# Patient Record
Sex: FEMALE | Race: WHITE | NOT HISPANIC OR LATINO | Employment: OTHER | URBAN - METROPOLITAN AREA
[De-identification: names, ages, dates, MRNs, and addresses within clinical notes are randomized per-mention and may not be internally consistent; named-entity substitution may affect disease eponyms.]

---

## 2017-01-19 ENCOUNTER — OFFICE VISIT (OUTPATIENT)
Dept: PHARMACY | Facility: PHYSICIAN GROUP | Age: 67
End: 2017-01-19

## 2017-01-19 ENCOUNTER — DOCUMENTATION ONLY (OUTPATIENT)
Dept: PHARMACY | Facility: PHYSICIAN GROUP | Age: 67
End: 2017-01-19

## 2017-01-19 DIAGNOSIS — E11.9 CONTROLLED TYPE 2 DIABETES MELLITUS WITHOUT COMPLICATION, WITH LONG-TERM CURRENT USE OF INSULIN (H): Primary | ICD-10-CM

## 2017-01-19 DIAGNOSIS — Z53.9 ERRONEOUS ENCOUNTER--DISREGARD: Primary | ICD-10-CM

## 2017-01-19 DIAGNOSIS — Z79.4 CONTROLLED TYPE 2 DIABETES MELLITUS WITHOUT COMPLICATION, WITH LONG-TERM CURRENT USE OF INSULIN (H): Primary | ICD-10-CM

## 2017-01-19 LAB — HBA1C MFR BLD: 7 % (ref 4–7)

## 2017-01-19 PROCEDURE — 84443 ASSAY THYROID STIM HORMONE: CPT | Mod: 90 | Performed by: FAMILY MEDICINE

## 2017-01-19 PROCEDURE — 83036 HEMOGLOBIN GLYCOSYLATED A1C: CPT | Performed by: FAMILY MEDICINE

## 2017-01-19 PROCEDURE — 36415 COLL VENOUS BLD VENIPUNCTURE: CPT | Performed by: FAMILY MEDICINE

## 2017-01-19 PROCEDURE — 80048 BASIC METABOLIC PNL TOTAL CA: CPT | Mod: 90 | Performed by: FAMILY MEDICINE

## 2017-01-19 NOTE — Clinical Note
[facility]  [facility] [address]  [place]  [address]    January 23, 2017        Alma Kraft  [address] [facility] [address]              Dear Alma Kraft      LAB RESULTS:     The results of your recent Kidney Tests, Potassium and Thyroid Function were NORMAL.    Fasting blood sugar is 124.  Hemoglobin A1C has increased slightly to 7- and is at the upper limit for diabetis control.      If you have any further questions or problems, please contact our office at [phone].          Susu Brody MD

## 2017-01-19 NOTE — PATIENT INSTRUCTIONS
Arthritis:    1. Try tylenol first; max of 6 tablets of the Extra Strength Tylenol daily   2. Advil   3. Aspercreme.  Apply to hands at night. Avoid touching eyes/mouth after applying  Labs:   1. A1c   2. TSH   3. BMP-kidney function

## 2017-01-19 NOTE — PROGRESS NOTES
"Clinical Consult:                                                    Alma Kraft is a 66 year old female coming in for a clinical pharmacist consult.  She was referred to me from Dr. Brody.     Reason for Consult: diabetes     Discussion: Gets really hungry around 4pm, stress eats often. Family stress- several family members with mental illnesses and she is primarily responsible for everyone. She is not seeing a therapist- on Venlafaxine ER 150mg daily and 10mg amitriptyline. Complains of dry mouth-  (amitriptyline for HA). Bought Biotene but hasn't tried it yet. Gets HA \"once in awhile\" takes 3 Advil and uses heat wrap. Perhaps d/c amitriptyline? Pt would benefit from working on other coping mechanisms instead of stress eating-->behavioral health consult?     Levemir 25 units of insulin qam currently with metofrmin 2000mg daily.    B yesterday, 136,142,112 (fasting)  Not checking her sugars in the afternoon because it is hard to remember.   Labs: a1c, tsh, bmp due.   Vitals: /77 HR 69    She had a question on what to take for arthritis in her hands. Using the Advil currently.       Plan:  1. Labs today  2. Behavioral health phone number- set up therapist.   3. PCP to consider trial off amitriptyline.     Changes ordered per CPA with Dr. Brody.    Sushila Fernandes, Pharm.D, [facility]  Medication Therapy Management Pharmacist  [phone]      "

## 2017-01-20 LAB
BUN SERPL-MCNC: 20 MG/DL (ref 7–25)
BUN/CREATININE RATIO: 43 (CALC) (ref 6–22)
CALCIUM SERPL-MCNC: 9 MG/DL (ref 8.6–10.4)
CHLORIDE SERPLBLD-SCNC: 102 MMOL/L (ref 98–110)
CO2 SERPL-SCNC: 27 MMOL/L (ref 20–31)
CREAT SERPL-MCNC: 0.47 MG/DL (ref 0.5–0.99)
EGFR AFRICAN AMERICAN - QUEST: 118 ML/MIN/1.73M2
GFR SERPL CREATININE-BSD FRML MDRD: 102 ML/MIN/1.73M2
GLUCOSE - QUEST: 124 MG/DL (ref 65–99)
POTASSIUM SERPL-SCNC: 3.8 MMOL/L (ref 3.5–5.3)
SODIUM SERPL-SCNC: 141 MMOL/L (ref 135–146)
TSH SERPL-ACNC: 2.28 MIU/L (ref 0.4–4.5)

## 2017-01-23 ENCOUNTER — CARE COORDINATION (OUTPATIENT)
Dept: CASE MANAGEMENT | Facility: CLINIC | Age: 67
End: 2017-01-23

## 2017-01-25 NOTE — PROGRESS NOTES
Clinic Care Coordination Contact  Outreach    Call placed to patient to follow-up on BHP referral information and identify if patient has any additional need for resources. Pt declined needing assistance at this time. Will mail Care Coordination introduction letter with contact information and remain avialable in clinic.    [facility]  Clinic Care Coordinator  [facility]/ [facility]  [phone]

## 2017-02-15 ENCOUNTER — TRANSFERRED RECORDS (OUTPATIENT)
Dept: FAMILY MEDICINE | Facility: CLINIC | Age: 67
End: 2017-02-15

## 2017-02-15 ENCOUNTER — OFFICE VISIT (OUTPATIENT)
Dept: FAMILY MEDICINE | Facility: CLINIC | Age: 67
End: 2017-02-15

## 2017-02-15 VITALS
OXYGEN SATURATION: 98 % | TEMPERATURE: 98.1 F | HEART RATE: 77 BPM | DIASTOLIC BLOOD PRESSURE: 70 MMHG | BODY MASS INDEX: 48.46 KG/M2 | SYSTOLIC BLOOD PRESSURE: 138 MMHG | WEIGHT: 250.2 LBS

## 2017-02-15 DIAGNOSIS — R51.9 CHRONIC NONINTRACTABLE HEADACHE, UNSPECIFIED HEADACHE TYPE: ICD-10-CM

## 2017-02-15 DIAGNOSIS — F51.04 PSYCHOPHYSIOLOGICAL INSOMNIA: ICD-10-CM

## 2017-02-15 DIAGNOSIS — G89.29 CHRONIC NONINTRACTABLE HEADACHE, UNSPECIFIED HEADACHE TYPE: ICD-10-CM

## 2017-02-15 DIAGNOSIS — F32.5 MAJOR DEPRESSION IN REMISSION (H): ICD-10-CM

## 2017-02-15 PROCEDURE — 99213 OFFICE O/P EST LOW 20 MIN: CPT | Performed by: FAMILY MEDICINE

## 2017-02-15 RX ORDER — LOSARTAN POTASSIUM AND HYDROCHLOROTHIAZIDE 25; 100 MG/1; MG/1
1 TABLET ORAL DAILY
Qty: 90 TABLET | Refills: 1 | Status: CANCELLED | OUTPATIENT
Start: 2017-02-15

## 2017-02-15 RX ORDER — POTASSIUM CHLORIDE 750 MG/1
CAPSULE, EXTENDED RELEASE ORAL
Qty: 360 CAPSULE | Refills: 1 | Status: CANCELLED | OUTPATIENT
Start: 2017-02-15

## 2017-02-15 RX ORDER — AMITRIPTYLINE HYDROCHLORIDE 10 MG/1
10 TABLET ORAL DAILY
Qty: 90 TABLET | Refills: 3 | Status: SHIPPED | OUTPATIENT
Start: 2017-02-15 | End: 2018-02-21

## 2017-02-15 RX ORDER — AMLODIPINE BESYLATE 5 MG/1
5 TABLET ORAL DAILY
Qty: 90 TABLET | Refills: 1 | Status: CANCELLED | OUTPATIENT
Start: 2017-02-15

## 2017-02-15 RX ORDER — VENLAFAXINE HYDROCHLORIDE 150 MG/1
150 CAPSULE, EXTENDED RELEASE ORAL DAILY
Qty: 90 CAPSULE | Refills: 3 | Status: SHIPPED | OUTPATIENT
Start: 2017-02-15 | End: 2018-02-21

## 2017-02-15 RX ORDER — ATORVASTATIN CALCIUM 10 MG/1
TABLET, FILM COATED ORAL
Qty: 90 TABLET | Refills: 3 | Status: CANCELLED | OUTPATIENT
Start: 2017-02-15

## 2017-02-15 NOTE — MR AVS SNAPSHOT
After Visit Summary   2/15/2017    Alma Kraft    MRN: [id number]           Patient Information     Date Of Birth          1950        Visit Information        Provider Department      2/15/2017 9:30 AM Susu Brody MD [facility]        Today's Diagnoses     Psychophysiological insomnia        Major depression in remission (H)        Chronic nonintractable headache, unspecified headache type           Follow-ups after your visit        Follow-up notes from your care team     Return for diabetis recheck with fasting labs.      Who to contact     If you have questions or need follow up information about today's clinic visit or your schedule please contact [facility] directly at [phone].  Normal or non-critical lab and imaging results will be communicated to you by MyChart, letter or phone within 4 business days after the clinic has received the results. If you do not hear from us within 7 days, please contact the clinic through MyChart or phone. If you have a critical or abnormal lab result, we will notify you by phone as soon as possible.  Submit refill requests through [employer] or call your pharmacy and they will forward the refill request to us. Please allow 3 business days for your refill to be completed.          Additional Information About Your Visit        Care EveryWhere ID     This is your Care EveryWhere ID. This could be used by other organizations to access your [facility] medical records  [id number]        Your Vitals Were     Pulse Temperature Last Period Pulse Oximetry BMI (Body Mass Index)       77 98.1  F (36.7  C) (Oral) 05/08/2001 98% 48.46 kg/m2        Blood Pressure from Last 3 Encounters:   02/15/17 138/70   12/14/16 140/74   10/06/16 138/84    Weight from Last 3 Encounters:   02/15/17 113.5 kg (250 lb 3.2 oz)   12/14/16 112.4 kg (247 lb 12.8 oz)   10/06/16 110.2 kg (243 lb)              Today, you had the  following     No orders found for display         Where to get your medicines      These medications were sent to [id number] - [place], [place] - [address]  [address], [facility] [address]     Phone:  [phone]     amitriptyline 10 MG tablet    venlafaxine 150 MG 24 hr capsule          Primary Care Provider Office Phone # Fax #    Susu Brody [phone]       [facility] [address]  [facility] [address]        Thank you!     Thank you for choosing [facility]  for your care. Our goal is always to provide you with excellent care. Hearing back from our patients is one way we can continue to improve our services. Please take a few minutes to complete the written survey that you may receive in the mail after your visit with us. Thank you!             Your Updated Medication List - Protect others around you: Learn how to safely use, store and throw away your medicines at www.disposemymeds.org.          This list is accurate as of: 2/15/17  8:04 PM.  Always use your most recent med list.                   Brand Name Dispense Instructions for use    amitriptyline 10 MG tablet    ELAVIL    90 tablet    Take 1 tablet (10 mg) by mouth daily       amLODIPine 5 MG tablet    NORVASC    90 tablet    Take 1 tablet (5 mg) by mouth daily       aspirin 81 MG tablet     100    1 tab po QD (Once per day)       atorvastatin 10 MG tablet    LIPITOR    90 tablet    TAKE 1 TABLET (10 MG) BY MOUTH DAILY       blood glucose monitoring lancets     1 Box    Use to test blood sugars 2 times daily or as directed.       blood glucose monitoring meter device kit     1 kit    Use to test blood sugars 2 times daily or as directed.       * blood glucose monitoring test strip    ONE TOUCH VERIO IQ    200 each    Use to test blood sugar 2 times daily or as directed.       * blood glucose monitoring test strip    no brand specified    100  each    Use to test blood sugars bid times daily or as directed  One Touch Ultra Test Strips       budesonide 32 MCG/ACT spray    RHINOCORT AQUA    3 Bottle    Spray 2 sprays into both nostrils daily       CENTRUM SILVER per tablet     100 tablet    Take  by mouth. 1 tablet 3 times weekly       insulin detemir 100 UNIT/ML injection    LEVEMIR FLEXPEN/FLEXTOUCH    3 Month    20 units at bedtime       insulin pen needle 32G X 4 MM    BD ROSE U/F    1 each    Use 1 daily as directed.       losartan-hydrochlorothiazide 100-25 MG per tablet    HYZAAR    90 tablet    Take 1 tablet by mouth daily       metFORMIN 500 MG 24 hr tablet    GLUCOPHAGE-XR    360 tablet    Take 4 tablets (2,000 mg) by mouth daily (with dinner)       order for DME     3 Device    Equipment being ordered: Mediven plus compression stockings  84893 20-30 compression       potassium chloride 10 MEQ CR capsule    MICRO-K    360 capsule    TAKE 2 TABLETS BY MOUTH TWICE DAILY       venlafaxine 150 MG 24 hr capsule    EFFEXOR XR    90 capsule    Take 1 capsule (150 mg) by mouth daily       * Notice:  This list has 2 medication(s) that are the same as other medications prescribed for you. Read the directions carefully, and ask your doctor or other care provider to review them with you.

## 2017-02-15 NOTE — PROGRESS NOTES
SUBJECTIVE:                                                    Alma Kraft is a 66 year old female who presents to clinic today for the following health issues:      Depression Followup    Status since last visit: Stable      See PHQ-9 for current symptoms.  Other associated symptoms: overeating- back on track with mindful eating-     Complicating factors: sleep is good/ recheck with sleep physicians/ on CPAP  Significant life event:  No   Current substance abuse:  None  Anxiety or Panic symptoms:  No    PHQ-9  English PHQ-9   Any Language            Amount of exercise or physical activity: sedentary    Problems taking medications regularly: No    Medication side effects: none  Diet: diabetic    PROBLEMS TO ADD ON...  Diabetis: up to date with care  History of headaches-     Has been on amitriptyline since the 1980's- started when her  lost his job-  She had intractable headaches  ?? Stop amitriptyline- advised by MTM  Rare headaches currently- on minimal dose    Venlafaxine: initially prescribed ten plus years-  had mental illness- menopause years  Has been on ever since- symptoms in remission- wishes to continue    Problem list and histories reviewed & adjusted, as indicated.  Additional history: as documented    Patient Active Problem List   Diagnosis     Essential hypertension, benign     Menopausal and postmenopausal disorder     Mixed hyperlipidemia     Obesity, morbid, BMI 40.0-49.9 (H)     Esophageal reflux     Allergic rhinitis     Obstructive sleep apnea     Family history of malignant neoplasm of breast     ACP (advance care planning)     Diabetes type 2, controlled (H)     Adjustment disorder with mixed anxiety and depressed mood     Acne     Health Care Home     Past Surgical History   Procedure Laterality Date     Hc remove tonsils/adenoids,<12 y/o  1953     T & A <11y.o.     C appendectomy  1956     C vaginal hysterectomy  8/01     Hysterectomy, Vaginal & BSO     Hc colonoscopy  w/wo brush/wash  6/03     C eye exam established pt  4/20/11     No retinopathy     Hc knee scope, diagnostic  4/2005     Arthroscopy, Knee Left     C total knee arthroplasty  2/06     Knee Replacement, Total Right     C total knee arthroplasty  3/5/07     Knee Replacement, Total  Left     Test not found  6/27/07     R heel/ inocencio's deformity     ------------other-------------  9/5/2013     L hand, cyst excision       Social History   Substance Use Topics     Smoking status: Never Smoker     Smokeless tobacco: Never Used     Alcohol use 0.0 oz/week     0 drink(s) per week      Comment: very rare     Family History   Problem Relation Age of Onset     Alzheimer Disease No family hx of      CANCER Father      prostate     DIABETES No family hx of      HEART DISEASE Paternal Grandfather      CEREBROVASCULAR DISEASE Maternal Grandfather      HEART DISEASE Maternal Grandmother      GASTROINTESTINAL DISEASE Father      GERD     GASTROINTESTINAL DISEASE Other      Niece with GERD/hiatal hernia     Breast Cancer Sister      51         Current Outpatient Prescriptions   Medication Sig Dispense Refill     amitriptyline (ELAVIL) 10 MG tablet Take 1 tablet (10 mg) by mouth daily 90 tablet 3     venlafaxine (EFFEXOR XR) 150 MG 24 hr capsule Take 1 capsule (150 mg) by mouth daily 90 capsule 3     insulin detemir (LEVEMIR FLEXPEN/FLEXTOUCH) 100 UNIT/ML injection 20 units at bedtime 3 Month 1     metFORMIN (GLUCOPHAGE-XR) 500 MG 24 hr tablet Take 4 tablets (2,000 mg) by mouth daily (with dinner) 360 tablet 1     amLODIPine (NORVASC) 5 MG tablet Take 1 tablet (5 mg) by mouth daily 90 tablet 1     potassium chloride (MICRO-K) 10 MEQ CR capsule TAKE 2 TABLETS BY MOUTH TWICE DAILY 360 capsule 1     losartan-hydrochlorothiazide (HYZAAR) 100-25 MG per tablet Take 1 tablet by mouth daily 90 tablet 1     budesonide (RHINOCORT AQUA) 32 MCG/ACT nasal spray Spray 2 sprays into both nostrils daily 3 Bottle 3     blood glucose monitoring (ONE  TOUCH DELICA) lancets Use to test blood sugars 2 times daily or as directed. 1 Box prn     blood glucose monitoring (NO BRAND SPECIFIED) test strip Use to test blood sugars bid times daily or as directed    One Touch Ultra Test Strips 100 each 3     atorvastatin (LIPITOR) 10 MG tablet TAKE 1 TABLET (10 MG) BY MOUTH DAILY 90 tablet 3     blood glucose monitoring (ONE TOUCH VERIO IQ) test strip Use to test blood sugar 2 times daily or as directed. 200 each 1     insulin pen needle (BD ROSE U/F) 32G X 4 MM Use 1 daily as directed. 1 each PRN     ORDER FOR DME Equipment being ordered: Mediven plus compression stockings    [id number]  20-30 compression 3 Device 0     Blood Glucose Monitoring Suppl (ONE TOUCH ULTRA 2) W/DEVICE KIT Use to test blood sugars 2 times daily or as directed. 1 kit 0     Multiple Vitamins-Minerals (CENTRUM SILVER) per tablet Take  by mouth. 1 tablet 3 times weekly 100 tablet      ASPIRIN 81 MG OR TABS 1 tab po QD (Once per day) 100 3     [DISCONTINUED] amitriptyline (ELAVIL) 10 MG tablet Take 1 tablet (10 mg) by mouth daily 90 tablet 1     [DISCONTINUED] venlafaxine (EFFEXOR XR) 150 MG 24 hr capsule Take 1 capsule (150 mg) by mouth daily 90 capsule 1       ROS:  Constitutional, HEENT, cardiovascular, pulmonary, gi and gu systems are negative, except as otherwise noted.    OBJECTIVE:                                                    /70  Pulse 77  Temp 98.1  F (36.7  C) (Oral)  Wt 113.5 kg (250 lb 3.2 oz)  LMP 05/08/2001  SpO2 98%  BMI 48.46 kg/m2  Body mass index is 48.46 kg/(m^2).  ALert and oriented times 3; Coherent speech, normal rate and volume, able to articulate, logical thoughts, able to abstract reason, no tangential thoughts, no hallucinations or delusions, understands psychiatric illnesses as a problem with normal insight and judgment. Affect is pleasant      Diagnostic Test Results:  none      ASSESSMENT/PLAN:                                                      Problem List Items  "Addressed This Visit     Essential hypertension, benign    Diabetes type 2, controlled (H)    Adjustment disorder with mixed anxiety and depressed mood    Relevant Medications    venlafaxine (EFFEXOR XR) 150 MG 24 hr capsule      Other Visit Diagnoses     Psychophysiological insomnia        Relevant Medications    amitriptyline (ELAVIL) 10 MG tablet    Diabetes type 2, uncontrolled               BMI:   Estimated body mass index is 48.46 kg/(m^2) as calculated from the following:    Height as of 12/14/16: 1.53 m (5' 0.25\").    Weight as of this encounter: 113.5 kg (250 lb 3.2 oz).   Weight management plan: Discussed healthy diet and exercise guidelines and patient will follow up in 3 months in clinic to re-evaluate.      MEDICATIONS:  Continue current medications without change  FUTURE APPOINTMENTS:       - Follow-up visit in April for weight and diabetis check    Susu Brody MD  [facility]  "

## 2017-02-15 NOTE — NURSING NOTE
Alma is here for a fasting medication recheck.     Pre-Visit Screening :  Immunizations : up to date  Colonoscopy : is up to date  Mammogram : is up to date  Asthma Action Test/Plan : na  PHQ9/GAD7 :  Done today..    BP done on the left arm, with a large sized cuff.  Pulse - regular  My Chart - declines    CLASSIFICATION OF OVERWEIGHT AND OBESITY BY BMI                         Obesity Class           BMI(kg/m2)  Underweight                                    < 18.5  Normal                                         18.5-24.9  Overweight                                     25.0-29.9  OBESITY                     I                  30.0-34.9                              II                 35.0-39.9  EXTREME OBESITY             III                >40                             Patient's  BMI Body mass index is 48.46 kg/(m^2).  http://hin.nhlbi.nih.gov/menuplanner/menu.cgi  Questioned patient about current smoking habits.  Pt. has never smoked.    CARLENE Valerio ([facility])

## 2017-02-16 ASSESSMENT — PATIENT HEALTH QUESTIONNAIRE - PHQ9: SUM OF ALL RESPONSES TO PHQ QUESTIONS 1-9: 0

## 2017-02-27 RX ORDER — PEN NEEDLE, DIABETIC 32GX 5/32"
NEEDLE, DISPOSABLE MISCELLANEOUS
Qty: 100 EACH | Status: SHIPPED | OUTPATIENT
Start: 2017-02-27 | End: 2018-04-07

## 2017-02-27 RX ORDER — ATORVASTATIN CALCIUM 10 MG/1
TABLET, FILM COATED ORAL
Qty: 90 TABLET | Refills: 0 | Status: SHIPPED | OUTPATIENT
Start: 2017-02-27 | End: 2017-04-26

## 2017-02-27 NOTE — TELEPHONE ENCOUNTER
Pending Prescriptions:                       Disp   Refills    atorvastatin (LIPITOR) 10 MG tablet [Phar*90 tab*             Sig: TAKE ONE TABLET BY MOUTH EVERY DAY    BD ROSE U/F 32G X 4 MM insulin pen needle*100 ea*PRN          Sig: USE 1 DAILY    BJS please review and fill in QTY, on both requests.    Pt last lipid was 5-  Pt last refill was 12-  Pt last ov was 2-  [place]  [phone] ([place])

## 2017-03-16 DIAGNOSIS — J30.2 SEASONAL ALLERGIC RHINITIS, UNSPECIFIED ALLERGIC RHINITIS TRIGGER: ICD-10-CM

## 2017-03-16 NOTE — TELEPHONE ENCOUNTER
Pt last seen 2/17    Pending Prescriptions:                       Disp   Refills    budesonide (RINOCORT AQUA) 32 MCG/ACT spr*       0            Sig: INSTILL 2 SPRAYS BY NASAL ROUTE DAILY

## 2017-04-10 ENCOUNTER — TRANSFERRED RECORDS (OUTPATIENT)
Dept: FAMILY MEDICINE | Facility: CLINIC | Age: 67
End: 2017-04-10

## 2017-04-15 DIAGNOSIS — I10 ESSENTIAL HYPERTENSION, BENIGN: ICD-10-CM

## 2017-04-17 NOTE — TELEPHONE ENCOUNTER
Pending Prescriptions:                       Disp   Refills    potassium chloride (MICRO-K) 10 MEQ CR ca*360 ca*0            Sig: TAKE TWO CAPSULES BY MOUTH TWICE DAILY     Pt was here for med check on 2-/ blood work done on -1-  Please fax, deny, or send to KENIA Infante  [phone] (home)

## 2017-04-19 RX ORDER — POTASSIUM CHLORIDE 750 MG/1
CAPSULE, EXTENDED RELEASE ORAL
Qty: 360 CAPSULE | Refills: 0 | Status: SHIPPED | OUTPATIENT
Start: 2017-04-19 | End: 2017-07-08

## 2017-04-26 ENCOUNTER — OFFICE VISIT (OUTPATIENT)
Dept: FAMILY MEDICINE | Facility: CLINIC | Age: 67
End: 2017-04-26

## 2017-04-26 VITALS
OXYGEN SATURATION: 98 % | HEIGHT: 60 IN | BODY MASS INDEX: 49.43 KG/M2 | HEART RATE: 75 BPM | DIASTOLIC BLOOD PRESSURE: 80 MMHG | WEIGHT: 251.8 LBS | TEMPERATURE: 98.1 F | SYSTOLIC BLOOD PRESSURE: 136 MMHG

## 2017-04-26 DIAGNOSIS — E11.9 CONTROLLED TYPE 2 DIABETES MELLITUS WITHOUT COMPLICATION, WITH LONG-TERM CURRENT USE OF INSULIN (H): Primary | ICD-10-CM

## 2017-04-26 DIAGNOSIS — E66.01 OBESITY, MORBID, BMI 40.0-49.9 (H): ICD-10-CM

## 2017-04-26 DIAGNOSIS — Z79.4 CONTROLLED TYPE 2 DIABETES MELLITUS WITHOUT COMPLICATION, WITH LONG-TERM CURRENT USE OF INSULIN (H): Primary | ICD-10-CM

## 2017-04-26 DIAGNOSIS — F51.04 PSYCHOPHYSIOLOGICAL INSOMNIA: ICD-10-CM

## 2017-04-26 DIAGNOSIS — I10 ESSENTIAL HYPERTENSION, BENIGN: ICD-10-CM

## 2017-04-26 LAB — HBA1C MFR BLD: 6.7 % (ref 4–7)

## 2017-04-26 PROCEDURE — 80061 LIPID PANEL: CPT | Mod: 90 | Performed by: FAMILY MEDICINE

## 2017-04-26 PROCEDURE — 99214 OFFICE O/P EST MOD 30 MIN: CPT | Performed by: FAMILY MEDICINE

## 2017-04-26 PROCEDURE — 83036 HEMOGLOBIN GLYCOSYLATED A1C: CPT | Performed by: FAMILY MEDICINE

## 2017-04-26 PROCEDURE — 80048 BASIC METABOLIC PNL TOTAL CA: CPT | Mod: 90 | Performed by: FAMILY MEDICINE

## 2017-04-26 PROCEDURE — 36415 COLL VENOUS BLD VENIPUNCTURE: CPT | Performed by: FAMILY MEDICINE

## 2017-04-26 RX ORDER — METFORMIN HCL 500 MG
2000 TABLET, EXTENDED RELEASE 24 HR ORAL
Qty: 360 TABLET | Refills: 1 | Status: SHIPPED | OUTPATIENT
Start: 2017-04-26 | End: 2017-11-09

## 2017-04-26 RX ORDER — ATORVASTATIN CALCIUM 10 MG/1
10 TABLET, FILM COATED ORAL DAILY
Qty: 90 TABLET | Refills: 3 | Status: SHIPPED | OUTPATIENT
Start: 2017-04-26 | End: 2018-02-21

## 2017-04-26 RX ORDER — AMLODIPINE BESYLATE 5 MG/1
5 TABLET ORAL DAILY
Qty: 90 TABLET | Refills: 1 | Status: SHIPPED | OUTPATIENT
Start: 2017-04-26 | End: 2017-11-09

## 2017-04-26 RX ORDER — LOSARTAN POTASSIUM AND HYDROCHLOROTHIAZIDE 25; 100 MG/1; MG/1
1 TABLET ORAL DAILY
Qty: 90 TABLET | Refills: 1 | Status: SHIPPED | OUTPATIENT
Start: 2017-04-26 | End: 2017-11-09

## 2017-04-26 NOTE — MR AVS SNAPSHOT
After Visit Summary   4/26/2017    Alma Kraft    MRN: [id number]           Patient Information     Date Of Birth          1950        Visit Information        Provider Department      4/26/2017 10:45 AM Susu Brody MD [facility]        Today's Diagnoses     Controlled type 2 diabetes mellitus without complication, with long-term current use of insulin (H)    -  1    BENIGN HYPERTENSION        Obesity, morbid, BMI 40.0-49.9 (H)        Psychophysiological insomnia           Follow-ups after your visit        Who to contact     If you have questions or need follow up information about today's clinic visit or your schedule please contact [facility] directly at [phone].  Normal or non-critical lab and imaging results will be communicated to you by MyChart, letter or phone within 4 business days after the clinic has received the results. If you do not hear from us within 7 days, please contact the clinic through MyChart or phone. If you have a critical or abnormal lab result, we will notify you by phone as soon as possible.  Submit refill requests through [employer] or call your pharmacy and they will forward the refill request to us. Please allow 3 business days for your refill to be completed.          Additional Information About Your Visit        Care EveryWhere ID     This is your Care EveryWhere ID. This could be used by other organizations to access your [place] medical records  [id number]        Your Vitals Were     Pulse Temperature Height Last Period Pulse Oximetry BMI (Body Mass Index)    75 98.1  F (36.7  C) (Oral) 1.524 m (5') 05/08/2001 98% 49.18 kg/m2       Blood Pressure from Last 3 Encounters:   04/26/17 136/80   02/15/17 138/70   12/14/16 140/74    Weight from Last 3 Encounters:   04/26/17 114.2 kg (251 lb 12.8 oz)   02/15/17 113.5 kg (250 lb 3.2 oz)   12/14/16 112.4 kg (247 lb 12.8 oz)              We Performed the  Following     BASIC METABOLIC PANEL ([facility])     Hemoglobin A1c ([facility])     Lipid Profile     VENOUS COLLECTION          Today's Medication Changes          These changes are accurate as of: 4/26/17  9:24 PM.  If you have any questions, ask your nurse or doctor.               These medicines have changed or have updated prescriptions.        Dose/Directions    atorvastatin 10 MG tablet   Commonly known as:  LIPITOR   This may have changed:  See the new instructions.   Used for:  Controlled type 2 diabetes mellitus without complication, with long-term current use of insulin (H)   Changed by:  Susu Brody MD        Dose:  10 mg   Take 1 tablet (10 mg) by mouth daily   Quantity:  90 tablet   Refills:  3       insulin detemir 100 UNIT/ML injection   Commonly known as:  LEVEMIR FLEXPEN/FLEXTOUCH   This may have changed:  additional instructions   Used for:  Controlled type 2 diabetes mellitus without complication, with long-term current use of insulin (H)   Changed by:  Susu Brody MD        25 units at bedtime   Quantity:  3 mL   Refills:  2            Where to get your medicines      These medications were sent to [id number] - [place], [place] - [address] [id number]     Phone:  [phone]     amLODIPine 5 MG tablet    atorvastatin 10 MG tablet    blood glucose monitoring lancets    insulin detemir 100 UNIT/ML injection    losartan-hydrochlorothiazide 100-25 MG per tablet    metFORMIN 500 MG 24 hr tablet         Some of these will need a paper prescription and others can be bought over the counter.  Ask your nurse if you have questions.     Bring a paper prescription for each of these medications     blood glucose monitoring test strip                Primary Care Provider Office Phone # Fax #    Susu Brody [phone]       [facility] [address] [address]        Thank you!     Thank  you for choosing [facility]  for your care. Our goal is always to provide you with excellent care. Hearing back from our patients is one way we can continue to improve our services. Please take a few minutes to complete the written survey that you may receive in the mail after your visit with us. Thank you!             Your Updated Medication List - Protect others around you: Learn how to safely use, store and throw away your medicines at www.disposemymeds.org.          This list is accurate as of: 4/26/17  9:24 PM.  Always use your most recent med list.                   Brand Name Dispense Instructions for use    amitriptyline 10 MG tablet    ELAVIL    90 tablet    Take 1 tablet (10 mg) by mouth daily       amLODIPine 5 MG tablet    NORVASC    90 tablet    Take 1 tablet (5 mg) by mouth daily       aspirin 81 MG tablet     100    1 tab po QD (Once per day)       atorvastatin 10 MG tablet    LIPITOR    90 tablet    Take 1 tablet (10 mg) by mouth daily       BD ROSE U/F 32G X 4 MM   Generic drug:  insulin pen needle     100 each    USE 1 DAILY       blood glucose monitoring lancets     1 Box    Use to test blood sugars 2 times daily or as directed.       blood glucose monitoring meter device kit     1 kit    Use to test blood sugars 2 times daily or as directed.       * blood glucose monitoring test strip    ONE TOUCH VERIO IQ    200 each    Use to test blood sugar 2 times daily or as directed.       * blood glucose monitoring test strip    no brand specified    100 each    Use to test blood sugars bid times daily or as directed  One Touch Ultra Test Strips       budesonide 32 MCG/ACT spray    RINOCORT AQUA    1 Bottle    INSTILL 2 SPRAYS BY NASAL ROUTE DAILY       CENTRUM SILVER per tablet     100 tablet    Take  by mouth. 1 tablet 3 times weekly       insulin detemir 100 UNIT/ML injection    LEVEMIR FLEXPEN/FLEXTOUCH    3 mL    25 units at bedtime       losartan-hydrochlorothiazide 100-25 MG  per tablet    HYZAAR    90 tablet    Take 1 tablet by mouth daily       metFORMIN 500 MG 24 hr tablet    GLUCOPHAGE-XR    360 tablet    Take 4 tablets (2,000 mg) by mouth daily (with dinner)       order for DME     3 Device    Equipment being ordered: Mediven plus compression stockings  [id number] 20-30 compression       potassium chloride 10 MEQ CR capsule    MICRO-K    360 capsule    TAKE TWO CAPSULES BY MOUTH TWICE DAILY       venlafaxine 150 MG 24 hr capsule    EFFEXOR XR    90 capsule    Take 1 capsule (150 mg) by mouth daily       * Notice:  This list has 2 medication(s) that are the same as other medications prescribed for you. Read the directions carefully, and ask your doctor or other care provider to review them with you.

## 2017-04-26 NOTE — NURSING NOTE
Jeny is here for a medication recheck.      Pre-Visit Screening :  Immunizations : not up to date - Tdap    Colonoscopy : is up to date  Mammogram : is up to date  Asthma Action Test/Plan : alvina  PHQ9/GAD7 :  Na    Pulse - regular  My Chart - accepts    CLASSIFICATION OF OVERWEIGHT AND OBESITY BY BMI                         Obesity Class           BMI(kg/m2)  Underweight                                    < 18.5  Normal                                         18.5-24.9  Overweight                                     25.0-29.9  OBESITY                     I                  30.0-34.9                              II                 35.0-39.9  EXTREME OBESITY             III                >40                             Patient's  BMI Body mass index is 49.18 kg/(m^2).  http://hin.nhlbi.nih.gov/menuplanner/menu.cgi  Questioned patient about current smoking habits.  Pt. has never smoked.  Namita.CARLENE Dhillon ([facility])

## 2017-04-26 NOTE — PROGRESS NOTES
SUBJECTIVE:                                                    Alma Kraft is a 66 year old female who presents to clinic today for the following health issues:      Diabetes Follow-up    Patient is checking blood sugars: once daily.  Results are as follows:         am - 130's- not consistently checking pm    Diabetic concerns: Worried about her control     Symptoms of hypoglycemia (low blood sugar): none     Paresthesias (numbness or burning in feet) or sores: No   Date of last diabetic eye exam: Left eye, blurred vision- has seen Dr Barbosa - normal exam    Recheck again this week- symptoms continue     Amount of exercise or physical activity:Exercising, but not with husbands recent surgery/    Treadmill- 1.5 miles     Problems taking medications regularly: taking care of  and daughter- less consistent with dosing    Medication side effects: none    Diet: mindful- counting carbs- healthy meals- needs to curb snacking      PROBLEMS TO ADD ON...  Morbid obesity: weight gain of fifteen pounds this year.    Problem list and histories reviewed & adjusted, as indicated.  Additional history: as documented    Patient Active Problem List   Diagnosis     Essential hypertension, benign     Menopausal and postmenopausal disorder     Mixed hyperlipidemia     Obesity, morbid, BMI 40.0-49.9 (H)     Esophageal reflux     Allergic rhinitis     Obstructive sleep apnea     Family history of malignant neoplasm of breast     ACP (advance care planning)     Diabetes type 2, controlled (H)     Adjustment disorder with mixed anxiety and depressed mood     Acne     Health Care Home     Past Surgical History:   Procedure Laterality Date     ------------OTHER-------------  9/5/2013    L hand, cyst excision     C APPENDECTOMY  1956     C EYE EXAM ESTABLISHED PT  4/20/11    No retinopathy     C TOTAL KNEE ARTHROPLASTY  2/06    Knee Replacement, Total Right     C TOTAL KNEE ARTHROPLASTY  3/5/07    Knee Replacement, Total   Left     C VAGINAL HYSTERECTOMY  8/01    Hysterectomy, Vaginal & BSO     HC COLONOSCOPY W/WO BRUSH/WASH  6/03     HC KNEE SCOPE, DIAGNOSTIC  4/2005    Arthroscopy, Knee Left     HC REMOVE TONSILS/ADENOIDS,<10 Y/O  1953    T & A <11y.o.     TEST NOT FOUND  6/27/07    R heel/ inocencio's deformity       Social History   Substance Use Topics     Smoking status: Never Smoker     Smokeless tobacco: Never Used     Alcohol use 0.0 oz/week     0 drink(s) per week      Comment: very rare     Family History   Problem Relation Age of Onset     Alzheimer Disease No family hx of      CANCER Father      prostate     DIABETES No family hx of      HEART DISEASE Paternal Grandfather      CEREBROVASCULAR DISEASE Maternal Grandfather      HEART DISEASE Maternal Grandmother      GASTROINTESTINAL DISEASE Father      GERD     GASTROINTESTINAL DISEASE Other      Niece with GERD/hiatal hernia     Breast Cancer Sister      51         Current Outpatient Prescriptions   Medication Sig Dispense Refill     atorvastatin (LIPITOR) 10 MG tablet Take 1 tablet (10 mg) by mouth daily 90 tablet 3     insulin detemir (LEVEMIR FLEXPEN/FLEXTOUCH) 100 UNIT/ML injection 25 units at bedtime 3 mL 2     amLODIPine (NORVASC) 5 MG tablet Take 1 tablet (5 mg) by mouth daily 90 tablet 1     losartan-hydrochlorothiazide (HYZAAR) 100-25 MG per tablet Take 1 tablet by mouth daily 90 tablet 1     metFORMIN (GLUCOPHAGE-XR) 500 MG 24 hr tablet Take 4 tablets (2,000 mg) by mouth daily (with dinner) 360 tablet 1     blood glucose monitoring (ONE TOUCH DELICA) lancets Use to test blood sugars 2 times daily or as directed. 1 Box prn     blood glucose monitoring (NO BRAND SPECIFIED) test strip Use to test blood sugars bid times daily or as directed    One Touch Ultra Test Strips 100 each 3     potassium chloride (MICRO-K) 10 MEQ CR capsule TAKE TWO CAPSULES BY MOUTH TWICE DAILY  360 capsule 0     budesonide (RINOCORT AQUA) 32 MCG/ACT spray INSTILL 2 SPRAYS BY NASAL ROUTE  DAILY 1 Bottle 11     BD ROSE U/F 32G X 4 MM insulin pen needle USE 1 DAILY 100 each PRN     amitriptyline (ELAVIL) 10 MG tablet Take 1 tablet (10 mg) by mouth daily 90 tablet 3     venlafaxine (EFFEXOR XR) 150 MG 24 hr capsule Take 1 capsule (150 mg) by mouth daily 90 capsule 3     blood glucose monitoring (ONE TOUCH VERIO IQ) test strip Use to test blood sugar 2 times daily or as directed. 200 each 1     ORDER FOR DME Equipment being ordered: Mediven plus compression stockings    03645  20-30 compression 3 Device 0     Blood Glucose Monitoring Suppl (ONE TOUCH ULTRA 2) W/DEVICE KIT Use to test blood sugars 2 times daily or as directed. 1 kit 0     Multiple Vitamins-Minerals (CENTRUM SILVER) per tablet Take  by mouth. 1 tablet 3 times weekly 100 tablet      ASPIRIN 81 MG OR TABS 1 tab po QD (Once per day) 100 3     [DISCONTINUED] atorvastatin (LIPITOR) 10 MG tablet TAKE ONE TABLET BY MOUTH EVERY DAY 90 tablet 0     [DISCONTINUED] insulin detemir (LEVEMIR FLEXPEN/FLEXTOUCH) 100 UNIT/ML injection 20 units at bedtime 3 Month 1     [DISCONTINUED] metFORMIN (GLUCOPHAGE-XR) 500 MG 24 hr tablet Take 4 tablets (2,000 mg) by mouth daily (with dinner) 360 tablet 1     [DISCONTINUED] amLODIPine (NORVASC) 5 MG tablet Take 1 tablet (5 mg) by mouth daily 90 tablet 1     [DISCONTINUED] losartan-hydrochlorothiazide (HYZAAR) 100-25 MG per tablet Take 1 tablet by mouth daily 90 tablet 1       Reviewed and updated as needed this visit by clinical staff       Reviewed and updated as needed this visit by Provider         ROS:    ROS: 7 point ROS neg other than the symptoms noted above in the HPI.  She complains of difficulty falling asleep  She is tired.    OBJECTIVE:                                                    /80 (BP Location: Right arm, Patient Position: Chair, Cuff Size: Adult Large)  Pulse 75  Temp 98.1  F (36.7  C) (Oral)  Ht 1.524 m (5')  Wt 114.2 kg (251 lb 12.8 oz)  LMP 05/08/2001  SpO2 98%  BMI 49.18  kg/m2  Body mass index is 49.18 kg/(m^2).  Regular rate and  rhythm. S1 and S2 normal, no murmurs, clicks, gallops or rubs. No edema or JVD. Chest is clear; no wheezes or rales.      Diagnostic Test Results:  Results for orders placed or performed in visit on 04/26/17 (from the past 24 hour(s))   Hemoglobin A1c (BFP)   Result Value Ref Range    Hemoglobin A1C 6.7 4.0 - 7.0 %        ASSESSMENT/PLAN:                                                      Problem List Items Addressed This Visit     Essential hypertension, benign    Relevant Orders    BASIC METABOLIC PANEL ([facility])    VENOUS COLLECTION (Completed)    Albumin Random Urine Quantitative    Diabetes type 2, controlled (H)    Relevant Orders    VENOUS COLLECTION (Completed)    Albumin Random Urine Quantitative    Obesity, morbid, BMI 40.0-49.9 (H) - Primary    Relevant Orders    Lipid Profile    VENOUS COLLECTION (Completed)    Albumin Random Urine Quantitative      Other Visit Diagnoses     Uncontrolled type 2 diabetes mellitus without complication, with long-term current use of insulin (H)        Relevant Orders    Hemoglobin A1c (BFP)    VENOUS COLLECTION (Completed)    Albumin Random Urine Quantitative    Psychophysiological insomnia        Relevant Orders    VENOUS COLLECTION (Completed)    Albumin Random Urine Quantitative    Chronic nonintractable headache, unspecified headache type        Relevant Orders    VENOUS COLLECTION (Completed)    Albumin Random Urine Quantitative           BMI:   Estimated body mass index is 49.18 kg/(m^2) as calculated from the following:    Height as of this encounter: 1.524 m (5').    Weight as of this encounter: 114.2 kg (251 lb 12.8 oz).   Weight management plan: Discussed healthy diet and exercise guidelines and patient will follow up in 6 months in clinic to re-evaluate.      MEDICATIONS:  Continue current medications without change  FUTURE APPOINTMENTS:       - Follow-up visit in 6 months  Work on weight loss  Regular  exercise  Self care-     Susu Brody MD  [facility]

## 2017-04-26 NOTE — LETTER
[facility]  [facility] [address]  [place]  [address]    April 27, 2017        Alma Kraft  [address] [address]              Dear Alma Kraft      LAB RESULTS:     The results of your recent Kidney Tests, Lipid profile and Potassium were NORMAL.    Blood sugar is 132.       If you have any further questions or problems, please contact our office at [phone].          Susu Brody MD

## 2017-04-27 LAB
BUN SERPL-MCNC: 21 MG/DL (ref 7–25)
BUN/CREATININE RATIO: 47 (CALC) (ref 6–22)
CALCIUM SERPL-MCNC: 9.3 MG/DL (ref 8.6–10.4)
CHLORIDE SERPLBLD-SCNC: 101 MMOL/L (ref 98–110)
CHOLEST SERPL-MCNC: 165 MG/DL (ref 125–200)
CHOLEST/HDLC SERPL: 2.4 (CALC)
CO2 SERPL-SCNC: 29 MMOL/L (ref 20–31)
CREAT SERPL-MCNC: 0.45 MG/DL (ref 0.5–0.99)
EGFR AFRICAN AMERICAN - QUEST: 120 ML/MIN/1.73M2
GFR SERPL CREATININE-BSD FRML MDRD: 104 ML/MIN/1.73M2
GLUCOSE - QUEST: 132 MG/DL (ref 65–99)
HDLC SERPL-MCNC: 69 MG/DL
LDLC SERPL CALC-MCNC: 82 MG/DL (CALC)
NONHDLC SERPL-MCNC: 96 MG/DL (CALC)
POTASSIUM SERPL-SCNC: 3.7 MMOL/L (ref 3.5–5.3)
SODIUM SERPL-SCNC: 140 MMOL/L (ref 135–146)
TRIGL SERPL-MCNC: 68 MG/DL

## 2017-04-29 DIAGNOSIS — E11.9 CONTROLLED TYPE 2 DIABETES MELLITUS WITHOUT COMPLICATION, WITH LONG-TERM CURRENT USE OF INSULIN (H): ICD-10-CM

## 2017-04-29 DIAGNOSIS — Z79.4 CONTROLLED TYPE 2 DIABETES MELLITUS WITHOUT COMPLICATION, WITH LONG-TERM CURRENT USE OF INSULIN (H): ICD-10-CM

## 2017-05-01 DIAGNOSIS — Z79.4 CONTROLLED TYPE 2 DIABETES MELLITUS WITHOUT COMPLICATION, WITH LONG-TERM CURRENT USE OF INSULIN (H): ICD-10-CM

## 2017-05-01 DIAGNOSIS — E11.9 CONTROLLED TYPE 2 DIABETES MELLITUS WITHOUT COMPLICATION, WITH LONG-TERM CURRENT USE OF INSULIN (H): ICD-10-CM

## 2017-05-01 RX ORDER — LANCETS 33 GAUGE
EACH MISCELLANEOUS
Qty: 100 EACH | Refills: 2 | OUTPATIENT
Start: 2017-05-01

## 2017-05-01 NOTE — TELEPHONE ENCOUNTER
Refused Prescriptions:                       Disp   Refills    ONETOUCH DELICA LANCETS 33G MISC [Pharmacy*100 ea*2        Sig: TEST 2 TIMES DAILY  Refused By: JENNIFER REARDON  Reason for Refusal: Request already responded to by other means (phone, fax, etc.)  Reason for Refusal Comment: refilled 4-     Naresh  [phone] (home)

## 2017-05-01 NOTE — TELEPHONE ENCOUNTER
Alma Kraft is requesting a refill of:    Pending Prescriptions:                       Disp   Refills    blood glucose monitoring (NO BRAND SPECIF*100 ea*3            Sig: Use to test blood sugars bid times daily Ruby           strips    Needs the Ruby strips for testing

## 2017-05-02 ENCOUNTER — TELEPHONE (OUTPATIENT)
Dept: FAMILY MEDICINE | Facility: CLINIC | Age: 67
End: 2017-05-02

## 2017-05-02 NOTE — TELEPHONE ENCOUNTER
We have received orders from [facility] for Diabetic testing supplies.     Orders are in your box and ready for you to review and sign.     CARLENE Valerio ([facility])

## 2017-05-03 NOTE — TELEPHONE ENCOUNTER
Orders have been signed and faxed.   Ready for scanning.     Namita.CARLENE Dhillon ([facility])

## 2017-05-12 ENCOUNTER — OFFICE VISIT (OUTPATIENT)
Dept: FAMILY MEDICINE | Facility: CLINIC | Age: 67
End: 2017-05-12

## 2017-05-12 VITALS
HEIGHT: 60 IN | HEART RATE: 75 BPM | TEMPERATURE: 98 F | WEIGHT: 254.2 LBS | OXYGEN SATURATION: 98 % | BODY MASS INDEX: 49.9 KG/M2 | SYSTOLIC BLOOD PRESSURE: 120 MMHG | DIASTOLIC BLOOD PRESSURE: 60 MMHG

## 2017-05-12 DIAGNOSIS — H02.9 DISORDER OF EYELID: ICD-10-CM

## 2017-05-12 DIAGNOSIS — Z01.818 PREOPERATIVE EXAMINATION: Primary | ICD-10-CM

## 2017-05-12 LAB
ERYTHROCYTE [DISTWIDTH] IN BLOOD BY AUTOMATED COUNT: 12.4 %
HCT VFR BLD AUTO: 40.2 % (ref 35–47)
HEMOGLOBIN: 12.9 G/DL (ref 11.7–15.7)
MCH RBC QN AUTO: 29.1 PG (ref 26–33)
MCHC RBC AUTO-ENTMCNC: 32.1 G/DL (ref 31–36)
MCV RBC AUTO: 90.6 FL (ref 78–100)
PLATELET COUNT - QUEST: 295 10^9/L (ref 150–375)
RBC # BLD AUTO: 4.44 10*12/L (ref 3.8–5.2)
WBC # BLD AUTO: 5.9 10*9/L (ref 4–11)

## 2017-05-12 PROCEDURE — 93000 ELECTROCARDIOGRAM COMPLETE: CPT | Performed by: FAMILY MEDICINE

## 2017-05-12 PROCEDURE — 36415 COLL VENOUS BLD VENIPUNCTURE: CPT | Performed by: FAMILY MEDICINE

## 2017-05-12 PROCEDURE — 99214 OFFICE O/P EST MOD 30 MIN: CPT | Performed by: FAMILY MEDICINE

## 2017-05-12 PROCEDURE — 85027 COMPLETE CBC AUTOMATED: CPT | Performed by: FAMILY MEDICINE

## 2017-05-12 NOTE — PATIENT INSTRUCTIONS
Losartan needs to be discontinued 24 hours before surgery    18 units of insulin the morning of surgery

## 2017-05-12 NOTE — PROGRESS NOTES
[facility]  [address]  [address]  [facility] [address]  [phone]  Dept: [phone]    PRE-OP EVALUATION:  Today's date: 2017    Alma Kraft (: 1950) presents for pre-operative evaluation assessment as requested by Dr. Huntley.  She requires evaluation and anesthesia risk assessment prior to undergoing surgery/procedure for treatment of Tighten Lower Bilateral Eye lids .  Proposed procedure: Tighten Lower Bilateral Eye Lids    Date of Surgery/ Procedure: 17  Time of Surgery/ Procedure: 9:50 a.m  Hospital/Surgical Facility: [facility]  Fax number for surgical facility: [phone]  Primary Physician: Susu Brody  Type of Anesthesia Anticipated: to be determined    Patient has a Health Care Directive or Living Will:  NO    1. NO - Do you have a history of heart attack, stroke, stent, bypass or surgery on an artery in the head, neck, heart or legs?  2. NO - Do you ever have any pain or discomfort in your chest?  3. NO - Do you have a history of  Heart Failure?  4. NO - Are you troubled by shortness of breath when: walking on the level, up a slight hill or at night?  5. NO - Do you currently have a cold, bronchitis or other respiratory infection?  6. NO - Do you have a cough, shortness of breath or wheezing?  7. NO - Do you sometimes get pains in the calves of your legs when you walk?  8. YES - Do you or anyone in your family have previous history of blood clots? MOTHER- superficial phlebitis  9. NO - Do you or does anyone in your family have a serious bleeding problem such as prolonged bleeding following surgeries or cuts?  10. NO - Have you ever had problems with anemia or been told to take iron pills?  11. NO - Have you had any abnormal blood loss such as black, tarry or bloody stools, or abnormal vaginal bleeding?  12. NO - Have you ever had a blood transfusion?  13. NO - Have you or any of your relatives ever had problems with  anesthesia?  14. YES - Do you have sleep apnea, excessive snoring or daytime drowsiness? SLEEP APNEA/ CPAP   15. NO - Do you have any prosthetic heart valves?  16. YES - Do you have prosthetic joints? BILATERAL KNEES  17. NO - Is there any chance that you may be pregnant?      HPI:                                                      Brief HPI related to upcoming procedure:  Lower lid surgery      MEDICAL HISTORY:                                                      Patient Active Problem List    Diagnosis Date Noted     Health Care Home 09/25/2012     Priority: Medium     Tier:  1  1/22/2013  [facility]    State Tier Level:  Tier 3  Status:  NA  Care Coordinator:      See Letters for [facility] Care Plan           Acne 11/22/2011     Priority: Medium     Adjustment disorder with mixed anxiety and depressed mood 03/29/2011     Priority: Medium     Diabetes type 2, controlled (H) 10/26/2010     Priority: Medium     Problem list name updated by automated process. Provider to review       ACP (advance care planning) 09/22/2010     Priority: Medium     Advance Care Planning 11/30/2015: ACP Review and Resources Provided:  Reviewed chart for advance care plan.  Alma Kraft has no plan or code status on file. Discussed available resources and provided with information. Confirmed code status reflects current choices pending further ACP discussions.  Confirmed/documented legally designated decision maker(s). Added by Anny Deng                         Family history of malignant neoplasm of breast 12/06/2005     Priority: Medium     Sister         Obstructive sleep apnea 09/30/2002     Priority: Medium     Allergic rhinitis 05/29/2001     Priority: Medium     Problem list name updated by automated process. Provider to review       Esophageal reflux 02/26/2001     Priority: Medium     Mixed hyperlipidemia 09/20/1999     Priority: Medium     Obesity, morbid, BMI 40.0-49.9 (H)      Priority: Medium     Essential hypertension,  benign      Priority: Medium      No past medical history on file.  Past Surgical History:   Procedure Laterality Date     ------------OTHER-------------  9/5/2013    L hand, cyst excision     ------------OTHER------------- Right 03/14/2014    shoulder manipulation     C APPENDECTOMY  1956     C EYE EXAM ESTABLISHED PT  4/20/11    No retinopathy     C TOTAL KNEE ARTHROPLASTY  2/06    Knee Replacement, Total Right     C TOTAL KNEE ARTHROPLASTY  3/5/07    Knee Replacement, Total  Left     C VAGINAL HYSTERECTOMY  8/01    Hysterectomy, Vaginal & BSO     HC COLONOSCOPY W/WO BRUSH/WASH  6/03     HC INCISION TENDON SHEATH FINGER Left 09/05/2013    left thumb trigger finger     HC KNEE SCOPE, DIAGNOSTIC  4/2005    Arthroscopy, Knee Left     HC REMOVE TONSILS/ADENOIDS,<12 Y/O  1953    T & A <11y.o.     TEST NOT FOUND  6/27/07    R heel/ inocencio's deformity     Current Outpatient Prescriptions   Medication Sig Dispense Refill     blood glucose monitoring (NO BRAND SPECIFIED) test strip Use to test blood sugars bid times daily. Pt needs Ruby strips 100 each 3     atorvastatin (LIPITOR) 10 MG tablet Take 1 tablet (10 mg) by mouth daily 90 tablet 3     insulin detemir (LEVEMIR FLEXPEN/FLEXTOUCH) 100 UNIT/ML injection 25 units at bedtime 3 mL 2     amLODIPine (NORVASC) 5 MG tablet Take 1 tablet (5 mg) by mouth daily 90 tablet 1     losartan-hydrochlorothiazide (HYZAAR) 100-25 MG per tablet Take 1 tablet by mouth daily 90 tablet 1     metFORMIN (GLUCOPHAGE-XR) 500 MG 24 hr tablet Take 4 tablets (2,000 mg) by mouth daily (with dinner) 360 tablet 1     blood glucose monitoring (ONE TOUCH DELICA) lancets Use to test blood sugars 2 times daily or as directed. 1 Box prn     potassium chloride (MICRO-K) 10 MEQ CR capsule TAKE TWO CAPSULES BY MOUTH TWICE DAILY  360 capsule 0     budesonide (RINOCORT AQUA) 32 MCG/ACT spray INSTILL 2 SPRAYS BY NASAL ROUTE DAILY 1 Bottle 11     BD ROSE U/F 32G X 4 MM insulin pen needle USE 1 DAILY 100 each  PRN     amitriptyline (ELAVIL) 10 MG tablet Take 1 tablet (10 mg) by mouth daily 90 tablet 3     venlafaxine (EFFEXOR XR) 150 MG 24 hr capsule Take 1 capsule (150 mg) by mouth daily 90 capsule 3     blood glucose monitoring (ONE TOUCH VERIO IQ) test strip Use to test blood sugar 2 times daily or as directed. 200 each 1     ORDER FOR DME Equipment being ordered: Mediven plus compression stockings    [id number]  20-30 compression 3 Device 0     Blood Glucose Monitoring Suppl (ONE TOUCH ULTRA 2) W/DEVICE KIT Use to test blood sugars 2 times daily or as directed. 1 kit 0     Multiple Vitamins-Minerals (CENTRUM SILVER) per tablet Take  by mouth. 1 tablet 3 times weekly 100 tablet      ASPIRIN 81 MG OR TABS 1 tab po QD (Once per day) 100 3     OTC products: no recent use of OTC ASA, NSAIDS or Steroids  Stopped asa two weeks before surgery    Allergies   Allergen Reactions     Demerol Nausea and Vomiting     Erythromycin      GI upset     Peanuts [Nuts] Hives     Shrimp Hives      Latex Allergy: NO    Social History   Substance Use Topics     Smoking status: Never Smoker     Smokeless tobacco: Never Used     Alcohol use 0.0 oz/week     0 drink(s) per week      Comment: very rare     History   Drug Use No       REVIEW OF SYSTEMS:                                                    C: NEGATIVE for fever, chills, change in weight  E/M: NEGATIVE for ear, mouth and throat problems  R: NEGATIVE for significant cough or SOB  CV: NEGATIVE for chest pain, palpitations or peripheral edema    EXAM:                                                    /60 (BP Location: Left arm, Patient Position: Chair, Cuff Size: Adult Large)  Pulse 75  Temp 98  F (36.7  C) (Oral)  Ht 1.524 m (5')  Wt 115.3 kg (254 lb 3.2 oz)  LMP 05/08/2001  SpO2 98%  Breastfeeding? No  BMI 49.65 kg/m2  GENERAL APPEARANCE:   alert and no distress  HENT: ear canals and TM's normal and nose and mouth without ulcers or lesions  RESP: lungs clear to auscultation -  no rales, rhonchi or wheezes  CV: regular rate and rhythm, normal S1 S2, no S3 or S4 and no murmur, click or rub   ABDOMEN: soft, nontender, no HSM or masses and bowel sounds normal  NEURO: Normal strength and tone,  mentation intact and speech normal    DIAGNOSTICS:                                                    EKG: appears normal, NSR    Hemoglobin   Date Value Ref Range Status   05/12/2017 12.9 11.7 - 15.7 g/dL Final   ]  Potassium   Date Value Ref Range Status   04/26/2017 3.7 3.5 - 5.3 mmol/L Final   ]  Lab Results   Component Value Date    CR 0.45 04/26/2017       Recent Labs   Lab Test  04/26/17   1143  04/26/17   1119  01/19/17   1051  01/19/17   1043   08/17/16   1034   03/13/14   1549   10/08/12   1503   HGB   --    --    --    --    --   12.3   --   13.2   < >  13.1   PLT   --    --    --    --    --    --    --   294   --   254   NA  140   --    --   141   < >   --    < >   --    < >   --    POTASSIUM  3.7   --    --   3.8   < >   --    < >   --    < >   --    CR  0.45*   --    --   0.47*   < >   --    < >   --    < >   --    A1C   --   6.7  7.0   --    < >   --    < >   --    < >   --     < > = values in this interval not displayed.        IMPRESSION:                                                    Reason for surgery/procedure: eyelid surgery    The proposed surgical procedure is considered LOW risk.    REVISED CARDIAC RISK INDEX  The patient has the following serious cardiovascular risks for perioperative complications such as (MI, PE, VFib and 3  AV Block):  Diabetes Mellitus (on Insulin)  INTERPRETATION: 1 risks: Class II (low risk - 0.9% complication rate)    The patient has the following additional risks for perioperative complications:  Sleep apnea      ICD-10-CM    1. Preoperative examination Z01.818 HEMOGRAM/PLATELET (BFP)     VENOUS COLLECTION     EKG 12-lead complete w/read - Clinics   2. Disorder of eyelid H02.9        RECOMMENDATIONS:                                                       --Patient is to take all scheduled medications on the day of surgery EXCEPT for modifications listed below.    Diabetes Medication Use    -----Take 80% of long acting insulin (e.g. Lantus, NPH) while NPO (fasting)      ACE Inhibitor or Angiotensin Receptor Blocker (ARB) Use  Ace inhibitor or Angiotensin Receptor Blocker (ARB) and should HOLD this medication for the 24 hours prior to surgery.      APPROVAL GIVEN to proceed with proposed procedure, without further diagnostic evaluation    Bring CPAP machine to hospital       Signed Electronically by: Susu Brody MD    Copy of this evaluation report is provided to requesting physician.    [place] Preop Guidelines

## 2017-05-12 NOTE — MR AVS SNAPSHOT
After Visit Summary   5/12/2017    Alma Kraft    MRN: [id number]           Patient Information     Date Of Birth          1950        Visit Information        Provider Department      5/12/2017 12:00 PM Susu Brody MD [facility]        Today's Diagnoses     Preoperative examination    -  1      Care Instructions    Losartan needs to be discontinued 24 hours before surgery    18 units of insulin the morning of surgery        Follow-ups after your visit        Your next 10 appointments already scheduled     Jul 26, 2017  8:45 AM CDT   Office Visit with Susu Brody MD   [facility] ([facility])    [address]  [address]  [facility] [id number] [phone]              Who to contact     If you have questions or need follow up information about today's clinic visit or your schedule please contact [facility] directly at [phone].  Normal or non-critical lab and imaging results will be communicated to you by MyChart, letter or phone within 4 business days after the clinic has received the results. If you do not hear from us within 7 days, please contact the clinic through i[employer]se Media[employer] or phone. If you have a critical or abnormal lab result, we will notify you by phone as soon as possible.  Submit refill requests through iPinYou or call your pharmacy and they will forward the refill request to us. Please allow 3 business days for your refill to be completed.          Additional Information About Your Visit        Care EveryWhere ID     This is your Care EveryWhere ID. This could be used by other organizations to access your [facility] medical records  [id number]        Your Vitals Were     Pulse Temperature Height Last Period Pulse Oximetry Breastfeeding?    75 98  F (36.7  C) (Oral) 1.524 m (5') 05/08/2001 98% No    BMI (Body Mass Index)                   49.65 kg/m2             Blood Pressure from Last 3 Encounters:   05/12/17 120/60   04/26/17 136/80   02/15/17 138/70    Weight from Last 3 Encounters:   05/12/17 115.3 kg (254 lb 3.2 oz)   04/26/17 114.2 kg (251 lb 12.8 oz)   02/15/17 113.5 kg (250 lb 3.2 oz)              We Performed the Following     EKG 12-lead complete w/read - Clinics     HEMOGRAM/PLATELET (BFP)     VENOUS COLLECTION        Primary Care Provider Office Phone # Fax #    Susu Brody [phone]       [facility] [address]  [facility] [address]        Thank you!     Thank you for choosing [facility]  for your care. Our goal is always to provide you with excellent care. Hearing back from our patients is one way we can continue to improve our services. Please take a few minutes to complete the written survey that you may receive in the mail after your visit with us. Thank you!             Your Updated Medication List - Protect others around you: Learn how to safely use, store and throw away your medicines at www.disposemymeds.org.          This list is accurate as of: 5/12/17  1:08 PM.  Always use your most recent med list.                   Brand Name Dispense Instructions for use    amitriptyline 10 MG tablet    ELAVIL    90 tablet    Take 1 tablet (10 mg) by mouth daily       amLODIPine 5 MG tablet    NORVASC    90 tablet    Take 1 tablet (5 mg) by mouth daily       aspirin 81 MG tablet     100    1 tab po QD (Once per day)       atorvastatin 10 MG tablet    LIPITOR    90 tablet    Take 1 tablet (10 mg) by mouth daily       BD ROSE U/F 32G X 4 MM   Generic drug:  insulin pen needle     100 each    USE 1 DAILY       blood glucose monitoring lancets     1 Box    Use to test blood sugars 2 times daily or as directed.       blood glucose monitoring meter device kit     1 kit    Use to test blood sugars 2 times daily or as directed.       * blood glucose monitoring test strip    ONE TOUCH VERIO IQ     200 each    Use to test blood sugar 2 times daily or as directed.       * blood glucose monitoring test strip    no brand specified    100 each    Use to test blood sugars bid times daily. Pt needs Ruby strips       budesonide 32 MCG/ACT spray    RINOCORT AQUA    1 Bottle    INSTILL 2 SPRAYS BY NASAL ROUTE DAILY       CENTRUM SILVER per tablet     100 tablet    Take  by mouth. 1 tablet 3 times weekly       insulin detemir 100 UNIT/ML injection    LEVEMIR FLEXPEN/FLEXTOUCH    3 mL    25 units at bedtime       losartan-hydrochlorothiazide 100-25 MG per tablet    HYZAAR    90 tablet    Take 1 tablet by mouth daily       metFORMIN 500 MG 24 hr tablet    GLUCOPHAGE-XR    360 tablet    Take 4 tablets (2,000 mg) by mouth daily (with dinner)       order for DME     3 Device    Equipment being ordered: Mediven plus compression stockings  65433 20-30 compression       potassium chloride 10 MEQ CR capsule    MICRO-K    360 capsule    TAKE TWO CAPSULES BY MOUTH TWICE DAILY       venlafaxine 150 MG 24 hr capsule    EFFEXOR XR    90 capsule    Take 1 capsule (150 mg) by mouth daily       * Notice:  This list has 2 medication(s) that are the same as other medications prescribed for you. Read the directions carefully, and ask your doctor or other care provider to review them with you.

## 2017-07-08 DIAGNOSIS — I10 ESSENTIAL HYPERTENSION, BENIGN: ICD-10-CM

## 2017-07-10 NOTE — TELEPHONE ENCOUNTER
Pending Prescriptions:                       Disp   Refills    potassium chloride (MICRO-K) 10 MEQ CR ca*360 ca*0            Sig: TAKE TWO CAPSULES BY MOUTH TWICE DAILY     Per notes on 4- pt was to rtc in six months  You refill on 4- for 3 months.  Do you want to refill for another 3 months>  Please fax, deny or send to KENIA Infante  [phone] (home)

## 2017-07-11 RX ORDER — POTASSIUM CHLORIDE 750 MG/1
CAPSULE, EXTENDED RELEASE ORAL
Qty: 360 CAPSULE | Refills: 1 | Status: SHIPPED | OUTPATIENT
Start: 2017-07-11 | End: 2018-01-06

## 2017-07-18 ENCOUNTER — TRANSFERRED RECORDS (OUTPATIENT)
Dept: FAMILY MEDICINE | Facility: CLINIC | Age: 67
End: 2017-07-18

## 2017-07-26 ENCOUNTER — OFFICE VISIT (OUTPATIENT)
Dept: FAMILY MEDICINE | Facility: CLINIC | Age: 67
End: 2017-07-26

## 2017-07-26 VITALS
HEART RATE: 77 BPM | HEIGHT: 60 IN | WEIGHT: 256.6 LBS | BODY MASS INDEX: 50.38 KG/M2 | SYSTOLIC BLOOD PRESSURE: 118 MMHG | OXYGEN SATURATION: 97 % | TEMPERATURE: 98.1 F | DIASTOLIC BLOOD PRESSURE: 62 MMHG

## 2017-07-26 DIAGNOSIS — Z23 NEED FOR VACCINATION: ICD-10-CM

## 2017-07-26 LAB
ALBUMIN URINE MG/G CR: <30 MG/G CREATININE
ALBUMIN URINE MG/SPEC: 30
CREATININE URINE: 100
GLUCOSE SERPL-MCNC: 179 MG/DL (ref 60–99)
HBA1C MFR BLD: 7.4 % (ref 4–7)

## 2017-07-26 PROCEDURE — 90732 PPSV23 VACC 2 YRS+ SUBQ/IM: CPT | Performed by: FAMILY MEDICINE

## 2017-07-26 PROCEDURE — 82043 UR ALBUMIN QUANTITATIVE: CPT | Performed by: FAMILY MEDICINE

## 2017-07-26 PROCEDURE — 99213 OFFICE O/P EST LOW 20 MIN: CPT | Mod: 25 | Performed by: FAMILY MEDICINE

## 2017-07-26 PROCEDURE — 82947 ASSAY GLUCOSE BLOOD QUANT: CPT | Performed by: FAMILY MEDICINE

## 2017-07-26 PROCEDURE — G0009 ADMIN PNEUMOCOCCAL VACCINE: HCPCS | Performed by: FAMILY MEDICINE

## 2017-07-26 PROCEDURE — 83036 HEMOGLOBIN GLYCOSYLATED A1C: CPT | Performed by: FAMILY MEDICINE

## 2017-07-26 PROCEDURE — 36415 COLL VENOUS BLD VENIPUNCTURE: CPT | Performed by: FAMILY MEDICINE

## 2017-07-26 NOTE — NURSING NOTE
Senait is here for a diabetes recheck.     Pre-Visit Screening :  Immunizations : up to date    Colonoscopy : is up to date  Mammogram : is up to date  Asthma Action Test/Plan : NA  PHQ9/GAD7 : utd    Pulse - regular  My Chart - accepts    CLASSIFICATION OF OVERWEIGHT AND OBESITY BY BMI                         Obesity Class           BMI(kg/m2)  Underweight                                    < 18.5  Normal                                         18.5-24.9  Overweight                                     25.0-29.9  OBESITY                     I                  30.0-34.9                              II                 35.0-39.9  EXTREME OBESITY             III                >40                             Patient's  BMI Body mass index is 50.11 kg/(m^2).  http://hin.nhlbi.nih.gov/menuplanner/menu.cgi  Questioned patient about current smoking habits.  Pt. has never smoked.    Namita.CARLENE Dhillon ([facility])

## 2017-07-26 NOTE — PATIENT INSTRUCTIONS
Schedule visit with Sushila Fernandes- review of diabetic diet    Walk 30 minutes a day, even if three 10 minute walks    Drink more water-    Pay attention to portions    Limit computer and TV time to 2 hours a day    Recheck in three months- fasting diabetis visit

## 2017-07-26 NOTE — PROGRESS NOTES
Recheck of diabetis: worried about her diabetis control- has not been eating well- lots of sweets    Computor is on kitchen table- shops online  Close to food- has regained all of her weight after having some success with weight loss    Still cooks sometimes- less meal planning    Blood Pressure:at goal  /62 (BP Location: Right arm, Patient Position: Chair, Cuff Size: Adult Large)  Pulse 77  Temp 98.1  F (36.7  C) (Oral)  Ht 1.524 m (5')  Wt 116.4 kg (256 lb 9.6 oz)  LMP 05/08/2001  SpO2 97%  BMI 50.11 kg/m2   Regular rate and  rhythm. S1 and S2 normal, no murmurs, clicks, gallops or rubs. No edema or JVD. Chest is clear; no wheezes or rales.    Ace or Arb:yes    Blood sugars: no recorded blood pressures to review  Fasting in mid 100's  No symptoms or documentation  of low blood sugar-     Lipids:   LDL Cholesterol Calculated   Date Value Ref Range Status   04/26/2017 82 <130 mg/dL (calc) Final     Comment:        Desirable range <100 mg/dL for patients with CHD or  diabetes and <70 mg/dL for diabetic patients with  known heart disease.        ]    Statin:yes, atorvastatin    Exercise: not exercising    Weight:regained all of her weight  Dietician consult?:offered visit with Sushila Fernandes for diabetis education- review of diet recommendations    Last eye exam: 7/2017  No diabetic retinopathy    Medications:  Current Outpatient Prescriptions   Medication     potassium chloride (MICRO-K) 10 MEQ CR capsule     blood glucose monitoring (NO BRAND SPECIFIED) test strip     atorvastatin (LIPITOR) 10 MG tablet     insulin detemir (LEVEMIR FLEXPEN/FLEXTOUCH) 100 UNIT/ML injection     amLODIPine (NORVASC) 5 MG tablet     losartan-hydrochlorothiazide (HYZAAR) 100-25 MG per tablet     metFORMIN (GLUCOPHAGE-XR) 500 MG 24 hr tablet     blood glucose monitoring (ONE TOUCH DELICA) lancets     budesonide (RINOCORT AQUA) 32 MCG/ACT spray     BD ROSE U/F 32G X 4 MM insulin pen needle     amitriptyline (ELAVIL) 10 MG  tablet     venlafaxine (EFFEXOR XR) 150 MG 24 hr capsule     blood glucose monitoring (ONE TOUCH VERIO IQ) test strip     ORDER FOR DME     Blood Glucose Monitoring Suppl (ONE TOUCH ULTRA 2) W/DEVICE KIT     Multiple Vitamins-Minerals (CENTRUM SILVER) per tablet     ASPIRIN 81 MG OR TABS     No current facility-administered medications for this visit.          ASA:yes    Foot complaints, paresthesias: no complaints  Normal foot exam today including filament testing  No diabetic ulcers  No excessive callous    YAJAIRA: no symptoms of claudication    Tobacco:no    Hemoglobin A1C 7.4  Microalbumin    Assessment     (E11.65,  Z79.4) Uncontrolled type 2 diabetes mellitus without complication, with long-term current use of insulin (H)  (primary encounter diagnosis)  Comment: MTM consult-  Discussed goals of treatment: increase dose of insulin vs improvement of diet- she wishes to work on diet-  No change in medication  Plan: insulin detemir (LEVEMIR FLEXPEN/FLEXTOUCH) 100        UNIT/ML injection, Hemoglobin A1c, VENOUS         COLLECTION, Glucose Fasting (BFP), Albumin         Random Urine Quantitative            (Z23) Need for vaccination  Comment:   Plan: PNEUMOCOCCAL VACCINE,ADULT,SQ OR IM, VACCINE         ADMINISTRATION, INITIAL

## 2017-07-26 NOTE — MR AVS SNAPSHOT
"              After Visit Summary   7/26/2017    Alma Kraft    MRN: [id number]           Patient Information     Date Of Birth          1950        Visit Information        Provider Department      7/26/2017 8:45 AM Susu Brody MD [facility]        Today's Diagnoses     Controlled type 2 diabetes mellitus without complication, with long-term current use of insulin (H)    -  1    Diabetes type 2, controlled (H)        Need for vaccination          Care Instructions    Schedule visit with Sushila Fernandes- review of diabetic diet    Walk 30 minutes a day, even if three 10 minute walks    Drink more water-    Pay attention to portions    Limit computer and TV time to 2 hours a day    Recheck in three months- fasting diabetis visit          Follow-ups after your visit        Who to contact     If you have questions or need follow up information about today's clinic visit or your schedule please contact [facility] directly at [phone].  Normal or non-critical lab and imaging results will be communicated to you by enEvolvhart, letter or phone within 4 business days after the clinic has received the results. If you do not hear from us within 7 days, please contact the clinic through [employer] or phone. If you have a critical or abnormal lab result, we will notify you by phone as soon as possible.  Submit refill requests through Filtr8 or call your pharmacy and they will forward the refill request to us. Please allow 3 business days for your refill to be completed.          Additional Information About Your Visit        enEvolvhart Information     [email or web address] lets you send messages to your doctor, view your test results, renew your prescriptions, schedule appointments and more. To sign up, go to [email or web address] . Click on \"Log in\" on the left side of the screen, which will take you to the Welcome page. Then click on \"Sign up Now\" on the right side of the " page.     You will be asked to enter the access code listed below, as well as some personal information. Please follow the directions to create your username and password.     Your access code is: [email or web address]  Expires: 10/24/2017  9:32 AM     Your access code will  in 90 days. If you need help or a new code, please call your [place] clinic or [phone].        Care EveryWhere ID     This is your Care EveryWhere ID. This could be used by other organizations to access your [place] medical records  [id number]        Your Vitals Were     Pulse Temperature Height Last Period Pulse Oximetry BMI (Body Mass Index)    77 98.1  F (36.7  C) (Oral) 1.524 m (5') 2001 97% 50.11 kg/m2       Blood Pressure from Last 3 Encounters:   17 118/62   17 120/60   17 136/80    Weight from Last 3 Encounters:   17 116.4 kg (256 lb 9.6 oz)   17 115.3 kg (254 lb 3.2 oz)   17 114.2 kg (251 lb 12.8 oz)              We Performed the Following     Albumin Random Urine Quantitative     Glucose Fasting (BFP)     Hemoglobin A1c     PNEUMOCOCCAL VACCINE,ADULT,SQ OR IM     VACCINE ADMINISTRATION, INITIAL     VENOUS COLLECTION          Where to get your medicines      Some of these will need a paper prescription and others can be bought over the counter.  Ask your nurse if you have questions.     Bring a paper prescription for each of these medications     insulin detemir 100 UNIT/ML injection          Primary Care Provider Office Phone # Fax #    Susu Brody [phone]       [facility] [address]  [facility] [address]        Equal Access to Services     [facility]: Hadhenri Ayon, wanusratda lucharli, qaybta kaalmada rivas, dewayne shoemaker. So [facility] [phone].    ATENCIÓN: Si habla español, tiene a sy disposición servicios gratuitos de asistencia lingüística. Llame al [phone].    We comply with  applicable federal civil rights laws and [place] laws. We do not discriminate on the basis of race, color, national origin, age, disability sex, sexual orientation or gender identity.            Thank you!     Thank you for choosing [facility]  for your care. Our goal is always to provide you with excellent care. Hearing back from our patients is one way we can continue to improve our services. Please take a few minutes to complete the written survey that you may receive in the mail after your visit with us. Thank you!             Your Updated Medication List - Protect others around you: Learn how to safely use, store and throw away your medicines at www.disposemymeds.org.          This list is accurate as of: 7/26/17  9:32 AM.  Always use your most recent med list.                   Brand Name Dispense Instructions for use Diagnosis    amitriptyline 10 MG tablet    ELAVIL    90 tablet    Take 1 tablet (10 mg) by mouth daily    Psychophysiological insomnia, Chronic nonintractable headache, unspecified headache type       amLODIPine 5 MG tablet    NORVASC    90 tablet    Take 1 tablet (5 mg) by mouth daily    Essential hypertension, benign       aspirin 81 MG tablet     100    1 tab po QD (Once per day)    Other specified pre-operative examination       atorvastatin 10 MG tablet    LIPITOR    90 tablet    Take 1 tablet (10 mg) by mouth daily    Controlled type 2 diabetes mellitus without complication, with long-term current use of insulin (H)       BD ROSE U/F 32G X 4 MM   Generic drug:  insulin pen needle     100 each    USE 1 DAILY    Uncontrolled type 2 diabetes mellitus without complication, with long-term current use of insulin (H)       blood glucose monitoring lancets     1 Box    Use to test blood sugars 2 times daily or as directed.    Controlled type 2 diabetes mellitus without complication, with long-term current use of insulin (H)       blood glucose monitoring meter device kit      1 kit    Use to test blood sugars 2 times daily or as directed.    Type II or unspecified type diabetes mellitus without mention of complication, not stated as uncontrolled       * blood glucose monitoring test strip    ONE TOUCH VERIO IQ    200 each    Use to test blood sugar 2 times daily or as directed.    Diabetes type 2, uncontrolled (H)       * blood glucose monitoring test strip    no brand specified    100 each    Use to test blood sugars bid times daily. Pt needs Ruby strips    Controlled type 2 diabetes mellitus without complication, with long-term current use of insulin (H)       budesonide 32 MCG/ACT spray    RINOCORT AQUA    1 Bottle    INSTILL 2 SPRAYS BY NASAL ROUTE DAILY    Seasonal allergic rhinitis, unspecified allergic rhinitis trigger       CENTRUM SILVER per tablet     100 tablet    Take  by mouth. 1 tablet 3 times weekly        insulin detemir 100 UNIT/ML injection    LEVEMIR FLEXPEN/FLEXTOUCH    3 mL    25 units at bedtime    Controlled type 2 diabetes mellitus without complication, with long-term current use of insulin (H)       losartan-hydrochlorothiazide 100-25 MG per tablet    HYZAAR    90 tablet    Take 1 tablet by mouth daily    Essential hypertension, benign, Controlled type 2 diabetes mellitus without complication, with long-term current use of insulin (H)       metFORMIN 500 MG 24 hr tablet    GLUCOPHAGE-XR    360 tablet    Take 4 tablets (2,000 mg) by mouth daily (with dinner)    Controlled type 2 diabetes mellitus without complication, with long-term current use of insulin (H)       order for DME     3 Device    Equipment being ordered: Mediven plus compression stockings  02835 20-30 compression    Unspecified venous (peripheral) insufficiency       potassium chloride 10 MEQ CR capsule    MICRO-K    360 capsule    TAKE TWO CAPSULES BY MOUTH TWICE DAILY    Essential hypertension, benign       venlafaxine 150 MG 24 hr capsule    EFFEXOR XR    90 capsule    Take 1 capsule (150 mg)  by mouth daily    Major depression in remission (H)       * Notice:  This list has 2 medication(s) that are the same as other medications prescribed for you. Read the directions carefully, and ask your doctor or other care provider to review them with you.

## 2017-07-27 ENCOUNTER — DOCUMENTATION ONLY (OUTPATIENT)
Dept: PHARMACY | Facility: PHYSICIAN GROUP | Age: 67
End: 2017-07-27

## 2017-07-27 NOTE — PROGRESS NOTES
"Clinical Consult:                                                    Alma Kraft is a 66 year old female coming in for a clinical pharmacist consult.  She was referred to me from Dr. Brody.     Reason for Consult: Diabetes- patient is currently out of scope for MTM.     Discussion: Here for education on her diet as her a1c has gone up.     Dry mouth drives her to eat \"fresh flavors.\" Eats chocolate, donuts, cookies - especially when she tired. Has started to try and replace some of sweets with fruit, however, she admits to eating more than she should once she starts. Has a very hard time with portion control and will power. States that her family brings in very unhealthy things into the house and then she just can't avoid it.    Typically snacking most between lunch and dinner and after dinner. Stays up late. She is not exercising at this time and does not enjoy being outside at all. Spends a lot of her time in the kitchen, which she realized makes it harder for her to avoid the food temptations.      Plan:  1. Provided resources and teaching on meal preparation and portion control  2. Focus on having a good mix of non-starchy vegetables (1/2 of plate), protein (1/4 of plate), carbohydrates (1/4 of plate)   3. When eating, keep in mind the \"rule of 45\" (45 g of carbohydrates per meal with 1/3 coming from milk, 1/3 from grains, and 1/3 coming from fruit)  4. Encouraged meal prep and pre-portioning snacks to help with portion control.     Pt to follow up with PCP as planned.    Sushila Fernandes, Pharm.D, BCACP  Medication Therapy Management Pharmacist  [phone]          "

## 2017-09-07 ENCOUNTER — OFFICE VISIT (OUTPATIENT)
Dept: FAMILY MEDICINE | Facility: CLINIC | Age: 67
End: 2017-09-07

## 2017-09-07 VITALS
TEMPERATURE: 97.8 F | HEART RATE: 76 BPM | SYSTOLIC BLOOD PRESSURE: 130 MMHG | HEIGHT: 60 IN | BODY MASS INDEX: 50.26 KG/M2 | DIASTOLIC BLOOD PRESSURE: 76 MMHG | WEIGHT: 256 LBS | RESPIRATION RATE: 20 BRPM

## 2017-09-07 DIAGNOSIS — J20.9 ACUTE BRONCHITIS, UNSPECIFIED ORGANISM: Primary | ICD-10-CM

## 2017-09-07 PROCEDURE — 99213 OFFICE O/P EST LOW 20 MIN: CPT | Performed by: PHYSICIAN ASSISTANT

## 2017-09-07 RX ORDER — AZITHROMYCIN 250 MG/1
TABLET, FILM COATED ORAL
Qty: 6 TABLET | Refills: 0 | Status: SHIPPED | OUTPATIENT
Start: 2017-09-07 | End: 2017-11-09

## 2017-09-07 RX ORDER — BENZONATATE 100 MG/1
100 CAPSULE ORAL 3 TIMES DAILY PRN
Qty: 30 CAPSULE | Refills: 0 | Status: SHIPPED | OUTPATIENT
Start: 2017-09-07 | End: 2017-11-09

## 2017-09-07 NOTE — NURSING NOTE
Alma Kraft is here for a cough for over 2 weeks. Has had some sweats with this as well.  Questioned patient about current smoking habits.  Pt. has never smoked.  PULSE regular  My Chart:   CLASSIFICATION OF OVERWEIGHT AND OBESITY BY BMI                        Obesity Class           BMI(kg/m2)  Underweight                                    < 18.5  Normal                                         18.5-24.9  Overweight                                     25.0-29.9  OBESITY                     I                  30.0-34.9                             II                 35.0-39.9  EXTREME OBESITY             III                >40                            Patient's  BMI Body mass index is 50 kg/(m^2).  http://hin.nhlbi.nih.gov/menuplanner/menu.cgi  Pre-visit planning  Immunizations - up to date  Colonoscopy - is up to date  Mammogram - is up to date

## 2017-09-07 NOTE — MR AVS SNAPSHOT
"              After Visit Summary   9/7/2017    Alma Kraft    MRN: [id number]           Patient Information     Date Of Birth          1950        Visit Information        Provider Department      9/7/2017 3:00 PM Kiarra Wyatt PA-C [facility]        Today's Diagnoses     Acute bronchitis, unspecified organism    -  1       Follow-ups after your visit        Follow-up notes from your care team     Return if symptoms worsen or fail to improve.      Your next 10 appointments already scheduled     Nov 09, 2017  9:30 AM CST   Office Visit with Susu Brody MD   [facility] ([facility])    [address]  [address]  [facility] [address] [phone]              Who to contact     If you have questions or need follow up information about today's clinic visit or your schedule please contact [facility] directly at [phone].  Normal or non-critical lab and imaging results will be communicated to you by [employer], letter or phone within 4 business days after the clinic has received the results. If you do not hear from us within 7 days, please contact the clinic through [employer] or phone. If you have a critical or abnormal lab result, we will notify you by phone as soon as possible.  Submit refill requests through [facility] or call your pharmacy and they will forward the refill request to us. Please allow 3 business days for your refill to be completed.          Additional Information About Your Visit        [employer] Information     [facility] lets you send messages to your doctor, view your test results, renew your prescriptions, schedule appointments and more. To sign up, go to [email or web address] . Click on \"Log in\" on the left side of the screen, which will take you to the Welcome page. Then click on \"Sign up Now\" on the right side of the page.     You will be asked to enter the access code " listed below, as well as some personal information. Please follow the directions to create your username and password.     Your access code is: [email or web address]  Expires: 10/24/2017  9:32 AM     Your access code will  in 90 days. If you need help or a new code, please call your [facility] or [phone].        Care EveryWhere ID     This is your Care EveryWhere ID. This could be used by other organizations to access your [place] medical records  [id number]        Your Vitals Were     Pulse Temperature Respirations Height Last Period BMI (Body Mass Index)    76 97.8  F (36.6  C) (Oral) 20 1.524 m (5') 2001 50 kg/m2       Blood Pressure from Last 3 Encounters:   17 130/76   17 118/62   17 120/60    Weight from Last 3 Encounters:   17 116.1 kg (256 lb)   17 116.4 kg (256 lb 9.6 oz)   17 115.3 kg (254 lb 3.2 oz)              Today, you had the following     No orders found for display         Today's Medication Changes          These changes are accurate as of: 17 10:36 PM.  If you have any questions, ask your nurse or doctor.               Start taking these medicines.        Dose/Directions    azithromycin 250 MG tablet   Commonly known as:  ZITHROMAX   Used for:  Acute bronchitis, unspecified organism   Started by:  Kiarra Wyatt PA-C        Two tablets first day, then one tablet daily for four days.   Quantity:  6 tablet   Refills:  0       benzonatate 100 MG capsule   Commonly known as:  TESSALON   Used for:  Acute bronchitis, unspecified organism   Started by:  Kiarra Wyatt PA-C        Dose:  100 mg   Take 1 capsule (100 mg) by mouth 3 times daily as needed   Quantity:  30 capsule   Refills:  0            Where to get your medicines      These medications were sent to [id number] [address] [address]     Phone:  [phone]     azithromycin 250 MG tablet     benzonatate 100 MG capsule                Primary Care Provider Office Phone # Fax #    Susu Brody [phone] [address] [address]        Equal Access to Services     KEVIN AMAYA : Hadii wendy robb marinoo Soomaali, waaxda luqadaha, qaybta kaalmada adeisaacyada, dewayne joejerry elizabethisaac mckinley laGauravcoy sheomaker. So [facility] [phone].    ATENCIÓN: Si habla español, tiene a sy disposición servicios gratuitos de asistencia lingüística. Llame al [phone].    We comply with applicable federal civil rights laws and [place] laws. We do not discriminate on the basis of race, color, national origin, age, disability sex, sexual orientation or gender identity.            Thank you!     Thank you for choosing [facility]  for your care. Our goal is always to provide you with excellent care. Hearing back from our patients is one way we can continue to improve our services. Please take a few minutes to complete the written survey that you may receive in the mail after your visit with us. Thank you!             Your Updated Medication List - Protect others around you: Learn how to safely use, store and throw away your medicines at www.disposemymeds.org.          This list is accurate as of: 9/7/17 10:36 PM.  Always use your most recent med list.                   Brand Name Dispense Instructions for use Diagnosis    amitriptyline 10 MG tablet    ELAVIL    90 tablet    Take 1 tablet (10 mg) by mouth daily    Psychophysiological insomnia, Chronic nonintractable headache, unspecified headache type       amLODIPine 5 MG tablet    NORVASC    90 tablet    Take 1 tablet (5 mg) by mouth daily    Essential hypertension, benign       aspirin 81 MG tablet     100    1 tab po QD (Once per day)    Other specified pre-operative examination       atorvastatin 10 MG tablet    LIPITOR    90 tablet    Take 1 tablet (10 mg) by mouth daily    Controlled type 2 diabetes  mellitus without complication, with long-term current use of insulin (H)       azithromycin 250 MG tablet    ZITHROMAX    6 tablet    Two tablets first day, then one tablet daily for four days.    Acute bronchitis, unspecified organism       BD ROSE U/F 32G X 4 MM   Generic drug:  insulin pen needle     100 each    USE 1 DAILY    Uncontrolled type 2 diabetes mellitus without complication, with long-term current use of insulin (H)       benzonatate 100 MG capsule    TESSALON    30 capsule    Take 1 capsule (100 mg) by mouth 3 times daily as needed    Acute bronchitis, unspecified organism       blood glucose monitoring lancets     1 Box    Use to test blood sugars 2 times daily or as directed.    Controlled type 2 diabetes mellitus without complication, with long-term current use of insulin (H)       blood glucose monitoring meter device kit     1 kit    Use to test blood sugars 2 times daily or as directed.    Type II or unspecified type diabetes mellitus without mention of complication, not stated as uncontrolled       * blood glucose monitoring test strip    ONE TOUCH VERIO IQ    200 each    Use to test blood sugar 2 times daily or as directed.    Diabetes type 2, uncontrolled (H)       * blood glucose monitoring test strip    no brand specified    100 each    Use to test blood sugars bid times daily. Pt needs Ruby strips    Controlled type 2 diabetes mellitus without complication, with long-term current use of insulin (H)       budesonide 32 MCG/ACT spray    RINOCORT AQUA    1 Bottle    INSTILL 2 SPRAYS BY NASAL ROUTE DAILY    Seasonal allergic rhinitis, unspecified allergic rhinitis trigger       CENTRUM SILVER per tablet     100 tablet    Take  by mouth. 1 tablet 3 times weekly        insulin detemir 100 UNIT/ML injection    LEVEMIR FLEXPEN/FLEXTOUCH    3 mL    25 units at bedtime    Uncontrolled type 2 diabetes mellitus without complication, with long-term current use of insulin (H)        losartan-hydrochlorothiazide 100-25 MG per tablet    HYZAAR    90 tablet    Take 1 tablet by mouth daily    Essential hypertension, benign, Controlled type 2 diabetes mellitus without complication, with long-term current use of insulin (H)       metFORMIN 500 MG 24 hr tablet    GLUCOPHAGE-XR    360 tablet    Take 4 tablets (2,000 mg) by mouth daily (with dinner)    Controlled type 2 diabetes mellitus without complication, with long-term current use of insulin (H)       order for DME     3 Device    Equipment being ordered: Mediven plus compression stockings  47503 20-30 compression    Unspecified venous (peripheral) insufficiency       potassium chloride 10 MEQ CR capsule    MICRO-K    360 capsule    TAKE TWO CAPSULES BY MOUTH TWICE DAILY    Essential hypertension, benign       venlafaxine 150 MG 24 hr capsule    EFFEXOR XR    90 capsule    Take 1 capsule (150 mg) by mouth daily    Major depression in remission (H)       * Notice:  This list has 2 medication(s) that are the same as other medications prescribed for you. Read the directions carefully, and ask your doctor or other care provider to review them with you.

## 2017-09-07 NOTE — PROGRESS NOTES
CC: Cough    History:  For the past 3 weeks, Senait has been having a significant cough. Cough causes ear pain as well as urinary leakage. Cough is mostly during the day, and then just a little when first laying down at night. Has been having a sore throat, and has been taking ibuprofen to help. Wakes up in the morning, and has mattering in eyes. Chest and back are sore from cough. No fever, sweats, chills, but does feel fatigued.     Has not tried any cough suppressants or Mucinex as they never work.      PMH, MEDICATIONS, ALLERGIES, SOCIAL AND FAMILY HISTORY in [facility] and reviewed by me personally.      ROS negative other than the symptoms noted above in the HPI.        Examination   /76 (BP Location: Left arm, Patient Position: Chair, Cuff Size: Adult Large)  Pulse 76  Temp 97.8  F (36.6  C) (Oral)  Resp 20  Ht 1.524 m (5')  Wt 116.1 kg (256 lb)  LMP 05/08/2001  BMI 50 kg/m2       Constitutional: Sitting comfortably, in no acute distress. Vital signs noted  Eyes: pupils equal round reactive to light and accomodation, extra ocular movements intact  Ears: external canals and TMs free of abnormalities  Nose: patent, without mucosal abnormalities  Mouth and throat: without erythema or lesions of the mucosa  Neck:  no adenopathy, trachea midline and normal to palpation  Cardiovascular:  regular rate and rhythm, no murmurs, clicks, or gallops  Respiratory:  normal respiratory rate and rhythm, lungs clear to auscultation  Psychiatric: mentation appears normal and affect normal/bright        A/P    ICD-10-CM    1. Acute bronchitis, unspecified organism J20.9 azithromycin (ZITHROMAX) 250 MG tablet     benzonatate (TESSALON) 100 MG capsule       DISCUSSION: For bronchitis of 3 weeks, recommended Jeny complete a z-pack. She should take this with food- warned of side effects. Also will give Tessalon pills as a possible cough relief- warned of drowsiness. She should contact me in 1 week if symptoms have not  significantly improved.     follow up visit: As needed    Kiarra Wyatt PA-C  [facility]

## 2017-11-09 ENCOUNTER — OFFICE VISIT (OUTPATIENT)
Dept: FAMILY MEDICINE | Facility: CLINIC | Age: 67
End: 2017-11-09

## 2017-11-09 VITALS
HEART RATE: 74 BPM | DIASTOLIC BLOOD PRESSURE: 70 MMHG | WEIGHT: 255 LBS | TEMPERATURE: 97.7 F | OXYGEN SATURATION: 96 % | BODY MASS INDEX: 49.8 KG/M2 | SYSTOLIC BLOOD PRESSURE: 126 MMHG

## 2017-11-09 DIAGNOSIS — E66.01 OBESITY, MORBID, BMI 40.0-49.9 (H): ICD-10-CM

## 2017-11-09 DIAGNOSIS — Z23 NEED FOR VACCINATION: ICD-10-CM

## 2017-11-09 DIAGNOSIS — I10 ESSENTIAL HYPERTENSION, BENIGN: ICD-10-CM

## 2017-11-09 LAB — HBA1C MFR BLD: 7.5 % (ref 4–7)

## 2017-11-09 PROCEDURE — 99214 OFFICE O/P EST MOD 30 MIN: CPT | Mod: 25 | Performed by: FAMILY MEDICINE

## 2017-11-09 PROCEDURE — 80048 BASIC METABOLIC PNL TOTAL CA: CPT | Mod: 90 | Performed by: FAMILY MEDICINE

## 2017-11-09 PROCEDURE — G0008 ADMIN INFLUENZA VIRUS VAC: HCPCS | Performed by: FAMILY MEDICINE

## 2017-11-09 PROCEDURE — 83036 HEMOGLOBIN GLYCOSYLATED A1C: CPT | Performed by: FAMILY MEDICINE

## 2017-11-09 PROCEDURE — 90686 IIV4 VACC NO PRSV 0.5 ML IM: CPT | Performed by: FAMILY MEDICINE

## 2017-11-09 PROCEDURE — 36415 COLL VENOUS BLD VENIPUNCTURE: CPT | Performed by: FAMILY MEDICINE

## 2017-11-09 RX ORDER — AMLODIPINE BESYLATE 5 MG/1
5 TABLET ORAL DAILY
Qty: 90 TABLET | Refills: 1 | Status: SHIPPED | OUTPATIENT
Start: 2017-11-09 | End: 2018-05-23

## 2017-11-09 RX ORDER — LOSARTAN POTASSIUM AND HYDROCHLOROTHIAZIDE 25; 100 MG/1; MG/1
1 TABLET ORAL DAILY
Qty: 90 TABLET | Refills: 1 | Status: SHIPPED | OUTPATIENT
Start: 2017-11-09 | End: 2018-05-23

## 2017-11-09 RX ORDER — METFORMIN HCL 500 MG
2000 TABLET, EXTENDED RELEASE 24 HR ORAL
Qty: 360 TABLET | Refills: 1 | Status: ON HOLD | OUTPATIENT
Start: 2017-11-09 | End: 2018-02-11

## 2017-11-09 NOTE — MR AVS SNAPSHOT
"              After Visit Summary   11/9/2017    Alma Kraft    MRN: [id number]           Patient Information     Date Of Birth          1950        Visit Information        Provider Department      11/9/2017 9:30 AM Susu Brody MD [facility]        Today's Diagnoses     BENIGN HYPERTENSION    -  1    Uncontrolled type 2 diabetes mellitus without complication, with long-term current use of insulin (H)        Need for vaccination          Care Instructions    Recheck in three months    Increase insulin to 30 units at bedtime              Follow-ups after your visit        Who to contact     If you have questions or need follow up information about today's clinic visit or your schedule please contact [facility] directly at [phone].  Normal or non-critical lab and imaging results will be communicated to you by [facility], letter or phone within 4 business days after the clinic has received the results. If you do not hear from us within 7 days, please contact the clinic through [facility] or phone. If you have a critical or abnormal lab result, we will notify you by phone as soon as possible.  Submit refill requests through [employer] or call your pharmacy and they will forward the refill request to us. Please allow 3 business days for your refill to be completed.          Additional Information About Your Visit        [employer] Information     [employer] lets you send messages to your doctor, view your test results, renew your prescriptions, schedule appointments and more. To sign up, go to [email or web address] . Click on \"Log in\" on the left side of the screen, which will take you to the Welcome page. Then click on \"Sign up Now\" on the right side of the page.     You will be asked to enter the access code listed below, as well as some personal information. Please follow the directions to create your username and password.     Your access code is: " [email or web address]  Expires: 2018 10:31 AM     Your access code will  in 90 days. If you need help or a new code, please call your [facility] clinic or [phone].        Care EveryWhere ID     This is your Care EveryWhere ID. This could be used by other organizations to access your [facility] medical records  [id number]        Your Vitals Were     Pulse Temperature Last Period Pulse Oximetry BMI (Body Mass Index)       74 97.7  F (36.5  C) (Oral) 2001 96% 49.8 kg/m2        Blood Pressure from Last 3 Encounters:   17 126/70   17 130/76   17 118/62    Weight from Last 3 Encounters:   17 115.7 kg (255 lb)   17 116.1 kg (256 lb)   17 116.4 kg (256 lb 9.6 oz)              We Performed the Following     BASIC METABOLIC PANEL (QUEST)     HC FLU VAC PRESRV FREE QUAD SPLIT VIR 3+YRS IM     Hemoglobin A1c     VACCINE ADMINISTRATION, INITIAL     VENOUS COLLECTION          Today's Medication Changes          These changes are accurate as of: 17 10:31 AM.  If you have any questions, ask your nurse or doctor.               These medicines have changed or have updated prescriptions.        Dose/Directions    insulin detemir 100 UNIT/ML injection   Commonly known as:  LEVEMIR FLEXPEN/FLEXTOUCH   This may have changed:  additional instructions   Used for:  Uncontrolled type 2 diabetes mellitus without complication, with long-term current use of insulin (H)   Changed by:  Susu Brody MD        30 units at bedtime   Quantity:  3 mL   Refills:  1            Where to get your medicines      These medications were sent to [id number] - [place], [place] - [address] [place] [address]     Phone:  [phone]     amLODIPine 5 MG tablet    insulin detemir 100 UNIT/ML injection    losartan-hydrochlorothiazide 100-25 MG per tablet    metFORMIN 500 MG 24 hr tablet                Primary Care Provider Office Phone # Fax #    Susu POTTER  MD Mary Grace [phone]       [address] [facility] [address]  [facility] [address]        Equal Access to Services     KEVIN AMAYA : Hadii aad ku hadgabynura Diankristi, wanusratda laurajose, qahayleyta kaalmada rivas, dewayne ruiin hayaan glennisaac mckinley lamorganjerry navid. So [facility] [phone].    ATENCIÓN: Si habla español, tiene a sy disposición servicios gratuitos de asistencia lingüística. Llame al [phone].    We comply with applicable federal civil rights laws and [place] laws. We do not discriminate on the basis of race, color, national origin, age, disability, sex, sexual orientation, or gender identity.            Thank you!     Thank you for choosing [facility]  for your care. Our goal is always to provide you with excellent care. Hearing back from our patients is one way we can continue to improve our services. Please take a few minutes to complete the written survey that you may receive in the mail after your visit with us. Thank you!             Your Updated Medication List - Protect others around you: Learn how to safely use, store and throw away your medicines at www.disposemymeds.org.          This list is accurate as of: 11/9/17 10:31 AM.  Always use your most recent med list.                   Brand Name Dispense Instructions for use Diagnosis    amitriptyline 10 MG tablet    ELAVIL    90 tablet    Take 1 tablet (10 mg) by mouth daily    Psychophysiological insomnia, Chronic nonintractable headache, unspecified headache type       amLODIPine 5 MG tablet    NORVASC    90 tablet    Take 1 tablet (5 mg) by mouth daily    Essential hypertension, benign       aspirin 81 MG tablet     100    1 tab po QD (Once per day)    Other specified pre-operative examination       atorvastatin 10 MG tablet    LIPITOR    90 tablet    Take 1 tablet (10 mg) by mouth daily    Controlled type 2 diabetes mellitus without complication, with long-term current use of insulin (H)       BD ROES U/F 32G X 4 MM    Generic drug:  insulin pen needle     100 each    USE 1 DAILY    Uncontrolled type 2 diabetes mellitus without complication, with long-term current use of insulin (H)       blood glucose monitoring lancets     1 Box    Use to test blood sugars 2 times daily or as directed.    Controlled type 2 diabetes mellitus without complication, with long-term current use of insulin (H)       blood glucose monitoring meter device kit     1 kit    Use to test blood sugars 2 times daily or as directed.    Type II or unspecified type diabetes mellitus without mention of complication, not stated as uncontrolled       * blood glucose monitoring test strip    ONETOUCH VERIO IQ    200 each    Use to test blood sugar 2 times daily or as directed.    Diabetes type 2, uncontrolled (H)       * blood glucose monitoring test strip    no brand specified    100 each    Use to test blood sugars bid times daily. Pt needs Ruby strips    Controlled type 2 diabetes mellitus without complication, with long-term current use of insulin (H)       budesonide 32 MCG/ACT spray    RINOCORT AQUA    1 Bottle    INSTILL 2 SPRAYS BY NASAL ROUTE DAILY    Seasonal allergic rhinitis, unspecified allergic rhinitis trigger       CENTRUM SILVER per tablet     100 tablet    Take  by mouth. 1 tablet 3 times weekly        insulin detemir 100 UNIT/ML injection    LEVEMIR FLEXPEN/FLEXTOUCH    3 mL    30 units at bedtime    Uncontrolled type 2 diabetes mellitus without complication, with long-term current use of insulin (H)       losartan-hydrochlorothiazide 100-25 MG per tablet    HYZAAR    90 tablet    Take 1 tablet by mouth daily    Essential hypertension, benign, Uncontrolled type 2 diabetes mellitus without complication, with long-term current use of insulin (H)       metFORMIN 500 MG 24 hr tablet    GLUCOPHAGE-XR    360 tablet    Take 4 tablets (2,000 mg) by mouth daily (with dinner)    Uncontrolled type 2 diabetes mellitus without complication, with long-term  current use of insulin (H)       order for DME     3 Device    Equipment being ordered: Mediven plus compression stockings  16106 20-30 compression    Unspecified venous (peripheral) insufficiency       potassium chloride 10 MEQ CR capsule    MICRO-K    360 capsule    TAKE TWO CAPSULES BY MOUTH TWICE DAILY    Essential hypertension, benign       venlafaxine 150 MG 24 hr capsule    EFFEXOR XR    90 capsule    Take 1 capsule (150 mg) by mouth daily    Major depression in remission (H)       * Notice:  This list has 2 medication(s) that are the same as other medications prescribed for you. Read the directions carefully, and ask your doctor or other care provider to review them with you.

## 2017-11-09 NOTE — PROGRESS NOTES
SUBJECTIVE:   Alma Kraft is a 67 year old female who presents to clinic today for the following health issues:    Diabetes Follow-up    Patient is checking blood sugars: once daily.    120-160 Fasting    Diabetic concerns: lack of discipline with her diet     Symptoms of hypoglycemia (low blood sugar): none     Paresthesias (numbness or burning in feet) or sores: No   Date of last diabetic eye exam: July - Dr Robles- July- no diabetic retinoparhy      Amount of exercise or physical activity: Sedentary    Problems taking medications regularly: No    Medication side effects: none    Diet: poor    Ill with viral infection for about a month- drinking more hot chocolate-    PROBLEMS TO ADD ON...  Morbid obesity    Problem list and histories reviewed & adjusted, as indicated.  Additional history: as documented    Patient Active Problem List   Diagnosis     Essential hypertension, benign     Mixed hyperlipidemia     Obesity, morbid, BMI 40.0-49.9 (H)     Esophageal reflux     Allergic rhinitis     Obstructive sleep apnea     Family history of malignant neoplasm of breast     ACP (advance care planning)     Diabetes type 2, controlled (H)     Adjustment disorder with mixed anxiety and depressed mood     Acne     Health Care Home     Past Surgical History:   Procedure Laterality Date     ------------OTHER-------------  9/5/2013    L hand, cyst excision     ------------OTHER------------- Right 03/14/2014    shoulder manipulation     C APPENDECTOMY  1956     C EYE EXAM ESTABLISHED PT  4/20/11    No retinopathy     C TOTAL KNEE ARTHROPLASTY  2/06    Knee Replacement, Total Right     C TOTAL KNEE ARTHROPLASTY  3/5/07    Knee Replacement, Total  Left     C VAGINAL HYSTERECTOMY  8/01    Hysterectomy, Vaginal & BSO     HC COLONOSCOPY W/WO BRUSH/WASH  6/03     HC INCISION TENDON SHEATH FINGER Left 09/05/2013    left thumb trigger finger     HC KNEE SCOPE, DIAGNOSTIC  4/2005    Arthroscopy, Knee Left     HC REMOVE  TONSILS/ADENOIDS,<13 Y/O  1953    T & A <11y.o.     TEST NOT FOUND  6/27/07    R heel/ inocencio's deformity       Social History   Substance Use Topics     Smoking status: Never Smoker     Smokeless tobacco: Never Used     Alcohol use 0.0 oz/week     0 drink(s) per week      Comment: very rare     Family History   Problem Relation Age of Onset     Alzheimer Disease No family hx of      CANCER Father      prostate     DIABETES No family hx of      HEART DISEASE Paternal Grandfather      CEREBROVASCULAR DISEASE Maternal Grandfather      HEART DISEASE Maternal Grandmother      GASTROINTESTINAL DISEASE Father      GERD     GASTROINTESTINAL DISEASE Other      Niece with GERD/hiatal hernia     Breast Cancer Sister      51         Current Outpatient Prescriptions   Medication Sig Dispense Refill     amLODIPine (NORVASC) 5 MG tablet Take 1 tablet (5 mg) by mouth daily 90 tablet 1     losartan-hydrochlorothiazide (HYZAAR) 100-25 MG per tablet Take 1 tablet by mouth daily 90 tablet 1     metFORMIN (GLUCOPHAGE-XR) 500 MG 24 hr tablet Take 4 tablets (2,000 mg) by mouth daily (with dinner) 360 tablet 1     insulin detemir (LEVEMIR FLEXPEN/FLEXTOUCH) 100 UNIT/ML injection 30 units at bedtime 3 mL 1     potassium chloride (MICRO-K) 10 MEQ CR capsule TAKE TWO CAPSULES BY MOUTH TWICE DAILY  360 capsule 1     blood glucose monitoring (NO BRAND SPECIFIED) test strip Use to test blood sugars bid times daily. Pt needs Ruby strips 100 each 3     atorvastatin (LIPITOR) 10 MG tablet Take 1 tablet (10 mg) by mouth daily 90 tablet 3     blood glucose monitoring (ONE TOUCH DELICA) lancets Use to test blood sugars 2 times daily or as directed. 1 Box prn     budesonide (RINOCORT AQUA) 32 MCG/ACT spray INSTILL 2 SPRAYS BY NASAL ROUTE DAILY 1 Bottle 11     BD ROSE U/F 32G X 4 MM insulin pen needle USE 1 DAILY 100 each PRN     amitriptyline (ELAVIL) 10 MG tablet Take 1 tablet (10 mg) by mouth daily 90 tablet 3     venlafaxine (EFFEXOR XR) 150 MG 24  hr capsule Take 1 capsule (150 mg) by mouth daily 90 capsule 3     blood glucose monitoring (ONE TOUCH VERIO IQ) test strip Use to test blood sugar 2 times daily or as directed. 200 each 1     ORDER FOR DME Equipment being ordered: Mediven plus compression stockings    66275  20-30 compression 3 Device 0     Blood Glucose Monitoring Suppl (ONE TOUCH ULTRA 2) W/DEVICE KIT Use to test blood sugars 2 times daily or as directed. 1 kit 0     Multiple Vitamins-Minerals (CENTRUM SILVER) per tablet Take  by mouth. 1 tablet 3 times weekly 100 tablet      ASPIRIN 81 MG OR TABS 1 tab po QD (Once per day) 100 3         Reviewed and updated as needed this visit by clinical staffTobacco  Allergies  Problems       Reviewed and updated as needed this visit by Provider         ROS:  C: NEGATIVE for fever, chills, change in weight  E/M: NEGATIVE for ear, mouth and throat problems  R: NEGATIVE for significant cough or SOB  CV: NEGATIVE for chest pain, palpitations or peripheral edema    OBJECTIVE:     /70 (BP Location: Right arm, Patient Position: Chair, Cuff Size: Adult Large)  Pulse 74  Temp 97.7  F (36.5  C) (Oral)  Wt 115.7 kg (255 lb)  LMP 05/08/2001  SpO2 96%  BMI 49.8 kg/m2  Body mass index is 49.8 kg/(m^2).   Regular rate and  rhythm. S1 and S2 normal, no murmurs, clicks, gallops or rubs. No edema or JVD. Chest is clear; no wheezes or rales.    Diagnostic Test Results:  Results for orders placed or performed in visit on 11/09/17   Hemoglobin A1c   Result Value Ref Range    Hemoglobin A1C 7.5 (A) 4.0 - 7.0 %   BASIC METABOLIC PANEL ([facility])   Result Value Ref Range    Glucose 151 (H) 65 - 99 mg/dL    Urea Nitrogen 19 7 - 25 mg/dL    Creatinine 0.58 0.50 - 0.99 mg/dL    GFR Estimate 95 > OR = 60 mL/min/1.73m2    EGFR African American 110 > OR = 60 mL/min/1.73m2    BUN/Creatinine Ratio NOT APPLICABLE 6 - 22 (calc)    Sodium 139 135 - 146 mmol/L    Potassium 3.6 3.5 - 5.3 mmol/L    Chloride 99 98 - 110 mmol/L     Carbon Dioxide 27 20 - 31 mmol/L    Calcium 8.9 8.6 - 10.4 mg/dL       ASSESSMENT/PLAN:     Problem List Items Addressed This Visit     Essential hypertension, benign - Primary    Relevant Medications    amLODIPine (NORVASC) 5 MG tablet    losartan-hydrochlorothiazide (HYZAAR) 100-25 MG per tablet      Other Visit Diagnoses     Uncontrolled type 2 diabetes mellitus without complication, with long-term current use of insulin (H)        Relevant Medications    losartan-hydrochlorothiazide (HYZAAR) 100-25 MG per tablet    metFORMIN (GLUCOPHAGE-XR) 500 MG 24 hr tablet    insulin detemir (LEVEMIR FLEXPEN/FLEXTOUCH) 100 UNIT/ML injection    Other Relevant Orders    BASIC METABOLIC PANEL ([facility]) (Completed)    Need for vaccination        Relevant Orders    HC FLU VAC PRESRV FREE QUAD SPLIT VIR 3+YRS IM (Completed)    VACCINE ADMINISTRATION, INITIAL (Completed)         Blood pressure controlled  BMI:   Estimated body mass index is 49.8 kg/(m^2) as calculated from the following:    Height as of 9/7/17: 1.524 m (5').    Weight as of this encounter: 115.7 kg (255 lb).   Weight management plan: Discussed healthy diet and exercise guidelines and patient will follow up in 3 months in clinic to re-evaluate.        MEDICATIONS:        - Increase insulin to 30 units hs       - Continue other medications without change  FUTURE APPOINTMENTS:       - Follow-up visit in 3 months  Regular exercise  Focus on improved diet- has demonstrated ability to eat healthy in past- with good diabetis control  e4 visit includes diabetic diet counseling-    Susu Brody MD  [facility]

## 2017-11-09 NOTE — NURSING NOTE
Alma is here for diabetes recheck    Pre-Visit Screening :  Immunizations : up to date-flu shot today  Colonoscopy : is up to date  Mammogram : is up to date  Asthma Action Test/Plan : MELLISSA  PHQ9/GAD7 :  NA  Pulse - regular  My Chart - declines    CLASSIFICATION OF OVERWEIGHT AND OBESITY BY BMI                         Obesity Class           BMI(kg/m2)  Underweight                                    < 18.5  Normal                                         18.5-24.9  Overweight                                     25.0-29.9  OBESITY                     I                  30.0-34.9                              II                 35.0-39.9  EXTREME OBESITY             III                >40                             Patient's  BMI Body mass index is 49.8 kg/(m^2).  http://hin.nhlbi.nih.gov/menuplanner/menu.cgi  Questioned patient about current smoking habits.  Pt. has never smoked.

## 2017-11-10 LAB
BUN SERPL-MCNC: 19 MG/DL (ref 7–25)
BUN/CREATININE RATIO: ABNORMAL (CALC) (ref 6–22)
CALCIUM SERPL-MCNC: 8.9 MG/DL (ref 8.6–10.4)
CHLORIDE SERPLBLD-SCNC: 99 MMOL/L (ref 98–110)
CO2 SERPL-SCNC: 27 MMOL/L (ref 20–31)
CREAT SERPL-MCNC: 0.58 MG/DL (ref 0.5–0.99)
EGFR AFRICAN AMERICAN - QUEST: 110 ML/MIN/1.73M2
GFR SERPL CREATININE-BSD FRML MDRD: 95 ML/MIN/1.73M2
GLUCOSE - QUEST: 151 MG/DL (ref 65–99)
POTASSIUM SERPL-SCNC: 3.6 MMOL/L (ref 3.5–5.3)
SODIUM SERPL-SCNC: 139 MMOL/L (ref 135–146)

## 2017-11-14 ENCOUNTER — TELEPHONE (OUTPATIENT)
Dept: FAMILY MEDICINE | Facility: CLINIC | Age: 67
End: 2017-11-14

## 2017-11-14 NOTE — TELEPHONE ENCOUNTER
We have received forms from t regarding Jeny.     They would like us to review her medications.     Please take a look at these and decide if theses really need to be completed.     Thank you.     The form is in your box.       CARLENE Valerio ([facility])

## 2017-12-07 ENCOUNTER — TELEPHONE (OUTPATIENT)
Dept: FAMILY MEDICINE | Facility: CLINIC | Age: 67
End: 2017-12-07

## 2017-12-07 NOTE — TELEPHONE ENCOUNTER
Patient called and left a msg regarding her Levamir Flex Pen stating that she only got one pen and it was for 23units NOT 30 units.    Not sure what is going on so I call the patient on the number that was given and left a msg for her to please return our call.    [phone]

## 2017-12-07 NOTE — TELEPHONE ENCOUNTER
BJS the wrong amount was ordered - the rx should have been for 9 ml or 3 pens and was only written for 3 ml and taking 30 units a evening that is only good for 10 days.    I asked the pharmacy to dc the rx that was sent please ok the new one that I put in.    Pending Prescriptions:                       Disp   Refills    insulin detemir (LEVEMIR FLEXPEN/FLEXTOUC*9 mL   1            Si units at bedtime    Ready to be faxed when Rx is approved or denied.  Please close encounter when finished. Thanks, Jeny

## 2017-12-27 ENCOUNTER — OFFICE VISIT (OUTPATIENT)
Dept: FAMILY MEDICINE | Facility: CLINIC | Age: 67
End: 2017-12-27

## 2017-12-27 VITALS
SYSTOLIC BLOOD PRESSURE: 152 MMHG | OXYGEN SATURATION: 98 % | TEMPERATURE: 98.3 F | DIASTOLIC BLOOD PRESSURE: 86 MMHG | HEART RATE: 70 BPM

## 2017-12-27 DIAGNOSIS — M62.830 BACK MUSCLE SPASM: Primary | ICD-10-CM

## 2017-12-27 PROCEDURE — 99213 OFFICE O/P EST LOW 20 MIN: CPT | Performed by: PHYSICIAN ASSISTANT

## 2017-12-27 RX ORDER — CYCLOBENZAPRINE HCL 5 MG
5 TABLET ORAL 3 TIMES DAILY PRN
Qty: 42 TABLET | Refills: 0 | Status: SHIPPED | OUTPATIENT
Start: 2017-12-27 | End: 2018-02-21

## 2017-12-27 NOTE — NURSING NOTE
Alma is here for back pain 3.5 weeks ago    Pre-visit Screening:  Immunizations:  up to date  Colonoscopy:  is up to date  Mammogram: is up to date  Asthma Action Test/Plan:  NA  PHQ9:  NA  GAD7:  NA  Questioned patient about current smoking habits Pt. has never smoked.  Ok to leave detailed message on voice mail for today's visit only Yes, phone # [phone]

## 2017-12-27 NOTE — MR AVS SNAPSHOT
"              After Visit Summary   2017    Alma Kraft    MRN: [id number]           Patient Information     Date Of Birth          1950        Visit Information        Provider Department      2017 1:30 PM Kiarra Wyatt PA-C [facility]        Today's Diagnoses     Back muscle spasm    -  1       Follow-ups after your visit        Follow-up notes from your care team     Return if symptoms worsen or fail to improve.      Who to contact     If you have questions or need follow up information about today's clinic visit or your schedule please contact [facility] directly at [phone].  Normal or non-critical lab and imaging results will be communicated to you by MyChart, letter or phone within 4 business days after the clinic has received the results. If you do not hear from us within 7 days, please contact the clinic through MyChart or phone. If you have a critical or abnormal lab result, we will notify you by phone as soon as possible.  Submit refill requests through Lingot[employer] or call your pharmacy and they will forward the refill request to us. Please allow 3 business days for your refill to be completed.          Additional Information About Your Visit        MyChart Information     L[employer]otek lets you send messages to your doctor, view your test results, renew your prescriptions, schedule appointments and more. To sign up, go to [email or web address] . Click on \"Log in\" on the left side of the screen, which will take you to the Welcome page. Then click on \"Sign up Now\" on the right side of the page.     You will be asked to enter the access code listed below, as well as some personal information. Please follow the directions to create your username and password.     Your access code is: [email or web address]  Expires: 2018 10:31 AM     Your access code will  in 90 days. If you need help or a new code, please call your [place] clinic " or [phone].        Care EveryWhere ID     This is your Care EveryWhere ID. This could be used by other organizations to access your [facility] medical records  [id number]        Your Vitals Were     Pulse Temperature Last Period Pulse Oximetry          70 98.3  F (36.8  C) (Oral) 05/08/2001 98%         Blood Pressure from Last 3 Encounters:   12/27/17 152/86   11/09/17 126/70   09/07/17 130/76    Weight from Last 3 Encounters:   11/09/17 115.7 kg (255 lb)   09/07/17 116.1 kg (256 lb)   07/26/17 116.4 kg (256 lb 9.6 oz)              Today, you had the following     No orders found for display         Today's Medication Changes          These changes are accurate as of: 12/27/17 10:25 PM.  If you have any questions, ask your nurse or doctor.               Start taking these medicines.        Dose/Directions    cyclobenzaprine 5 MG tablet   Commonly known as:  FLEXERIL   Used for:  Back muscle spasm   Started by:  Kiarra Wyatt PA-C        Dose:  5 mg   Take 1 tablet (5 mg) by mouth 3 times daily as needed for muscle spasms   Quantity:  42 tablet   Refills:  0            Where to get your medicines      These medications were sent to [facility] [place] [id number]     Phone:  [phone]     cyclobenzaprine 5 MG tablet                Primary Care Provider Office Phone # Fax #    Susu Brody [phone] [address] [address]        Equal Access to Services     [facility]: Hadii wendy robb hadasho Soomaali, waaxda luqadaha, qaybta kaalmada rivas, dewayne shoemaker. So [facility] [phone].    ATENCIÓN: Si habla español, tiene a sy disposición servicios gratuitos de asistencia lingüística. Llame al [phone].    We comply with applicable federal civil rights laws and [place] laws. We do not discriminate on the basis of race, color, national  origin, age, disability, sex, sexual orientation, or gender identity.            Thank you!     Thank you for choosing [facility]  for your care. Our goal is always to provide you with excellent care. Hearing back from our patients is one way we can continue to improve our services. Please take a few minutes to complete the written survey that you may receive in the mail after your visit with us. Thank you!             Your Updated Medication List - Protect others around you: Learn how to safely use, store and throw away your medicines at www.disposemymeds.org.          This list is accurate as of: 12/27/17 10:25 PM.  Always use your most recent med list.                   Brand Name Dispense Instructions for use Diagnosis    amitriptyline 10 MG tablet    ELAVIL    90 tablet    Take 1 tablet (10 mg) by mouth daily    Psychophysiological insomnia, Chronic nonintractable headache, unspecified headache type       amLODIPine 5 MG tablet    NORVASC    90 tablet    Take 1 tablet (5 mg) by mouth daily    Essential hypertension, benign       aspirin 81 MG tablet     100    1 tab po QD (Once per day)    Other specified pre-operative examination       atorvastatin 10 MG tablet    LIPITOR    90 tablet    Take 1 tablet (10 mg) by mouth daily    Controlled type 2 diabetes mellitus without complication, with long-term current use of insulin (H)       BD RSOE U/F 32G X 4 MM   Generic drug:  insulin pen needle     100 each    USE 1 DAILY    Uncontrolled type 2 diabetes mellitus without complication, with long-term current use of insulin (H)       blood glucose monitoring lancets     1 Box    Use to test blood sugars 2 times daily or as directed.    Controlled type 2 diabetes mellitus without complication, with long-term current use of insulin (H)       blood glucose monitoring meter device kit     1 kit    Use to test blood sugars 2 times daily or as directed.    Type II or unspecified type diabetes mellitus  without mention of complication, not stated as uncontrolled       * blood glucose monitoring test strip    ONETOUCH VERIO IQ    200 each    Use to test blood sugar 2 times daily or as directed.    Diabetes type 2, uncontrolled (H)       * blood glucose monitoring test strip    no brand specified    100 each    Use to test blood sugars bid times daily. Pt needs Ruby strips    Controlled type 2 diabetes mellitus without complication, with long-term current use of insulin (H)       budesonide 32 MCG/ACT spray    RINOCORT AQUA    1 Bottle    INSTILL 2 SPRAYS BY NASAL ROUTE DAILY    Seasonal allergic rhinitis, unspecified allergic rhinitis trigger       CENTRUM SILVER per tablet     100 tablet    Take  by mouth. 1 tablet 3 times weekly        cyclobenzaprine 5 MG tablet    FLEXERIL    42 tablet    Take 1 tablet (5 mg) by mouth 3 times daily as needed for muscle spasms    Back muscle spasm       insulin detemir 100 UNIT/ML injection    LEVEMIR FLEXPEN/FLEXTOUCH    9 mL    30 units at bedtime    Uncontrolled type 2 diabetes mellitus without complication, with long-term current use of insulin (H)       losartan-hydrochlorothiazide 100-25 MG per tablet    HYZAAR    90 tablet    Take 1 tablet by mouth daily    Essential hypertension, benign, Uncontrolled type 2 diabetes mellitus without complication, with long-term current use of insulin (H)       metFORMIN 500 MG 24 hr tablet    GLUCOPHAGE-XR    360 tablet    Take 4 tablets (2,000 mg) by mouth daily (with dinner)    Uncontrolled type 2 diabetes mellitus without complication, with long-term current use of insulin (H)       order for DME     3 Device    Equipment being ordered: Mediven plus compression stockings  09392 20-30 compression    Unspecified venous (peripheral) insufficiency       potassium chloride 10 MEQ CR capsule    MICRO-K    360 capsule    TAKE TWO CAPSULES BY MOUTH TWICE DAILY    Essential hypertension, benign       venlafaxine 150 MG 24 hr capsule    EFFEXOR  XR    90 capsule    Take 1 capsule (150 mg) by mouth daily    Major depression in remission (H)       * Notice:  This list has 2 medication(s) that are the same as other medications prescribed for you. Read the directions carefully, and ask your doctor or other care provider to review them with you.

## 2017-12-27 NOTE — PROGRESS NOTES
"CC: Back pain    History:  Jeny is here today as she has been struggling with low back pain for 3 weeks. This was mild and stable, but then as she got more and more busy with the holidays it got worse and worse. Pain is on left lower back. Occasionally travels down into left hip or over to right lower back. Tried to rest, apply heat, and took some ibuprofen. No change in bowel or bladder control. No fevers, sweats, chills. Occasionally feels nausea with most intense pain. Pain gets up to 9/10 pain with 10 being the worst, especially with just moving around in bed. Sitting is the most comfortable. Has used soma in the past for similar symptoms.     Has had chronic back issues for the past 20 years. On and off. In past, has had \"disc protrusion\" requiring physical therapy.     PMH, MEDICATIONS, ALLERGIES, SOCIAL AND FAMILY HISTORY in [facility] and reviewed by me personally.      ROS negative other than the symptoms noted above in the HPI.        Examination   /86 (BP Location: Left arm, Patient Position: Chair, Cuff Size: Adult Large)  Pulse 70  Temp 98.3  F (36.8  C) (Oral)  LMP 05/08/2001  SpO2 98%       Constitutional: Sitting comfortably, in no acute distress. Vital signs noted. BP elevated with pain.  Cardiovascular:  regular rate and rhythm, no murmurs, clicks, or gallops  Respiratory:  normal respiratory rate and rhythm, lungs clear to auscultation  M/S: ROM of back intact. Pain recreated with right lateral lean. Moderately tender to palpation over left lower back at L3-L5 level. No tenderness over spine.  NEURO:  muscle strength normal, reflexes normal and symmetric  SKIN: No jaundice/pallor/rash.   Psychiatric: mentation appears normal and affect normal/bright        A/P    ICD-10-CM    1. Back muscle spasm M62.830 cyclobenzaprine (FLEXERIL) 5 MG tablet    left sided       DISCUSSION: Pain is most consistent with muscle spasm. Recommended:  Low dose cyclobenzaprine up to 3 times per day- careful of " drowsiness. Continue icing/heating, and stretching. Contact me in 1-2 weeks if not significantly better, and may need to consider ortho referral or physical therapy. I will write referral if patient asks.     follow up visit: As needed    [facility]

## 2018-02-11 ENCOUNTER — NURSE TRIAGE (OUTPATIENT)
Dept: NURSING | Facility: CLINIC | Age: 68
End: 2018-02-11

## 2018-02-11 ENCOUNTER — HOSPITAL ENCOUNTER (OUTPATIENT)
Facility: CLINIC | Age: 68
Setting detail: OBSERVATION
Discharge: HOME | End: 2018-02-11
Attending: EMERGENCY MEDICINE | Admitting: INTERNAL MEDICINE
Payer: MEDICARE

## 2018-02-11 ENCOUNTER — APPOINTMENT (OUTPATIENT)
Dept: CT IMAGING | Facility: CLINIC | Age: 68
End: 2018-02-11
Attending: EMERGENCY MEDICINE
Payer: MEDICARE

## 2018-02-11 VITALS
RESPIRATION RATE: 18 BRPM | OXYGEN SATURATION: 97 % | HEIGHT: 62 IN | BODY MASS INDEX: 48.21 KG/M2 | HEART RATE: 81 BPM | WEIGHT: 262 LBS | DIASTOLIC BLOOD PRESSURE: 73 MMHG | TEMPERATURE: 98.3 F | SYSTOLIC BLOOD PRESSURE: 160 MMHG

## 2018-02-11 DIAGNOSIS — R07.89 ATYPICAL CHEST PAIN: ICD-10-CM

## 2018-02-11 DIAGNOSIS — R07.9 CHEST PAIN: ICD-10-CM

## 2018-02-11 DIAGNOSIS — I10 ESSENTIAL HYPERTENSION, BENIGN: Primary | ICD-10-CM

## 2018-02-11 LAB
ANION GAP SERPL CALCULATED.3IONS-SCNC: 5 MMOL/L (ref 3–14)
BASOPHILS # BLD AUTO: 0 10E9/L (ref 0–0.2)
BASOPHILS NFR BLD AUTO: 0.3 %
BUN SERPL-MCNC: 15 MG/DL (ref 7–30)
CALCIUM SERPL-MCNC: 8.6 MG/DL (ref 8.5–10.1)
CHLORIDE SERPL-SCNC: 100 MMOL/L (ref 94–109)
CO2 SERPL-SCNC: 32 MMOL/L (ref 20–32)
CREAT SERPL-MCNC: 0.46 MG/DL (ref 0.52–1.04)
D DIMER PPP FEU-MCNC: 0.8 UG/ML FEU (ref 0–0.5)
DIFFERENTIAL METHOD BLD: NORMAL
EOSINOPHIL # BLD AUTO: 0 10E9/L (ref 0–0.7)
EOSINOPHIL NFR BLD AUTO: 0.1 %
ERYTHROCYTE [DISTWIDTH] IN BLOOD BY AUTOMATED COUNT: 13.3 % (ref 10–15)
GFR SERPL CREATININE-BSD FRML MDRD: >90 ML/MIN/1.7M2
GLUCOSE BLDC GLUCOMTR-MCNC: 133 MG/DL (ref 70–99)
GLUCOSE SERPL-MCNC: 151 MG/DL (ref 70–99)
HBA1C MFR BLD: 7.6 % (ref 4.3–6)
HCT VFR BLD AUTO: 40.4 % (ref 35–47)
HGB BLD-MCNC: 13.3 G/DL (ref 11.7–15.7)
IMM GRANULOCYTES # BLD: 0 10E9/L (ref 0–0.4)
IMM GRANULOCYTES NFR BLD: 0.3 %
INTERPRETATION ECG - MUSE: NORMAL
LYMPHOCYTES # BLD AUTO: 2.6 10E9/L (ref 0.8–5.3)
LYMPHOCYTES NFR BLD AUTO: 34.8 %
MCH RBC QN AUTO: 29.6 PG (ref 26.5–33)
MCHC RBC AUTO-ENTMCNC: 32.9 G/DL (ref 31.5–36.5)
MCV RBC AUTO: 90 FL (ref 78–100)
MONOCYTES # BLD AUTO: 0.8 10E9/L (ref 0–1.3)
MONOCYTES NFR BLD AUTO: 10.3 %
NEUTROPHILS # BLD AUTO: 4 10E9/L (ref 1.6–8.3)
NEUTROPHILS NFR BLD AUTO: 54.2 %
NRBC # BLD AUTO: 0 10*3/UL
NRBC BLD AUTO-RTO: 0 /100
PLATELET # BLD AUTO: 256 10E9/L (ref 150–450)
POTASSIUM SERPL-SCNC: 3.1 MMOL/L (ref 3.4–5.3)
POTASSIUM SERPL-SCNC: 3.6 MMOL/L (ref 3.4–5.3)
RBC # BLD AUTO: 4.49 10E12/L (ref 3.8–5.2)
SODIUM SERPL-SCNC: 137 MMOL/L (ref 133–144)
TROPONIN I SERPL-MCNC: <0.015 UG/L (ref 0–0.04)
WBC # BLD AUTO: 7.4 10E9/L (ref 4–11)

## 2018-02-11 PROCEDURE — 00000146 ZZHCL STATISTIC GLUCOSE BY METER IP

## 2018-02-11 PROCEDURE — 84484 ASSAY OF TROPONIN QUANT: CPT | Performed by: EMERGENCY MEDICINE

## 2018-02-11 PROCEDURE — 83036 HEMOGLOBIN GLYCOSYLATED A1C: CPT | Performed by: INTERNAL MEDICINE

## 2018-02-11 PROCEDURE — 99285 EMERGENCY DEPT VISIT HI MDM: CPT | Mod: 25

## 2018-02-11 PROCEDURE — 93005 ELECTROCARDIOGRAM TRACING: CPT

## 2018-02-11 PROCEDURE — A9270 NON-COVERED ITEM OR SERVICE: HCPCS | Mod: GY | Performed by: INTERNAL MEDICINE

## 2018-02-11 PROCEDURE — G0378 HOSPITAL OBSERVATION PER HR: HCPCS

## 2018-02-11 PROCEDURE — 25000132 ZZH RX MED GY IP 250 OP 250 PS 637: Performed by: EMERGENCY MEDICINE

## 2018-02-11 PROCEDURE — 84132 ASSAY OF SERUM POTASSIUM: CPT | Mod: 91 | Performed by: PHYSICIAN ASSISTANT

## 2018-02-11 PROCEDURE — 84484 ASSAY OF TROPONIN QUANT: CPT | Performed by: INTERNAL MEDICINE

## 2018-02-11 PROCEDURE — 36415 COLL VENOUS BLD VENIPUNCTURE: CPT | Performed by: INTERNAL MEDICINE

## 2018-02-11 PROCEDURE — 25000132 ZZH RX MED GY IP 250 OP 250 PS 637: Mod: GY | Performed by: INTERNAL MEDICINE

## 2018-02-11 PROCEDURE — 85379 FIBRIN DEGRADATION QUANT: CPT | Performed by: EMERGENCY MEDICINE

## 2018-02-11 PROCEDURE — 99207 ZZC CDG-EXAM COMPONENT: MEETS DETAILED - UP CODED: CPT | Performed by: INTERNAL MEDICINE

## 2018-02-11 PROCEDURE — 25000128 H RX IP 250 OP 636: Performed by: EMERGENCY MEDICINE

## 2018-02-11 PROCEDURE — 96374 THER/PROPH/DIAG INJ IV PUSH: CPT | Mod: 59

## 2018-02-11 PROCEDURE — 85025 COMPLETE CBC W/AUTO DIFF WBC: CPT | Performed by: EMERGENCY MEDICINE

## 2018-02-11 PROCEDURE — 80048 BASIC METABOLIC PNL TOTAL CA: CPT | Performed by: EMERGENCY MEDICINE

## 2018-02-11 PROCEDURE — 99218 ZZC INITIAL OBSERVATION CARE,LEVL I: CPT | Performed by: INTERNAL MEDICINE

## 2018-02-11 PROCEDURE — 36415 COLL VENOUS BLD VENIPUNCTURE: CPT | Performed by: PHYSICIAN ASSISTANT

## 2018-02-11 PROCEDURE — 71260 CT THORAX DX C+: CPT

## 2018-02-11 RX ORDER — ASPIRIN 81 MG/1
162 TABLET, CHEWABLE ORAL ONCE
Status: COMPLETED | OUTPATIENT
Start: 2018-02-11 | End: 2018-02-11

## 2018-02-11 RX ORDER — NICOTINE POLACRILEX 4 MG
15-30 LOZENGE BUCCAL
Status: DISCONTINUED | OUTPATIENT
Start: 2018-02-11 | End: 2018-02-11 | Stop reason: HOSPADM

## 2018-02-11 RX ORDER — ASPIRIN 81 MG/1
324 TABLET, CHEWABLE ORAL ONCE
Status: COMPLETED | OUTPATIENT
Start: 2018-02-11 | End: 2018-02-11

## 2018-02-11 RX ORDER — NALOXONE HYDROCHLORIDE 0.4 MG/ML
.1-.4 INJECTION, SOLUTION INTRAMUSCULAR; INTRAVENOUS; SUBCUTANEOUS
Status: DISCONTINUED | OUTPATIENT
Start: 2018-02-11 | End: 2018-02-11 | Stop reason: HOSPADM

## 2018-02-11 RX ORDER — MORPHINE SULFATE 4 MG/ML
4 INJECTION, SOLUTION INTRAMUSCULAR; INTRAVENOUS EVERY 10 MIN PRN
Status: DISCONTINUED | OUTPATIENT
Start: 2018-02-11 | End: 2018-02-11

## 2018-02-11 RX ORDER — ASPIRIN 81 MG/1
81 TABLET ORAL DAILY
Status: DISCONTINUED | OUTPATIENT
Start: 2018-02-12 | End: 2018-02-11 | Stop reason: HOSPADM

## 2018-02-11 RX ORDER — NITROGLYCERIN 0.4 MG/1
0.4 TABLET SUBLINGUAL EVERY 5 MIN PRN
Status: DISCONTINUED | OUTPATIENT
Start: 2018-02-11 | End: 2018-02-11 | Stop reason: HOSPADM

## 2018-02-11 RX ORDER — NITROGLYCERIN 0.4 MG/1
0.4 TABLET SUBLINGUAL EVERY 5 MIN PRN
Status: DISCONTINUED | OUTPATIENT
Start: 2018-02-11 | End: 2018-02-11

## 2018-02-11 RX ORDER — DEXTROSE MONOHYDRATE 25 G/50ML
25-50 INJECTION, SOLUTION INTRAVENOUS
Status: DISCONTINUED | OUTPATIENT
Start: 2018-02-11 | End: 2018-02-11 | Stop reason: HOSPADM

## 2018-02-11 RX ORDER — ACETAMINOPHEN 325 MG/1
650 TABLET ORAL EVERY 4 HOURS PRN
Status: DISCONTINUED | OUTPATIENT
Start: 2018-02-11 | End: 2018-02-11 | Stop reason: HOSPADM

## 2018-02-11 RX ORDER — AMLODIPINE BESYLATE 5 MG/1
5 TABLET ORAL DAILY
Status: DISCONTINUED | OUTPATIENT
Start: 2018-02-11 | End: 2018-02-11 | Stop reason: HOSPADM

## 2018-02-11 RX ORDER — POTASSIUM CHLORIDE 1.5 G/1.58G
20-40 POWDER, FOR SOLUTION ORAL
Status: DISCONTINUED | OUTPATIENT
Start: 2018-02-11 | End: 2018-02-11 | Stop reason: HOSPADM

## 2018-02-11 RX ORDER — IOPAMIDOL 755 MG/ML
500 INJECTION, SOLUTION INTRAVASCULAR ONCE
Status: COMPLETED | OUTPATIENT
Start: 2018-02-11 | End: 2018-02-11

## 2018-02-11 RX ORDER — POTASSIUM CHLORIDE 29.8 MG/ML
20 INJECTION INTRAVENOUS
Status: DISCONTINUED | OUTPATIENT
Start: 2018-02-11 | End: 2018-02-11 | Stop reason: HOSPADM

## 2018-02-11 RX ORDER — ATORVASTATIN CALCIUM 10 MG/1
10 TABLET, FILM COATED ORAL DAILY
Status: DISCONTINUED | OUTPATIENT
Start: 2018-02-11 | End: 2018-02-11 | Stop reason: HOSPADM

## 2018-02-11 RX ORDER — ACETAMINOPHEN 650 MG/1
650 SUPPOSITORY RECTAL EVERY 4 HOURS PRN
Status: DISCONTINUED | OUTPATIENT
Start: 2018-02-11 | End: 2018-02-11 | Stop reason: HOSPADM

## 2018-02-11 RX ORDER — POTASSIUM CHLORIDE 7.45 MG/ML
10 INJECTION INTRAVENOUS
Status: DISCONTINUED | OUTPATIENT
Start: 2018-02-11 | End: 2018-02-11 | Stop reason: HOSPADM

## 2018-02-11 RX ORDER — POTASSIUM CL/LIDO/0.9 % NACL 10MEQ/0.1L
10 INTRAVENOUS SOLUTION, PIGGYBACK (ML) INTRAVENOUS
Status: DISCONTINUED | OUTPATIENT
Start: 2018-02-11 | End: 2018-02-11 | Stop reason: HOSPADM

## 2018-02-11 RX ORDER — LIDOCAINE 40 MG/G
CREAM TOPICAL
Status: DISCONTINUED | OUTPATIENT
Start: 2018-02-11 | End: 2018-02-11 | Stop reason: HOSPADM

## 2018-02-11 RX ORDER — METFORMIN HCL 500 MG
2000 TABLET, EXTENDED RELEASE 24 HR ORAL
Qty: 360 TABLET | Refills: 1
Start: 2018-02-13 | End: 2018-08-22

## 2018-02-11 RX ORDER — POTASSIUM CHLORIDE 1500 MG/1
20-40 TABLET, EXTENDED RELEASE ORAL
Status: DISCONTINUED | OUTPATIENT
Start: 2018-02-11 | End: 2018-02-11 | Stop reason: HOSPADM

## 2018-02-11 RX ADMIN — POTASSIUM CHLORIDE 40 MEQ: 1500 TABLET, EXTENDED RELEASE ORAL at 06:10

## 2018-02-11 RX ADMIN — IOPAMIDOL 80 ML: 755 INJECTION, SOLUTION INTRAVENOUS at 03:58

## 2018-02-11 RX ADMIN — ASPIRIN 81 MG 324 MG: 81 TABLET ORAL at 02:47

## 2018-02-11 RX ADMIN — MORPHINE SULFATE 4 MG: 4 INJECTION INTRAVENOUS at 02:55

## 2018-02-11 RX ADMIN — NITROGLYCERIN 0.4 MG: 0.4 TABLET SUBLINGUAL at 02:47

## 2018-02-11 RX ADMIN — SODIUM CHLORIDE 90 ML: 9 INJECTION, SOLUTION INTRAVENOUS at 03:58

## 2018-02-11 RX ADMIN — POTASSIUM CHLORIDE 20 MEQ: 1500 TABLET, EXTENDED RELEASE ORAL at 08:13

## 2018-02-11 RX ADMIN — ASPIRIN 81 MG 162 MG: 81 TABLET ORAL at 06:11

## 2018-02-11 ASSESSMENT — ENCOUNTER SYMPTOMS
PALPITATIONS: 1
CHILLS: 0
NUMBNESS: 0
COUGH: 0
FEVER: 0

## 2018-02-11 NOTE — H&P
Admitted:     02/11/2018      DATE OF ADMISSION: 02/11/2018      CHIEF COMPLAINT:  Chest fullness.      HISTORY OF PRESENT ILLNESS:  This is a 67-year-old white female with no prior cardiac history; however, has a history of diabetes, obstructive sleep apnea, hypertension, hyperlipidemia, and depression. She was in mass at [facility] at roughly 5 p.m. when she had what she describes as a fullness in her midsternal chest.  She describes it as a bloating or pressure-like sensation, but no pain.  It lasted for an hour and subsequently dissipated.  She had a recurrence of this at 1 a.m. while she was watching TV.  This fullness or bloating sensation does not radiate.  There is no associated shortness of breath, nausea, vomiting, lightheadedness or diaphoresis.  In the ER over here, she was seen by Dr. Gupta whom I discussed care with. Her workup was essentially nondiagnostic other than hypokalemia and an elevated D-dimer. Hence, I am asked to admit her for further evaluation.      PAST MEDICAL HISTORY:  Hypertension, diabetes, hyperlipidemia, obstructive sleep apnea and depression.      PAST SURGICAL HISTORY:  Significant for bilateral total knee arthroplasty, multiple surgeries to her hands.      FAMILY HISTORY:  Significant for stroke in her mother.      ALLERGIES:  SHE IS ALLERGIC TO DEMEROL, ERYTHROMYCIN, PEANUTS, SHRIMP.      HOME MEDICATIONS: Levemir, Norvasc, Hyzaar, metformin, Lipitor, Elavil, Effexor, aspirin.  The rest of her meds are awaiting reconciliation.      REVIEW OF SYSTEMS:  As mentioned in the HPI.  She denies any nausea, vomiting, lightheadedness, dizziness at present.  She has no discomfort in her chest.  All other systems are extensively reviewed and deemed unremarkable and negative.      PHYSICAL EXAMINATION:   VITAL SIGNS:  Temperature is 98.6, pulse is 83.  Blood pressure is 151/86, respiratory rate 12, O2 sat is 97% on room air.   GENERAL:  She is alert, awake, oriented, coherent, nontoxic, in no  acute distress.  She is accompanied by her .   HEENT:  Pupils equal, round, react to light.   LUNGS:  Clear to auscultation.   HEART:  Regular rate, S1, S2 normal.  No murmurs or gallops.   ABDOMEN:  Obese, soft, nontender, with good bowel sounds.   EXTREMITIES:  There is no edema.      LABORATORY:  Labwork obtained here included a basic metabolic panel which is grossly unremarkable other than a potassium of 3.1.  Troponin less than 0.015.  A CBC with diff is grossly unremarkable. D-dimer was 0.8 in the ER.      DIAGNOSTIC DATA:  CT PE protocol showed cirrhotic-appearing liver, no evidence of PE, no evidence of active pulmonary disease.  A few nonspecific borderline and mildly enlarged mediastinal lymph nodes.        An EKG shows normal sinus rhythm at 95 beats per minute with occasional PVCs.  There is no significant ST-T wave changes to indicate ischemia.      ASSESSMENT AND PLAN:   1.  Chest fullness, bloating, pressure in the chest.  Seems atypical for acute coronary syndrome; however, she has multiple risk factors.  Will admit her under observation status.  We will monitor on telemetry.  We will carry out serial troponin on her.  She would benefit from stress testing as an outpatient.   2.  Diabetes.  We will hold metformin.  We will place her on Accu-Cheks along with insulin.   3.  Sleep apnea on CPAP at bedtime.   4.  Hypertension.  Will resume her home meds.      CODE STATUS:  FULL CODE.      DISPOSITION: She will be admitted under observation status.         PEDRO GRECO MD             D: 2018   T: 2018   MT:       Name:     DENNY BENITEZ   MRN:      [id number]        Account:      [id number]   :      1950        Admitted:     2018                   Document: [id number]

## 2018-02-11 NOTE — ED NOTES
Pt to ER with c/o cp and feeling like her heart is beating fast, pressure and fullness, pt originally felt it at [employer] at 1700

## 2018-02-11 NOTE — PLAN OF CARE
Problem: Patient Care Overview  Goal: Discharge Needs Assessment  Outcome: Improving  PRIMARY DIAGNOSIS: CHEST PAIN  OUTPATIENT/OBSERVATION GOALS TO BE MET BEFORE DISCHARGE:  1. Ruled out acute coronary syndrome (negative or stable Troponin):  Yes  2. Pain Status: Pain free.  3. Appropriate provocative testing performed: Yes  - Stress Test Procedure: Follow up outpatient lexiscan  - Interpretation of cardiac rhythm per telemetry tech: SR HR 70s    4. Cleared by Consultants (if applicable):Hospitalist to see patient  5. Return to near baseline physical activity: Yes  Discharge Planner Nurse   Safe discharge environment identified: Yes  Barriers to discharge: Not once medically cleared       Entered by: Elizabet Roy 02/11/2018 8:30am  Please review provider order for any additional goals. Nurse to notify provider when observation goals have been met and patient is ready for discharge.  Alert and oriented x4. SBP elevated between 150s-180s. Patient up SBA. Patient denies CP/SOB. Patient will have outpatient kvein scan- negative troponins x3. Gave 60mEq for K+ of 3.1, will get a recheck after 10am. Will await d/c orders after potassium level is back. Will continue to monitor.

## 2018-02-11 NOTE — TELEPHONE ENCOUNTER
"She was overnight in the observation room at the hospital.  She stated she was \"given KCL tablets at hospital. Told to take regular meds and told to take two tablets today\". Medication record is unclear as to what they intended her to take so I advised to do what they told her to do then contact her primary clinic to discuss any further dosing so that she has adequate coverage for potassium needs. She will contact her clinic to clarify further doses.  Genoveva Putnam RN-[facility]    "

## 2018-02-11 NOTE — PLAN OF CARE
Problem: Patient Care Overview  Goal: Discharge Needs Assessment  Outcome: Adequate for Discharge Date Met: 02/11/18  Patient's After Visit Summary was reviewed with patient and/or significant other.   Patient verbalized understanding of After Visit Summary, recommended follow up and was given an opportunity to ask questions.   Discharge medications sent home with patient/family: No, but a blood pressure cuff rx sent with patient   Discharged with spouse    OBSERVATION patient END time: 12:15pm

## 2018-02-11 NOTE — ED NOTES
[facility] [place]  ED Nurse Handoff Report    Alma Kraft is a 67 year old female   ED Chief complaint: chest pressure  . ED Diagnosis:   Final diagnoses:   None     Allergies:   Allergies   Allergen Reactions     Demerol Nausea and Vomiting     Erythromycin      GI upset     Peanuts [Nuts] Hives     Shrimp Hives       Code Status: Full Code  Activity level - Baseline/Home:  Independent. Activity Level - Current:   Independent. Lift room needed: No. Bariatric: No   Needed: No   Isolation: No. Infection: Not Applicable.     Vital Signs:   Vitals:    02/11/18 0300 02/11/18 0315 02/11/18 0330 02/11/18 0345   BP: 155/81 161/77 169/79 151/86   Pulse:       Resp: 16 12 12 12   Temp:       TempSrc:       SpO2: 96% 96% 96% 97%   Weight:       Height:           Cardiac Rhythm:  ,      Pain level: 0-10 Pain Scale: 1  Patient confused: No. Patient Falls Risk: No.   Elimination Status: Has voided   Patient Report - Initial Complaint: chest pain pressure upper chest into throat . Focused Assessment: alert oriented lungs clear no cough  Pain improved after morphine zofran  Chest ct neg   Tests Performed: cardiac labs and ct  Abnormal Results:   Labs Ordered and Resulted from Time of ED Arrival Up to the Time of Departure from the ED   BASIC METABOLIC PANEL - Abnormal; Notable for the following:        Result Value    Potassium 3.1 (*)     Glucose 151 (*)     Creatinine 0.46 (*)     All other components within normal limits   D DIMER QUANTITATIVE - Abnormal; Notable for the following:     D Dimer 0.8 (*)     All other components within normal limits   CBC WITH PLATELETS DIFFERENTIAL   TROPONIN I   PULSE OXIMETRY NURSING   CARDIAC CONTINUOUS MONITORING   PERIPHERAL IV CATHETER   PATIENT CARE ORDER   .   Treatments provided: ct pain med  Family Comments:  at bedside  OBS brochure/video discussed/provided to patient:  Yes  ED Medications:   Medications   nitroGLYcerin (NITROSTAT) sublingual tablet 0.4  mg (0.4 mg Sublingual Given 2/11/18 0247)   morphine (PF) injection 4 mg (4 mg Intravenous Given 2/11/18 0255)   aspirin chewable tablet 324 mg (324 mg Oral Given 2/11/18 0247)   0.9% sodium chloride BOLUS (0 mLs Intravenous Stopped 2/11/18 0401)   iopamidol (ISOVUE-370) solution 500 mL (80 mLs Intravenous Given 2/11/18 8098)     Drips infusing:  No  For the majority of the shift, the patient's behavior Green. Interventions performed were none   Severe Sepsis OR Septic Shock Diagnosis Present: No      ED Nurse Name/Phone Number: Cassandra Daigle,   4:39 AM    RECEIVING UNIT ED HANDOFF REVIEW    Above ED Nurse Handoff Report was reviewed: Yes  Reviewed by: Nicole Abraham on February 11, 2018 at 5:13 AM

## 2018-02-11 NOTE — DISCHARGE SUMMARY
Discharge Summary  Hospitalist Service    Alma Kraft MRN# [id number]   YOB: 1950 Age: 67 year old     Date of Admission:  2/11/2018  Date of Discharge:  2/11/2018  Admitting Physician: Minerva Crowder MD  Discharge Physician: Ryann Velazquez PA-C  Discharging Service: Hospitalist Service     Primary Provider: Susu Brody  Primary Care Physician Phone Number: [phone]         Discharge Diagnoses/Problem Oriented Hospital Course (Providers):    Alma Kraft was admitted on 2/11/2018 by Minerva Crowder MD and I would refer you to their history and physical. Briefly, patient was admitted with non exertional chest fullness with no other associated cardiac symptoms. Her telemetry was negative as well as her cardiac enzymes x 3. Given obesity, deconditioning and hx of joint pain we did not think she would do well on a treadmill and nuclear stress testing was unavailable. Her symptoms were not concerning enough to keep her an extra night so she was discharged with order for lexiscan stress test. Her blood pressure was elevated her as well which could be contributing to symptoms in addition to increased stress.  The following problems were addressed during her hospitalization:    1. Chest fullness: Non exertional in nature with no other associated symptoms. Tele revealed no arrhythmia. Troponins x 3 negative. Likely to not be cardiac, but stress test reasonable considering risk factors. Patient not likely to do well on treadmill so discharged with outpatient lexiscan order and recommended to return to ED if chest fullness associated with other symptoms. Patient suffering from significant stress at home which could also be contributing.     2. HTN- Blood pressure elevated here which could be related #1. She states her recent pressures at the clinic have been much better so discharged with prescription for blood pressure cuff and recommend monitoring daily with follow up with PCP.           Code Status:      Full Code        Brief Hospital Stay Summary Sent Home With Patient in AVS:        Reason for your hospital stay       You were admitted for concerns of chest fullness. Your initial work up   was negative for heart attack but we were asked to watch you overnight.   Your cardiac enzymes were negative for heart attack. Your chest fullness   is atypical for heart disease and we are unable to perform the type of   stress test you need on the weekend so we were comfortable discharging you   home for an outpatient stress test that we have ordered. You will be   contacted by our cardio pulmonary team to set this up. I am not sure what   is the cause of your chest fullness but should it get worse and you   develop shortness of breath, sweating or jaw/arm pain with it please   return to the ER.    Your blood pressure was high here, but you feel like your blood pressure   has been lower at previous appointment so we have provided you with a   prescription for a blood pressure cuff and would like you to check your   blood pressure every AM and keep track of it to show your primary care   provider.    You had a CT scan with contrast as part of your work up so you can not   take Metformin until 2/13.    You should talk with your primary care provider about your level of stress   and consider seeing a therapist or someone you can talk to.                              Pending Results:        Unresulted Labs Ordered in the Past 30 Days of this Admission     No orders found from 12/13/2017 to 2/12/2018.            Discharge Instructions and Follow-Up:      Follow-up Appointments     Follow-up and recommended labs and tests        Follow up with primary care provider, Susu Brody as scheduled and   you should have a blood pressure diary check, follow up on your stress   test results, and a basic metabolic panel done as your potassium was low.                        Discharge Disposition:       Discharged to home         Discharge Medications:        Current Discharge Medication List      START taking these medications    Details   !! order for DME Equipment being ordered: Other: Automatic blood pressure cuff  Treatment Diagnosis: Hypertension  Qty: 1 Device, Refills: 0    Associated Diagnoses: Essential hypertension, benign       !! - Potential duplicate medications found. Please discuss with provider.      CONTINUE these medications which have CHANGED    Details   metFORMIN (GLUCOPHAGE-XR) 500 MG 24 hr tablet Take 4 tablets (2,000 mg) by mouth daily (with dinner)  Qty: 360 tablet, Refills: 1    Associated Diagnoses: Uncontrolled type 2 diabetes mellitus without complication, with long-term current use of insulin (H)         CONTINUE these medications which have NOT CHANGED    Details   potassium chloride (MICRO-K) 10 MEQ CR capsule TAKE TWO CAPSULES BY MOUTH TWICE DAILY   Qty: 360 capsule, Refills: 3    Comments: Please ensure same potassium supplement as previous. Thanks.  Associated Diagnoses: Essential hypertension, benign      cyclobenzaprine (FLEXERIL) 5 MG tablet Take 1 tablet (5 mg) by mouth 3 times daily as needed for muscle spasms  Qty: 42 tablet, Refills: 0    Associated Diagnoses: Back muscle spasm      insulin detemir (LEVEMIR FLEXPEN/FLEXTOUCH) 100 UNIT/ML injection 30 units at bedtime  Qty: 9 mL, Refills: 1    Comments: Replaces the rx sent in for 3 ml - DOSE CHANGE  Associated Diagnoses: Uncontrolled type 2 diabetes mellitus without complication, with long-term current use of insulin (H)      amLODIPine (NORVASC) 5 MG tablet Take 1 tablet (5 mg) by mouth daily  Qty: 90 tablet, Refills: 1    Associated Diagnoses: Essential hypertension, benign      losartan-hydrochlorothiazide (HYZAAR) 100-25 MG per tablet Take 1 tablet by mouth daily  Qty: 90 tablet, Refills: 1    Associated Diagnoses: Essential hypertension, benign; Uncontrolled type 2 diabetes mellitus without complication, with  long-term current use of insulin (H)      !! blood glucose monitoring (NO BRAND SPECIFIED) test strip Use to test blood sugars bid times daily. Pt needs Ruby strips  Qty: 100 each, Refills: 3    Associated Diagnoses: Controlled type 2 diabetes mellitus without complication, with long-term current use of insulin (H)      atorvastatin (LIPITOR) 10 MG tablet Take 1 tablet (10 mg) by mouth daily  Qty: 90 tablet, Refills: 3    Comments: Patient will call when to fill  Associated Diagnoses: Controlled type 2 diabetes mellitus without complication, with long-term current use of insulin (H)      blood glucose monitoring (ONE TOUCH DELICA) lancets Use to test blood sugars 2 times daily or as directed.  Qty: 1 Box, Refills: prn    Comments: Patient will call when to fill  Associated Diagnoses: Controlled type 2 diabetes mellitus without complication, with long-term current use of insulin (H)      budesonide (RINOCORT AQUA) 32 MCG/ACT spray INSTILL 2 SPRAYS BY NASAL ROUTE DAILY  Qty: 1 Bottle, Refills: 11    Associated Diagnoses: Seasonal allergic rhinitis, unspecified allergic rhinitis trigger      BD ROSE U/F 32G X 4 MM insulin pen needle USE 1 DAILY  Qty: 100 each, Refills: PRN    Associated Diagnoses: Uncontrolled type 2 diabetes mellitus without complication, with long-term current use of insulin (H)      amitriptyline (ELAVIL) 10 MG tablet Take 1 tablet (10 mg) by mouth daily  Qty: 90 tablet, Refills: 3    Associated Diagnoses: Psychophysiological insomnia; Chronic nonintractable headache, unspecified headache type      venlafaxine (EFFEXOR XR) 150 MG 24 hr capsule Take 1 capsule (150 mg) by mouth daily  Qty: 90 capsule, Refills: 3    Associated Diagnoses: Major depression in remission (H)      !! blood glucose monitoring (ONE TOUCH VERIO IQ) test strip Use to test blood sugar 2 times daily or as directed.  Qty: 200 each, Refills: 1    Associated Diagnoses: Diabetes type 2, uncontrolled (H)      !! ORDER FOR DME  "Equipment being ordered: Mediven plus compression stockings    35214  20-30 compression  Qty: 3 Device, Refills: 0    Associated Diagnoses: Unspecified venous (peripheral) insufficiency      Blood Glucose Monitoring Suppl (ONE TOUCH ULTRA 2) W/DEVICE KIT Use to test blood sugars 2 times daily or as directed.  Qty: 1 kit, Refills: 0    Comments: PATIENT WILL CALL WHEN TO FILL/ may substitute meter brand per her insurance  Associated Diagnoses: Type II or unspecified type diabetes mellitus without mention of complication, not stated as uncontrolled      Multiple Vitamins-Minerals (CENTRUM SILVER) per tablet Take  by mouth. 1 tablet 3 times weekly  Qty: 100 tablet      ASPIRIN 81 MG OR TABS 1 tab po QD (Once per day)  Qty: 100, Refills: 3    Associated Diagnoses: Other specified pre-operative examination       !! - Potential duplicate medications found. Please discuss with provider.            Allergies:         Allergies   Allergen Reactions     Demerol Nausea and Vomiting     Erythromycin      GI upset     Peanuts [Nuts] Hives     Shrimp Hives           Consultations This Hospital Stay:      No consultations were requested during this admission         Condition and Physical on Discharge:      Discharge condition: Stable   Vitals: Blood pressure 155/69, pulse 81, temperature 97.8  F (36.6  C), temperature source Oral, resp. rate 18, height 1.575 m (5' 2\"), weight 118.8 kg (262 lb), last menstrual period 05/12/2001, SpO2 96 %, not currently breastfeeding.     Constitutional: Alert and orientated   Lungs: CTAB   Cardiovascular: RRR with no murmur   Abdomen: Bowel sounds are present with no tenderness   Skin: No rash or open sores.    Other:          Discharge Time:      Less than 30 minutes.        Image Results From This Hospital Stay (For Non-EPIC Providers):        Results for orders placed or performed during the hospital encounter of 02/11/18   Chest CT, IV contrast only - PE protocol    Narrative    CT CHEST WITH " CONTRAST   2/11/2018 4:11 AM     HISTORY: Elevated D-dimer.    COMPARISON: None.    TECHNIQUE: Following the uneventful administration of 80 mL Isovue-370  intravenous contrast, helical sections were acquired through the lungs  according to the pulmonary embolism protocol. Coronal reconstructions  were generated. Radiation dose for this scan was reduced using  automated exposure control, adjustment of the mA and/or kV according  to the patient's size, or iterative reconstruction technique.    FINDINGS: No visualized pulmonary embolus. The thoracic aorta is  normal in caliber without dissection. Atherosclerotic calcification in  the thoracic aorta.    The lungs are clear. No pleural or pericardial effusion. A few  borderline and mildly enlarged mediastinal lymph nodes. For example,  there is a 1.7 x 1.4 cm right prevascular lymph node (series 4, image  61).    Scan through the upper abdomen is significant for a nodular outer  contour of the liver that is suggestive of cirrhosis and borderline  splenomegaly. 2 cm cyst in the upper pole of the right kidney. A few  mildly prominent lymph nodes in the gastrohepatic ligament.      Impression    IMPRESSION:   1. No visualized pulmonary embolus.  2. No evidence of active pulmonary disease.  3. A few nonspecific borderline and mildly enlarged mediastinal lymph  nodes.  4. Cirrhotic-appearing liver.    RICHA LINO MD           Most Recent Lab Results In EPIC (For Non-EPIC Providers):    Most Recent 3 CBC's:  Recent Labs   Lab Test  02/11/18   0250  05/12/17   1326  08/17/16   1034  03/13/14   1549   WBC  7.4  5.9   --   4.7   HGB  13.3  12.9  12.3  13.2   MCV  90  90.6   --   90.6   PLT  256  295   --   294      Most Recent 3 BMP's:  Recent Labs   Lab Test  02/11/18   1042  02/11/18   0250  11/09/17   1040  07/26/17   0910  04/26/17   1143   NA   --   137  139   --   140   POTASSIUM  3.6  3.1*  3.6   --   3.7   CHLORIDE   --   100  99   --   101   CO2   --   32  27   --    29   BUN   --   15  19  NOT APPLICABLE   --   21  47*   CR   --   0.46*  0.58   --   0.45*   ANIONGAP   --   5   --    --    --    TONI   --   8.6  8.9   --   9.3   GLC   --   151*  151*  179*  132*     Most Recent 3 Troponin's:No lab results found.  Most Recent 3 INR's:No lab results found.  Most Recent 2 LFT's:  Recent Labs   Lab Test  10/05/13   1023  08/29/13   1349   AST  33  30   ALT  34*  25   ALKPHOS  97  100   BILITOTAL  0.6  0.4     Most Recent Cholesterol Panel:  Recent Labs   Lab Test  04/26/17   1143   CHOL  165   LDL  82   HDL  69   TRIG  68     Most Recent 6 Bacteria Isolates From Any Culture (See EPIC Reports for Culture Details):No lab results found.  Most Recent TSH, T4 and HgbA1c:   Recent Labs   Lab Test  02/11/18   0537   01/19/17   1043   09/23/10   0500   TSH   --    --   2.28   < >  2.11   T4   --    --    --    --   0.9   A1C  7.6*   < >   --    < >   --     < > = values in this interval not displayed.

## 2018-02-11 NOTE — IP AVS SNAPSHOT
MRN:[id number]                      After Visit Summary   2/11/2018    Alma Kraft    MRN: [id number]           Thank you!     Thank you for choosing [facility] for your care. Our goal is always to provide you with excellent care. Hearing back from our patients is one way we can continue to improve our services. Please take a few minutes to complete the written survey that you may receive in the mail after you visit. If you would like to speak to someone directly about your visit please contact Patient Relations at [phone]. Thank you!          Patient Information     Date Of Birth          1950        Designated Caregiver       Most Recent Value    Caregiver    Will someone help with your care after discharge? yes    Name of designated caregiver Blas    Phone number of caregiver [phone]    Caregiver address [place]      About your hospital stay     You were admitted on:  February 11, 2018 You last received care in the:  [facility] Observation Department    You were discharged on:  February 11, 2018        Reason for your hospital stay       You were admitted for concerns of chest fullness. Your initial work up was negative for heart attack but we were asked to watch you overnight. Your cardiac enzymes were negative for heart attack. Your chest fullness is atypical for heart disease and we are unable to perform the type of stress test you need on the weekend so we were comfortable discharging you home for an outpatient stress test that we have ordered. You will be contacted by our cardio pulmonary team to set this up. I am not sure what is the cause of your chest fullness but should it get worse and you develop shortness of breath, sweating or jaw/arm pain with it please return to the ER.    Your blood pressure was high here, but you feel like your blood pressure has been lower at previous appointment so we have provided you with a prescription  for a blood pressure cuff and would like you to check your blood pressure every AM and keep track of it to show your primary care provider.    You had a CT scan with contrast as part of your work up so you can not take Metformin until 2/13.    You should talk with your primary care provider about your level of stress and consider seeing a therapist or someone you can talk to.                  Who to Call     For medical emergencies, please call 911.  For non-urgent questions about your medical care, please call your primary care provider or clinic, [phone]          Attending Provider     Provider Specialty    Makayla Gupta MD Emergency Medicine    Minerva Crowder MD Internal Medicine       Primary Care Provider Office Phone # Fax #    Susu Brody [phone]      After Care Instructions     Activity       Your activity upon discharge: activity as tolerated            Diet       Follow this diet upon discharge: Regular            Monitor and record       blood pressure daily and keep diary of it                  Follow-up Appointments     Follow-up and recommended labs and tests        Follow up with primary care provider, Susu Brody as scheduled and you should have a blood pressure diary check, follow up on your stress test results, and a basic metabolic panel done as your potassium was low.                  Your next 10 appointments already scheduled     Feb 21, 2018  9:30 AM CST   Office Visit with Susu Brody MD   [facility] ([facility])    [address]  [address]  [facility] [id number] [phone]                         Future tests that were ordered for you     NM Lexiscan stress test                 Pending Results     No orders found from 2/9/2018 to 2/12/2018.            Admission Information     Date & Time Provider Department Dept. Phone    2/11/2018 Minerva Crowder MD [facility]  "Observation Department [phone]      Your Vitals Were     Blood Pressure Pulse Temperature Respirations Height Weight    160/73 (BP Location: Right arm) 81 98.3  F (36.8  C) (Oral) 18 1.575 m (5' 2\") 118.8 kg (262 lb)    Last Period Pulse Oximetry BMI (Body Mass Index)             2001 97% 47.92 kg/m2         FRESS Information     FRESS lets you send messages to your doctor, view your test results, renew your prescriptions, schedule appointments and more. To sign up, go to [email or web address] . Click on \"Log in\" on the left side of the screen, which will take you to the Welcome page. Then click on \"Sign up Now\" on the right side of the page.     You will be asked to enter the access code listed below, as well as some personal information. Please follow the directions to create your username and password.     Your access code is: [email or web address]  Expires: 2018 11:56 AM     Your access code will  in 90 days. If you need help or a new code, please call your [facility] clinic or [phone].        Care EveryWhere ID     This is your Care EveryWhere ID. This could be used by other organizations to access your [facility] medical records  [id number]        Equal Access to Services     KEVIN AMAYA AH: Thuy love Sokristi, waaxda luqadaha, qaybta kaalmada aderitesh, dewayne shoemaker. So [facility] [phone].    ATENCIÓN: Si habla español, tiene a sy disposición servicios gratuitos de asistencia lingüística. Llame al [phone].    We comply with applicable federal civil rights laws and [place] laws. We do not discriminate on the basis of race, color, national origin, age, disability, sex, sexual orientation, or gender identity.               Review of your medicines      CONTINUE these medicines which may have CHANGED, or have new prescriptions. If we are uncertain of the size of tablets/capsules you have at home, strength may be listed as something that might have " changed.        Dose / Directions    * order for DME   This may have changed:  Another medication with the same name was added. Make sure you understand how and when to take each.   Used for:  Unspecified venous (peripheral) insufficiency        Equipment being ordered: Mediven plus compression stockings  56059 20-30 compression   Quantity:  3 Device   Refills:  0       * order for DME   This may have changed:  You were already taking a medication with the same name, and this prescription was added. Make sure you understand how and when to take each.   Used for:  Essential hypertension, benign        Equipment being ordered: Other: Automatic blood pressure cuff Treatment Diagnosis: Hypertension   Quantity:  1 Device   Refills:  0       * Notice:  This list has 2 medication(s) that are the same as other medications prescribed for you. Read the directions carefully, and ask your doctor or other care provider to review them with you.      CONTINUE these medicines which have NOT CHANGED        Dose / Directions    amitriptyline 10 MG tablet   Commonly known as:  ELAVIL   Used for:  Psychophysiological insomnia, Chronic nonintractable headache, unspecified headache type        Dose:  10 mg   Take 1 tablet (10 mg) by mouth daily   Quantity:  90 tablet   Refills:  3       amLODIPine 5 MG tablet   Commonly known as:  NORVASC   Used for:  Essential hypertension, benign        Dose:  5 mg   Take 1 tablet (5 mg) by mouth daily   Quantity:  90 tablet   Refills:  1       aspirin 81 MG tablet   Used for:  Other specified pre-operative examination        1 tab po QD (Once per day)   Quantity:  100   Refills:  3       atorvastatin 10 MG tablet   Commonly known as:  LIPITOR   Used for:  Controlled type 2 diabetes mellitus without complication, with long-term current use of insulin (H)        Dose:  10 mg   Take 1 tablet (10 mg) by mouth daily   Quantity:  90 tablet   Refills:  3       BD ROSE U/F 32G X 4 MM   Used for:  Uncontrolled  type 2 diabetes mellitus without complication, with long-term current use of insulin (H)   Generic drug:  insulin pen needle        USE 1 DAILY   Quantity:  100 each   Refills:  PRN       blood glucose monitoring lancets   Used for:  Controlled type 2 diabetes mellitus without complication, with long-term current use of insulin (H)        Use to test blood sugars 2 times daily or as directed.   Quantity:  1 Box   Refills:  prn       blood glucose monitoring meter device kit   Used for:  Type II or unspecified type diabetes mellitus without mention of complication, not stated as uncontrolled        Use to test blood sugars 2 times daily or as directed.   Quantity:  1 kit   Refills:  0       * blood glucose monitoring test strip   Commonly known as:  ONETOUCH VERIO IQ   Used for:  Diabetes type 2, uncontrolled (H)        Use to test blood sugar 2 times daily or as directed.   Quantity:  200 each   Refills:  1       * blood glucose monitoring test strip   Commonly known as:  no brand specified   Used for:  Controlled type 2 diabetes mellitus without complication, with long-term current use of insulin (H)        Use to test blood sugars bid times daily. Pt needs Ruby strips   Quantity:  100 each   Refills:  3       budesonide 32 MCG/ACT spray   Commonly known as:  RINOCORT AQUA   Used for:  Seasonal allergic rhinitis, unspecified allergic rhinitis trigger        INSTILL 2 SPRAYS BY NASAL ROUTE DAILY   Quantity:  1 Bottle   Refills:  11       CENTRUM SILVER per tablet        Take  by mouth. 1 tablet 3 times weekly   Quantity:  100 tablet   Refills:  0       cyclobenzaprine 5 MG tablet   Commonly known as:  FLEXERIL   Used for:  Back muscle spasm        Dose:  5 mg   Take 1 tablet (5 mg) by mouth 3 times daily as needed for muscle spasms   Quantity:  42 tablet   Refills:  0       insulin detemir 100 UNIT/ML injection   Commonly known as:  LEVEMIR FLEXPEN/FLEXTOUCH   Used for:  Uncontrolled type 2 diabetes mellitus without  complication, with long-term current use of insulin (H)        30 units at bedtime   Quantity:  9 mL   Refills:  1       losartan-hydrochlorothiazide 100-25 MG per tablet   Commonly known as:  HYZAAR   Used for:  Essential hypertension, benign, Uncontrolled type 2 diabetes mellitus without complication, with long-term current use of insulin (H)        Dose:  1 tablet   Take 1 tablet by mouth daily   Quantity:  90 tablet   Refills:  1       metFORMIN 500 MG 24 hr tablet   Commonly known as:  GLUCOPHAGE-XR   Used for:  Uncontrolled type 2 diabetes mellitus without complication, with long-term current use of insulin (H)        Dose:  2000 mg   Start taking on:  2/13/2018   Take 4 tablets (2,000 mg) by mouth daily (with dinner)   Quantity:  360 tablet   Refills:  1       potassium chloride 10 MEQ CR capsule   Commonly known as:  MICRO-K   Used for:  Essential hypertension, benign        TAKE TWO CAPSULES BY MOUTH TWICE DAILY   Quantity:  360 capsule   Refills:  3       venlafaxine 150 MG 24 hr capsule   Commonly known as:  EFFEXOR XR   Used for:  Major depression in remission (H)        Dose:  150 mg   Take 1 capsule (150 mg) by mouth daily   Quantity:  90 capsule   Refills:  3       * Notice:  This list has 2 medication(s) that are the same as other medications prescribed for you. Read the directions carefully, and ask your doctor or other care provider to review them with you.         Where to get your medicines      Some of these will need a paper prescription and others can be bought over the counter. Ask your nurse if you have questions.     Bring a paper prescription for each of these medications     order for DME       You don't need a prescription for these medications     metFORMIN 500 MG 24 hr tablet                Protect others around you: Learn how to safely use, store and throw away your medicines at www.disposemymeds.org.             Medication List: This is a list of all your medications and when to take  them. Check marks below indicate your daily home schedule. Keep this list as a reference.      Medications           Morning Afternoon Evening Bedtime As Needed    amitriptyline 10 MG tablet   Commonly known as:  ELAVIL   Take 1 tablet (10 mg) by mouth daily                                amLODIPine 5 MG tablet   Commonly known as:  NORVASC   Take 1 tablet (5 mg) by mouth daily                                aspirin 81 MG tablet   1 tab po QD (Once per day)                                atorvastatin 10 MG tablet   Commonly known as:  LIPITOR   Take 1 tablet (10 mg) by mouth daily                                BD ROSE U/F 32G X 4 MM   USE 1 DAILY   Generic drug:  insulin pen needle                                blood glucose monitoring lancets   Use to test blood sugars 2 times daily or as directed.                                blood glucose monitoring meter device kit   Use to test blood sugars 2 times daily or as directed.                                * blood glucose monitoring test strip   Commonly known as:  ONETOUCH VERIO IQ   Use to test blood sugar 2 times daily or as directed.                                * blood glucose monitoring test strip   Commonly known as:  no brand specified   Use to test blood sugars bid times daily. Pt needs Ruby strips                                budesonide 32 MCG/ACT spray   Commonly known as:  RINOCORT AQUA   INSTILL 2 SPRAYS BY NASAL ROUTE DAILY                                CENTRUM SILVER per tablet   Take  by mouth. 1 tablet 3 times weekly                                cyclobenzaprine 5 MG tablet   Commonly known as:  FLEXERIL   Take 1 tablet (5 mg) by mouth 3 times daily as needed for muscle spasms                                insulin detemir 100 UNIT/ML injection   Commonly known as:  LEVEMIR FLEXPEN/FLEXTOUCH   30 units at bedtime                                losartan-hydrochlorothiazide 100-25 MG per tablet   Commonly known as:  HYZAAR   Take 1 tablet by  mouth daily                                metFORMIN 500 MG 24 hr tablet   Commonly known as:  GLUCOPHAGE-XR   Take 4 tablets (2,000 mg) by mouth daily (with dinner)   Start taking on:  2/13/2018                                * order for DME   Equipment being ordered: Mediven plus compression stockings  47206 20-30 compression                                * order for DME   Equipment being ordered: Other: Automatic blood pressure cuff Treatment Diagnosis: Hypertension                                potassium chloride 10 MEQ CR capsule   Commonly known as:  MICRO-K   TAKE TWO CAPSULES BY MOUTH TWICE DAILY                                venlafaxine 150 MG 24 hr capsule   Commonly known as:  EFFEXOR XR   Take 1 capsule (150 mg) by mouth daily                                * Notice:  This list has 4 medication(s) that are the same as other medications prescribed for you. Read the directions carefully, and ask your doctor or other care provider to review them with you.

## 2018-02-11 NOTE — ED PROVIDER NOTES
History     Chief Complaint:  Chest Pain     HPI   Alma Kraft is a 67 year old female, with a history of hypertension, hyperlipidemia, and type 2 diabetes, who presents with her  to the ED for evaluation of chest pain. The patient reports she had one episode of a chest pain that is a pressure/fullness sensation with palpitations yesterday at 5:00PM; this resolved. The patient notes her chest pain returned today at 2:00AM while she was watching TV. The patient rates the pain as a 4-5/10. The patient reports she took a baby Aspirin. The patient denies any fevers, chills, cough, leg swelling, numbness, or tingling.     CARDIAC RISK FACTORS:  Sex:    Female  Tobacco:   No  Hypertension:   Yes  Hyperlipidemia:  Yes  Diabetes:   Yes  Family History:  No    PE/DVT RISK FACTORS:  Sex:    Female  Hormones:   No  Tobacco:   No  Cancer:   No  Travel:   No  Surgery:   No  Other immobilization: No  Personal history:  No  Family history:  No    Allergies:  Demerol  Erythromycin    Medications:    Potassium chloride  Flexeril  Insulin detemir  Norvasc  Hyzaar  Metformin  Lipitor  Elavil  Effexor  Centrum silver  Aspirin 81mg     Past Medical History:    Adjust disorder   Anxiety  Depression  Type 2 diabetes  MARCOS  Allergic rhinitis  Esophageal reflux   HLD  Obesity  HTN     Past Surgical History:    Left hand cyst excision   Shoulder manipulation  Appendectomy  Bilateral total knee replacement arthroplasty  Hysterectomy & BSO  Left knee arthroscopy   T&A    Family History:    Prostate cancer  GERD  Breast cancer     Social History:  Smoking status: Never smoker  Alcohol use: Very rare  Presents to ED with    Marital Status:   [2]     Review of Systems   Constitutional: Negative for chills and fever.   Respiratory: Negative for cough.    Cardiovascular: Positive for chest pain and palpitations. Negative for leg swelling.   Neurological: Negative for numbness.   All other systems reviewed and are  "negative.    Physical Exam     Patient Vitals for the past 24 hrs:   BP Temp Temp src Pulse Heart Rate Resp SpO2 Height Weight   02/11/18 0345 151/86 - - - 83 12 97 % - -   02/11/18 0330 169/79 - - - 84 12 96 % - -   02/11/18 0315 161/77 - - - 82 12 96 % - -   02/11/18 0300 155/81 - - - 112 16 96 % - -   02/11/18 0257 (!) 174/100 - - - 96 19 98 % - -   02/11/18 0242 (!) 190/120 98.6  F (37  C) Oral 95 - 18 98 % 1.549 m (5' 1\") 117.9 kg (260 lb)     Physical Exam   Constitutional: She appears well-developed and well-nourished.   HENT:   Right Ear: External ear normal.   Left Ear: External ear normal.   Mouth/Throat: Oropharynx is clear and moist. No oropharyngeal exudate.   TM's clear bilaterally   Eyes: Conjunctivae are normal. Pupils are equal, round, and reactive to light. No scleral icterus.   Neck: Normal range of motion. Neck supple.   Cardiovascular: Normal rate, regular rhythm, normal heart sounds and intact distal pulses.  Exam reveals no gallop and no friction rub.    No murmur heard.  Pulmonary/Chest: Effort normal and breath sounds normal. No respiratory distress. She has no wheezes. She has no rales. She exhibits no tenderness.   Abdominal: Soft. Bowel sounds are normal. She exhibits no distension and no mass. There is no tenderness.   Musculoskeletal: Normal range of motion. She exhibits no edema.   Lymphadenopathy:     She has no cervical adenopathy.   Neurological: She is alert.   Skin: Skin is warm and dry. No rash noted.   Psychiatric: She has a normal mood and affect.         Emergency Department Course     ECG (2:41:58):  Rate 95 bpm. IL interval 158. QRS duration 90. QT/QTc 378/475. P-R-T axes 51 20 42. Sinus rhythm with premature atrial complexes with aberrant conduction. Otherwise normal ECG. Interpreted at 0242 by Makayla Gupta MD.    Imaging:  Radiographic findings were communicated with the patient and family who voiced understanding of the findings.    Chest CT, IV Contrast Only-PE " Protocol  IMPRESSION:   1. No visualized pulmonary embolus.  2. No evidence of active pulmonary disease.  3. A few nonspecific borderline and mildly enlarged mediastinal lymph  nodes.  4. Cirrhotic-appearing liver.  As read by Radiology.    Laboratory:   D dimer: 0.8(H)  Troponin I (0250): <0.015  CBC: o/w WNL (WBC 7.4, HGB 13.3, )   BMP: Potassium 3.1(L), Glucose 151(H), Creatinine 0.46(L)    Interventions:  0247: Aspirin 324mg Oral  0247: Nitrostat 0.4mg Sublingual   0255: Morphine 4mg IV  0358: NS 90mLs Bolus IV    Emergency Department Course:  Past medical records, nursing notes, and vitals reviewed.  0239: I performed an exam of the patient and obtained history, as documented above.  IV inserted and blood drawn.    The patient was sent for a chest CT while in the emergency department, findings above.    0426: I rechecked the patient. Explained findings to patient and .    0441: I spoke to Dr. Crowder of the hospitalist service who accepts the patient for admission.     Findings and plan explained to the Patient and spouse who consents to admission. Discussed the patient with Dr. Crowder, who will admit the patient to an obs bed for further monitoring, evaluation, and treatment.     Impression & Plan      Medical Decision Making:  Alma Kraft 67 year old female presents with 2 episodes of chest pressure. On arrival, her symptoms are resolving; although, she was quite hypertensive. So far, her evaluation only showed elevated D dimer. CT chest was negative due to her age as well as chest symptoms, I do think she warrants observation and chest pain evaluation. She does have diabetes, hypertension, as well as high cholesterol. She's comfortable with the plan. Patient's admitted to Dr. Crowder in observation unit.     Diagnosis:    ICD-10-CM   1. Chest pain R07.9     Disposition: Patient admitted to an obs bed by Dr. Lakesha Boswell  2/11/2018   [facility] EMERGENCY  DEPARTMENT    I, Namita Boswell, am serving as a scribe at 2:39 AM on 2/11/2018 to document services personally performed by Makayla Gupta MD based on my observations and the provider's statements to me.        Makayla Gupta MD  02/11/18 0600

## 2018-02-11 NOTE — IP AVS SNAPSHOT
[facility] Observation Department    [address]    [facility] [address]    Phone:  [phone]                                       After Visit Summary   2/11/2018    Alma Kraft    MRN: [id number]           After Visit Summary Signature Page     I have received my discharge instructions, and my questions have been answered. I have discussed any challenges I see with this plan with the nurse or doctor.    ..........................................................................................................................................  Patient/Patient Representative Signature      ..........................................................................................................................................  Patient Representative Print Name and Relationship to Patient    ..................................................               ................................................  Date                                            Time    ..........................................................................................................................................  Reviewed by Signature/Title    ...................................................              ..............................................  Date                                                            Time

## 2018-02-12 ENCOUNTER — TRANSFERRED RECORDS (OUTPATIENT)
Dept: FAMILY MEDICINE | Facility: CLINIC | Age: 68
End: 2018-02-12

## 2018-02-13 ENCOUNTER — CARE COORDINATION (OUTPATIENT)
Dept: FAMILY MEDICINE | Facility: CLINIC | Age: 68
End: 2018-02-13

## 2018-02-13 ENCOUNTER — TELEPHONE (OUTPATIENT)
Dept: FAMILY MEDICINE | Facility: CLINIC | Age: 68
End: 2018-02-13

## 2018-02-13 NOTE — TELEPHONE ENCOUNTER
Alma is having a stress test on Thursday and wants to know if she should take her insulin.  The instructions read to take 1/2 of the insulin if required by PCP so she is calling to see if that is what she should do?  Please advise     Phone #[phone]

## 2018-02-13 NOTE — TELEPHONE ENCOUNTER
No results in epic as of yet    Can she make an appointment to follow up in office to discuss stress test results as well as her diabetis lab results?    Please call

## 2018-02-13 NOTE — PROGRESS NOTES
Care Coordination Assessment    PCP: Susu Brody    Referral Source:  ED/IP    Clinical Data: Patient discharged from [facility] 2/11/2018 with Essential Hypertension, Benign, Chest pain.  Patient discharged home with instructions to follow up with PCP.    Plan: Patient has a appt set-up in clinic with PCP.  No further follow up is necessary.  I will close encounter.

## 2018-02-14 NOTE — TELEPHONE ENCOUNTER
Stress test at 10 am  Plans to eat breakfast three hours before test  Advised to take her usual dose of levimir

## 2018-02-15 ENCOUNTER — HOSPITAL ENCOUNTER (OUTPATIENT)
Dept: NUCLEAR MEDICINE | Facility: CLINIC | Age: 68
Setting detail: NUCLEAR MEDICINE
End: 2018-02-15
Attending: PHYSICIAN ASSISTANT
Payer: MEDICARE

## 2018-02-15 ENCOUNTER — HOSPITAL ENCOUNTER (OUTPATIENT)
Dept: CARDIOLOGY | Facility: CLINIC | Age: 68
Discharge: HOME | End: 2018-02-15
Attending: PHYSICIAN ASSISTANT | Admitting: PHYSICIAN ASSISTANT
Payer: MEDICARE

## 2018-02-15 DIAGNOSIS — R07.89 ATYPICAL CHEST PAIN: ICD-10-CM

## 2018-02-15 PROCEDURE — 78452 HT MUSCLE IMAGE SPECT MULT: CPT | Mod: 26 | Performed by: INTERNAL MEDICINE

## 2018-02-15 PROCEDURE — 25000128 H RX IP 250 OP 636

## 2018-02-15 PROCEDURE — A9502 TC99M TETROFOSMIN: HCPCS | Performed by: PHYSICIAN ASSISTANT

## 2018-02-15 PROCEDURE — 93018 CV STRESS TEST I&R ONLY: CPT | Performed by: INTERNAL MEDICINE

## 2018-02-15 PROCEDURE — 78452 HT MUSCLE IMAGE SPECT MULT: CPT

## 2018-02-15 PROCEDURE — 34300033 ZZH RX 343: Performed by: PHYSICIAN ASSISTANT

## 2018-02-15 PROCEDURE — 93016 CV STRESS TEST SUPVJ ONLY: CPT | Performed by: INTERNAL MEDICINE

## 2018-02-15 PROCEDURE — 93017 CV STRESS TEST TRACING ONLY: CPT

## 2018-02-15 RX ORDER — REGADENOSON 0.08 MG/ML
INJECTION, SOLUTION INTRAVENOUS
Status: COMPLETED
Start: 2018-02-15 | End: 2018-02-15

## 2018-02-15 RX ADMIN — TETROFOSMIN 10.7 MCI.: 1.38 INJECTION, POWDER, LYOPHILIZED, FOR SOLUTION INTRAVENOUS at 09:55

## 2018-02-15 RX ADMIN — REGADENOSON 0.4 MG: 0.08 INJECTION, SOLUTION INTRAVENOUS at 11:54

## 2018-02-15 RX ADMIN — TETROFOSMIN 31.5 MCI.: 1.38 INJECTION, POWDER, LYOPHILIZED, FOR SOLUTION INTRAVENOUS at 11:53

## 2018-02-15 NOTE — PROGRESS NOTES
Pre-procedure:    Initial vital signs: /88, , RR 16  Allergies reviewed: yes   Rhythm: Sinus  Medications taken within 48 hours of procedure: see epic   Last Caffeine: none today  Lung sounds: CTA, no wheezing, crackles or rtx  Health History (COPD, Asthma, etc): none    Procedure: Lexiscan  Reaction/symptoms after receiving Cindy injection: none  Intensity of Pain: none  Rhythm:   1. Vital Signs:/100, , RR 16  2. Vital Signs:/94, , RR 16     Reversal agent: N/A    Post:   Resolution of symptoms?: no symptoms  Vital signs: /94, , RR 15    Walk: YES  Comment: Patient transported back to radiology  Return to Radiology

## 2018-02-21 ENCOUNTER — OFFICE VISIT (OUTPATIENT)
Dept: FAMILY MEDICINE | Facility: CLINIC | Age: 68
End: 2018-02-21

## 2018-02-21 VITALS
HEART RATE: 76 BPM | TEMPERATURE: 98.2 F | WEIGHT: 255.6 LBS | SYSTOLIC BLOOD PRESSURE: 148 MMHG | BODY MASS INDEX: 47.04 KG/M2 | DIASTOLIC BLOOD PRESSURE: 76 MMHG | RESPIRATION RATE: 20 BRPM | HEIGHT: 62 IN

## 2018-02-21 DIAGNOSIS — G89.29 CHRONIC NONINTRACTABLE HEADACHE, UNSPECIFIED HEADACHE TYPE: ICD-10-CM

## 2018-02-21 DIAGNOSIS — R51.9 CHRONIC NONINTRACTABLE HEADACHE, UNSPECIFIED HEADACHE TYPE: ICD-10-CM

## 2018-02-21 DIAGNOSIS — L30.9 DERMATITIS: ICD-10-CM

## 2018-02-21 DIAGNOSIS — F32.5 MAJOR DEPRESSION IN REMISSION (H): ICD-10-CM

## 2018-02-21 DIAGNOSIS — F51.04 PSYCHOPHYSIOLOGICAL INSOMNIA: ICD-10-CM

## 2018-02-21 PROCEDURE — 36415 COLL VENOUS BLD VENIPUNCTURE: CPT | Performed by: FAMILY MEDICINE

## 2018-02-21 PROCEDURE — 86803 HEPATITIS C AB TEST: CPT | Mod: 90 | Performed by: FAMILY MEDICINE

## 2018-02-21 PROCEDURE — 80061 LIPID PANEL: CPT | Mod: 90 | Performed by: FAMILY MEDICINE

## 2018-02-21 PROCEDURE — 99214 OFFICE O/P EST MOD 30 MIN: CPT | Performed by: FAMILY MEDICINE

## 2018-02-21 RX ORDER — AMITRIPTYLINE HYDROCHLORIDE 10 MG/1
10 TABLET ORAL DAILY
Qty: 90 TABLET | Refills: 3 | Status: SHIPPED | OUTPATIENT
Start: 2018-02-21 | End: 2019-04-13

## 2018-02-21 RX ORDER — HYDROCORTISONE VALERATE CREAM 2 MG/G
CREAM TOPICAL
Qty: 45 G | Refills: 0 | Status: SHIPPED | OUTPATIENT
Start: 2018-02-21 | End: 2018-08-22

## 2018-02-21 RX ORDER — ATORVASTATIN CALCIUM 10 MG/1
10 TABLET, FILM COATED ORAL DAILY
Qty: 90 TABLET | Refills: 3 | Status: SHIPPED | OUTPATIENT
Start: 2018-02-21 | End: 2019-02-16

## 2018-02-21 RX ORDER — VENLAFAXINE HYDROCHLORIDE 150 MG/1
150 CAPSULE, EXTENDED RELEASE ORAL DAILY
Qty: 90 CAPSULE | Refills: 3 | Status: SHIPPED | OUTPATIENT
Start: 2018-02-21 | End: 2019-04-13

## 2018-02-21 ASSESSMENT — ANXIETY QUESTIONNAIRES
2. NOT BEING ABLE TO STOP OR CONTROL WORRYING: SEVERAL DAYS
5. BEING SO RESTLESS THAT IT IS HARD TO SIT STILL: NOT AT ALL
IF YOU CHECKED OFF ANY PROBLEMS ON THIS QUESTIONNAIRE, HOW DIFFICULT HAVE THESE PROBLEMS MADE IT FOR YOU TO DO YOUR WORK, TAKE CARE OF THINGS AT HOME, OR GET ALONG WITH OTHER PEOPLE: NOT DIFFICULT AT ALL
GAD7 TOTAL SCORE: 3
7. FEELING AFRAID AS IF SOMETHING AWFUL MIGHT HAPPEN: NOT AT ALL
1. FEELING NERVOUS, ANXIOUS, OR ON EDGE: SEVERAL DAYS
6. BECOMING EASILY ANNOYED OR IRRITABLE: NOT AT ALL
3. WORRYING TOO MUCH ABOUT DIFFERENT THINGS: SEVERAL DAYS

## 2018-02-21 ASSESSMENT — PATIENT HEALTH QUESTIONNAIRE - PHQ9: 5. POOR APPETITE OR OVEREATING: NOT AT ALL

## 2018-02-21 NOTE — NURSING NOTE
Alma Kraft is here for fasting blood work and medication refill.  Questioned patient about current smoking habits.  Pt. has never smoked.  Body mass index is 33.67 kg/(m^2).  PULSE regular  My Chart:     Pre-visit planning  Immunizations - up to date  Colonoscopy - is up to date  Mammogram - is up to date  Asthma -   PHQ9  KATHERINE-7

## 2018-02-21 NOTE — PROGRESS NOTES
SUBJECTIVE:   Alma Kraft is a 67 year old female who presents to clinic today for the following health issues:    Diabetes Follow-up      Patient is checking blood sugars: once daily.     Diabetic concerns: other - She has difficulty eating a healthy diet. She really enjoys eating sweets and carbs. Sometimes when she eats, she isn't hungry. She is cooking meals at home intermittently, but also eats processed foods.      Symptoms of hypoglycemia (low blood sugar): none     Paresthesias (numbness or burning in feet) or sores: No     Date of last diabetic eye exam: Due in April 2018    Hyperlipidemia Follow-Up      Rate your low fat/cholesterol diet?: poor    Taking statin?  Yes, no muscle aches from statin    Other lipid medications/supplements?:  none    Hypertension Follow-up      Outpatient blood pressures are not being checked.    Low Salt Diet: not monitoring salt    BP Readings from Last 2 Encounters:   02/21/18 148/76   02/11/18 160/73     Hemoglobin A1C (%)   Date Value   02/11/2018 7.6 (H)   11/09/2017 7.5 (A)     LDL Cholesterol Calculated (mg/dL (calc))   Date Value   04/26/2017 82   05/16/2016 77       Amount of exercise or physical activity: None    Problems taking medications regularly: No    Medication side effects: none    Diet: regular (no restrictions)    Depression Followup    Status since last visit: Stable    See PHQ-9 for current symptoms.  Other associated symptoms: None    Complicating factors:   Significant life event:  No   Current substance abuse:  None  Anxiety or Panic symptoms:  Yes-  Following episode of chest pain/hospitalization last week.     PHQ-9 12/14/2016 2/15/2017 2/21/2018   Total Score 3 0 3   Q9: Suicide Ideation Not at all Not at all Not at all     PHQ-9  English  PHQ-9   Any Language  Suicide Assessment Five-step Evaluation and Treatment (SAFE-T)    Amount of exercise or physical activity: none    Problems taking medications regularly: No    Medication side effects:  none    Diet: regular (no restrictions)      PROBLEMS TO ADD ON...  Last week, she had an episode of chest heaviness and palpitations. She presented to the ED and was hospitalized for MI rule out. She had a stress test on 2/15/18, which was normal.     Her older sister was recently diagnosed with breast cancer (age 70). Her younger sister has also been diagnosed with breast cancer. She requests a 3D   mammogram.     She's concerned about a lesion in her mouth, located on the right side of her cheek.     Her right thumb is bothering her. She has difficulty grasping objects at times. She would like to return to see Dr[facility] Lua at [facility], who has seen in her in the past.     She feels she's breaking out in a mild rash on the right side of her face/neck. She has not used any new facial creams or washes.     Problem list and histories reviewed & adjusted, as indicated.  Additional history: as documented    Patient Active Problem List   Diagnosis     Essential hypertension, benign     Mixed hyperlipidemia     Obesity, morbid, BMI 40.0-49.9 (H)     Esophageal reflux     Allergic rhinitis     Obstructive sleep apnea     Family history of malignant neoplasm of breast     ACP (advance care planning)     Diabetes type 2, controlled (H)     Adjustment disorder with mixed anxiety and depressed mood     Acne     Health Care Home     Past Surgical History:   Procedure Laterality Date     ------------OTHER-------------  9/5/2013    L hand, cyst excision     ------------OTHER------------- Right 03/14/2014    shoulder manipulation     C APPENDECTOMY  1956     C EYE EXAM ESTABLISHED PT  4/20/11    No retinopathy     C TOTAL KNEE ARTHROPLASTY  2/06    Knee Replacement, Total Right     C TOTAL KNEE ARTHROPLASTY  3/5/07    Knee Replacement, Total  Left     C VAGINAL HYSTERECTOMY  8/01    Hysterectomy, Vaginal & BSO     HC COLONOSCOPY W/WO BRUSH/WASH  6/03     HC INCISION TENDON SHEATH FINGER Left 09/05/2013    left thumb trigger finger      HC KNEE SCOPE, DIAGNOSTIC  4/2005    Arthroscopy, Knee Left     HC REMOVE TONSILS/ADENOIDS,<11 Y/O  1953    T & A <11y.o.     TEST NOT FOUND  6/27/07    R heel/ inocencio's deformity       Social History   Substance Use Topics     Smoking status: Never Smoker     Smokeless tobacco: Never Used     Alcohol use 0.0 oz/week     0 drink(s) per week      Comment: very rare     Family History   Problem Relation Age of Onset     CANCER Father      prostate     GASTROINTESTINAL DISEASE Father      GERD     HEART DISEASE Paternal Grandfather      CEREBROVASCULAR DISEASE Maternal Grandfather      HEART DISEASE Maternal Grandmother      GASTROINTESTINAL DISEASE Other      Niece with GERD/hiatal hernia     Breast Cancer Sister      51     Alzheimer Disease No family hx of      DIABETES No family hx of          Current Outpatient Prescriptions   Medication Sig Dispense Refill     amitriptyline (ELAVIL) 10 MG tablet Take 1 tablet (10 mg) by mouth daily 90 tablet 3     atorvastatin (LIPITOR) 10 MG tablet Take 1 tablet (10 mg) by mouth daily 90 tablet 3     venlafaxine (EFFEXOR XR) 150 MG 24 hr capsule Take 1 capsule (150 mg) by mouth daily 90 capsule 3     LEVEMIR FLEXTOUCH 100 UNIT/ML injection INJECT 30 UNITS AT BEDTIME.  9 mL 3     metFORMIN (GLUCOPHAGE-XR) 500 MG 24 hr tablet Take 4 tablets (2,000 mg) by mouth daily (with dinner) 360 tablet 1     order for DME Equipment being ordered: Other: Automatic blood pressure cuff  Treatment Diagnosis: Hypertension 1 Device 0     potassium chloride (MICRO-K) 10 MEQ CR capsule TAKE TWO CAPSULES BY MOUTH TWICE DAILY  360 capsule 3     amLODIPine (NORVASC) 5 MG tablet Take 1 tablet (5 mg) by mouth daily 90 tablet 1     losartan-hydrochlorothiazide (HYZAAR) 100-25 MG per tablet Take 1 tablet by mouth daily 90 tablet 1     blood glucose monitoring (NO BRAND SPECIFIED) test strip Use to test blood sugars bid times daily. Pt needs Ruby strips 100 each 3     blood glucose monitoring (ONE TOUCH  "DELICA) lancets Use to test blood sugars 2 times daily or as directed. 1 Box prn     budesonide (RINOCORT AQUA) 32 MCG/ACT spray INSTILL 2 SPRAYS BY NASAL ROUTE DAILY 1 Bottle 11     BD ROSE U/F 32G X 4 MM insulin pen needle USE 1 DAILY 100 each PRN     ORDER FOR DME Equipment being ordered: Mediven plus compression stockings    33645  20-30 compression 3 Device 0     Blood Glucose Monitoring Suppl (ONE TOUCH ULTRA 2) W/DEVICE KIT Use to test blood sugars 2 times daily or as directed. 1 kit 0     Multiple Vitamins-Minerals (CENTRUM SILVER) per tablet Take  by mouth. 1 tablet 3 times weekly 100 tablet      ASPIRIN 81 MG OR TABS 1 tab po QD (Once per day) 100 3     [DISCONTINUED] atorvastatin (LIPITOR) 10 MG tablet Take 1 tablet (10 mg) by mouth daily 90 tablet 3     [DISCONTINUED] amitriptyline (ELAVIL) 10 MG tablet Take 1 tablet (10 mg) by mouth daily 90 tablet 3     [DISCONTINUED] venlafaxine (EFFEXOR XR) 150 MG 24 hr capsule Take 1 capsule (150 mg) by mouth daily 90 capsule 3       Reviewed and updated as needed this visit by clinical staff  Tobacco  Allergies  Meds  Problems       Reviewed and updated as needed this visit by Provider         ROS:   ROS: 7 point ROS neg other than the symptoms noted above in the HPI.    OBJECTIVE:     /76 (BP Location: Right arm, Patient Position: Chair, Cuff Size: Adult Large)  Pulse 76  Temp 98.2  F (36.8  C) (Oral)  Resp 20  Ht 1.575 m (5' 2\")  Wt 115.9 kg (255 lb 9.6 oz)  LMP 05/12/2001  Breastfeeding? No  BMI 46.75 kg/m2  Body mass index is 46.75 kg/(m^2).   Constitutional: Alert, oriented, well hydrated. Skin turgor normal.  HEENT: Hemorrhagic tissue, purplish coloration near the molars and on the right cheek (? Traumatic etiology of mouth findings). Neck supple without palpable adenopathy.  Cardiovascular: Regular rate and  rhythm. S1 and S2 normal, no murmurs, clicks, gallops or rubs. No edema or JVD. Pulmonary: Chest is clear; no wheezes or rales.  Skin: " macular papular rash on face    Diabetic foot exam:  Dorsalis pedis pulse R:2  Dorsalis pedis pulse L: 2  Skin temperature R:normal  Skin temperature L:normal  Capillary refill R:normal  Capillary refill L:normal  Hair growth R:normal  Hair growth L:normal  Nail growth R/L:normal  Monofilament R foot:normal monofilament exam, except for findings noted on diabetic foot graphical image.  Monofilament L foot:normal monofilament exam, except for findings noted on diabetic foot graphical image    Diagnostic Test Results:  A1c on 2/11 7.6%    ASSESSMENT/PLAN:     Problem List Items Addressed This Visit     Diabetes type 2, controlled (H)    Relevant Medications    atorvastatin (LIPITOR) 10 MG tablet    Other Relevant Orders    Lipid Profile    Hepatits C antibody (QUEST)    VENOUS COLLECTION (Completed)      Other Visit Diagnoses     Psychophysiological insomnia        Relevant Medications    amitriptyline (ELAVIL) 10 MG tablet    Chronic nonintractable headache, unspecified headache type        Relevant Medications    amitriptyline (ELAVIL) 10 MG tablet    venlafaxine (EFFEXOR XR) 150 MG 24 hr capsule    Major depression in remission (H)        Relevant Medications    amitriptyline (ELAVIL) 10 MG tablet    venlafaxine (EFFEXOR XR) 150 MG 24 hr capsule         (L30.9) Dermatitis  (primary encounter diagnosis)  Comment: Trial of medication. If no improvement, will refer to dermatology.  Plan: hydrocortisone (WESTCORT) 0.2 % cream            (F51.04) Psychophysiological insomnia  Comment: Stable. Refill given.  Plan: amitriptyline (ELAVIL) 10 MG tablet            (R51) Chronic nonintractable headache, unspecified headache type  Comment: Stable. Refill given.  Plan: amitriptyline (ELAVIL) 10 MG tablet            (E11.9,  Z79.4) Controlled type 2 diabetes mellitus without complication, with long-term current use of insulin (H)  Comment: A1c is 7.6% today indicating fair diabetes control. We spent a significant amount of time  discussing the importance of diet and exercise, and how this would improve her diabetes control. Refills given. Follow up in 3 months.   Plan: atorvastatin (LIPITOR) 10 MG tablet, Lipid         Profile, Hepatits C antibody (QUEST), VENOUS         COLLECTION            (F32.5) Major depression in remission (H)  Comment: Stable. Refill given.   Plan: venlafaxine (EFFEXOR XR) 150 MG 24 hr capsule         Regarding the sores in her mouth, she should see her dentist.       MEDICATIONS:  Continue current medications without change  FUTURE APPOINTMENTS:       - Follow-up visit in 3 months  Work on weight loss  Try to make improvements to her diet-     Scribe Statement: I, Fortino Betts, PSS, am scribing for and in the presence of Susu Brody MD. 2/21/2018 7:21 AM    Provider Statement: I personally performed this service and the scribe documentation above accurately reflects this service. Susu Brody MD 2/21/2018 7:21 AM    Susu Brody MD  [facility]

## 2018-02-21 NOTE — MR AVS SNAPSHOT
"              After Visit Summary   2/21/2018    Alma Kraft    MRN: [id number]           Patient Information     Date Of Birth          1950        Visit Information        Provider Department      2/21/2018 9:30 AM Susu Brody MD [facility]        Today's Diagnoses     Dermatitis    -  1    Psychophysiological insomnia        Chronic nonintractable headache, unspecified headache type        Controlled type 2 diabetes mellitus without complication, with long-term current use of insulin (H)        Major depression in remission (H)          Care Instructions    cetaphil cleanser and lotion for skin care          Follow-ups after your visit        Who to contact     If you have questions or need follow up information about today's clinic visit or your schedule please contact [facility] directly at [phone].  Normal or non-critical lab and imaging results will be communicated to you by [email or web address], letter or phone within 4 business days after the clinic has received the results. If you do not hear from us within 7 days, please contact the clinic through [email or web address] or phone. If you have a critical or abnormal lab result, we will notify you by phone as soon as possible.  Submit refill requests through Localist or call your pharmacy and they will forward the refill request to us. Please allow 3 business days for your refill to be completed.          Additional Information About Your Visit        [email or web address] Information     Localist lets you send messages to your doctor, view your test results, renew your prescriptions, schedule appointments and more. To sign up, go to [email or web address] . Click on \"Log in\" on the left side of the screen, which will take you to the Welcome page. Then click on \"Sign up Now\" on the right side of the page.     You will be asked to enter the access code listed below, as well as some personal information. Please follow the " "directions to create your username and password.     Your access code is: [email or web address]  Expires: 2018 11:56 AM     Your access code will  in 90 days. If you need help or a new code, please call your [facility] clinic or [phone].        Care EveryWhere ID     This is your Care EveryWhere ID. This could be used by other organizations to access your [facility] medical records  [id number]        Your Vitals Were     Pulse Temperature Respirations Height Last Period Breastfeeding?    76 98.2  F (36.8  C) (Oral) 20 1.575 m (5' 2\") 2001 No    BMI (Body Mass Index)                   46.75 kg/m2            Blood Pressure from Last 3 Encounters:   18 148/76   18 160/73   17 152/86    Weight from Last 3 Encounters:   18 115.9 kg (255 lb 9.6 oz)   18 118.8 kg (262 lb)   17 115.7 kg (255 lb)              We Performed the Following     Hepatits C antibody (QUEST)     Lipid Profile     VENOUS COLLECTION          Today's Medication Changes          These changes are accurate as of 18 10:34 AM.  If you have any questions, ask your nurse or doctor.               Start taking these medicines.        Dose/Directions    hydrocortisone 0.2 % cream   Commonly known as:  WESTCORT   Used for:  Dermatitis   Started by:  Susu Brody MD        Apply sparingly to affected area two times daily for ten days   Quantity:  45 g   Refills:  0            Where to get your medicines      These medications were sent to [id number] [address] [address]     Phone:  [phone]     amitriptyline 10 MG tablet    atorvastatin 10 MG tablet    hydrocortisone 0.2 % cream    venlafaxine 150 MG 24 hr capsule                Primary Care Provider Office Phone # Fax #    Susu Brody [phone] [address] [address]        Equal Access to Services     KEVIN AMAYA AH: " Hadii wendy pichardoo Sobijanali, waaxda luqadaha, qaybta kaalmada rivas, dewayne godwin heathjerry elizabethisaac mckinley lamorganjerry navid. So [facility] [phone].    ATENCIÓN: Si yeimi lugo, tiene a sy disposición servicios gratuitos de asistencia lingüística. Llame al [phone].    We comply with applicable federal civil rights laws and [place] laws. We do not discriminate on the basis of race, color, national origin, age, disability, sex, sexual orientation, or gender identity.            Thank you!     Thank you for choosing [facility]  for your care. Our goal is always to provide you with excellent care. Hearing back from our patients is one way we can continue to improve our services. Please take a few minutes to complete the written survey that you may receive in the mail after your visit with us. Thank you!             Your Updated Medication List - Protect others around you: Learn how to safely use, store and throw away your medicines at www.disposemymeds.org.          This list is accurate as of 2/21/18 10:34 AM.  Always use your most recent med list.                   Brand Name Dispense Instructions for use Diagnosis    amitriptyline 10 MG tablet    ELAVIL    90 tablet    Take 1 tablet (10 mg) by mouth daily    Psychophysiological insomnia, Chronic nonintractable headache, unspecified headache type       amLODIPine 5 MG tablet    NORVASC    90 tablet    Take 1 tablet (5 mg) by mouth daily    Essential hypertension, benign       aspirin 81 MG tablet     100    1 tab po QD (Once per day)    Other specified pre-operative examination       atorvastatin 10 MG tablet    LIPITOR    90 tablet    Take 1 tablet (10 mg) by mouth daily    Controlled type 2 diabetes mellitus without complication, with long-term current use of insulin (H)       BD ROSE U/F 32G X 4 MM   Generic drug:  insulin pen needle     100 each    USE 1 DAILY    Uncontrolled type 2 diabetes mellitus without complication, with long-term  current use of insulin (H)       blood glucose monitoring lancets     1 Box    Use to test blood sugars 2 times daily or as directed.    Controlled type 2 diabetes mellitus without complication, with long-term current use of insulin (H)       blood glucose monitoring meter device kit     1 kit    Use to test blood sugars 2 times daily or as directed.    Type II or unspecified type diabetes mellitus without mention of complication, not stated as uncontrolled       blood glucose monitoring test strip    no brand specified    100 each    Use to test blood sugars bid times daily. Pt needs Ruby strips    Controlled type 2 diabetes mellitus without complication, with long-term current use of insulin (H)       budesonide 32 MCG/ACT spray    RINOCORT AQUA    1 Bottle    INSTILL 2 SPRAYS BY NASAL ROUTE DAILY    Seasonal allergic rhinitis, unspecified allergic rhinitis trigger       CENTRUM SILVER per tablet     100 tablet    Take  by mouth. 1 tablet 3 times weekly        hydrocortisone 0.2 % cream    WESTCORT    45 g    Apply sparingly to affected area two times daily for ten days    Dermatitis       LEVEMIR FLEXTOUCH 100 UNIT/ML injection   Generic drug:  insulin detemir     9 mL    INJECT 30 UNITS AT BEDTIME.    Uncontrolled type 2 diabetes mellitus without complication, with long-term current use of insulin (H)       losartan-hydrochlorothiazide 100-25 MG per tablet    HYZAAR    90 tablet    Take 1 tablet by mouth daily    Essential hypertension, benign, Uncontrolled type 2 diabetes mellitus without complication, with long-term current use of insulin (H)       metFORMIN 500 MG 24 hr tablet    GLUCOPHAGE-XR    360 tablet    Take 4 tablets (2,000 mg) by mouth daily (with dinner)    Uncontrolled type 2 diabetes mellitus without complication, with long-term current use of insulin (H)       * order for DME     3 Device    Equipment being ordered: Mediven plus compression stockings  75086 20-30 compression    Unspecified venous  (peripheral) insufficiency       * order for DME     1 Device    Equipment being ordered: Other: Automatic blood pressure cuff Treatment Diagnosis: Hypertension    Essential hypertension, benign       potassium chloride 10 MEQ CR capsule    MICRO-K    360 capsule    TAKE TWO CAPSULES BY MOUTH TWICE DAILY    Essential hypertension, benign       venlafaxine 150 MG 24 hr capsule    EFFEXOR XR    90 capsule    Take 1 capsule (150 mg) by mouth daily    Major depression in remission (H)       * Notice:  This list has 2 medication(s) that are the same as other medications prescribed for you. Read the directions carefully, and ask your doctor or other care provider to review them with you.

## 2018-02-22 LAB
CHOLEST SERPL-MCNC: 166 MG/DL
CHOLEST/HDLC SERPL: 2.7 (CALC)
HCV AB - QUEST: NORMAL
HDLC SERPL-MCNC: 61 MG/DL
LDLC SERPL CALC-MCNC: 86 MG/DL (CALC)
NONHDLC SERPL-MCNC: 105 MG/DL (CALC)
SIGNAL TO CUT OFF - QUEST: 0.02
TRIGL SERPL-MCNC: 99 MG/DL

## 2018-02-22 ASSESSMENT — PATIENT HEALTH QUESTIONNAIRE - PHQ9: SUM OF ALL RESPONSES TO PHQ QUESTIONS 1-9: 3

## 2018-02-22 ASSESSMENT — ANXIETY QUESTIONNAIRES: GAD7 TOTAL SCORE: 3

## 2018-03-20 ENCOUNTER — TRANSFERRED RECORDS (OUTPATIENT)
Dept: FAMILY MEDICINE | Facility: CLINIC | Age: 68
End: 2018-03-20

## 2018-03-29 DIAGNOSIS — J30.2 CHRONIC SEASONAL ALLERGIC RHINITIS, UNSPECIFIED TRIGGER: ICD-10-CM

## 2018-03-29 NOTE — TELEPHONE ENCOUNTER
Pending Prescriptions:                       Disp   Refills    budesonide (RINOCORT AQUA) 32 MCG/ACT spr*                    Sig: SPRAY 2 SPRAYS INTO BOTH NOSTRILS ONCE DAILY    Pt last refill was 3-  Was here last for a dieabetes check on 2--rtc in three months  Please change qty fax or send to [facility]  [phone] (home)

## 2018-04-04 ENCOUNTER — TELEPHONE (OUTPATIENT)
Dept: FAMILY MEDICINE | Facility: CLINIC | Age: 68
End: 2018-04-04

## 2018-04-07 RX ORDER — PEN NEEDLE, DIABETIC 32GX 5/32"
NEEDLE, DISPOSABLE MISCELLANEOUS
Qty: 100 EACH | Refills: 3 | Status: SHIPPED | OUTPATIENT
Start: 2018-04-07 | End: 2019-05-16

## 2018-04-07 NOTE — TELEPHONE ENCOUNTER
Pending Prescriptions:                       Disp   Refills    BD ROSE U/F 32G X 4 MM insulin pen needle*100 ea*             Sig: USE 1 NEEDLE DAILY    Last refill 2-27-17  Last diabetes check 2-  Please change qty and fax  [facility]  [phone] (home)

## 2018-04-11 ENCOUNTER — HOSPITAL ENCOUNTER (OUTPATIENT)
Dept: MAMMOGRAPHY | Facility: CLINIC | Age: 68
Discharge: HOME | End: 2018-04-11
Attending: FAMILY MEDICINE | Admitting: FAMILY MEDICINE
Payer: MEDICARE

## 2018-04-11 DIAGNOSIS — Z12.31 VISIT FOR SCREENING MAMMOGRAM: ICD-10-CM

## 2018-04-11 PROCEDURE — 77063 BREAST TOMOSYNTHESIS BI: CPT

## 2018-05-23 ENCOUNTER — OFFICE VISIT (OUTPATIENT)
Dept: FAMILY MEDICINE | Facility: CLINIC | Age: 68
End: 2018-05-23

## 2018-05-23 VITALS
WEIGHT: 258 LBS | BODY MASS INDEX: 47.19 KG/M2 | HEART RATE: 80 BPM | SYSTOLIC BLOOD PRESSURE: 132 MMHG | DIASTOLIC BLOOD PRESSURE: 84 MMHG | TEMPERATURE: 98.4 F | OXYGEN SATURATION: 97 %

## 2018-05-23 DIAGNOSIS — Z23 NEED FOR VACCINATION: ICD-10-CM

## 2018-05-23 DIAGNOSIS — E66.01 OBESITY, MORBID, BMI 40.0-49.9 (H): ICD-10-CM

## 2018-05-23 DIAGNOSIS — I10 ESSENTIAL HYPERTENSION, BENIGN: ICD-10-CM

## 2018-05-23 DIAGNOSIS — E87.6 LOW BLOOD POTASSIUM: ICD-10-CM

## 2018-05-23 LAB — HBA1C MFR BLD: 8.4 % (ref 4–7)

## 2018-05-23 PROCEDURE — 83036 HEMOGLOBIN GLYCOSYLATED A1C: CPT | Performed by: FAMILY MEDICINE

## 2018-05-23 PROCEDURE — 36415 COLL VENOUS BLD VENIPUNCTURE: CPT | Performed by: FAMILY MEDICINE

## 2018-05-23 PROCEDURE — 80048 BASIC METABOLIC PNL TOTAL CA: CPT | Mod: 90 | Performed by: FAMILY MEDICINE

## 2018-05-23 PROCEDURE — 90715 TDAP VACCINE 7 YRS/> IM: CPT | Performed by: FAMILY MEDICINE

## 2018-05-23 PROCEDURE — 99214 OFFICE O/P EST MOD 30 MIN: CPT | Mod: 25 | Performed by: FAMILY MEDICINE

## 2018-05-23 PROCEDURE — 90471 IMMUNIZATION ADMIN: CPT | Performed by: FAMILY MEDICINE

## 2018-05-23 RX ORDER — AMLODIPINE BESYLATE 5 MG/1
5 TABLET ORAL DAILY
Qty: 90 TABLET | Refills: 1 | Status: SHIPPED | OUTPATIENT
Start: 2018-05-23 | End: 2018-11-21

## 2018-05-23 RX ORDER — LOSARTAN POTASSIUM AND HYDROCHLOROTHIAZIDE 25; 100 MG/1; MG/1
1 TABLET ORAL DAILY
Qty: 90 TABLET | Refills: 1 | Status: SHIPPED | OUTPATIENT
Start: 2018-05-23 | End: 2018-11-21

## 2018-05-23 ASSESSMENT — ANXIETY QUESTIONNAIRES
6. BECOMING EASILY ANNOYED OR IRRITABLE: NOT AT ALL
5. BEING SO RESTLESS THAT IT IS HARD TO SIT STILL: NOT AT ALL
3. WORRYING TOO MUCH ABOUT DIFFERENT THINGS: NEARLY EVERY DAY
IF YOU CHECKED OFF ANY PROBLEMS ON THIS QUESTIONNAIRE, HOW DIFFICULT HAVE THESE PROBLEMS MADE IT FOR YOU TO DO YOUR WORK, TAKE CARE OF THINGS AT HOME, OR GET ALONG WITH OTHER PEOPLE: SOMEWHAT DIFFICULT
GAD7 TOTAL SCORE: 9
2. NOT BEING ABLE TO STOP OR CONTROL WORRYING: NEARLY EVERY DAY
7. FEELING AFRAID AS IF SOMETHING AWFUL MIGHT HAPPEN: NOT AT ALL
1. FEELING NERVOUS, ANXIOUS, OR ON EDGE: NEARLY EVERY DAY

## 2018-05-23 ASSESSMENT — PATIENT HEALTH QUESTIONNAIRE - PHQ9: 5. POOR APPETITE OR OVEREATING: NOT AT ALL

## 2018-05-23 NOTE — PROGRESS NOTES
SUBJECTIVE:   Alma Kraft is a 67 year old female who presents to clinic today for the following health issues:    Diabetes Follow-up      Patient is checking blood sugars: not at all    Diabetic concerns: pretty sure that her sugars are running high- not eating well- not exercising- usually remembers to take her insulin daily, but not consistent with times     Symptoms of hypoglycemia (low blood sugar): none     Paresthesias (numbness or burning in feet) or sores: No     Date of last diabetic eye exam: 7/18/2017    BP Readings from Last 2 Encounters:   02/21/18 148/76   02/11/18 160/73     Hemoglobin A1C (%)   Date Value   02/11/2018 7.6 (H)   11/09/2017 7.5 (A)     LDL Cholesterol Calculated (mg/dL (calc))   Date Value   02/21/2018 86   04/26/2017 82       Amount of exercise or physical activity: no walking/ goes up and down stairs at home - helping care for her daughter who recently had hip replacement    Problems taking medications regularly: No    Medication side effects: none    Diet: no meal planning    PROBLEMS TO ADD ON...  Caregiver issues: feels overwhelmed    Low potassium 2/2018- recheck    Brings lifeline screening: high risk - morbid obesity, diabetis  Blood pressure was high at screening    Problem list and histories reviewed & adjusted, as indicated.  Additional history: as documented    Patient Active Problem List   Diagnosis     Essential hypertension, benign     Mixed hyperlipidemia     Obesity, morbid, BMI 40.0-49.9 (H)     Esophageal reflux     Allergic rhinitis     Obstructive sleep apnea     Family history of malignant neoplasm of breast     ACP (advance care planning)     Diabetes type 2, controlled (H)     Adjustment disorder with mixed anxiety and depressed mood     Acne     Health Care Home     Past Surgical History:   Procedure Laterality Date     ------------OTHER-------------  9/5/2013    L hand, cyst excision     ------------OTHER------------- Right 03/14/2014    shoulder  manipulation     C APPENDECTOMY  1956     C EYE EXAM ESTABLISHED PT  4/20/11    No retinopathy     C TOTAL KNEE ARTHROPLASTY  2/06    Knee Replacement, Total Right     C TOTAL KNEE ARTHROPLASTY  3/5/07    Knee Replacement, Total  Left     C VAGINAL HYSTERECTOMY  8/01    Hysterectomy, Vaginal & BSO     HC COLONOSCOPY W/WO BRUSH/WASH  6/03     HC INCISION TENDON SHEATH FINGER Left 09/05/2013    left thumb trigger finger     HC KNEE SCOPE, DIAGNOSTIC  4/2005    Arthroscopy, Knee Left     HC REMOVE TONSILS/ADENOIDS,<11 Y/O  1953    T & A <11y.o.     TEST NOT FOUND  6/27/07    R heel/ inocencio's deformity       Social History   Substance Use Topics     Smoking status: Never Smoker     Smokeless tobacco: Never Used     Alcohol use 0.0 oz/week     0 drink(s) per week      Comment: very rare     Family History   Problem Relation Age of Onset     CANCER Father      prostate     GASTROINTESTINAL DISEASE Father      GERD     HEART DISEASE Paternal Grandfather      CEREBROVASCULAR DISEASE Maternal Grandfather      HEART DISEASE Maternal Grandmother      GASTROINTESTINAL DISEASE Other      Niece with GERD/hiatal hernia     Breast Cancer Sister      51     Alzheimer Disease No family hx of      DIABETES No family hx of          Current Outpatient Prescriptions   Medication Sig Dispense Refill     amitriptyline (ELAVIL) 10 MG tablet Take 1 tablet (10 mg) by mouth daily 90 tablet 3     amLODIPine (NORVASC) 5 MG tablet Take 1 tablet (5 mg) by mouth daily 90 tablet 1     ASPIRIN 81 MG OR TABS 1 tab po QD (Once per day) 100 3     atorvastatin (LIPITOR) 10 MG tablet Take 1 tablet (10 mg) by mouth daily 90 tablet 3     BD ROSE U/F 32G X 4 MM insulin pen needle USE 1 NEEDLE DAILY 100 each 3     blood glucose monitoring (NO BRAND SPECIFIED) test strip Use to test blood sugars bid times daily. Pt needs Ruby strips 100 each 3     blood glucose monitoring (ONE TOUCH DELICA) lancets Use to test blood sugars 2 times daily or as directed. 1 Box  prn     Blood Glucose Monitoring Suppl (ONE TOUCH ULTRA 2) W/DEVICE KIT Use to test blood sugars 2 times daily or as directed. 1 kit 0     budesonide (RINOCORT AQUA) 32 MCG/ACT spray SPRAY 2 SPRAYS INTO BOTH NOSTRILS ONCE DAILY 3 Bottle 3     hydrocortisone (WESTCORT) 0.2 % cream Apply sparingly to affected area two times daily for ten days 45 g 0     insulin detemir (LEVEMIR FLEXTOUCH) 100 UNIT/ML injection INJECT 30 UNITS AT BEDTIME. 9 mL 3     losartan-hydrochlorothiazide (HYZAAR) 100-25 MG per tablet Take 1 tablet by mouth daily 90 tablet 1     metFORMIN (GLUCOPHAGE-XR) 500 MG 24 hr tablet Take 4 tablets (2,000 mg) by mouth daily (with dinner) 360 tablet 1     Multiple Vitamins-Minerals (CENTRUM SILVER) per tablet Take  by mouth. 1 tablet 3 times weekly 100 tablet      order for DME Equipment being ordered: Other: Automatic blood pressure cuff  Treatment Diagnosis: Hypertension 1 Device 0     ORDER FOR DME Equipment being ordered: Mediven plus compression stockings    31970  20-30 compression 3 Device 0     potassium chloride (MICRO-K) 10 MEQ CR capsule TAKE TWO CAPSULES BY MOUTH TWICE DAILY  360 capsule 3     venlafaxine (EFFEXOR XR) 150 MG 24 hr capsule Take 1 capsule (150 mg) by mouth daily 90 capsule 3     [DISCONTINUED] amLODIPine (NORVASC) 5 MG tablet Take 1 tablet (5 mg) by mouth daily 90 tablet 1     [DISCONTINUED] LEVEMIR FLEXTOUCH 100 UNIT/ML injection INJECT 30 UNITS AT BEDTIME.  9 mL 3     [DISCONTINUED] losartan-hydrochlorothiazide (HYZAAR) 100-25 MG per tablet Take 1 tablet by mouth daily 90 tablet 1       Reviewed and updated as needed this visit by clinical staff       Reviewed and updated as needed this visit by Provider         ROS:  CONSTITUTIONAL: NEGATIVE for fever, chills, change in weight  ENT/MOUTH: NEGATIVE for ear, mouth and throat problems  RESP: NEGATIVE for significant cough or SOB  CV: NEGATIVE for chest pain, palpitations or peripheral edema  PSYCHIATRIC: POSITIVE for  "anxiety    OBJECTIVE:     LMP 05/12/2001  Body mass index is 47.19 kg/(m^2).   Regular rate and  rhythm. S1 and S2 normal, no murmurs, clicks, gallops or rubs. No edema or JVD. Chest is clear; no wheezes or rales.        Diabetic foot exam:  Dorsalis pedis pulse R:4  Dorsalis pedis pulse L: 4  Skin temperature R:normal  Skin temperature L:normal  Capillary refill R:normal  Capillary refill L:normal  Hair growth R:normal  Hair growth L:normal  Nail growth R:normal  Monofilament R foot:normal monofilament exam, except for findings noted on diabetic foot graphical image.  Monofilament L foot:normal monofilament exam, except for findings noted on diabetic foot graphical image  Diagnostic Test Results:  Results for orders placed or performed in visit on 05/23/18 (from the past 24 hour(s))   Hemoglobin A1c (BFP)   Result Value Ref Range    Hemoglobin A1C 8.4 (A) 4.0 - 7.0 %       ASSESSMENT/PLAN:     Problem List Items Addressed This Visit     Essential hypertension, benign    Diabetes type 2, controlled (H)      Other Visit Diagnoses     Low blood potassium    -  Primary    Uncontrolled type 2 diabetes mellitus without complication, with long-term current use of insulin (H)               BMI:   Estimated body mass index is 47.19 kg/(m^2) as calculated from the following:    Height as of 2/21/18: 1.575 m (5' 2\").    Weight as of this encounter: 117 kg (258 lb).   Weight management plan: Discussed healthy diet and exercise guidelines and patient will follow up in 6 months in clinic to re-evaluate.      MEDICATIONS:  Continue current medications without change  FUTURE APPOINTMENTS:       - Follow-up visit in 3 months-  she will call me in a month with recorded blood glucose results  SELF MONITORING:       - Please check blood glucose readings daily  Improve diet  Regular exercise  No change in insulin with lack of blood sugar testing-   Her daughter is recovering- hope is that she will be able to refocus on healthy " eating      Call me with blood sugars in 30 days  Overwhelmed with doctor appointments- has a tooth abscess- needs an extraction  Continue current dose of insulin, pending results  Self care encouraged  Susu Brody MD  [facility]

## 2018-05-23 NOTE — MR AVS SNAPSHOT
After Visit Summary   5/23/2018    Alma Kraft    MRN: [id number]           Patient Information     Date Of Birth          1950        Visit Information        Provider Department      5/23/2018 9:30 AM Susu Brody MD [facility]        Today's Diagnoses     Low blood potassium    -  1    Controlled type 2 diabetes mellitus without complication, with long-term current use of insulin (H)        Essential hypertension, benign        BENIGN HYPERTENSION        Uncontrolled type 2 diabetes mellitus without complication, with long-term current use of insulin (H)        Need for vaccination          Care Instructions    Call me in four weeks with your blood sugars          Follow-ups after your visit        Who to contact     If you have questions or need follow up information about today's clinic visit or your schedule please contact [facility] directly at [phone].  Normal or non-critical lab and imaging results will be communicated to you by MyChart, letter or phone within 4 business days after the clinic has received the results. If you do not hear from us within 7 days, please contact the clinic through MyChart or phone. If you have a critical or abnormal lab result, we will notify you by phone as soon as possible.  Submit refill requests through [employer] or call your pharmacy and they will forward the refill request to us. Please allow 3 business days for your refill to be completed.          Additional Information About Your Visit        Care EveryWhere ID     This is your Care EveryWhere ID. This could be used by other organizations to access your [facility] medical records  [id number]        Your Vitals Were     Pulse Temperature Last Period Pulse Oximetry BMI (Body Mass Index)       80 98.4  F (36.9  C) (Oral) 05/12/2001 97% 47.19 kg/m2        Blood Pressure from Last 3 Encounters:   05/23/18 132/84   02/21/18 148/76   02/11/18  160/73    Weight from Last 3 Encounters:   05/23/18 117 kg (258 lb)   02/21/18 115.9 kg (255 lb 9.6 oz)   02/11/18 118.8 kg (262 lb)              We Performed the Following     BASIC METABOLIC PANEL (QUEST)     Hemoglobin A1c (BFP)     TDAP VACCINE (BOOSTRIX)     VACCINE ADMINISTRATION, INITIAL     VENOUS COLLECTION          Today's Medication Changes          These changes are accurate as of 5/23/18 10:29 AM.  If you have any questions, ask your nurse or doctor.               These medicines have changed or have updated prescriptions.        Dose/Directions    insulin detemir 100 UNIT/ML injection   Commonly known as:  LEVEMIR FLEXTOUCH   This may have changed:  See the new instructions.   Used for:  Uncontrolled type 2 diabetes mellitus without complication, with long-term current use of insulin (H)   Changed by:  Susu Brody MD        INJECT 30 UNITS AT BEDTIME.   Quantity:  9 mL   Refills:  3            Where to get your medicines      These medications were sent to [id number] [place] [id number]     Phone:  [phone]     amLODIPine 5 MG tablet    insulin detemir 100 UNIT/ML injection    losartan-hydrochlorothiazide 100-25 MG per tablet                Primary Care Provider Office Phone # Fax #    Susu Brody [phone] [address] [address]        Equal Access to Services     [facility]: Hadii wendy robb hadasho Sobijanali, waaxda luqadaha, qaybta kaalmada adeegyada, dewayne connelly haycoy crawford . So [facility] [phone].    ATENCIÓN: Si habla español, tiene a sy disposición servicios gratuitos de asistencia lingüística. Shirin al [phone].    We comply with applicable federal civil rights laws and [place] laws. We do not discriminate on the basis of race, color, national origin, age, disability, sex, sexual orientation, or gender identity.             Thank you!     Thank you for choosing [facility]  for your care. Our goal is always to provide you with excellent care. Hearing back from our patients is one way we can continue to improve our services. Please take a few minutes to complete the written survey that you may receive in the mail after your visit with us. Thank you!             Your Updated Medication List - Protect others around you: Learn how to safely use, store and throw away your medicines at www.disposemymeds.org.          This list is accurate as of 5/23/18 10:29 AM.  Always use your most recent med list.                   Brand Name Dispense Instructions for use Diagnosis    amitriptyline 10 MG tablet    ELAVIL    90 tablet    Take 1 tablet (10 mg) by mouth daily    Psychophysiological insomnia, Chronic nonintractable headache, unspecified headache type       amLODIPine 5 MG tablet    NORVASC    90 tablet    Take 1 tablet (5 mg) by mouth daily    Essential hypertension, benign       aspirin 81 MG tablet     100    1 tab po QD (Once per day)    Other specified pre-operative examination       atorvastatin 10 MG tablet    LIPITOR    90 tablet    Take 1 tablet (10 mg) by mouth daily    Uncontrolled type 2 diabetes mellitus with stage 2 chronic kidney disease, with long-term current use of insulin (H)       BD ROSE U/F 32G X 4 MM   Generic drug:  insulin pen needle     100 each    USE 1 NEEDLE DAILY    Uncontrolled type 2 diabetes mellitus without complication, with long-term current use of insulin (H)       blood glucose monitoring lancets     1 Box    Use to test blood sugars 2 times daily or as directed.    Controlled type 2 diabetes mellitus without complication, with long-term current use of insulin (H)       blood glucose monitoring meter device kit     1 kit    Use to test blood sugars 2 times daily or as directed.    Type II or unspecified type diabetes mellitus without mention of complication, not stated as  uncontrolled       blood glucose monitoring test strip    no brand specified    100 each    Use to test blood sugars bid times daily. Pt needs Ruby strips    Controlled type 2 diabetes mellitus without complication, with long-term current use of insulin (H)       budesonide 32 MCG/ACT spray    RINOCORT AQUA    3 Bottle    SPRAY 2 SPRAYS INTO BOTH NOSTRILS ONCE DAILY    Chronic seasonal allergic rhinitis, unspecified trigger       CENTRUM SILVER per tablet     100 tablet    Take  by mouth. 1 tablet 3 times weekly        hydrocortisone 0.2 % cream    WESTCORT    45 g    Apply sparingly to affected area two times daily for ten days    Dermatitis       insulin detemir 100 UNIT/ML injection    LEVEMIR FLEXTOUCH    9 mL    INJECT 30 UNITS AT BEDTIME.    Uncontrolled type 2 diabetes mellitus without complication, with long-term current use of insulin (H)       losartan-hydrochlorothiazide 100-25 MG per tablet    HYZAAR    90 tablet    Take 1 tablet by mouth daily    Essential hypertension, benign, Uncontrolled type 2 diabetes mellitus without complication, with long-term current use of insulin (H)       metFORMIN 500 MG 24 hr tablet    GLUCOPHAGE-XR    360 tablet    Take 4 tablets (2,000 mg) by mouth daily (with dinner)    Uncontrolled type 2 diabetes mellitus without complication, with long-term current use of insulin (H)       order for DME     3 Device    Equipment being ordered: Mediven plus compression stockings  36175 20-30 compression    Unspecified venous (peripheral) insufficiency       order for DME     1 Device    Equipment being ordered: Other: Automatic blood pressure cuff Treatment Diagnosis: Hypertension    Essential hypertension, benign       potassium chloride 10 MEQ CR capsule    MICRO-K    360 capsule    TAKE TWO CAPSULES BY MOUTH TWICE DAILY    Essential hypertension, benign       venlafaxine 150 MG 24 hr capsule    EFFEXOR XR    90 capsule    Take 1 capsule (150 mg) by mouth daily    Major depression  in remission (H)          no

## 2018-05-23 NOTE — NURSING NOTE
Jeny is here for fasting medication check today.    Pre-visit Screening:  Immunizations:  not up to date - due for tetanus-patient will have one done today   Colonoscopy:  is up to date  Mammogram: is up to date  Asthma Action Test/Plan:  No concerns  PHQ9:  Will give PHQ-9 today   GAD7:  Will give KATHERINE-7 today   Questioned patient about current smoking habits Pt. has never smoked.  Ok to leave detailed message on voice mail for today's visit only Yes, phone # [phone]

## 2018-05-23 NOTE — LETTER
May 24, 2018      Alma Kraft  [address]  [facility] [address]        Dear ,    We are writing to inform you of your test results.    Blood sugar 201  Hemoglobin A1C 8.4  Both indicate that your diabetis needs better control-   Your insulin dose may need to be increased- call in your blood sugars or drop me off your record for the next four weeks-  I do encourage you to take care of yourself- better sleep- better diet- walking the mall    Normal kidney function tests  Potassium is borderline low at 3.4 (Normal is 3.5 and higher)- include foods high in potassium in your diet (raw bananas, avocado, kassidy and oranges, tomatoes or tomato juice)  Let me know if you have questions.    Resulted Orders   BASIC METABOLIC PANEL (QUEST)   Result Value Ref Range    Glucose 201 (H) 65 - 99 mg/dL      Comment:                    Fasting reference interval     For someone without known diabetes, a glucose  value >125 mg/dL indicates that they may have  diabetes and this should be confirmed with a  follow-up test.         Urea Nitrogen 18 7 - 25 mg/dL    Creatinine 0.49 (L) 0.50 - 0.99 mg/dL      Comment:      For patients >49 years of age, the reference limit  for Creatinine is approximately 13% higher for people  identified as -American.         GFR Estimate 100 > OR = 60 mL/min/1.73m2    EGFR African American 116 > OR = 60 mL/min/1.73m2    BUN/Creatinine Ratio 37 (H) 6 - 22 (calc)    Sodium 137 135 - 146 mmol/L    Potassium 3.4 (L) 3.5 - 5.3 mmol/L    Chloride 99 98 - 110 mmol/L    Carbon Dioxide 27 20 - 31 mmol/L    Calcium 8.7 8.6 - 10.4 mg/dL   Hemoglobin A1c (BFP)   Result Value Ref Range    Hemoglobin A1C 8.4 (A) 4.0 - 7.0 %       If you have any questions or concerns, please call the clinic at the number listed above.       Sincerely,        Susu Brody MD

## 2018-05-24 LAB
BUN SERPL-MCNC: 18 MG/DL (ref 7–25)
BUN/CREATININE RATIO: 37 (CALC) (ref 6–22)
CALCIUM SERPL-MCNC: 8.7 MG/DL (ref 8.6–10.4)
CHLORIDE SERPLBLD-SCNC: 99 MMOL/L (ref 98–110)
CO2 SERPL-SCNC: 27 MMOL/L (ref 20–31)
CREAT SERPL-MCNC: 0.49 MG/DL (ref 0.5–0.99)
EGFR AFRICAN AMERICAN - QUEST: 116 ML/MIN/1.73M2
GFR SERPL CREATININE-BSD FRML MDRD: 100 ML/MIN/1.73M2
GLUCOSE - QUEST: 201 MG/DL (ref 65–99)
POTASSIUM SERPL-SCNC: 3.4 MMOL/L (ref 3.5–5.3)
SODIUM SERPL-SCNC: 137 MMOL/L (ref 135–146)

## 2018-05-24 ASSESSMENT — PATIENT HEALTH QUESTIONNAIRE - PHQ9: SUM OF ALL RESPONSES TO PHQ QUESTIONS 1-9: 3

## 2018-05-24 ASSESSMENT — ANXIETY QUESTIONNAIRES: GAD7 TOTAL SCORE: 9

## 2018-07-09 ENCOUNTER — TRANSFERRED RECORDS (OUTPATIENT)
Dept: FAMILY MEDICINE | Facility: CLINIC | Age: 68
End: 2018-07-09

## 2018-07-30 RX ORDER — METFORMIN HCL 500 MG
TABLET, EXTENDED RELEASE 24 HR ORAL
Qty: 360 TABLET | Refills: 1 | Status: SHIPPED | OUTPATIENT
Start: 2018-07-30 | End: 2018-11-21

## 2018-07-30 NOTE — TELEPHONE ENCOUNTER
Pending Prescriptions:                       Disp   Refills    metFORMIN (GLUCOPHAGE-XR) 500 MG 24 hr ta*360 ta*             Sig: Take 4 tablets (2,000 mg) by mouth daily (with           dinner)    Last refill was 2-2018  Last diabetes check was 5- with blood work  Please change qty fax or randell Infante  [phone] (home)

## 2018-08-22 ENCOUNTER — OFFICE VISIT (OUTPATIENT)
Dept: FAMILY MEDICINE | Facility: CLINIC | Age: 68
End: 2018-08-22

## 2018-08-22 VITALS
RESPIRATION RATE: 20 BRPM | DIASTOLIC BLOOD PRESSURE: 86 MMHG | HEART RATE: 72 BPM | SYSTOLIC BLOOD PRESSURE: 146 MMHG | BODY MASS INDEX: 50.14 KG/M2 | HEIGHT: 60 IN | TEMPERATURE: 98 F | WEIGHT: 255.4 LBS

## 2018-08-22 DIAGNOSIS — Z80.3 FAMILY HISTORY OF MALIGNANT NEOPLASM OF BREAST: ICD-10-CM

## 2018-08-22 DIAGNOSIS — Z00.00 ROUTINE HISTORY AND PHYSICAL EXAMINATION OF ADULT: Primary | ICD-10-CM

## 2018-08-22 DIAGNOSIS — E66.01 OBESITY, MORBID, BMI 40.0-49.9 (H): ICD-10-CM

## 2018-08-22 LAB
ALBUMIN URINE MG/G CR: <30 MG/G CREATININE
ALBUMIN URINE MG/SPEC: 80
CREATININE URINE: 200
ERYTHROCYTE [DISTWIDTH] IN BLOOD BY AUTOMATED COUNT: 12.9 %
GLUCOSE SERPL-MCNC: 176 MG/DL (ref 60–99)
HBA1C MFR BLD: 9.4 % (ref 4–7)
HCT VFR BLD AUTO: 39 % (ref 35–47)
HEMOGLOBIN: 12.6 G/DL (ref 11.7–15.7)
MCH RBC QN AUTO: 29.7 PG (ref 26–33)
MCHC RBC AUTO-ENTMCNC: 32.3 G/DL (ref 31–36)
MCV RBC AUTO: 92 FL (ref 78–100)
PLATELET COUNT - QUEST: 200 10^9/L (ref 150–375)
RBC # BLD AUTO: 4.24 10*12/L (ref 3.8–5.2)
WBC # BLD AUTO: 5.3 10*9/L (ref 4–11)

## 2018-08-22 PROCEDURE — 82947 ASSAY GLUCOSE BLOOD QUANT: CPT | Performed by: FAMILY MEDICINE

## 2018-08-22 PROCEDURE — 36415 COLL VENOUS BLD VENIPUNCTURE: CPT | Performed by: FAMILY MEDICINE

## 2018-08-22 PROCEDURE — 99397 PER PM REEVAL EST PAT 65+ YR: CPT | Performed by: FAMILY MEDICINE

## 2018-08-22 PROCEDURE — 85027 COMPLETE CBC AUTOMATED: CPT | Performed by: FAMILY MEDICINE

## 2018-08-22 PROCEDURE — 82043 UR ALBUMIN QUANTITATIVE: CPT | Performed by: FAMILY MEDICINE

## 2018-08-22 PROCEDURE — 83036 HEMOGLOBIN GLYCOSYLATED A1C: CPT | Performed by: FAMILY MEDICINE

## 2018-08-22 ASSESSMENT — ANXIETY QUESTIONNAIRES
2. NOT BEING ABLE TO STOP OR CONTROL WORRYING: NOT AT ALL
IF YOU CHECKED OFF ANY PROBLEMS ON THIS QUESTIONNAIRE, HOW DIFFICULT HAVE THESE PROBLEMS MADE IT FOR YOU TO DO YOUR WORK, TAKE CARE OF THINGS AT HOME, OR GET ALONG WITH OTHER PEOPLE: NOT DIFFICULT AT ALL
3. WORRYING TOO MUCH ABOUT DIFFERENT THINGS: NOT AT ALL
7. FEELING AFRAID AS IF SOMETHING AWFUL MIGHT HAPPEN: NOT AT ALL
GAD7 TOTAL SCORE: 0
1. FEELING NERVOUS, ANXIOUS, OR ON EDGE: NOT AT ALL
6. BECOMING EASILY ANNOYED OR IRRITABLE: NOT AT ALL
5. BEING SO RESTLESS THAT IT IS HARD TO SIT STILL: NOT AT ALL

## 2018-08-22 ASSESSMENT — PATIENT HEALTH QUESTIONNAIRE - PHQ9: 5. POOR APPETITE OR OVEREATING: NOT AT ALL

## 2018-08-22 NOTE — LETTER
August 26, 2018      Alma Kraft  [address]  [place] [address]        Dear ,    We are writing to inform you of your test results.    Normal blood count including hemoglobin  Both fasting blood sugar and Hemoglobin A1C indicate that your diabetis needs improvement  Microalbumin is present in urine- goal is improved blood sugars.    Resulted Orders   Glucose Fasting (BFP)   Result Value Ref Range    Glucose 176 (A) 60 - 99 mg/dL   HEMOGRAM/PLATELET (BFP)   Result Value Ref Range    WBC 5.3 4.0 - 11 10*9/L    RBC Count 4.24 3.8 - 5.2 10*12/L    Hemoglobin 12.6 11.7 - 15.7 g/dL    Hematocrit 39.0 35.0 - 47.0 %    MCV 92.0 78 - 100 fL    MCH 29.7 26 - 33 pg    MCHC 32.3 31 - 36 g/dL    RDW 12.9 %    Platelet Count 200 150 - 375 10^9/L   Hemoglobin A1c (BFP)   Result Value Ref Range    Hemoglobin A1C 9.4 (A) 4.0 - 7.0 %   Albumin Random Urine Quantitative with Creat Ratio   Result Value Ref Range    Albumin Urine mg/spec 80 (A) <30    Albumin Urine mg/g Cr <30 <30 MG/G Creatinine    Creatinine Urine 200 <300       If you have any questions or concerns, please call the clinic at the number listed above.       Sincerely,        Susu Brody MD

## 2018-08-22 NOTE — PROGRESS NOTES
Chief Complaint: Alma Kraft is an 67 year old woman who presents for preventive health visit.      Besides routine health maintenance,  she would like to discuss : type 2 diabetis uncontrolled.   Testing blood sugars in mornin-160 fasting  No postprandial sugars tested    Right Shoulder injection this past July-has helped  Still has right sided neck pain, radiates to shoulder      Healthy Habits:  Do you get at least three servings of calcium containing foods daily (dairy, green leafy vegetables, etc.)  Skipping lunch-  Snacks on nuts and m/m's  Outside of work or daily activities, how many days per week do you exercise for 30 minutes or longer? None- works around the house  Has a treadmill at home- not used  Have you had an eye exam in the past two years? Eye surgery of May of Last year/ scheduled September  Do you see a dentist twice per year? Yes- tooth extraction- consult for implant this fall      PHQ-2  Over the last two weeks- Have you been bothered by little interest or pleasure in doing things?  No  Over the last two weeks- Have you been feeling down, depressed, or hopeless?  No     PHQ9: scores 6  Isolated from family/ siblings- upcoming trip planned     Advanced directive: encouraged    COGNITIVE SCREEN  1) Repeat 3 items (Leader, Season, Table)    2) Clock draw: NORMAL  3) 3 item recall: Recalls 3 objects  Results: 3 items recalled: COGNITIVE IMPAIRMENT LESS LIKELY    Mini-CogTM Copyright S Daniel. Licensed by the author for use in [facility]; reprinted with permission (maria@.[email or web address]). All rights reserved.          Social History   Substance Use Topics     Smoking status: Never Smoker     Smokeless tobacco: Never Used     Alcohol use 0.0 oz/week     0 drink(s) per week      Comment: very rare     The patient does not drink >3 drinks per day nor >7 drinks per week.    Reviewed orders with patient.  Reviewed health maintenance and updated orders accordingly - Yes      History of  "abnormal Pap smear: NO - age 64 - see link Cervical Cytology Screening Guidelines  All Histories reviewed and updated in [facility].      ROS:  CONSTITUTIONAL: NEGATIVE for fever, chills, change in weight  INTEGUMENTARY/SKIN: NEGATIVE for worrisome rashes, moles or lesions  EYES: NEGATIVE for vision changes or irritation  ENT: NEGATIVE for ear, mouth and throat problems  RESP: NEGATIVE for significant cough or SOB  BREAST: NEGATIVE for masses, tenderness or discharge  CV: NEGATIVE for chest pain, palpitations or peripheral edema  GI: NEGATIVE for nausea, abdominal pain, heartburn, or change in bowel habits  : NEGATIVE for vaginal bleeding  Stress incontinence- wears pads.  MUSCULOSKELETAL:Arthritic hands- pain and stiffness  NEURO: NEGATIVE for weakness, dizziness or paresthesias  PSYCHIATRIC:History of depression-in partial remission  PHQ9 scores 6   Abnormal sleep pattern- stays up reading until 1 or 2 am, then sleeps late into the day  Has difficulty falling asleep  Endocrine: not willing to make lifestyle changes- has the information on healthy diabetic diet-     Health Care Maintenance   Scheduled for colonoscopy in September  Mammogram is up to date        OBJECTIVE:  /86 (BP Location: Left arm, Patient Position: Chair, Cuff Size: Adult Large)  Pulse 72  Temp 98  F (36.7  C) (Oral)  Resp 20  Ht 1.53 m (5' 0.25\")  Wt 115.8 kg (255 lb 6.4 oz)  LMP 05/12/2001  BMI 49.47 kg/m2  General appearance: Healthy    Skin: Normal. No atypical appearing moles on inspection of trunk and extremities. Hair piece on- scalp not inspected    External ears  and canals clear bilaterally. TM's normal bilaterally. Nose normal without lesions or discharge. Oropharynx normal. Neck supple without palpable adenopathy.    Breasts are symmetric.  No dominant, discrete, fixed  or suspicious masses are noted.  No skin or nipple changes or axillary nodes.     Regular rate and  rhythm. S1 and S2 normal, no murmurs, clicks, gallops or " rubs. No edema or JVD. Chest is clear; no wheezes or rales.    The abdomen is soft without tenderness, guarding, mass or organomegaly. Bowel sounds are normal. No CVA tenderness or inguinal adenopathy noted.    Pelvic:  deferred    Rectal exam: deferred    Extremities:   Diabetic foot exam:  Dorsalis pedis pulse R:3  Dorsalis pedis pulse L: 3  Skin temperature R:normal  Skin temperature L:normal  Capillary refill R:normal  Capillary refill L:normal  Hair growth R:normal  Hair growth L:normal  Nail growth R/L:normal  Monofilament R foot:normal monofilament exam,   Monofilament L foot:normal monofilament exam,       COUNSELING:  Reviewed preventive health counseling, as reflected in patient instructions       Regular exercise       Healthy diet/nutrition       Aspirin Prophylaxsis       Osteoporosis Prevention/Bone Health       Advance Care Planning    ATP III Guidelines  ICSI Preventive Guidelines    ASSESSMENT/PLAN:  (Z00.00) Routine history and physical examination of adult  (primary encounter diagnosis)  Comment:   Plan:     (E11.65,  Z79.4) Uncontrolled type 2 diabetes mellitus without complication, with long-term current use of insulin (H)  Comment: MTM referral- Hemoglobin A1C not at goal-? Benefit from Viktosa- or similar- ?  Plan: Glucose Fasting (BFP), HEMOGRAM/PLATELET (BFP),        VENOUS COLLECTION, Hemoglobin A1c (BFP),         Albumin Random Urine Quantitative with Creat         Ratio, blood glucose monitoring (ONE TOUCH         DELICA) lancets, blood glucose monitoring (NO         BRAND SPECIFIED) test strip            (Z80.3) Family history of malignant neoplasm of breast  Comment:    Plan: annual mammogram encouraged    (E66.01) Obesity, morbid, BMI 40.0-49.9 (H)  Comment:   Plan: DC amitriptyline  Counseling  Bariatric surgery  referral?

## 2018-08-22 NOTE — MR AVS SNAPSHOT
"              After Visit Summary   8/22/2018    Alma Kraft    MRN: [id number]           Patient Information     Date Of Birth          1950        Visit Information        Provider Department      8/22/2018 9:00 AM Susu Brody MD [facility]        Today's Diagnoses     Routine history and physical examination of adult    -  1    Uncontrolled type 2 diabetes mellitus without complication, with long-term current use of insulin (H)        Family history of malignant neoplasm of breast        Obesity, morbid, BMI 40.0-49.9 (H)          Care Instructions    New Recombinant shingles vaccine (Shingrix): call your insurance for information on cost  Get at Pharmacy    Schedule MTM visit- with Sushila Fernandes          Follow-ups after your visit        Who to contact     If you have questions or need follow up information about today's clinic visit or your schedule please contact [facility] directly at [phone].  Normal or non-critical lab and imaging results will be communicated to you by MyChart, letter or phone within 4 business days after the clinic has received the results. If you do not hear from us within 7 days, please contact the clinic through MyChart or phone. If you have a critical or abnormal lab result, we will notify you by phone as soon as possible.  Submit refill requests through [employer] or call your pharmacy and they will forward the refill request to us. Please allow 3 business days for your refill to be completed.          Additional Information About Your Visit        Care EveryWhere ID     This is your Care EveryWhere ID. This could be used by other organizations to access your [facility] medical records  [id number]        Your Vitals Were     Pulse Temperature Respirations Height Last Period BMI (Body Mass Index)    72 98  F (36.7  C) (Oral) 20 1.53 m (5' 0.25\") 05/12/2001 49.47 kg/m2       Blood Pressure from Last 3 Encounters: "   08/22/18 146/86   05/23/18 132/84   02/21/18 148/76    Weight from Last 3 Encounters:   08/22/18 115.8 kg (255 lb 6.4 oz)   05/23/18 117 kg (258 lb)   02/21/18 115.9 kg (255 lb 9.6 oz)              We Performed the Following     Albumin Random Urine Quantitative with Creat Ratio     Glucose Fasting (BFP)     Hemoglobin A1c (BFP)     HEMOGRAM/PLATELET (BFP)     VENOUS COLLECTION          Today's Medication Changes          These changes are accurate as of 8/22/18 11:59 PM.  If you have any questions, ask your nurse or doctor.               These medicines have changed or have updated prescriptions.        Dose/Directions    blood glucose monitoring lancets   This may have changed:  additional instructions   Used for:  Uncontrolled type 2 diabetes mellitus without complication, with long-term current use of insulin (H)   Changed by:  Susu Brody MD        Use to test blood sugars 1 times daily or as directed.   Quantity:  100 each   Refills:  3       blood glucose monitoring test strip   Commonly known as:  no brand specified   This may have changed:  additional instructions   Used for:  Uncontrolled type 2 diabetes mellitus without complication, with long-term current use of insulin (H)   Changed by:  Susu Brody MD        Use to test blood sugars one times daily.   Quantity:  100 each   Refills:  3            Where to get your medicines      These medications were sent to [id number] [address] [address]     Phone:  [phone]     blood glucose monitoring lancets    blood glucose monitoring test strip                Primary Care Provider Office Phone # Fax #    Susu Brody [phone] [address] [address]        Equal Access to Services     [facility] JOSE LUIS AH: Thuy Ayon, edwin sheehan, dewayne tristan  lamariia shoemaker. So [facility] [phone].    ATENCIÓN: Si habla parish, tiene a sy disposición servicios gratuitos de asistencia lingüística. Shirin melton [phone].    We comply with applicable federal civil rights laws and [place] laws. We do not discriminate on the basis of race, color, national origin, age, disability, sex, sexual orientation, or gender identity.            Thank you!     Thank you for choosing [facility]  for your care. Our goal is always to provide you with excellent care. Hearing back from our patients is one way we can continue to improve our services. Please take a few minutes to complete the written survey that you may receive in the mail after your visit with us. Thank you!             Your Updated Medication List - Protect others around you: Learn how to safely use, store and throw away your medicines at www.disposemymeds.org.          This list is accurate as of 8/22/18 11:59 PM.  Always use your most recent med list.                   Brand Name Dispense Instructions for use Diagnosis    amitriptyline 10 MG tablet    ELAVIL    90 tablet    Take 1 tablet (10 mg) by mouth daily    Psychophysiological insomnia, Chronic nonintractable headache, unspecified headache type       amLODIPine 5 MG tablet    NORVASC    90 tablet    Take 1 tablet (5 mg) by mouth daily    Essential hypertension, benign       aspirin 81 MG tablet     100    1 tab po QD (Once per day)    Other specified pre-operative examination       atorvastatin 10 MG tablet    LIPITOR    90 tablet    Take 1 tablet (10 mg) by mouth daily    Uncontrolled type 2 diabetes mellitus with stage 2 chronic kidney disease, with long-term current use of insulin (H)       BD ROSE U/F 32G X 4 MM   Generic drug:  insulin pen needle     100 each    USE 1 NEEDLE DAILY    Uncontrolled type 2 diabetes mellitus without complication, with long-term current use of insulin (H)       blood glucose monitoring lancets     100 each    Use  to test blood sugars 1 times daily or as directed.    Uncontrolled type 2 diabetes mellitus without complication, with long-term current use of insulin (H)       blood glucose monitoring meter device kit     1 kit    Use to test blood sugars 2 times daily or as directed.    Type II or unspecified type diabetes mellitus without mention of complication, not stated as uncontrolled       blood glucose monitoring test strip    no brand specified    100 each    Use to test blood sugars one times daily.    Uncontrolled type 2 diabetes mellitus without complication, with long-term current use of insulin (H)       budesonide 32 MCG/ACT spray    RINOCORT AQUA    3 Bottle    SPRAY 2 SPRAYS INTO BOTH NOSTRILS ONCE DAILY    Chronic seasonal allergic rhinitis, unspecified trigger       CENTRUM SILVER per tablet     100 tablet    Take  by mouth. 1 tablet 3 times weekly        insulin detemir 100 UNIT/ML injection    LEVEMIR FLEXTOUCH    9 mL    INJECT 30 UNITS AT BEDTIME.    Uncontrolled type 2 diabetes mellitus without complication, with long-term current use of insulin (H)       losartan-hydrochlorothiazide 100-25 MG per tablet    HYZAAR    90 tablet    Take 1 tablet by mouth daily    Essential hypertension, benign, Uncontrolled type 2 diabetes mellitus without complication, with long-term current use of insulin (H)       metFORMIN 500 MG 24 hr tablet    GLUCOPHAGE-XR    360 tablet    Take 4 tablets (2,000 mg) by mouth daily (with dinner)    Uncontrolled type 2 diabetes mellitus without complication, with long-term current use of insulin (H)       order for DME     3 Device    Equipment being ordered: Mediven plus compression stockings  25565 20-30 compression    Unspecified venous (peripheral) insufficiency       potassium chloride 10 MEQ CR capsule    MICRO-K    360 capsule    TAKE TWO CAPSULES BY MOUTH TWICE DAILY    Essential hypertension, benign       venlafaxine 150 MG 24 hr capsule    EFFEXOR XR    90 capsule    Take 1  capsule (150 mg) by mouth daily    Major depression in remission (H)

## 2018-08-22 NOTE — PATIENT INSTRUCTIONS
New Recombinant shingles vaccine (Shingrix): call your insurance for information on cost  Get at Pharmacy    Schedule MTM visit- with Sushila Fernandes

## 2018-08-22 NOTE — NURSING NOTE
Alma Kraft is here for a diabetic check and medication refill.    Questioned patient about current smoking habits.  Pt. has never smoked.  Body mass index is 49.47 kg/(m^2).  PULSE regular  My Chart: accepts  CLASSIFICATION OF OVERWEIGHT AND OBESITY BY BMI                        Obesity Class           BMI(kg/m2)  Underweight                                    < 18.5  Normal                                         18.5-24.9  Overweight                                     25.0-29.9  OBESITY                     I                  30.0-34.9                             II                 35.0-39.9  EXTREME OBESITY             III                >40                            Patient's  BMI Body mass index is 49.47 kg/(m^2).  http://hin.nhlbi.nih.gov/menuplanner/menu.cgi    Pre-visit planning  Immunizations - up to date  Colonoscopy - Pt has appointment  Mammogram - is up to date  Asthma -   PHQ9 -    KATHERINE-7 -

## 2018-08-23 ENCOUNTER — OFFICE VISIT (OUTPATIENT)
Dept: PHARMACY | Facility: PHYSICIAN GROUP | Age: 68
End: 2018-08-23
Payer: COMMERCIAL

## 2018-08-23 DIAGNOSIS — Z79.4 TYPE 2 DIABETES MELLITUS WITHOUT COMPLICATION, WITH LONG-TERM CURRENT USE OF INSULIN (H): Primary | ICD-10-CM

## 2018-08-23 DIAGNOSIS — E11.9 TYPE 2 DIABETES MELLITUS WITHOUT COMPLICATION, WITH LONG-TERM CURRENT USE OF INSULIN (H): Primary | ICD-10-CM

## 2018-08-23 DIAGNOSIS — E78.2 MIXED HYPERLIPIDEMIA: ICD-10-CM

## 2018-08-23 DIAGNOSIS — F43.23 ADJUSTMENT DISORDER WITH MIXED ANXIETY AND DEPRESSED MOOD: ICD-10-CM

## 2018-08-23 DIAGNOSIS — E66.01 OBESITY, MORBID, BMI 40.0-49.9 (H): ICD-10-CM

## 2018-08-23 DIAGNOSIS — R51.9 GENERALIZED HEADACHE: ICD-10-CM

## 2018-08-23 DIAGNOSIS — I10 ESSENTIAL HYPERTENSION, BENIGN: ICD-10-CM

## 2018-08-23 PROCEDURE — 99605 MTMS BY PHARM NP 15 MIN: CPT | Performed by: PHARMACIST

## 2018-08-23 PROCEDURE — 99607 MTMS BY PHARM ADDL 15 MIN: CPT | Performed by: PHARMACIST

## 2018-08-23 NOTE — PROGRESS NOTES
"SUBJECTIVE/OBJECTIVE:                           Alma Kraft is a 67 year old female coming in for an initial visit for Medication Therapy Management.  She was referred to me from Dr. Brody.    Chief Complaint: DM uncontrolled- needs weight loss too.    Allergies/ADRs: Reviewed in Epic  Tobacco: No tobacco use  Alcohol: 1-3 beverages / week  Caffeine: no caffeine  Activity: not exercising  PMH: Reviewed in Epic    Medication Adherence/Access:  no issues reported    Diabetes/Obesity:  Pt currently taking metformin 2000mg daily, Levemir 30 units daily. Pt is not experiencing side effects.  SMBG: one time daily.  Ranges (patient reported):   Fastin-190s, did NOT bring in meter today.  Cortisone injection in July, shot up to 390 after this.  Breakfast 9:30/10:30am, 1-2pm lunch, light snack, 6-8pm for dinner  Patient is not experiencing hypoglycemia  Recent symptoms of high blood sugar? none  ACEi/ARB: Yes: losartan.   Urine Albumin:   Lab Results   Component Value Date    UMALCR <30 2018      Aspirin: Taking 81mg daily and denies side effects  Diet/Exercise: has a really hard time with weight and eating, she eats when she is stressed, makes poor choices around food and has a hard time when there are temptations in her home. Lives with her  and grown children.  Lab Results   Component Value Date    A1C 9.4 2018    A1C 8.4 2018    A1C 7.6 2018    A1C 7.5 2017    A1C 7.4 2017        Hypertension: Current medications include losartan/hctz 100/25mg 1 daily and amlodipine 5mg daily. Also on potassium daily due to 3.4 level. Patient does not self-monitor BP.  Patient reports no current medication side effects.States her BP is up today due to upcoming trip that is stressing her out because she is the \"outsider\" of her family and it makes her nervous for weeks around seeing them.  BP Readings from Last 3 Encounters:   18 146/86   18 132/84   18 148/76 "     Depression/Headaches:  Current medications include: Venlafaxine 150mg once daily and Amitriptyline 10mg once daily. Family stress is a major issue for her - eating is a coping mechanism. Has not worked with a therapist before. Has issues with staying up late and then sleeping in. Not active. She is using the amitriptyline for a headache condition and has been able to get down to maintain on 10mg daily, had been up to 50mg at one point years ago. Does have significant dry mouth.   PHQ-9 SCORE 2/21/2018 5/23/2018 8/22/2018   Total Score - - -   Total Score 3 3 6     Hyperlipidemia: Current therapy includes atorvastatin 10mg once daily.  Pt reports no significant myalgias or other side effects.    Today's Vitals: LMP 05/12/2001      ASSESSMENT:                             Current medications were reviewed today.     Medication Adherence: good, no issues identified    Diabetes/Obesity: Needs Improvement. Patient is not meeting A1c goal of < 7%. Self monitoring of blood glucose is not at goal of fasting  mg/dL. Pt would benefit from GLP-1 agonist (Trulicity) :  Start at dose : 0.75mg weekly. Reviewed appropriate use, benefits, risks and monitoring at length. Needs to modify diet and increase exercise- working with a therapist on eating behaviors is recommended.     Hypertension: Needs Improvement. Patient is not meeting BP goal of < 140/90mmHg. Patient needs diet modification and increased exercise, discussed in detail today.     Depression/Headaches: Needs Improvement. Patient would benefit from seeing a therapist- discussed using [facility] to find one. Did discuss risks associated with amitriptyline and increasing age - she is not willing to make any changes to this medication.    Hyperlipidemia: Stable.     PLAN:                            1. Start Trulicity 0.75mg once weekly.     2. Could try some meditation relaxation strategies    3. Continue with other medications at same doses.     4. Check with [facility] to find  a therapist - [phone]      I spent 45 minutes with this patient today. All changes were made via collaborative practice agreement with Dr. Brody. A copy of the visit note was provided to the patient's primary care provider.    Will follow up in 1 month.    The patient was given a summary of these recommendations as an after visit summary.     Sushila Fernandes, Pharm.D, [facility]  Medication Therapy Management Pharmacist  [phone]

## 2018-08-23 NOTE — PATIENT INSTRUCTIONS
Recommendations from today's MTM visit:                                                    MTM (medication therapy management) is a service provided by a clinical pharmacist designed to help you get the most of out of your medicines.   Today we reviewed what your medicines are for, how to know if they are working, that your medicines are safe and how to make your medicine regimen as easy as possible.     1. Start Trulicity 0.75mg once weekly.     2. Could try some meditation relaxation strategies    3. Continue with other medications at same doses.     4. Check with [facility] to find a therapist - [phone]    Next MTM visit: 1 month    To schedule another MTM appointment, please call the clinic directly or you may call the MTM scheduling line at [phone] or toll-free at [phone].     My Clinical Pharmacist's contact information:                                                      It was a pleasure seeing you today!  Please feel free to contact me with any questions or concerns you have.      Sushila Fernandes, Pharm.D, [facility]  Medication Therapy Management Pharmacist  [phone]    You may receive a survey about the MTM services you received.  I would appreciate your feedback to help me serve you better in the future. Please fill it out and return it when you can. Your comments will be anonymous.      My healthcare goals:                                                      Diabetes Goals:    Home Monitoring of Blood Sugars:Fasting  mg/dL and 2 hours after a meal less than 150 mg/dL.    Hemoglobin A1C: Less than 7%. Yours is   Lab Results   Component Value Date    A1C 9.4 08/22/2018   .    Blood Pressure: Less than 140/90mmHg.     Cholesterol: You are taking atorvastatin to help decrease the risk of heart disease.    Things you can do to help lower your blood sugars:    Diet: 3 meals and 1-2 snacks per day with 45-60 grams of carbohydrates per meal and 15-30 grams of carbohydrates per snack. Try to  fill your plate at least half-full with vegetables, fill one-quarter full with lean meats or protein, and also make sure you get at least some carbohydrate with every meal.    Exercise: 30 minutes per day of anything that will increase your heart rate and make you break a sweat! Gardening, walking, cleaning the house, changing the oil in your car, etc. If you feel like 30 minutes per day is too much, start small. Even lifting canned foods or working your arms with a resistance band in front of the TV can help.

## 2018-08-23 NOTE — MR AVS SNAPSHOT
After Visit Summary   8/23/2018    Alma Kraft    MRN: [id number]           Patient Information     Date Of Birth          1950        Visit Information        Provider Department      8/23/2018 3:00 PM Sushila Fernandes, [facility] MTM        Today's Diagnoses     Controlled type 2 diabetes mellitus without complication, with long-term current use of insulin (H)    -  1      Care Instructions    Recommendations from today's MTM visit:                                                    MTM (medication therapy management) is a service provided by a clinical pharmacist designed to help you get the most of out of your medicines.   Today we reviewed what your medicines are for, how to know if they are working, that your medicines are safe and how to make your medicine regimen as easy as possible.     1. Start Trulicity 0.75mg once weekly.     2. Could try some meditation relaxation strategies    3. Continue with other medications at same doses.     4. Check with [facility] to find a therapist -     Next MTM visit: 1 month    To schedule another MTM appointment, please call the clinic directly or you may call the MTM scheduling line at [phone] or toll-free at [phone].     My Clinical Pharmacist's contact information:                                                      It was a pleasure seeing you today!  Please feel free to contact me with any questions or concerns you have.      Sushila Fernandes, Pharm.D, [facility]  Medication Therapy Management Pharmacist  [phone]    You may receive a survey about the MTM services you received.  I would appreciate your feedback to help me serve you better in the future. Please fill it out and return it when you can. Your comments will be anonymous.      My healthcare goals:                                                      Diabetes Goals:    Home Monitoring of Blood Sugars:Fasting  mg/dL and 2 hours after a meal less than  150 mg/dL.    Hemoglobin A1C: Less than 7%. Yours is   Lab Results   Component Value Date    A1C 9.4 08/22/2018   .    Blood Pressure: Less than 140/90mmHg.     Cholesterol: You are taking atorvastatin to help decrease the risk of heart disease.    Things you can do to help lower your blood sugars:    Diet: 3 meals and 1-2 snacks per day with 45-60 grams of carbohydrates per meal and 15-30 grams of carbohydrates per snack. Try to fill your plate at least half-full with vegetables, fill one-quarter full with lean meats or protein, and also make sure you get at least some carbohydrate with every meal.    Exercise: 30 minutes per day of anything that will increase your heart rate and make you break a sweat! Gardening, walking, cleaning the house, changing the oil in your car, etc. If you feel like 30 minutes per day is too much, start small. Even lifting canned foods or working your arms with a resistance band in front of the TV can help.                  Follow-ups after your visit        Who to contact     If you have questions or need follow up information about today's clinic visit or your schedule please contact [facility] directly at [phone].  Normal or non-critical lab and imaging results will be communicated to you by MyChart, letter or phone within 4 business days after the clinic has received the results. If you do not hear from us within 7 days, please contact the clinic through MyChart or phone. If you have a critical or abnormal lab result, we will notify you by phone as soon as possible.  Submit refill requests through [employer] or call your pharmacy and they will forward the refill request to us. Please allow 3 business days for your refill to be completed.          Additional Information About Your Visit        Care EveryWhere ID     This is your Care EveryWhere ID. This could be used by other organizations to access your [place] medical records  [id number]        Your Vitals  Were     Last Period                   05/12/2001            Blood Pressure from Last 3 Encounters:   08/22/18 146/86   05/23/18 132/84   02/21/18 148/76    Weight from Last 3 Encounters:   08/22/18 255 lb 6.4 oz (115.8 kg)   05/23/18 258 lb (117 kg)   02/21/18 255 lb 9.6 oz (115.9 kg)              Today, you had the following     No orders found for display         Today's Medication Changes          These changes are accurate as of 8/23/18  3:46 PM.  If you have any questions, ask your nurse or doctor.               Start taking these medicines.        Dose/Directions    dulaglutide 0.75 MG/0.5ML pen   Commonly known as:  TRULICITY   Used for:  Controlled type 2 diabetes mellitus without complication, with long-term current use of insulin (H)        Dose:  0.75 mg   Inject 0.75 mg Subcutaneous every 7 days   Quantity:  2 mL   Refills:  0            Where to get your medicines      These medications were sent to [id number] [address] [address]     Phone:  [phone]     dulaglutide 0.75 MG/0.5ML pen                Primary Care Provider Office Phone # Fax #    Susu Brody [phone] [address] [address]        Equal Access to Services     KEVIN AMAYA : Hadii wendy robb hadgabyo Sokristi, waaxda luqadaha, qaybta kaalmada aderitesh, dewayne shoemaker. So [facility] [phone].    ATENCIÓN: Si habla español, tiene a sy disposición servicios gratuitos de asistencia lingüística. Shirin al [phone].    We comply with applicable federal civil rights laws and [place] laws. We do not discriminate on the basis of race, color, national origin, age, disability, sex, sexual orientation, or gender identity.            Thank you!     Thank you for choosing [facility]  for your care. Our goal is always to provide you with excellent care. Hearing back  from our patients is one way we can continue to improve our services. Please take a few minutes to complete the written survey that you may receive in the mail after your visit with us. Thank you!             Your Updated Medication List - Protect others around you: Learn how to safely use, store and throw away your medicines at www.disposemymeds.org.          This list is accurate as of 8/23/18  3:46 PM.  Always use your most recent med list.                   Brand Name Dispense Instructions for use Diagnosis    amitriptyline 10 MG tablet    ELAVIL    90 tablet    Take 1 tablet (10 mg) by mouth daily    Psychophysiological insomnia, Chronic nonintractable headache, unspecified headache type       amLODIPine 5 MG tablet    NORVASC    90 tablet    Take 1 tablet (5 mg) by mouth daily    Essential hypertension, benign       aspirin 81 MG tablet     100    1 tab po QD (Once per day)    Other specified pre-operative examination       atorvastatin 10 MG tablet    LIPITOR    90 tablet    Take 1 tablet (10 mg) by mouth daily    Uncontrolled type 2 diabetes mellitus with stage 2 chronic kidney disease, with long-term current use of insulin (H)       BD ROSE U/F 32G X 4 MM   Generic drug:  insulin pen needle     100 each    USE 1 NEEDLE DAILY    Uncontrolled type 2 diabetes mellitus without complication, with long-term current use of insulin (H)       blood glucose monitoring lancets     1 Box    Use to test blood sugars 2 times daily or as directed.    Controlled type 2 diabetes mellitus without complication, with long-term current use of insulin (H)       blood glucose monitoring meter device kit     1 kit    Use to test blood sugars 2 times daily or as directed.    Type II or unspecified type diabetes mellitus without mention of complication, not stated as uncontrolled       blood glucose monitoring test strip    no brand specified    100 each    Use to test blood sugars bid times daily. Pt needs Ruby strips    Controlled  type 2 diabetes mellitus without complication, with long-term current use of insulin (H)       budesonide 32 MCG/ACT spray    RINOCORT AQUA    3 Bottle    SPRAY 2 SPRAYS INTO BOTH NOSTRILS ONCE DAILY    Chronic seasonal allergic rhinitis, unspecified trigger       CENTRUM SILVER per tablet     100 tablet    Take  by mouth. 1 tablet 3 times weekly        dulaglutide 0.75 MG/0.5ML pen    TRULICITY    2 mL    Inject 0.75 mg Subcutaneous every 7 days    Controlled type 2 diabetes mellitus without complication, with long-term current use of insulin (H)       insulin detemir 100 UNIT/ML injection    LEVEMIR FLEXTOUCH    9 mL    INJECT 30 UNITS AT BEDTIME.    Uncontrolled type 2 diabetes mellitus without complication, with long-term current use of insulin (H)       losartan-hydrochlorothiazide 100-25 MG per tablet    HYZAAR    90 tablet    Take 1 tablet by mouth daily    Essential hypertension, benign, Uncontrolled type 2 diabetes mellitus without complication, with long-term current use of insulin (H)       metFORMIN 500 MG 24 hr tablet    GLUCOPHAGE-XR    360 tablet    Take 4 tablets (2,000 mg) by mouth daily (with dinner)    Uncontrolled type 2 diabetes mellitus without complication, with long-term current use of insulin (H)       order for DME     3 Device    Equipment being ordered: Mediven plus compression stockings  [id number] 20-30 compression    Unspecified venous (peripheral) insufficiency       potassium chloride 10 MEQ CR capsule    MICRO-K    360 capsule    TAKE TWO CAPSULES BY MOUTH TWICE DAILY    Essential hypertension, benign       venlafaxine 150 MG 24 hr capsule    EFFEXOR XR    90 capsule    Take 1 capsule (150 mg) by mouth daily    Major depression in remission (H)

## 2018-08-24 ASSESSMENT — PATIENT HEALTH QUESTIONNAIRE - PHQ9: SUM OF ALL RESPONSES TO PHQ QUESTIONS 1-9: 6

## 2018-08-24 ASSESSMENT — ANXIETY QUESTIONNAIRES: GAD7 TOTAL SCORE: 0

## 2018-09-04 DIAGNOSIS — E11.9 TYPE 2 DIABETES MELLITUS WITHOUT COMPLICATION, WITH LONG-TERM CURRENT USE OF INSULIN (H): Primary | ICD-10-CM

## 2018-09-04 DIAGNOSIS — Z79.4 TYPE 2 DIABETES MELLITUS WITHOUT COMPLICATION, WITH LONG-TERM CURRENT USE OF INSULIN (H): Primary | ICD-10-CM

## 2018-09-04 NOTE — TELEPHONE ENCOUNTER
pt called the clinical support line with the following;    Pt pharmacy called     Puja from [facility]    States that pt is on Medicare B and needs specific name on tests strips to be covered.  We sent in a rx on 9-1-2018 but was not covered  One Touch Viro test strips  Please check that I put in the right brand and fax   Naresh  [phone] (home)

## 2018-09-24 ENCOUNTER — TELEPHONE (OUTPATIENT)
Dept: PHARMACY | Facility: PHYSICIAN GROUP | Age: 68
End: 2018-09-24

## 2018-09-24 NOTE — TELEPHONE ENCOUNTER
Patient called to speak with MTM as she has completed 3 shots of Trulicity and usually gets pretty nauseaus on Thursdays after taking the shot on Monday. She has not taken the shot today yet. She is stating it is tolerable, but wanted to see if this is expected. Also curious if this could be impacting her sleep as she has not been sleeping as well lately. She is not reporting any heartburn symptoms impacting her sleep. Lastly, her concern is the cost of the medication and the coverage gap. States her one month cost $200+ and not sure she can afford to pay this.     She is scheduled to see MTM on Friday to discuss these issues. She will bring [facility] pharmacy spend and last year tax information to see if patient assistance would be an option if she is able to tolerate the medication moving forward.     Sushila Fernandes, Pharm.D, [facility]  Medication Therapy Management Pharmacist  [phone]

## 2018-09-28 ENCOUNTER — ALLIED HEALTH/NURSE VISIT (OUTPATIENT)
Dept: FAMILY MEDICINE | Facility: CLINIC | Age: 68
End: 2018-09-28

## 2018-09-28 ENCOUNTER — OFFICE VISIT (OUTPATIENT)
Dept: PHARMACY | Facility: PHYSICIAN GROUP | Age: 68
End: 2018-09-28
Payer: COMMERCIAL

## 2018-09-28 DIAGNOSIS — E11.9 TYPE 2 DIABETES MELLITUS WITHOUT COMPLICATION, WITH LONG-TERM CURRENT USE OF INSULIN (H): Primary | ICD-10-CM

## 2018-09-28 DIAGNOSIS — Z79.4 TYPE 2 DIABETES MELLITUS WITHOUT COMPLICATION, WITH LONG-TERM CURRENT USE OF INSULIN (H): Primary | ICD-10-CM

## 2018-09-28 DIAGNOSIS — Z23 NEED FOR VACCINATION: Primary | ICD-10-CM

## 2018-09-28 PROCEDURE — G0008 ADMIN INFLUENZA VIRUS VAC: HCPCS | Performed by: FAMILY MEDICINE

## 2018-09-28 PROCEDURE — 99607 MTMS BY PHARM ADDL 15 MIN: CPT | Performed by: PHARMACIST

## 2018-09-28 PROCEDURE — 90686 IIV4 VACC NO PRSV 0.5 ML IM: CPT | Performed by: FAMILY MEDICINE

## 2018-09-28 PROCEDURE — 99606 MTMS BY PHARM EST 15 MIN: CPT | Performed by: PHARMACIST

## 2018-09-28 NOTE — MR AVS SNAPSHOT
After Visit Summary   9/28/2018    Alma Kraft    MRN: [id number]           Patient Information     Date Of Birth          1950        Visit Information        Provider Department      9/28/2018 1:30 PM Susu Brody MD [facility]        Today's Diagnoses     Need for vaccination    -  1       Follow-ups after your visit        Your next 10 appointments already scheduled     Nov 21, 2018  9:30 AM CST   Office Visit with Susu Brody MD   [facility] ([facility])    [address]  [address]  [facility] [id number] [phone]              Who to contact     If you have questions or need follow up information about today's clinic visit or your schedule please contact [facility] directly at [phone].  Normal or non-critical lab and imaging results will be communicated to you by MyChart, letter or phone within 4 business days after the clinic has received the results. If you do not hear from us within 7 days, please contact the clinic through MyChart or phone. If you have a critical or abnormal lab result, we will notify you by phone as soon as possible.  Submit refill requests through [employer] or call your pharmacy and they will forward the refill request to us. Please allow 3 business days for your refill to be completed.          Additional Information About Your Visit        Care EveryWhere ID     This is your Care EveryWhere ID. This could be used by other organizations to access your [place] medical records  [id number]        Your Vitals Were     Last Period                   05/12/2001            Blood Pressure from Last 3 Encounters:   08/22/18 146/86   05/23/18 132/84   02/21/18 148/76    Weight from Last 3 Encounters:   08/22/18 115.8 kg (255 lb 6.4 oz)   05/23/18 117 kg (258 lb)   02/21/18 115.9 kg (255 lb 9.6 oz)              We Performed the Following     HC FLU  VAC PRESRV FREE QUAD SPLIT VIR 3+YRS IM     VACCINE ADMINISTRATION, INITIAL        Primary Care Provider Office Phone # Fax #    Susu Brody [phone] [address] [address]        Equal Access to Services     KEVIN AMAYA : Hadii aad ku hadasho Soomaali, waaxda luqadaha, qaybta kaalmada adeegyada, waxay ruiin hayysabeln dorie trentafrica crawford . So [facility] [phone].    ATENCIÓN: Si habla español, tiene a sy disposición servicios gratuitos de asistencia lingüística. Llame al [phone].    We comply with applicable federal civil rights laws and [place] laws. We do not discriminate on the basis of race, color, national origin, age, disability, sex, sexual orientation, or gender identity.            Thank you!     Thank you for choosing [facility]  for your care. Our goal is always to provide you with excellent care. Hearing back from our patients is one way we can continue to improve our services. Please take a few minutes to complete the written survey that you may receive in the mail after your visit with us. Thank you!             Your Updated Medication List - Protect others around you: Learn how to safely use, store and throw away your medicines at www.disposemymeds.org.          This list is accurate as of 9/28/18  3:19 PM.  Always use your most recent med list.                   Brand Name Dispense Instructions for use Diagnosis    amitriptyline 10 MG tablet    ELAVIL    90 tablet    Take 1 tablet (10 mg) by mouth daily    Psychophysiological insomnia, Chronic nonintractable headache, unspecified headache type       amLODIPine 5 MG tablet    NORVASC    90 tablet    Take 1 tablet (5 mg) by mouth daily    Essential hypertension, benign       aspirin 81 MG tablet     100    1 tab po QD (Once per day)    Other specified pre-operative examination       atorvastatin 10 MG tablet    LIPITOR    90 tablet    Take 1 tablet (10 mg) by mouth  daily    Uncontrolled type 2 diabetes mellitus with stage 2 chronic kidney disease, with long-term current use of insulin (H)       BD ROSE U/F 32G X 4 MM   Generic drug:  insulin pen needle     100 each    USE 1 NEEDLE DAILY    Uncontrolled type 2 diabetes mellitus without complication, with long-term current use of insulin (H)       blood glucose monitoring lancets     100 each    Use to test blood sugars 1 times daily or as directed.    Uncontrolled type 2 diabetes mellitus without complication, with long-term current use of insulin (H)       blood glucose monitoring meter device kit     1 kit    Use to test blood sugars 2 times daily or as directed.    Type II or unspecified type diabetes mellitus without mention of complication, not stated as uncontrolled       * blood glucose monitoring test strip    no brand specified    100 each    Use to test blood sugars one times daily.    Uncontrolled type 2 diabetes mellitus without complication, with long-term current use of insulin (H)       * blood glucose monitoring test strip    no brand specified    100 strip    Use to test blood sugar one times daily or as directed. To accompany: {Blood Glucose [employer] One Touch Viro test strips    Type 2 diabetes mellitus without complication, with long-term current use of insulin (H)       budesonide 32 MCG/ACT spray    RINOCORT AQUA    3 Bottle    SPRAY 2 SPRAYS INTO BOTH NOSTRILS ONCE DAILY    Chronic seasonal allergic rhinitis, unspecified trigger       CENTRUM SILVER per tablet     100 tablet    Take  by mouth. 1 tablet 3 times weekly        dulaglutide 0.75 MG/0.5ML pen    TRULICITY    2 mL    Inject 0.75 mg Subcutaneous every 7 days        insulin detemir 100 UNIT/ML injection    LEVEMIR FLEXTOUCH    9 mL    INJECT 30 UNITS AT BEDTIME.    Uncontrolled type 2 diabetes mellitus without complication, with long-term current use of insulin (H)       losartan-hydrochlorothiazide 100-25 MG per tablet    HYZAAR    90  tablet    Take 1 tablet by mouth daily    Essential hypertension, benign, Uncontrolled type 2 diabetes mellitus without complication, with long-term current use of insulin (H)       metFORMIN 500 MG 24 hr tablet    GLUCOPHAGE-XR    360 tablet    Take 4 tablets (2,000 mg) by mouth daily (with dinner)    Uncontrolled type 2 diabetes mellitus without complication, with long-term current use of insulin (H)       order for DME     3 Device    Equipment being ordered: Mediven plus compression stockings  [id number] 20-30 compression    Unspecified venous (peripheral) insufficiency       potassium chloride 10 MEQ CR capsule    MICRO-K    360 capsule    TAKE TWO CAPSULES BY MOUTH TWICE DAILY    Essential hypertension, benign       venlafaxine 150 MG 24 hr capsule    EFFEXOR XR    90 capsule    Take 1 capsule (150 mg) by mouth daily    Major depression in remission (H)       * Notice:  This list has 2 medication(s) that are the same as other medications prescribed for you. Read the directions carefully, and ask your doctor or other care provider to review them with you.

## 2018-09-28 NOTE — PROGRESS NOTES
SUBJECTIVE/OBJECTIVE:                Alma Kraft is a 68 year old female coming in for a follow-up visit for Medication Therapy Management.  She was referred to me from Dr. Brody.     Chief Complaint: Follow up from our visit on .  Medication costs and side effects- trulicity    Tobacco: No tobacco use  Alcohol: not currently using    Medication Adherence/Access:  no issues reported    Diabetes/Obesity:  Pt currently taking metformin 2000mg daily, Levemir 30 units daily and Trulicity 0.75mg week (4 weeks so far). Having a hard time with nausea since starting Trulicity- this is also very expensive and would like to stop the medication.   SMBG: one time daily.  Ranges (patient reported):   Fastinmg/dl  140mg/dl  121mg/dl  120mg/dl  Self reported- no meter here today.   Breakfast 9:30/10:30am, 1-2pm lunch, light snack, 6-8pm for dinner  Patient is not experiencing hypoglycemia  Recent symptoms of high blood sugar? none  ACEi/ARB: Yes: losartan.   Urine Albumin:   Lab Results   Component Value Date    UMALCR <30 2018      Aspirin: Taking 81mg daily and denies side effects  Diet/Exercise: has a really hard time with weight and eating, she eats when she is stressed, makes poor choices around food and has a hard time when there are temptations in her home. Lives with her  and grown children.  Lab Results   Component Value Date    A1C 9.4 2018    A1C 8.4 2018    A1C 7.6 2018    A1C 7.5 2017    A1C 7.4 2017        Today's Vitals: LMP 2001      ASSESSMENT:              Current medications were reviewed today as discussed above.      Medication Adherence: good, no issues identified    Diabetes/Obesity:  Needs improvement, given financial need- filled out patient assistance applications for [employer] (Januvia) and [employer](Basagar). Using DPP4 inhibitor in place of GLP1 given her side effects. Likely need to consider additional insulin as well.       PLAN:                   1. Continue Basaglar 30units daily- application filled out to be signed and faxed to [employer].     2. Start Januvia 100mg daily  In place of Trulicity due to side effects- application for [facility] patient assistance filled out today to be mailed.     I spent 30 minutes with this patient today. All changes were made via collaborative practice agreement with Dr. Brody. A copy of the visit note was provided to the patient's primary care provider.     Will follow up in 1 month.    The patient was given a summary of these recommendations as an after visit summary.    Sushila Fernandes, Pharm.D, [facility]  Medication Therapy Management Pharmacist  [phone]

## 2018-10-09 ENCOUNTER — TELEPHONE (OUTPATIENT)
Dept: FAMILY MEDICINE | Facility: CLINIC | Age: 68
End: 2018-10-09

## 2018-10-09 NOTE — TELEPHONE ENCOUNTER
Pt informed that her medication is here and ready to be picked up. It is in the injection fridge in clinic support.

## 2018-11-05 ENCOUNTER — TELEPHONE (OUTPATIENT)
Dept: FAMILY MEDICINE | Facility: CLINIC | Age: 68
End: 2018-11-05

## 2018-11-05 NOTE — TELEPHONE ENCOUNTER
Please call-  She should not take her metformin tonight- and do not restart until she restarts eating her normal foods    She should hold her blood pressure pills in the morning

## 2018-11-05 NOTE — TELEPHONE ENCOUNTER
Alma Kraft called the clinic support line with the following:    States that she has a colonoscopy tomorrow and is wondering if she should take her metformin tonight since she wont be eating, also should she take her blood pressure medication in the morning.    Please advise - [phone] (home)

## 2018-11-09 ENCOUNTER — TRANSFERRED RECORDS (OUTPATIENT)
Dept: FAMILY MEDICINE | Facility: CLINIC | Age: 68
End: 2018-11-09

## 2018-11-17 DIAGNOSIS — I10 ESSENTIAL HYPERTENSION, BENIGN: ICD-10-CM

## 2018-11-19 RX ORDER — AMLODIPINE BESYLATE 5 MG/1
TABLET ORAL
Qty: 90 TABLET | Refills: 0 | OUTPATIENT
Start: 2018-11-19

## 2018-11-19 RX ORDER — LOSARTAN POTASSIUM AND HYDROCHLOROTHIAZIDE 25; 100 MG/1; MG/1
TABLET ORAL
Qty: 90 TABLET | Refills: 0 | OUTPATIENT
Start: 2018-11-19

## 2018-11-21 ENCOUNTER — OFFICE VISIT (OUTPATIENT)
Dept: FAMILY MEDICINE | Facility: CLINIC | Age: 68
End: 2018-11-21

## 2018-11-21 VITALS
OXYGEN SATURATION: 98 % | WEIGHT: 255 LBS | TEMPERATURE: 98.4 F | HEART RATE: 69 BPM | BODY MASS INDEX: 49.39 KG/M2 | SYSTOLIC BLOOD PRESSURE: 136 MMHG | DIASTOLIC BLOOD PRESSURE: 86 MMHG

## 2018-11-21 DIAGNOSIS — I10 ESSENTIAL HYPERTENSION, BENIGN: ICD-10-CM

## 2018-11-21 DIAGNOSIS — E66.01 OBESITY, MORBID, BMI 40.0-49.9 (H): ICD-10-CM

## 2018-11-21 LAB — HBA1C MFR BLD: 8.2 % (ref 4–7)

## 2018-11-21 PROCEDURE — 99214 OFFICE O/P EST MOD 30 MIN: CPT | Performed by: FAMILY MEDICINE

## 2018-11-21 PROCEDURE — 80048 BASIC METABOLIC PNL TOTAL CA: CPT | Mod: 90 | Performed by: FAMILY MEDICINE

## 2018-11-21 PROCEDURE — 83036 HEMOGLOBIN GLYCOSYLATED A1C: CPT | Performed by: FAMILY MEDICINE

## 2018-11-21 PROCEDURE — 36415 COLL VENOUS BLD VENIPUNCTURE: CPT | Performed by: FAMILY MEDICINE

## 2018-11-21 RX ORDER — INSULIN GLARGINE 100 [IU]/ML
30 INJECTION, SOLUTION SUBCUTANEOUS DAILY
COMMUNITY
End: 2019-01-28

## 2018-11-21 RX ORDER — LOSARTAN POTASSIUM AND HYDROCHLOROTHIAZIDE 25; 100 MG/1; MG/1
1 TABLET ORAL DAILY
Qty: 90 TABLET | Refills: 1 | Status: SHIPPED | OUTPATIENT
Start: 2018-11-21 | End: 2019-05-18

## 2018-11-21 RX ORDER — METFORMIN HCL 500 MG
TABLET, EXTENDED RELEASE 24 HR ORAL
Qty: 360 TABLET | Refills: 1 | Status: SHIPPED | OUTPATIENT
Start: 2018-11-21 | End: 2019-07-13

## 2018-11-21 RX ORDER — AMLODIPINE BESYLATE 5 MG/1
5 TABLET ORAL DAILY
Qty: 90 TABLET | Refills: 1 | Status: SHIPPED | OUTPATIENT
Start: 2018-11-21 | End: 2019-05-25

## 2018-11-21 NOTE — NURSING NOTE
Patient is here for fasting diabetic check today.    Pre-visit Screening:  Immunizations:  up to date  Colonoscopy:  is up to date  Mammogram: is up to date  Asthma Action Test/Plan:  MELLISSA  PHQ9:  NA  GAD7:  NA  Questioned patient about current smoking habits Pt. has never smoked.  Ok to leave detailed message on voice mail for today's visit only Yes, phone # [phone]

## 2018-11-21 NOTE — PROGRESS NOTES
SUBJECTIVE:   Alma Kraft is a 68 year old female who presents to clinic today for the following health issues:    Diabetes Follow-up    Patient is checking blood sugars: once daily.  Results are as follows:    Two days ago 123- othertimes 140    Diabetic concerns: other - variable blood sugars- little understanding on why some days control is better than others     Symptoms of hypoglycemia (low blood sugar): none     Paresthesias (numbness or burning in feet) or sores: No     Date of last diabetic eye exam:  Patient reports she is up to date with eye exams    BP Readings from Last 2 Encounters:   08/22/18 146/86   05/23/18 132/84     Hemoglobin A1C (%)   Date Value   08/22/2018 9.4 (A)   05/23/2018 8.4 (A)     LDL Cholesterol Calculated (mg/dL (calc))   Date Value   02/21/2018 86   04/26/2017 82       Diabetes Management Resources    Amount of exercise or physical activity: no exercise - minimal movement- sitting much of the day    Does not walk    Problems taking medications regularly: No    Started januvia, one week ago-just came in mail order pharmacy    Medication side effects: none    Diet: eats a lot of sweets- candy    Restarted drinking pop (not diet) - because she feels tired  One half tsp cinnamon with yogurt- started on Friday   Up to date with sleep apnea follow up    PROBLEMS TO ADD ON...  Morbid obesity: weight unchanged    Problem list and histories reviewed & adjusted, as indicated.  Additional history: as documented    Patient Active Problem List   Diagnosis     Essential hypertension, benign     Mixed hyperlipidemia     Obesity, morbid, BMI 40.0-49.9 (H)     Esophageal reflux     Allergic rhinitis     Obstructive sleep apnea     Family history of malignant neoplasm of breast     ACP (advance care planning)     Type 2 diabetes mellitus without complication, with long-term current use of insulin (H)     Adjustment disorder with mixed anxiety and depressed mood     Acne     Health Care Home      Past Surgical History:   Procedure Laterality Date     ------------OTHER-------------  9/5/2013    L hand, cyst excision     ------------OTHER------------- Right 03/14/2014    shoulder manipulation     C APPENDECTOMY  1956     C EYE EXAM ESTABLISHED PT  4/20/11    No retinopathy     C TOTAL KNEE ARTHROPLASTY  2/06    Knee Replacement, Total Right     C TOTAL KNEE ARTHROPLASTY  3/5/07    Knee Replacement, Total  Left     C VAGINAL HYSTERECTOMY  8/01    Hysterectomy, Vaginal & BSO     HC COLONOSCOPY W/WO BRUSH/WASH  6/03     HC INCISION TENDON SHEATH FINGER Left 09/05/2013    left thumb trigger finger     HC KNEE SCOPE, DIAGNOSTIC  4/2005    Arthroscopy, Knee Left     HC REMOVE TONSILS/ADENOIDS,<13 Y/O  1953    T & A <11y.o.     TEST NOT FOUND  6/27/07    R heel/ incoencio's deformity       Social History   Substance Use Topics     Smoking status: Never Smoker     Smokeless tobacco: Never Used     Alcohol use 0.0 oz/week     0 drink(s) per week      Comment: very rare     Family History   Problem Relation Age of Onset     Cancer Father      prostate     GASTROINTESTINAL DISEASE Father      GERD     HEART DISEASE Paternal Grandfather      Cerebrovascular Disease Maternal Grandfather      HEART DISEASE Maternal Grandmother      GASTROINTESTINAL DISEASE Other      Niece with GERD/hiatal hernia     Breast Cancer Sister      51     Alzheimer Disease No family hx of      Diabetes No family hx of          Current Outpatient Prescriptions   Medication Sig Dispense Refill     amitriptyline (ELAVIL) 10 MG tablet Take 1 tablet (10 mg) by mouth daily 90 tablet 3     amLODIPine (NORVASC) 5 MG tablet Take 1 tablet (5 mg) by mouth daily 90 tablet 1     ASPIRIN 81 MG OR TABS 1 tab po QD (Once per day) 100 3     atorvastatin (LIPITOR) 10 MG tablet Take 1 tablet (10 mg) by mouth daily 90 tablet 3     BD ROSE U/F 32G X 4 MM insulin pen needle USE 1 NEEDLE DAILY 100 each 3     blood glucose monitoring (NO BRAND SPECIFIED) test strip  Use to test blood sugar one times daily or as directed. To accompany: {Blood Glucose Monitor Brands One Touch Viro test strips 100 strip 3     blood glucose monitoring (ONE TOUCH DELICA) lancets Use to test blood sugars 1 times daily or as directed. 100 each 3     Blood Glucose Monitoring Suppl (ONE TOUCH ULTRA 2) W/DEVICE KIT Use to test blood sugars 2 times daily or as directed. 1 kit 0     budesonide (RINOCORT AQUA) 32 MCG/ACT spray SPRAY 2 SPRAYS INTO BOTH NOSTRILS ONCE DAILY 3 Bottle 3     insulin detemir (LEVEMIR FLEXTOUCH) 100 UNIT/ML injection INJECT 30 UNITS AT BEDTIME. 9 mL 3     insulin glargine (BASAGLAR KWIKPEN) 100 UNIT/ML pen Inject 30 Units Subcutaneous daily       losartan-hydrochlorothiazide (HYZAAR) 100-25 MG per tablet Take 1 tablet by mouth daily 90 tablet 1     metFORMIN (GLUCOPHAGE-XR) 500 MG 24 hr tablet Take 4 tablets (2,000 mg) by mouth daily (with dinner) 360 tablet 1     Multiple Vitamins-Minerals (CENTRUM SILVER) per tablet Take  by mouth. 1 tablet 3 times weekly 100 tablet      ORDER FOR DME Equipment being ordered: [employer]el [employer] plus compression stockings    37929  20-30 compression 3 Device 0     potassium chloride (MICRO-K) 10 MEQ CR capsule TAKE TWO CAPSULES BY MOUTH TWICE DAILY  360 capsule 3     sitagliptin (JANUVIA) 100 MG tablet Take 1 tablet (100 mg) by mouth daily 90 tablet 1     venlafaxine (EFFEXOR XR) 150 MG 24 hr capsule Take 1 capsule (150 mg) by mouth daily 90 capsule 3       Reviewed and updated as needed this visit by clinical staff       Reviewed and updated as needed this visit by Provider         ROS:  CONSTITUTIONAL: NEGATIVE for fever, chills, change in weight  ENT/MOUTH: NEGATIVE for ear, mouth and throat problems  RESP: NEGATIVE for significant cough or SOB  CV: NEGATIVE for chest pain, palpitations or peripheral edema  GI: NEGATIVE for nausea, abdominal pain, heartburn, or change in bowel habits. Recent colonoscopy  ENDOCRINE: NEGATIVE for temperature intolerance,  skin/hair changes- See diabetis history above    OBJECTIVE:     /86 (BP Location: Left arm, Patient Position: Sitting, Cuff Size: Adult Large)  Pulse 69  Temp 98.4  F (36.9  C) (Oral)  Wt 115.7 kg (255 lb)  LMP 05/12/2001  SpO2 98%  BMI 49.39 kg/m2  Body mass index is 49.39 kg/(m^2).   GENERAL: healthy, alert and no distress  NECK: no adenopathy, no asymmetry, masses, or scars and thyroid normal to palpation  RESP: lungs clear to auscultation - no rales, rhonchi or wheezes  CV: regular rate and rhythm, normal S1 S2, no S3 or S4, no murmur, click or rub, no peripheral edema and peripheral pulses strong  MS: no gross musculoskeletal defects noted, no edema    Diabetic foot exam:  Dorsalis pedis pulse R:3  Dorsalis pedis pulse L: 3  Skin temperature R:normal  Skin temperature L:normal  Capillary refill R:normal  Capillary refill L:normal  Hair growth R:normal  Hair growth L:normal     Monofilament R foot:normal monofilament exam   Monofilament L foot:normal monofilament exam     Diagnostic Test Results:  Results for orders placed or performed in visit on 11/21/18   Hemoglobin A1c (BFP)   Result Value Ref Range    Hemoglobin A1C 8.2 (A) 4.0 - 7.0 %   BASIC METABOLIC PANEL (QUEST)   Result Value Ref Range    Glucose 170 (H) 65 - 99 mg/dL    Urea Nitrogen 18 7 - 25 mg/dL    Creatinine 0.50 0.50 - 0.99 mg/dL    GFR Estimate 99 > OR = 60 mL/min/1.73m2    EGFR African American 114 > OR = 60 mL/min/1.73m2    BUN/Creatinine Ratio NOT APPLICABLE 6 - 22 (calc)    Sodium 138 135 - 146 mmol/L    Potassium 3.7 3.5 - 5.3 mmol/L    Chloride 103 98 - 110 mmol/L    Carbon Dioxide 26 20 - 32 mmol/L    Calcium 8.9 8.6 - 10.4 mg/dL       ASSESSMENT/PLAN:     (E11.65,  Z79.4) Uncontrolled type 2 diabetes mellitus without complication, with long-term current use of insulin (H)  (primary encounter diagnosis)  Comment: diabetis control not at goal  She has been taking januvia for less than a week  Reevaluate diabetis control again  "in three months- meanwhile continue current medications  Stop pop - candy- discussed alternatives for snacking  Move more  Plan: Hemoglobin A1c (BFP), VENOUS COLLECTION, BASIC         METABOLIC PANEL (QUEST), insulin detemir         (LEVEMIR FLEXTOUCH) 100 UNIT/ML injection,         losartan-hydrochlorothiazide (HYZAAR) 100-25 MG        per tablet, metFORMIN (GLUCOPHAGE-XR) 500 MG 24        hr tablet, insulin glargine (BASAGLAR KWIKPEN)         100 UNIT/ML pen, sitagliptin (JANUVIA) 100 MG         tablet             (I10) BENIGN HYPERTENSION  Comment: blood pressure at goal  Plan: amLODIPine (NORVASC) 5 MG tablet,         losartan-hydrochlorothiazide (HYZAAR) 100-25 MG        per tablet              BMI:   Estimated body mass index is 49.39 kg/(m^2) as calculated from the following:    Height as of 8/22/18: 1.53 m (5' 0.25\").    Weight as of this encounter: 115.7 kg (255 lb).   Weight management plan: Discussed healthy diet and exercise guidelines and patient will follow up in 3 months in clinic to re-evaluate.      MEDICATIONS:  Continue current medications without change  Recheck in three months  Susu Brody MD  [facility]           "

## 2018-11-21 NOTE — MR AVS SNAPSHOT
"              After Visit Summary   2018    Alma Kraft    MRN: [id number]           Patient Information     Date Of Birth          1950        Visit Information        Provider Department      2018 9:30 AM Susu Brody MD [facility]        Today's Diagnoses     Uncontrolled type 2 diabetes mellitus without complication, with long-term current use of insulin (H)    -  1    BENIGN HYPERTENSION        Obesity, morbid, BMI 40.0-49.9 (H)           Follow-ups after your visit        Who to contact     If you have questions or need follow up information about today's clinic visit or your schedule please contact [facility] directly at [phone].  Normal or non-critical lab and imaging results will be communicated to you by Fantomhart, letter or phone within 4 business days after the clinic has received the results. If you do not hear from us within 7 days, please contact the clinic through Fantomhart or phone. If you have a critical or abnormal lab result, we will notify you by phone as soon as possible.  Submit refill requests through cooala - your brands or call your pharmacy and they will forward the refill request to us. Please allow 3 business days for your refill to be completed.          Additional Information About Your Visit        Fantomhart Information     cooala - your brands lets you send messages to your doctor, view your test results, renew your prescriptions, schedule appointments and more. To sign up, go to [email or web address] - your brands . Click on \"Log in\" on the left side of the screen, which will take you to the Welcome page. Then click on \"Sign up Now\" on the right side of the page.     You will be asked to enter the access code listed below, as well as some personal information. Please follow the directions to create your username and password.     Your access code is: [email or web address]  Expires: 2019  3:56 PM     Your access code will  in 90 days. If you " need help or a new code, please call your [place] clinic or [phone].        Care EveryWhere ID     This is your Care EveryWhere ID. This could be used by other organizations to access your [place] medical records  [id number]        Your Vitals Were     Pulse Temperature Last Period Pulse Oximetry BMI (Body Mass Index)       69 98.4  F (36.9  C) (Oral) 05/12/2001 98% 49.39 kg/m2        Blood Pressure from Last 3 Encounters:   11/21/18 136/86   08/22/18 146/86   05/23/18 132/84    Weight from Last 3 Encounters:   11/21/18 115.7 kg (255 lb)   08/22/18 115.8 kg (255 lb 6.4 oz)   05/23/18 117 kg (258 lb)              We Performed the Following     BASIC METABOLIC PANEL (QUEST)     Hemoglobin A1c (BFP)     VENOUS COLLECTION          Where to get your medicines      These medications were sent to [facility] [address]  [address] [facility] [address], [facility] [address]     Phone:  [phone]     insulin detemir 100 UNIT/ML pen    sitagliptin 100 MG tablet         These medications were sent to [id number] [address] [address]     Phone:  [phone]     amLODIPine 5 MG tablet    losartan-hydrochlorothiazide 100-25 MG per tablet    metFORMIN 500 MG 24 hr tablet          Primary Care Provider Office Phone # Fax #    Susu Brody [phone] [address] [address]        Equal Access to Services     [facility]: Hadii wendy robb hadasho Soomaali, waaxda luqadaha, qaybta kaalmada dewayne hathaway . So [facility] [place] [phone].    ATENCIÓN: Si habla español, tiene a sy disposición servicios gratuitos de asistencia lingüística. Llame al [phone].    We comply with applicable federal civil rights laws and [place] laws. We do not discriminate on the basis of race, color, national origin, age, disability, sex, sexual  orientation, or gender identity.            Thank you!     Thank you for choosing [facility]  for your care. Our goal is always to provide you with excellent care. Hearing back from our patients is one way we can continue to improve our services. Please take a few minutes to complete the written survey that you may receive in the mail after your visit with us. Thank you!             Your Updated Medication List - Protect others around you: Learn how to safely use, store and throw away your medicines at www.disposemymeds.org.          This list is accurate as of 11/21/18 11:59 PM.  Always use your most recent med list.                   Brand Name Dispense Instructions for use Diagnosis    amitriptyline 10 MG tablet    ELAVIL    90 tablet    Take 1 tablet (10 mg) by mouth daily    Psychophysiological insomnia, Chronic nonintractable headache, unspecified headache type       amLODIPine 5 MG tablet    NORVASC    90 tablet    Take 1 tablet (5 mg) by mouth daily    Essential hypertension, benign       aspirin 81 MG tablet    ASA    100    1 tab po QD (Once per day)    Other specified pre-operative examination       atorvastatin 10 MG tablet    LIPITOR    90 tablet    Take 1 tablet (10 mg) by mouth daily    Uncontrolled type 2 diabetes mellitus with stage 2 chronic kidney disease, with long-term current use of insulin (H)       BD ROSE U/F 32G X 4 MM miscellaneous   Generic drug:  insulin pen needle     100 each    USE 1 NEEDLE DAILY    Uncontrolled type 2 diabetes mellitus without complication, with long-term current use of insulin (H)       blood glucose monitoring lancets     100 each    Use to test blood sugars 1 times daily or as directed.    Uncontrolled type 2 diabetes mellitus without complication, with long-term current use of insulin (H)       blood glucose monitoring meter device kit     1 kit    Use to test blood sugars 2 times daily or as directed.    Type II or unspecified type  diabetes mellitus without mention of complication, not stated as uncontrolled       blood glucose monitoring test strip    no brand specified    100 strip    Use to test blood sugar one times daily or as directed. To accompany: {Blood Glucose Monitor Brands One Touch Viro test strips    Type 2 diabetes mellitus without complication, with long-term current use of insulin (H)       budesonide 32 MCG/ACT nasal spray    RINOCORT AQUA    3 Bottle    SPRAY 2 SPRAYS INTO BOTH NOSTRILS ONCE DAILY    Chronic seasonal allergic rhinitis, unspecified trigger       CENTRUM SILVER tablet     100 tablet    Take  by mouth. 1 tablet 3 times weekly        insulin detemir 100 UNIT/ML pen    LEVEMIR FLEXTOUCH    9 mL    INJECT 30 UNITS AT BEDTIME.    Uncontrolled type 2 diabetes mellitus without complication, with long-term current use of insulin (H)       insulin glargine 100 UNIT/ML pen      Inject 30 Units Subcutaneous daily    Uncontrolled type 2 diabetes mellitus without complication, with long-term current use of insulin (H)       losartan-hydrochlorothiazide 100-25 MG per tablet    HYZAAR    90 tablet    Take 1 tablet by mouth daily    Essential hypertension, benign, Uncontrolled type 2 diabetes mellitus without complication, with long-term current use of insulin (H)       metFORMIN 500 MG 24 hr tablet    GLUCOPHAGE-XR    360 tablet    Take 4 tablets (2,000 mg) by mouth daily (with dinner)    Uncontrolled type 2 diabetes mellitus without complication, with long-term current use of insulin (H)       order for DME     3 Device    Equipment being ordered: Med[employer] plus compression stockings  23868 20-30 compression    Unspecified venous (peripheral) insufficiency       potassium chloride 10 MEQ CR capsule    MICRO-K    360 capsule    TAKE TWO CAPSULES BY MOUTH TWICE DAILY    Essential hypertension, benign       sitagliptin 100 MG tablet    JANUVIA    90 tablet    Take 1 tablet (100 mg) by mouth daily    Uncontrolled type 2 diabetes  mellitus without complication, with long-term current use of insulin (H)       venlafaxine 150 MG 24 hr capsule    EFFEXOR XR    90 capsule    Take 1 capsule (150 mg) by mouth daily    Major depression in remission (H)

## 2018-11-21 NOTE — LETTER
November 24, 2018      Alma Shahelio  [address]  [facility] [address]        Dear ,    We are writing to inform you of your test results.    Kidney function tests and potassium are Normal  Blood sugar and Hemoglobin A1C indicate that your diabetis control needs improvement.  Choose healthier snacks and beverages  Continue your current medications.  Recheck in three months    Resulted Orders   Hemoglobin A1c (BFP)   Result Value Ref Range    Hemoglobin A1C 8.2 (A) 4.0 - 7.0 %   BASIC METABOLIC PANEL (QUEST)   Result Value Ref Range    Glucose 170 (H) 65 - 99 mg/dL      Comment:                    Fasting reference interval     For someone without known diabetes, a glucose  value >125 mg/dL indicates that they may have  diabetes and this should be confirmed with a  follow-up test.         Urea Nitrogen 18 7 - 25 mg/dL    Creatinine 0.50 0.50 - 0.99 mg/dL      Comment:      For patients >49 years of age, the reference limit  for Creatinine is approximately 13% higher for people  identified as -American.         GFR Estimate 99 > OR = 60 mL/min/1.73m2    EGFR African American 114 > OR = 60 mL/min/1.73m2    BUN/Creatinine Ratio NOT APPLICABLE 6 - 22 (calc)    Sodium 138 135 - 146 mmol/L    Potassium 3.7 3.5 - 5.3 mmol/L    Chloride 103 98 - 110 mmol/L    Carbon Dioxide 26 20 - 32 mmol/L    Calcium 8.9 8.6 - 10.4 mg/dL       If you have any questions or concerns, please call the clinic at the number listed above.       Sincerely,        Susu Brody MD

## 2018-11-22 LAB
BUN SERPL-MCNC: 18 MG/DL (ref 7–25)
BUN/CREATININE RATIO: ABNORMAL (CALC) (ref 6–22)
CALCIUM SERPL-MCNC: 8.9 MG/DL (ref 8.6–10.4)
CHLORIDE SERPLBLD-SCNC: 103 MMOL/L (ref 98–110)
CO2 SERPL-SCNC: 26 MMOL/L (ref 20–32)
CREAT SERPL-MCNC: 0.5 MG/DL (ref 0.5–0.99)
EGFR AFRICAN AMERICAN - QUEST: 114 ML/MIN/1.73M2
GFR SERPL CREATININE-BSD FRML MDRD: 99 ML/MIN/1.73M2
GLUCOSE - QUEST: 170 MG/DL (ref 65–99)
POTASSIUM SERPL-SCNC: 3.7 MMOL/L (ref 3.5–5.3)
SODIUM SERPL-SCNC: 138 MMOL/L (ref 135–146)

## 2018-12-03 ENCOUNTER — OFFICE VISIT (OUTPATIENT)
Dept: FAMILY MEDICINE | Facility: CLINIC | Age: 68
End: 2018-12-03

## 2018-12-03 VITALS
SYSTOLIC BLOOD PRESSURE: 138 MMHG | WEIGHT: 255 LBS | HEART RATE: 82 BPM | OXYGEN SATURATION: 98 % | BODY MASS INDEX: 49.39 KG/M2 | TEMPERATURE: 98.8 F | DIASTOLIC BLOOD PRESSURE: 78 MMHG

## 2018-12-03 DIAGNOSIS — N63.0 LUMP OR MASS IN BREAST: ICD-10-CM

## 2018-12-03 DIAGNOSIS — Z80.42 FAMILY HISTORY OF PROSTATE CANCER: ICD-10-CM

## 2018-12-03 DIAGNOSIS — Z80.3 FAMILY HISTORY OF MALIGNANT NEOPLASM OF BREAST: ICD-10-CM

## 2018-12-03 PROCEDURE — 99213 OFFICE O/P EST LOW 20 MIN: CPT | Performed by: FAMILY MEDICINE

## 2018-12-03 NOTE — NURSING NOTE
Patient is here due to having a lump under her left arm area but it is now going away. She just wants to make sure it is ok because she had two sisters that had breast cancer.    Pre-visit Screening:  Immunizations:  up to date  Colonoscopy:  is up to date  Mammogram: is up to date  Asthma Action Test/Plan:  No concerns  PHQ9:  NA  GAD7:  NA  Questioned patient about current smoking habits Pt. has never smoked.  Ok to leave detailed message on voice mail for today's visit only Yes, phone # [phone]

## 2018-12-03 NOTE — MR AVS SNAPSHOT
After Visit Summary   12/3/2018    Alma Kraft    MRN: [id number]           Patient Information     Date Of Birth          1950        Visit Information        Provider Department      12/3/2018 3:15 PM Susu Brody MD [facility]        Today's Diagnoses     Lump or mass in breast    -  1    Family history of malignant neoplasm of breast        Family history of prostate cancer           Follow-ups after your visit        Additional Services     CANCER RISK MGMT/CANCER GENETIC COUNSELING REFERRAL       Genetic counseling:  Ava Russell  [facility]  [address] [place] [address]  [phone]   [email or web address]  Reason for Referral: family history of breast and prostate cancer    We have a sent a notice to a staff member of the Cancer Risk Management Program to give you a call to assist with scheduling your appointment.  You may also call  [phone] ([phone]) to initiate scheduling.    Please be aware that coverage of these services is subject to the terms and limitations of your health insurance plan.  Call member services at your health plan with any benefit or coverage questions.      Please bring the completed family history sheet to your appointment in addition to any available outside medical records documenting your cancer diagnosis.                  Who to contact     If you have questions or need follow up information about today's clinic visit or your schedule please contact [facility] directly at [phone].  Normal or non-critical lab and imaging results will be communicated to you by MyChart, letter or phone within 4 business days after the clinic has received the results. If you do not hear from us within 7 days, please contact the clinic through MyChart or phone. If you have a critical or abnormal lab result, we will notify you by phone as soon as possible.  Submit  "refill requests through Fastnote or call your pharmacy and they will forward the refill request to us. Please allow 3 business days for your refill to be completed.          Additional Information About Your Visit        [employer] Information     Fastnote lets you send messages to your doctor, view your test results, renew your prescriptions, schedule appointments and more. To sign up, go to [email or web address] . Click on \"Log in\" on the left side of the screen, which will take you to the Welcome page. Then click on \"Sign up Now\" on the right side of the page.     You will be asked to enter the access code listed below, as well as some personal information. Please follow the directions to create your username and password.     Your access code is: [email or web address]  Expires: 2019  3:56 PM     Your access code will  in 90 days. If you need help or a new code, please call your [facility] clinic or [phone].        Care EveryWhere ID     This is your Care EveryWhere ID. This could be used by other organizations to access your [facility] medical records  [id number]        Your Vitals Were     Pulse Temperature Last Period Pulse Oximetry BMI (Body Mass Index)       82 98.8  F (37.1  C) (Oral) 2001 98% 49.39 kg/m2        Blood Pressure from Last 3 Encounters:   18 138/78   18 136/86   18 146/86    Weight from Last 3 Encounters:   18 115.7 kg (255 lb)   18 115.7 kg (255 lb)   18 115.8 kg (255 lb 6.4 oz)              We Performed the Following     CANCER RISK MGMT/CANCER GENETIC COUNSELING REFERRAL        Primary Care Provider Office Phone # Fax #    Susu Brody [phone] [address] [address]        Equal Access to Services     ANAI AMAYA : Thuy Ayon, wadanelle kapadiaqjose, qablade turneraldewayne gaspar. [facility] [phone].    ATENCIÓN: Si yeimi lugo, " tiene a sy disposición servicios gratuitos de asistencia lingüística. Shirin melton [phone].    We comply with applicable federal civil rights laws and [place] laws. We do not discriminate on the basis of race, color, national origin, age, disability, sex, sexual orientation, or gender identity.            Thank you!     Thank you for choosing [facility]  for your care. Our goal is always to provide you with excellent care. Hearing back from our patients is one way we can continue to improve our services. Please take a few minutes to complete the written survey that you may receive in the mail after your visit with us. Thank you!             Your Updated Medication List - Protect others around you: Learn how to safely use, store and throw away your medicines at www.disposemymeds.org.          This list is accurate as of 12/3/18  3:44 PM.  Always use your most recent med list.                   Brand Name Dispense Instructions for use Diagnosis    amitriptyline 10 MG tablet    ELAVIL    90 tablet    Take 1 tablet (10 mg) by mouth daily    Psychophysiological insomnia, Chronic nonintractable headache, unspecified headache type       amLODIPine 5 MG tablet    NORVASC    90 tablet    Take 1 tablet (5 mg) by mouth daily    Essential hypertension, benign       aspirin 81 MG tablet    ASA    100    1 tab po QD (Once per day)    Other specified pre-operative examination       atorvastatin 10 MG tablet    LIPITOR    90 tablet    Take 1 tablet (10 mg) by mouth daily    Uncontrolled type 2 diabetes mellitus with stage 2 chronic kidney disease, with long-term current use of insulin (H)       BD ROSE U/F 32G X 4 MM miscellaneous   Generic drug:  insulin pen needle     100 each    USE 1 NEEDLE DAILY    Uncontrolled type 2 diabetes mellitus without complication, with long-term current use of insulin (H)       blood glucose monitoring lancets     100 each    Use to test blood sugars 1 times daily or as  directed.    Uncontrolled type 2 diabetes mellitus without complication, with long-term current use of insulin (H)       blood glucose monitoring meter device kit     1 kit    Use to test blood sugars 2 times daily or as directed.    Type II or unspecified type diabetes mellitus without mention of complication, not stated as uncontrolled       blood glucose monitoring test strip    NO BRAND SPECIFIED    100 strip    Use to test blood sugar one times daily or as directed. To accompany: {Blood Glucose Monitor Brands One Touch Viro test strips    Type 2 diabetes mellitus without complication, with long-term current use of insulin (H)       budesonide 32 MCG/ACT nasal spray    RINOCORT AQUA    3 Bottle    SPRAY 2 SPRAYS INTO BOTH NOSTRILS ONCE DAILY    Chronic seasonal allergic rhinitis, unspecified trigger       insulin detemir 100 UNIT/ML pen    LEVEMIR FLEXTOUCH    9 mL    INJECT 30 UNITS AT BEDTIME.    Uncontrolled type 2 diabetes mellitus without complication, with long-term current use of insulin (H)       insulin glargine 100 UNIT/ML pen      Inject 30 Units Subcutaneous daily    Uncontrolled type 2 diabetes mellitus without complication, with long-term current use of insulin (H)       losartan-hydrochlorothiazide 100-25 MG tablet    HYZAAR    90 tablet    Take 1 tablet by mouth daily    Essential hypertension, benign, Uncontrolled type 2 diabetes mellitus without complication, with long-term current use of insulin (H)       metFORMIN 500 MG 24 hr tablet    GLUCOPHAGE-XR    360 tablet    Take 4 tablets (2,000 mg) by mouth daily (with dinner)    Uncontrolled type 2 diabetes mellitus without complication, with long-term current use of insulin (H)       multivitamin tablet     100 tablet    Take  by mouth. 1 tablet 3 times weekly        order for DME     3 Device    Equipment being ordered: Mediven plus compression stockings  15216 20-30 compression    Unspecified venous (peripheral) insufficiency       potassium  chloride ER 10 MEQ CR capsule    MICRO-K    360 capsule    TAKE TWO CAPSULES BY MOUTH TWICE DAILY    Essential hypertension, benign       sitagliptin 100 MG tablet    JANUVIA    90 tablet    Take 1 tablet (100 mg) by mouth daily    Uncontrolled type 2 diabetes mellitus without complication, with long-term current use of insulin (H)       venlafaxine 150 MG 24 hr capsule    EFFEXOR XR    90 capsule    Take 1 capsule (150 mg) by mouth daily    Major depression in remission (H)

## 2018-12-03 NOTE — PROGRESS NOTES
SUBJECTIVE:  68 year old female presents with the following concern:    One week ago, she felt a  tender lump right axilla  After 24 hours, the lump enlarged- and was more tender  Two days later, she decided to call for an appointment, but then it started to get better.  Now, symptoms have pretty much resolved     Symptoms came on shortly after shaving and plucking axillary hair with a tweezers     Family history of breat cancer- one sister age 71, one sister 52- neither had genetic testing  Father had prostate cancer    OBJECTIVE:  /78 (BP Location: Left arm, Patient Position: Sitting, Cuff Size: Adult Large)  Pulse 82  Temp 98.8  F (37.1  C) (Oral)  Wt 115.7 kg (255 lb)  LMP 05/12/2001  SpO2 98%  BMI 49.39 kg/m2   Normal bilateral axillary and breast exam  No axillary tenderness, or palpable lump  No breast masses- breast are large and symmetrical  No nipple changes    Assessment    Resolution of tender axillary lump      PLAN:  She is interested in genetic testing for breast cancer  Referral for genetic counseling

## 2019-01-07 DIAGNOSIS — I10 ESSENTIAL HYPERTENSION, BENIGN: ICD-10-CM

## 2019-01-07 RX ORDER — POTASSIUM CHLORIDE 750 MG/1
CAPSULE, EXTENDED RELEASE ORAL
Qty: 360 CAPSULE | Refills: 3 | Status: SHIPPED | OUTPATIENT
Start: 2019-01-07 | End: 2019-11-14

## 2019-01-07 NOTE — TELEPHONE ENCOUNTER
Pt was last seen 08/22/2018.    Alma Kraft is requesting a refill of:    Pending Prescriptions:                       Disp   Refills    potassium chloride ER (MICRO-K) 10 MEQ CR*360 ca*3

## 2019-01-11 ENCOUNTER — OFFICE VISIT (OUTPATIENT)
Dept: PHARMACY | Facility: PHYSICIAN GROUP | Age: 69
End: 2019-01-11
Payer: COMMERCIAL

## 2019-01-11 VITALS — DIASTOLIC BLOOD PRESSURE: 76 MMHG | SYSTOLIC BLOOD PRESSURE: 124 MMHG | HEART RATE: 80 BPM

## 2019-01-11 DIAGNOSIS — I10 ESSENTIAL HYPERTENSION, BENIGN: ICD-10-CM

## 2019-01-11 DIAGNOSIS — E78.2 MIXED HYPERLIPIDEMIA: ICD-10-CM

## 2019-01-11 DIAGNOSIS — F43.23 ADJUSTMENT DISORDER WITH MIXED ANXIETY AND DEPRESSED MOOD: ICD-10-CM

## 2019-01-11 DIAGNOSIS — Z79.4 TYPE 2 DIABETES MELLITUS WITHOUT COMPLICATION, WITH LONG-TERM CURRENT USE OF INSULIN (H): Primary | ICD-10-CM

## 2019-01-11 DIAGNOSIS — E11.9 TYPE 2 DIABETES MELLITUS WITHOUT COMPLICATION, WITH LONG-TERM CURRENT USE OF INSULIN (H): Primary | ICD-10-CM

## 2019-01-11 DIAGNOSIS — E66.01 OBESITY, MORBID, BMI 40.0-49.9 (H): ICD-10-CM

## 2019-01-11 DIAGNOSIS — K08.9 TOOTH DISORDER: ICD-10-CM

## 2019-01-11 DIAGNOSIS — R51.9 GENERALIZED HEADACHE: ICD-10-CM

## 2019-01-11 PROCEDURE — 99607 MTMS BY PHARM ADDL 15 MIN: CPT | Performed by: PHARMACIST

## 2019-01-11 PROCEDURE — 99605 MTMS BY PHARM NP 15 MIN: CPT | Performed by: PHARMACIST

## 2019-01-11 NOTE — PROGRESS NOTES
"SUBJECTIVE/OBJECTIVE:                Alma Kraft is a 68 year old female coming in for a follow-up visit for Medication Therapy Management.  She was referred to me from Dr. Brody.     Chief Complaint: Follow up from our visit on 9/28.  Needs to update [employer] patient assistance forms today. Won't be eligible for insulin programs until later in the year. First visit of this year.    Tobacco: No tobacco use  Alcohol: not currently using    Medication Adherence/Access:  no issues reported      Diabetes/Obesity:  Pt currently taking metformin 2000mg daily, Levemir 30 units daily and Januvia 100mg daily (started end of Sept). Pt is not experiencing side effects.  SMBG: one time daily. Ranges (patient reported):     Just started checking again, stopped before Thanksgiving because she knew she wasn't eating right.   Had restarted drinking coke at that time and eating a lot due to emotions.   Ideal plan would be Breakfast 9:30/10:30am, 1-2pm lunch, light snack, 6-8pm for dinner, but currently waking up around noon and eating dinner, then staying up late. Working on getting back to a normal routine.   Patient is not experiencing hypoglycemia  Recent symptoms of high blood sugar? none  ACEi/ARB: Yes: losartan.   Urine Albumin:   Lab Results   Component Value Date    UMALCR <30 08/22/2018      Aspirin: Taking 81mg daily and denies side effects    Lab Results   Component Value Date    A1C 8.2 11/21/2018    A1C 9.4 08/22/2018    A1C 8.4 05/23/2018    A1C 7.6 02/11/2018    A1C 7.5 11/09/2017        Hypertension: Current medications include losartan/hctz 100/25mg 1 daily and amlodipine 5mg daily. Also on potassium daily due to low level, last checked 11/21/18 = 3.7mg/dl. Patient does not self-monitor BP.  Patient reports no current medication side effects.States her BP is up today due to upcoming trip that is stressing her out because she is the \"outsider\" of her family and it makes her nervous for weeks around seeing " them.  BP Readings from Last 3 Encounters:   12/03/18 138/78   11/21/18 136/86   08/22/18 146/86     Depression/Headaches:  Current medications include: Venlafaxine 150mg once daily and Amitriptyline 10mg once daily. Family stress is a major issue for her - eating is a coping mechanism. Recommended BHP previously she is not interested and does not this this is necessary at this time. Has not worked with a therapist before. Has issues with staying up late and then sleeping in. Not active. She is using the amitriptyline for a headache condition and has been able to get down to maintain on 10mg daily, had been up to 50mg at one point years ago. Does have significant dry mouth. We have discussed the safety of the medication, has not been willing to change this. Lives with 3 grown children and her , but one of her son's is moving out this month which has stirred some additional stress for her.      Hyperlipidemia: Current therapy includes atorvastatin 10mg once daily.  Pt reports no significant myalgias or other side effects.    Allergic rhinitis: Current medications include Rhniocort daily.  Pt feels that current therapy is effective.   No issues at this time    Tooth implant: has been needing multiple rounds of amoxicillin for this, now waiting 4 months to have this done. Had to have sinuses lifted. 4-500mg tabs prior to dental work also.    Today's Vitals: LMP 05/12/2001       ASSESSMENT:              Current medications were reviewed today as discussed above.      Medication Adherence: good, filled out [employer] assistance paperwork today.    Diabetes/Obesity:  Needs Improvement. Patient is not meeting A1c goal of < 7%. Self monitoring of blood glucose is not at goal of fasting  mg/dL. Pt would benefit from basaglar increase to 33 units daily and checking sugars every day. Discussed diet modification again as well. Januvia paperwork filled out. She will not qualify for assistance with insulin until she has  met >$1000 out of pocket for the year.     Hypertension: Stable. Patient is meeting BP goal of < 140/90mmHg.     Depression/Headaches: Needs improvement, I feel a therapist would be helpful as she continues to use eating as a coping mechanism for stress, however she is not willing at this time.    Hyperlipidemia: Stable.     Allergic rhinitis: Stable.    Tooth implant: Stable.    PLAN:                  1. Increase Basaglar to 33 units daily    2. Filled out Januvia application     3. Start checking sugars every day.     I spent 30 minutes with this patient today. All changes were made via collaborative practice agreement with . A copy of the visit note was provided to the patient's primary care provider.     Will follow up in 1 month.    The patient was given a summary of these recommendations as an after visit summary.    Sushila Fernandes, Pharm.D, [facility]  Medication Therapy Management Pharmacist  [phone]

## 2019-01-11 NOTE — PATIENT INSTRUCTIONS
Recommendations from today's MTM visit:                                                      1. Increase Basaglar to 33 units daily    2. Filled out Januvia application     3. Start checking sugars every day.     Next MTM visit: 1 month - Feb 8th at 11:30am    To schedule another MTM appointment, please call the clinic directly or you may call the MTM scheduling line at [phone] or toll-free at [phone].     My Clinical Pharmacist's contact information:                                                      It was a pleasure talking with you today!  Please feel free to contact me with any questions or concerns you have.      Sushlia Fernandes, Pharm.D, [facility]  Medication Therapy Management Pharmacist  [phone]    You may receive a survey about the MTM services you received.  I would appreciate your feedback to help me serve you better in the future. Please fill it out and return it when you can. Your comments will be anonymous.

## 2019-01-28 RX ORDER — INSULIN GLARGINE 100 [IU]/ML
33 INJECTION, SOLUTION SUBCUTANEOUS DAILY
Qty: 30 ML | Refills: 0 | Status: SHIPPED | OUTPATIENT
Start: 2019-01-28 | End: 2019-04-28

## 2019-02-13 ENCOUNTER — TELEPHONE (OUTPATIENT)
Dept: PHARMACY | Facility: PHYSICIAN GROUP | Age: 69
End: 2019-02-13

## 2019-02-13 NOTE — TELEPHONE ENCOUNTER
Patient is still waiting on approval from [employer] for Rushuvia- attestation has been mailed back so waiting on approval. Sent 30 day supply to local pharmacy to hold her over until she is approved through the .     Sushila Fernandes, Pharm.D, [facility]  Medication Therapy Management Pharmacist  [phone]

## 2019-02-18 RX ORDER — ATORVASTATIN CALCIUM 10 MG/1
TABLET, FILM COATED ORAL
Qty: 30 TABLET | Refills: 0 | Status: SHIPPED | OUTPATIENT
Start: 2019-02-18 | End: 2019-02-23

## 2019-02-18 NOTE — TELEPHONE ENCOUNTER
Pending Prescriptions:                       Disp   Refills    atorvastatin (LIPITOR) 10 MG tablet [Phar*30 tab*0            Sig: TAKE ONE TABLET BY MOUTH DAILY.     Please review for BJS     Fax 30 and send to [facility] for a fasting ov  Louise  [phone] (home)

## 2019-02-19 ENCOUNTER — HOSPITAL PATHOLOGY (OUTPATIENT)
Dept: OTHER | Facility: CLINIC | Age: 69
End: 2019-02-19

## 2019-02-21 ENCOUNTER — OFFICE VISIT (OUTPATIENT)
Dept: FAMILY MEDICINE | Facility: CLINIC | Age: 69
End: 2019-02-21

## 2019-02-21 ENCOUNTER — OFFICE VISIT (OUTPATIENT)
Dept: PHARMACY | Facility: PHYSICIAN GROUP | Age: 69
End: 2019-02-21
Payer: COMMERCIAL

## 2019-02-21 ENCOUNTER — TELEPHONE (OUTPATIENT)
Dept: FAMILY MEDICINE | Facility: CLINIC | Age: 69
End: 2019-02-21

## 2019-02-21 VITALS
DIASTOLIC BLOOD PRESSURE: 88 MMHG | BODY MASS INDEX: 50.36 KG/M2 | HEART RATE: 80 BPM | OXYGEN SATURATION: 95 % | SYSTOLIC BLOOD PRESSURE: 138 MMHG | WEIGHT: 260 LBS | TEMPERATURE: 98.4 F

## 2019-02-21 DIAGNOSIS — I10 ESSENTIAL HYPERTENSION, BENIGN: ICD-10-CM

## 2019-02-21 DIAGNOSIS — R51.9 GENERALIZED HEADACHE: ICD-10-CM

## 2019-02-21 DIAGNOSIS — Z79.4 TYPE 2 DIABETES MELLITUS WITHOUT COMPLICATION, WITH LONG-TERM CURRENT USE OF INSULIN (H): Primary | ICD-10-CM

## 2019-02-21 DIAGNOSIS — E11.9 TYPE 2 DIABETES MELLITUS WITHOUT COMPLICATION, WITH LONG-TERM CURRENT USE OF INSULIN (H): Primary | ICD-10-CM

## 2019-02-21 DIAGNOSIS — F43.23 ADJUSTMENT DISORDER WITH MIXED ANXIETY AND DEPRESSED MOOD: ICD-10-CM

## 2019-02-21 DIAGNOSIS — E66.01 OBESITY, MORBID, BMI 40.0-49.9 (H): ICD-10-CM

## 2019-02-21 LAB
COPATH REPORT: NORMAL
GLUCOSE SERPL-MCNC: 193 MG/DL (ref 60–99)
HBA1C MFR BLD: 8.8 % (ref 4–7)

## 2019-02-21 PROCEDURE — 99606 MTMS BY PHARM EST 15 MIN: CPT | Performed by: PHARMACIST

## 2019-02-21 PROCEDURE — 99214 OFFICE O/P EST MOD 30 MIN: CPT | Performed by: FAMILY MEDICINE

## 2019-02-21 PROCEDURE — 83036 HEMOGLOBIN GLYCOSYLATED A1C: CPT | Performed by: FAMILY MEDICINE

## 2019-02-21 PROCEDURE — 82947 ASSAY GLUCOSE BLOOD QUANT: CPT | Performed by: FAMILY MEDICINE

## 2019-02-21 PROCEDURE — 36415 COLL VENOUS BLD VENIPUNCTURE: CPT | Performed by: FAMILY MEDICINE

## 2019-02-21 PROCEDURE — 99607 MTMS BY PHARM ADDL 15 MIN: CPT | Performed by: PHARMACIST

## 2019-02-21 RX ORDER — GLIPIZIDE 5 MG/1
5 TABLET, FILM COATED, EXTENDED RELEASE ORAL DAILY
Qty: 90 TABLET | Refills: 3 | Status: SHIPPED | OUTPATIENT
Start: 2019-02-21 | End: 2020-02-12

## 2019-02-21 NOTE — PROGRESS NOTES
SUBJECTIVE/OBJECTIVE:                Alma Kraft is a 68 year old female coming in for a follow-up visit for Medication Therapy Management.  She was referred to me from Dr. Brody.     Chief Complaint: Follow up from our visit on 1/11/19.      Tobacco: No tobacco use  Alcohol: not currently using    Medication Adherence/Access:  no issues reported    Diabetes/Obesity:  Pt currently taking metformin 2000mg daily, Basaglar 33 units daily and Januvia 100mg daily - applied for patient assistance with [employer], but waiting to get the product shipped to her. Has 1 month supply from the pharmacy at this time (Januvia). Pt is not experiencing side effects.  SMBG: one time daily. Ranges (patient reported): didn't bring the meter, recalled few numbers   189, 200, 185, 176   Her goal plan would be Breakfast 9:30/10:30am, 1-2pm lunch, light snack, 6-8pm for dinner, but currently waking up around noon and eating dinner, then staying up late. Working on getting back to a normal routine. Feels she is under too much stress and can't seem to take care of herself. See below.  Patient is not experiencing hypoglycemia.   Recent symptoms of high blood sugar? none  ACEi/ARB: Yes: losartan.   Urine Albumin:   Lab Results   Component Value Date    UMALCR <30 08/22/2018   Aspirin: Taking 81mg daily and denies side effects    Lab Results   Component Value Date    A1C 8.8 02/21/2019    A1C 8.2 11/21/2018    A1C 9.4 08/22/2018    A1C 8.4 05/23/2018    A1C 7.6 02/11/2018       Hypertension: Current medications include losartan/hctz 100/25mg 1 daily and amlodipine 5mg daily. Also on potassium daily due to low level, will check potassium level today. Patient does not self-monitor BP. Patient reports no current medication side effects.  BP Readings from Last 3 Encounters:   02/21/19 138/88   01/11/19 124/76   12/03/18 138/78     Depression/Headaches:  Current medications include: Venlafaxine 150mg once daily and Amitriptyline 10mg once daily.  "Family stress is a major issue for her - eating is a coping mechanism. Recommended BHP previously - discussed with PCP again today. Has not worked with a therapist before. Has issues with staying up late and then sleeping in. Not active.  She would like to stop taking amitriptyline now and would like to know how to do this.   Lives with 3 grown children and her , but one of her son's is moving out this month which is one of her biggest stressor. Multiple family members gone through surgery in the past half of year, son is having surgery this summer.       BP Readings from Last 1 Encounters:   02/21/19 138/88     Pulse Readings from Last 1 Encounters:   02/21/19 80     Wt Readings from Last 1 Encounters:   02/21/19 260 lb (117.9 kg)     Ht Readings from Last 1 Encounters:   08/22/18 5' 0.25\" (1.53 m)     Estimated body mass index is 50.36 kg/m  as calculated from the following:    Height as of 8/22/18: 5' 0.25\" (1.53 m).    Weight as of an earlier encounter on 2/21/19: 260 lb (117.9 kg).    Temp Readings from Last 1 Encounters:   02/21/19 98.4  F (36.9  C) (Oral)       ASSESSMENT:              Current medications were reviewed today as discussed above.      Medication Adherence: good, no issues identified. Filled out [employer] assistance paperwork already, waiting for the following steps.     Diabetes: Needs Improvement. Patient is not meeting A1c goal of < 7%. Self monitoring of blood glucose is not at goal of fasting  mg/dL. Pt would benefit from starting glipizide XL 5mg daily and working with therapist to help with coping mechanism.     Hypertension: Stable. BP is at goal <140/90mmHg.     Depression/Headaches:   Needs improvement, patient has a strong desire of stopping take amitriptyline, tapering the dose over 2 weeks would be beneficial. Advise patient to take 5mg for 2 weeks, then stop. Also recommend setting up therapy visit as this is her biggest contributor to her poor health at this time.  "     PLAN:                  1. Start glipizide XL 5mg daily     2. Cut amitriptyline in half and take 1/2 tablet by mouth for 2 weeks if having any issues at lower dose okay to go back to whole pill at bedtime. If doing well at 2 weeks then okay to stop completely. - okay per auth from Dr. Brody.    3. Digna from [facility] will be helping to get therapy information to patient.     I spent 30 minutes with this patient today. All changes were made via collaborative practice agreement with PCP. A copy of the visit note was provided to the patient's primary care provider.     Will follow up in 1 month.    The patient was given a summary of these recommendations as an after visit summary.    Phil Harrison, Pharmacy Student    Sushila Fernandes, Pharm.D, Psychiatric  Medication Therapy Management Pharmacist  [phone]

## 2019-02-21 NOTE — TELEPHONE ENCOUNTER
I talked with patient RE referral for therapist. I gave her     [facility]  [address]  [address]  [facility] [id number] [phone] - appt line  [phone] - fax    [facility]  [address] [facility] [id number] [phone] -- appt line  [phone] -- fax    [facility]  [address] [place] [address] [address]  [address]  [facility] [id number] [phone] -- appt line    Patient will call to make her appt. Will call me back with her appt info.

## 2019-02-21 NOTE — NURSING NOTE
Patient is here for diabetic check today.    Pre-visit Screening:  Immunizations:  up to date  Colonoscopy:  is up to date  Mammogram: is up to date  Asthma Action Test/Plan:  MELLISSA  PHQ9:  NA  GAD7:  NA  Questioned patient about current smoking habits Pt. has never smoked.  Ok to leave detailed message on voice mail for today's visit only Yes, phone # [phone]

## 2019-02-21 NOTE — LETTER
February 23, 2019      Alma Kraft  [address]  [place] [address]        Dear ,    We are writing to inform you of your test results.    Normal thyroid function  LDL is at goal on your current dose of atorvastatin- refilled for one year.  Normal liver function  Normal potassium  Fasting blood sugar and Hemoglobin A1C needs improvement- recheck in three months-   Let me know if you need my help scheduling with a therapist.    Resulted Orders   TSH with free T4 reflex ([facility])   Result Value Ref Range    TSH 2.09 0.40 - 4.50 mIU/L   Lipid Profile ([facility])   Result Value Ref Range    Cholesterol 170 <200 mg/dL    HDL Cholesterol 66 >50 mg/dL    Triglycerides 77 <150 mg/dL    LDL Cholesterol Calculated 87 mg/dL (calc)      Comment:      Reference range: <100     Desirable range <100 mg/dL for primary prevention;    <70 mg/dL for patients with CHD or diabetic patients   with > or = 2 CHD risk factors.     LDL-C is now calculated using the Stevie-Ford   calculation, which is a validated novel method providing   better accuracy than the Friedewald equation in the   estimation of LDL-C.   Stevie SS et al. KATERYNA. 2013;310(19): 6476-6096   (http://education.Lango.DelaGet/faq/UFF098)      Cholesterol/HDL Ratio 2.6 <5.0 (calc)    Non HDL Cholesterol 104 <130 mg/dL (calc)      Comment:      For patients with diabetes plus 1 major ASCVD risk   factor, treating to a non-HDL-C goal of <100 mg/dL   (LDL-C of <70 mg/dL) is considered a therapeutic   option.     Hemoglobin A1c ([facility])   Result Value Ref Range    Hemoglobin A1C 8.8 (A) 4.0 - 7.0 %   Glucose Fasting ([facility])   Result Value Ref Range    Glucose 193 (A) 60 - 99 mg/dL   ALT ([facility])   Result Value Ref Range    ALT 32 (H) 6 - 29 U/L   POTASSIUM ([facility])   Result Value Ref Range    Potassium 3.9 3.5 - 5.3 mmol/L       If you have any questions or concerns, please call the clinic at the number listed above.       Sincerely,        Susu Brody,  MD

## 2019-02-21 NOTE — PROGRESS NOTES
SUBJECTIVE:   Alma Kraft is a 68 year old female who presents to clinic today for the following health issues:      Diabetes Follow-up      Patient is checking blood sugars: No recorded blood sugars to review- She stopped checking blood sugars because she has not been eating well and she knows her blood sugars will be high    Diabetic concerns: high blood sugars     Symptoms of hypoglycemia (low blood sugar): none     Paresthesias (numbness or burning in feet) or sores: No     Date of last diabetic eye exam: she reports that her eye exams are up to date    BP Readings from Last 2 Encounters:   01/11/19 124/76   12/03/18 138/78     Hemoglobin A1C (%)   Date Value   11/21/2018 8.2 (A)   08/22/2018 9.4 (A)     LDL Cholesterol Calculated (mg/dL (calc))   Date Value   02/21/2018 86   04/26/2017 82       Diabetes Management Resources    Amount of exercise or physical activity: None    Problems taking medications regularly: No    Medication side effects: none    Diet: reviewed her diet-    Cooks once or twice a week- no longer drinking pop    Eats take out, sugared cereal, fruit (little vegetables)      PROBLEMS TO ADD ON...  Codependency issues with family (cannot cook healthy meals because her children (in their forties)- does not like to eat it-  Not motivated to change  History of depression       Problem list and histories reviewed & adjusted, as indicated.  Additional history: as documented    Patient Active Problem List   Diagnosis     Essential hypertension, benign     Mixed hyperlipidemia     Obesity, morbid, BMI 40.0-49.9 (H)     Esophageal reflux     Allergic rhinitis     Obstructive sleep apnea     Family history of malignant neoplasm of breast     ACP (advance care planning)     Type 2 diabetes mellitus without complication, with long-term current use of insulin (H)     Adjustment disorder with mixed anxiety and depressed mood     Acne     Health Care Home     Past Surgical History:   Procedure  Laterality Date     ------------OTHER-------------  9/5/2013    L hand, cyst excision     ------------OTHER------------- Right 03/14/2014    shoulder manipulation     C APPENDECTOMY  1956     C EYE EXAM ESTABLISHED PT  4/20/11    No retinopathy     C TOTAL KNEE ARTHROPLASTY  2/06    Knee Replacement, Total Right     C TOTAL KNEE ARTHROPLASTY  3/5/07    Knee Replacement, Total  Left     C VAGINAL HYSTERECTOMY  8/01    Hysterectomy, Vaginal & BSO     HC COLONOSCOPY W/WO BRUSH/WASH  6/03     HC INCISION TENDON SHEATH FINGER Left 09/05/2013    left thumb trigger finger     HC KNEE SCOPE, DIAGNOSTIC  4/2005    Arthroscopy, Knee Left     HC REMOVE TONSILS/ADENOIDS,<13 Y/O  1953    T & A <11y.o.     TEST NOT FOUND  6/27/07    R heel/ inocencio's deformity       Social History     Tobacco Use     Smoking status: Never Smoker     Smokeless tobacco: Never Used   Substance Use Topics     Alcohol use: Yes     Alcohol/week: 0.0 oz     Comment: very rare     Family History   Problem Relation Age of Onset     Cancer Father         prostate     Gastrointestinal Disease Father         GERD     Heart Disease Paternal Grandfather      Cerebrovascular Disease Maternal Grandfather      Heart Disease Maternal Grandmother      Gastrointestinal Disease Other         Niece with GERD/hiatal hernia     Breast Cancer Sister         51     Alzheimer Disease No family hx of      Diabetes No family hx of          Current Outpatient Medications   Medication Sig Dispense Refill     amitriptyline (ELAVIL) 10 MG tablet Take 1 tablet (10 mg) by mouth daily 90 tablet 3     amLODIPine (NORVASC) 5 MG tablet Take 1 tablet (5 mg) by mouth daily 90 tablet 1     ASPIRIN 81 MG OR TABS 1 tab po QD (Once per day) 100 3     atorvastatin (LIPITOR) 10 MG tablet TAKE ONE TABLET BY MOUTH DAILY.  30 tablet 0     BD ROSE U/F 32G X 4 MM insulin pen needle USE 1 NEEDLE DAILY 100 each 3     blood glucose monitoring (NO BRAND SPECIFIED) test strip Use to test blood sugar  one times daily or as directed. To accompany: {Blood Glucose Monitor Brands One Touch Viro test strips 100 strip 3     blood glucose monitoring (ONE TOUCH DELICA) lancets Use to test blood sugars 1 times daily or as directed. 100 each 3     Blood Glucose Monitoring Suppl (ONE TOUCH ULTRA 2) W/DEVICE KIT Use to test blood sugars 2 times daily or as directed. 1 kit 0     budesonide (RINOCORT AQUA) 32 MCG/ACT spray SPRAY 2 SPRAYS INTO BOTH NOSTRILS ONCE DAILY 3 Bottle 3     insulin glargine (BASAGLAR KWIKPEN) 100 UNIT/ML pen Inject 33 Units Subcutaneous daily 30 mL 0     losartan-hydrochlorothiazide (HYZAAR) 100-25 MG per tablet Take 1 tablet by mouth daily 90 tablet 1     metFORMIN (GLUCOPHAGE-XR) 500 MG 24 hr tablet Take 4 tablets (2,000 mg) by mouth daily (with dinner) 360 tablet 1     Multiple Vitamins-Minerals (CENTRUM SILVER) per tablet 1 tablet 4 times weekly 100 tablet      ORDER FOR DME Equipment being ordered: Stretch plus compression stockings    64882  20-30 compression 3 Device 0     potassium chloride ER (MICRO-K) 10 MEQ CR capsule TAKE TWO CAPSULES BY MOUTH TWICE DAILY 360 capsule 3     sitagliptin (JANUVIA) 100 MG tablet Take 1 tablet (100 mg) by mouth daily 30 tablet 0     venlafaxine (EFFEXOR XR) 150 MG 24 hr capsule Take 1 capsule (150 mg) by mouth daily 90 capsule 3     glipiZIDE (GLUCOTROL XL) 5 MG 24 hr tablet Take 1 tablet (5 mg) by mouth daily 90 tablet 3       Reviewed and updated as needed this visit by clinical staff       Reviewed and updated as needed this visit by Provider         ROS:  INTEGUMENTARY/SKIN: NEGATIVE for worrisome rashes, moles or lesions  ENT/MOUTH: NEGATIVE for ear, mouth and throat problems. Positive for tooth problems- extractions and tooth implants  RESP:NEGATIVE for significant cough or SOB  CV: NEGATIVE for chest pain, palpitations or peripheral edema  GI: NEGATIVE for nausea, abdominal pain, heartburn, or change in bowel habits  : Incontinent of urine while  sleeping  ENDOCRINE: NEGATIVE for temperature intolerance, skin/hair changes  Positive for weight gain- morbid obesity  PSYCHIATRIC: POSITIVE for depressed mood and feelings of worthlessness/guilt  MS: right groin pain- intermittent- history of femoral hernia    OBJECTIVE:     /88 (BP Location: Left arm, Patient Position: Sitting, Cuff Size: Adult Large)   Pulse 80   Temp 98.4  F (36.9  C) (Oral)   Wt 117.9 kg (260 lb)   LMP 05/12/2001   SpO2 95%   BMI 50.36 kg/m    Body mass index is 50.36 kg/m .   GENERAL: healthy, alert and no distress  NECK: no adenopathy, no asymmetry, masses, or scars and thyroid normal to palpation  RESP: lungs clear to auscultation - no rales, rhonchi or wheezes  CV: regular rate and rhythm, normal S1 S2, no S3 or S4, no murmur, click or rub, no peripheral edema and peripheral pulses strong  MS: no gross musculoskeletal defects noted, no edema  PSYCH:Normal affect    Diabetic foot exam:  Dorsalis pedis pulse R:4  Dorsalis pedis pulse L: 4  Skin temperature R:normal  Skin temperature L:normal  Capillary refill R:normal  Capillary refill L:normal  Hair growth R:normal  Hair growth L:normal  Nail growth R/L:normal  Monofilament R foot:normal monofilament exam   Monofilament L foot:normal monofilament exam     Diagnostic Test Results:  Results for orders placed or performed in visit on 02/21/19 (from the past 24 hour(s))   Glucose Fasting (BFP)   Result Value Ref Range    Glucose 193 (A) 60 - 99 mg/dL   Hemoglobin A1c (BFP)   Result Value Ref Range    Hemoglobin A1C 8.8 (A) 4.0 - 7.0 %       ASSESSMENT/PLAN:     Problem List Items Addressed This Visit     Type 2 diabetes mellitus without complication, with long-term current use of insulin (H) - Primary    Relevant Orders    TSH with free T4 reflex ([facility])    Lipid Profile ([facility])    Hemoglobin A1c (BFP) (Completed)    VENOUS COLLECTION (Completed)    Glucose Fasting (BFP) (Completed)    ALT ([facility])    POTASSIUM ([facility])           BMI:  "  Estimated body mass index is 50.36 kg/m  as calculated from the following:    Height as of 8/22/18: 1.53 m (5' 0.25\").    Weight as of this encounter: 117.9 kg (260 lb).   Weight management plan: Discussed healthy diet and exercise guidelines   Discussed need for meal planning  Hand out for healthy snacks given      MEDICATIONS:   MTM  Consult  Glimepiride added to her current medications  She does not eat regular meals- discussed the importance of doing this  CONSULTATION/REFERRAL to  Mental health provider-  Needs to understand family dynamics, self esteem-   FUTURE APPOINTMENTS:       - Follow-up visit in 3 months    More than 50% of visit spent in counseling. 25 minutes    Susu Brody MD  [facility]    "

## 2019-02-21 NOTE — PATIENT INSTRUCTIONS
Recommendations from today's MTM visit:                                                        1. Start glipizide XL 5mg daily     2. Cut amitriptyline in half and take 1/2 tablet by mouth for 2 weeks if having any issues at lower dose okay to go back to whole pill at bedtime. If doing well at 2 weeks then okay to stop completely.     3. Set up visit with therapist    Next MTM visit: 1 month with Sushila    To schedule another MTM appointment, please call the clinic directly or you may call the MTM scheduling line at [phone] or toll-free at [phone].     My Clinical Pharmacist's contact information:                                                      It was a pleasure talking with you today!  Please feel free to contact me with any questions or concerns you have.      Sushila Fernandes, Pharm.D, [facility]  Medication Therapy Management Pharmacist  [phone]    You may receive a survey about the MTM services you received.  I would appreciate your feedback to help me serve you better in the future. Please fill it out and return it when you can. Your comments will be anonymous.

## 2019-02-22 LAB
ALT SERPL-CCNC: 32 U/L (ref 6–29)
CHOLEST SERPL-MCNC: 170 MG/DL
CHOLEST/HDLC SERPL: 2.6 (CALC)
HDLC SERPL-MCNC: 66 MG/DL
LDLC SERPL CALC-MCNC: 87 MG/DL (CALC)
NONHDLC SERPL-MCNC: 104 MG/DL (CALC)
POTASSIUM SERPL-SCNC: 3.9 MMOL/L (ref 3.5–5.3)
TRIGL SERPL-MCNC: 77 MG/DL
TSH SERPL-ACNC: 2.09 MIU/L (ref 0.4–4.5)

## 2019-02-23 ENCOUNTER — TRANSFERRED RECORDS (OUTPATIENT)
Dept: FAMILY MEDICINE | Facility: CLINIC | Age: 69
End: 2019-02-23

## 2019-02-23 RX ORDER — ATORVASTATIN CALCIUM 10 MG/1
10 TABLET, FILM COATED ORAL DAILY
Qty: 90 TABLET | Refills: 3 | Status: SHIPPED | OUTPATIENT
Start: 2019-02-23 | End: 2019-12-11

## 2019-03-21 ENCOUNTER — OFFICE VISIT (OUTPATIENT)
Dept: PHARMACY | Facility: PHYSICIAN GROUP | Age: 69
End: 2019-03-21
Payer: COMMERCIAL

## 2019-03-21 VITALS — SYSTOLIC BLOOD PRESSURE: 139 MMHG | DIASTOLIC BLOOD PRESSURE: 78 MMHG | HEART RATE: 72 BPM

## 2019-03-21 DIAGNOSIS — F43.23 ADJUSTMENT DISORDER WITH MIXED ANXIETY AND DEPRESSED MOOD: ICD-10-CM

## 2019-03-21 DIAGNOSIS — Z79.4 TYPE 2 DIABETES MELLITUS WITHOUT COMPLICATION, WITH LONG-TERM CURRENT USE OF INSULIN (H): Primary | ICD-10-CM

## 2019-03-21 DIAGNOSIS — E11.9 TYPE 2 DIABETES MELLITUS WITHOUT COMPLICATION, WITH LONG-TERM CURRENT USE OF INSULIN (H): Primary | ICD-10-CM

## 2019-03-21 DIAGNOSIS — I10 ESSENTIAL HYPERTENSION, BENIGN: ICD-10-CM

## 2019-03-21 DIAGNOSIS — R51.9 GENERALIZED HEADACHE: ICD-10-CM

## 2019-03-21 PROCEDURE — 99607 MTMS BY PHARM ADDL 15 MIN: CPT | Performed by: PHARMACIST

## 2019-03-21 PROCEDURE — 99606 MTMS BY PHARM EST 15 MIN: CPT | Performed by: PHARMACIST

## 2019-03-21 NOTE — PATIENT INSTRUCTIONS
Recommendations from today's MTM visit:                                                      1. Continue current doses    2. Start checking blood sugars 2 hours after a meal or at bedtime.     Next MTM visit: May 16th at 9am, then Dr. Brody at 9:30am    To schedule another MTM appointment, please call the clinic directly or you may call the MTM scheduling line at [phone] or toll-free at [phone].     My Clinical Pharmacist's contact information:                                                      It was a pleasure talking with you today!  Please feel free to contact me with any questions or concerns you have.      Sushila Fernandes, Pharm.D, [facility]  Medication Therapy Management Pharmacist  [phone]    You may receive a survey about the MTM services you received by email and/or US Mail.  I would appreciate your feedback to help me serve you better in the future. Your comments will be anonymous.
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
## 2019-04-08 NOTE — TELEPHONE ENCOUNTER
Dr. Brody    Patient has an appt 4/18/2019 @ 12:00pm with     Namita Ferrari ( Toni ), PhD, , [place]  [facility]  [address] [place] [id number]  [phone] -- appt line    Patient also wanted to let you know that [facility] has a 9 month waiting list for Medicare patients.     FYI

## 2019-04-09 DIAGNOSIS — J30.2 CHRONIC SEASONAL ALLERGIC RHINITIS: ICD-10-CM

## 2019-04-09 NOTE — TELEPHONE ENCOUNTER
Received incoming refill request for  Pending Prescriptions:                       Disp   Refills    budesonide (RINOCORT AQUA) 32 MCG/ACT chuy*       2            Sig: SPRAY 2 SPRAYS INTO BOTH NOSTRILS ONCE DAILY    Patient was last given refills of this medication on 3/29/18 and she was last seen in clinic on 2/21/19 for diabetic visit. Routing to Dr. Brody for approval or denial of request.

## 2019-04-13 DIAGNOSIS — F32.5 MAJOR DEPRESSION IN REMISSION (H): ICD-10-CM

## 2019-04-13 DIAGNOSIS — R51.9 CHRONIC NONINTRACTABLE HEADACHE, UNSPECIFIED HEADACHE TYPE: ICD-10-CM

## 2019-04-13 DIAGNOSIS — F51.04 PSYCHOPHYSIOLOGICAL INSOMNIA: ICD-10-CM

## 2019-04-13 DIAGNOSIS — G89.29 CHRONIC NONINTRACTABLE HEADACHE, UNSPECIFIED HEADACHE TYPE: ICD-10-CM

## 2019-04-15 ENCOUNTER — HOSPITAL ENCOUNTER (OUTPATIENT)
Dept: MAMMOGRAPHY | Facility: CLINIC | Age: 69
Discharge: HOME | End: 2019-04-15
Attending: FAMILY MEDICINE | Admitting: FAMILY MEDICINE
Payer: MEDICARE

## 2019-04-15 DIAGNOSIS — Z12.31 VISIT FOR SCREENING MAMMOGRAM: ICD-10-CM

## 2019-04-15 PROCEDURE — 77063 BREAST TOMOSYNTHESIS BI: CPT

## 2019-04-15 RX ORDER — AMITRIPTYLINE HYDROCHLORIDE 10 MG/1
TABLET ORAL
Qty: 90 TABLET | Refills: 1 | Status: SHIPPED | OUTPATIENT
Start: 2019-04-15 | End: 2019-10-14

## 2019-04-15 RX ORDER — VENLAFAXINE HYDROCHLORIDE 150 MG/1
CAPSULE, EXTENDED RELEASE ORAL
Qty: 90 CAPSULE | Refills: 1 | Status: SHIPPED | OUTPATIENT
Start: 2019-04-15 | End: 2019-08-14

## 2019-04-15 NOTE — TELEPHONE ENCOUNTER
Alma Kraft is requesting a refill of:    Pending Prescriptions:                       Disp   Refills    venlafaxine (EFFEXOR-XR) 150 MG 24 hr cap*90 cap*0            Sig: TAKE ONE CAPSULE BY MOUTH DAILY.     amitriptyline (ELAVIL) 10 MG tablet [Phar*90 tab*0            Sig: TAKE ONE TABLET BY MOUTH DAILY.     Pt has OV with you on 5/16/19    PHQ-9 SCORE 2/21/2018 5/23/2018 8/22/2018   PHQ-9 Total Score - - -   PHQ-9 Total Score 3 3 6

## 2019-04-29 RX ORDER — INSULIN GLARGINE 100 [IU]/ML
33 INJECTION, SOLUTION SUBCUTANEOUS DAILY
Qty: 30 ML | Refills: 0 | Status: SHIPPED | OUTPATIENT
Start: 2019-04-29 | End: 2019-08-14

## 2019-04-29 NOTE — TELEPHONE ENCOUNTER
Received incoming refill request for   Pending Prescriptions:                       Disp   Refills    insulin glargine (BASAGLAR KWIKPEN) 100 U*30 mL  0            Sig: Inject 33 Units Subcutaneous daily    Patient was last given refills of this medication on 1/28/19 for a 3 month supply. Patient was seen in clinic on 2/21/19 for diabetic check. Routing to Dr. Brody for review, is patient able to get 3 month supply of this medication or just one month?

## 2019-04-29 NOTE — TELEPHONE ENCOUNTER
Spoke to patient and informed her that prescription was refilled. She expressed understanding to this and had no further questions or concerns at this point.

## 2019-05-09 ENCOUNTER — TELEPHONE (OUTPATIENT)
Dept: FAMILY MEDICINE | Facility: CLINIC | Age: 69
End: 2019-05-09

## 2019-05-09 NOTE — TELEPHONE ENCOUNTER
Medical records request from Viri Ferrari PHD, LP . Records sent 5/10/19    
no abdominal pain, no bloating, no constipation, no diarrhea, no nausea and no vomiting.

## 2019-05-16 ENCOUNTER — OFFICE VISIT (OUTPATIENT)
Dept: FAMILY MEDICINE | Facility: CLINIC | Age: 69
End: 2019-05-16

## 2019-05-16 ENCOUNTER — OFFICE VISIT (OUTPATIENT)
Dept: PHARMACY | Facility: PHYSICIAN GROUP | Age: 69
End: 2019-05-16
Payer: COMMERCIAL

## 2019-05-16 VITALS
DIASTOLIC BLOOD PRESSURE: 82 MMHG | RESPIRATION RATE: 20 BRPM | HEART RATE: 74 BPM | WEIGHT: 260 LBS | OXYGEN SATURATION: 97 % | BODY MASS INDEX: 50.36 KG/M2 | SYSTOLIC BLOOD PRESSURE: 130 MMHG

## 2019-05-16 VITALS — WEIGHT: 260.8 LBS | BODY MASS INDEX: 50.51 KG/M2

## 2019-05-16 DIAGNOSIS — Z79.4 TYPE 2 DIABETES MELLITUS WITHOUT COMPLICATION, WITH LONG-TERM CURRENT USE OF INSULIN (H): Primary | ICD-10-CM

## 2019-05-16 DIAGNOSIS — F43.23 ADJUSTMENT DISORDER WITH MIXED ANXIETY AND DEPRESSED MOOD: ICD-10-CM

## 2019-05-16 DIAGNOSIS — I10 ESSENTIAL HYPERTENSION, BENIGN: Primary | ICD-10-CM

## 2019-05-16 DIAGNOSIS — R60.9 EDEMA, UNSPECIFIED TYPE: ICD-10-CM

## 2019-05-16 DIAGNOSIS — E11.9 TYPE 2 DIABETES MELLITUS WITHOUT COMPLICATION, WITH LONG-TERM CURRENT USE OF INSULIN (H): Primary | ICD-10-CM

## 2019-05-16 DIAGNOSIS — G47.8 POOR SLEEP PATTERN: ICD-10-CM

## 2019-05-16 DIAGNOSIS — I10 ESSENTIAL HYPERTENSION, BENIGN: ICD-10-CM

## 2019-05-16 DIAGNOSIS — R51.9 GENERALIZED HEADACHE: ICD-10-CM

## 2019-05-16 DIAGNOSIS — L82.0 INFLAMED SEBORRHEIC KERATOSIS: ICD-10-CM

## 2019-05-16 DIAGNOSIS — E11.9 TYPE 2 DIABETES MELLITUS WITHOUT COMPLICATION, WITH LONG-TERM CURRENT USE OF INSULIN (H): ICD-10-CM

## 2019-05-16 DIAGNOSIS — Z79.4 TYPE 2 DIABETES MELLITUS WITHOUT COMPLICATION, WITH LONG-TERM CURRENT USE OF INSULIN (H): ICD-10-CM

## 2019-05-16 LAB
BUN SERPL-MCNC: 18 MG/DL (ref 7–25)
CALCIUM SERPL-MCNC: 9.3 MG/DL (ref 8.6–10.3)
CHLORIDE SERPLBLD-SCNC: 102.3 MMOL/L (ref 98–110)
CO2 SERPL-SCNC: 28.1 MMOL/L (ref 20–32)
CREAT SERPL-MCNC: 0.6 MG/DL (ref 0.7–1.18)
GLUCOSE SERPL-MCNC: 148 MG/DL (ref 60–99)
GLUCOSE SERPL-MCNC: 149 MG/DL (ref 60–99)
HBA1C MFR BLD: 7.4 % (ref 4–7)
POTASSIUM SERPL-SCNC: 3.76 MMOL/L (ref 3.5–5.3)
SODIUM SERPL-SCNC: 140.5 MMOL/L (ref 135–146)

## 2019-05-16 PROCEDURE — 99214 OFFICE O/P EST MOD 30 MIN: CPT | Performed by: FAMILY MEDICINE

## 2019-05-16 PROCEDURE — 36415 COLL VENOUS BLD VENIPUNCTURE: CPT | Performed by: FAMILY MEDICINE

## 2019-05-16 PROCEDURE — 80048 BASIC METABOLIC PNL TOTAL CA: CPT | Performed by: FAMILY MEDICINE

## 2019-05-16 PROCEDURE — 83036 HEMOGLOBIN GLYCOSYLATED A1C: CPT | Performed by: FAMILY MEDICINE

## 2019-05-16 PROCEDURE — 99607 MTMS BY PHARM ADDL 15 MIN: CPT | Performed by: PHARMACIST

## 2019-05-16 PROCEDURE — 99606 MTMS BY PHARM EST 15 MIN: CPT | Performed by: PHARMACIST

## 2019-05-16 ASSESSMENT — ANXIETY QUESTIONNAIRES
7. FEELING AFRAID AS IF SOMETHING AWFUL MIGHT HAPPEN: NOT AT ALL
GAD7 TOTAL SCORE: 0
1. FEELING NERVOUS, ANXIOUS, OR ON EDGE: NOT AT ALL
IF YOU CHECKED OFF ANY PROBLEMS ON THIS QUESTIONNAIRE, HOW DIFFICULT HAVE THESE PROBLEMS MADE IT FOR YOU TO DO YOUR WORK, TAKE CARE OF THINGS AT HOME, OR GET ALONG WITH OTHER PEOPLE: NOT DIFFICULT AT ALL
5. BEING SO RESTLESS THAT IT IS HARD TO SIT STILL: NOT AT ALL
6. BECOMING EASILY ANNOYED OR IRRITABLE: NOT AT ALL
3. WORRYING TOO MUCH ABOUT DIFFERENT THINGS: NOT AT ALL
2. NOT BEING ABLE TO STOP OR CONTROL WORRYING: NOT AT ALL

## 2019-05-16 ASSESSMENT — PATIENT HEALTH QUESTIONNAIRE - PHQ9
5. POOR APPETITE OR OVEREATING: NOT AT ALL
SUM OF ALL RESPONSES TO PHQ QUESTIONS 1-9: 6

## 2019-05-16 NOTE — PROGRESS NOTES
SUBJECTIVE/OBJECTIVE:                Alma Kraft is a 68 year old female coming in for a follow-up visit for Medication Therapy Management.  She was referred to me from Dr. Brody.     Chief Complaint: Follow up from our visit on 3/21.  Seeing PCP today also.     Tobacco: No tobacco use  Alcohol: not currently using    Medication Adherence/Access:  no issues reported  Januvia through [employer] Patient assistance.     Diabetes:  Pt currently taking metformin 2000mg daily, Basaglar 33units daily, Januvia 100mg daily and glipizide XL 5mg daily (started on 2/21/19).  Pt does not report problems taking medications regularly.  Waking up late, eating around 4pm  SMBG: one time daily.   Ranges:   159 higher end,  Lower end seeing 113   not checking often.   Symptoms of low blood sugar? none. Frequency of hypoglycemia? no.  Recent symptoms of high blood sugar? None.  ACEi/ARB: yes - losartan   Eye exam: due  Foot exam: up to date  Aspirin: yes - 81mg and denies side effects   No low episodes.   Diet/Exercise: eating better, patient controls the carb's in each meal within 45-60 grams (3-4 carb servings)- mostly at 60g per meal, however her pitfall is her snacking and sweets. She continues to eat pies, candies etc in the evening because her  buys them and she doesn't want him to feel bad. She eats at night as a coping mechanism for stress/emotions. Working on eating three meals a day, currently eats protein bars mid-day as lunch several days per week.      Lab Results   Component Value Date    A1C 8.8 02/21/2019    A1C 8.2 11/21/2018    A1C 9.4 08/22/2018    A1C 8.4 05/23/2018    A1C 7.6 02/11/2018       Hypertension: Current HTN meds include losartan/hctz 100/25mg daily and amlodipine 5mg daily, potassium 20mEq tablets BID. Last potassium level was 3.9 on 2/21/19. Pt doesn't self-monitor BP. Pt reports taking medications as instructed, no current medication side effects.   BP Readings from Last 3 Encounters:  "  05/16/19 130/82   03/21/19 139/78   02/21/19 138/88       Depression/Headaches/sleep patterns: Current medications include: venlafaxine 150mg daily and amitriptyline 10mg daily. In the past month, patient tried tapering off amitriptyline by taking 5mg daily for 2 weeks then stopped. However her headache came back which were unbearable with the lower dose and discontinuation. Patient went back to 10mg daily 2 days after stopped the amitriptyline. Patient also mentioned that the highest amitriptyline dose she had was 50mg daily.  She was interested in discussing the rationale for discontinuation of the amitriptyline again today. She is not willing to change today but wanted to discuss risk/benefits.   Pt does not report problems taking medications regularly. Pt reports that symptoms are not changed. Patient reports the following stressors: family. Coping mechanism is eating. Started seeing a therapist in [facility], has had 2 sessions so far, every other week set up for now. Feels this is a good fit with this therapist.     States that her sleeping routine is likely impacting her energy, motivation, eating and ability to get thing done. She is going to bed very late midnight-2am and then not waking up until 10-11am and sitting around most of the day because she wakes up late.      Today's Vitals:   BP Readings from Last 1 Encounters:   05/16/19 130/82     Pulse Readings from Last 1 Encounters:   05/16/19 74     Wt Readings from Last 1 Encounters:   05/16/19 260 lb (117.9 kg)     Ht Readings from Last 1 Encounters:   08/22/18 5' 0.25\" (1.53 m)     Estimated body mass index is 50.51 kg/m  as calculated from the following:    Height as of 8/22/18: 5' 0.25\" (1.53 m).    Weight as of this encounter: 260 lb 12.8 oz (118.3 kg).    Temp Readings from Last 1 Encounters:   02/21/19 98.4  F (36.9  C) (Oral)       ASSESSMENT:              Current medications were reviewed today as discussed above.      Medication " Adherence: good, no issues identified    Diabetes: Needs Improvement. Patient is not meeting A1c goal of < 7%. Self monitoring of blood glucose is not at goal of fasting  mg/dL and post prandial < 150 mg/dL. Pt would benefit from shifting her sleep pattern to allow for more exercise and movement during the day.    Hypertension: Stable. Patient is meeting BP goal of < 140/90mmHg, due for BMP to ensure safety of SCr and potassium.     Depression/Headaches/sleep patterns: Stable. Recommend trial of melatonin for shifting pattern of sleep and increasing exercise. May additionally try reducing amitriptyline to 5mg in the future if she is willing. Ongoing work with therapist needed.       PLAN:                  1. A1c, BMP (watch K),     2. Needs refill of test strips and needles.      3. Melatonin 1-3mg at 9-10pm and then take night meds at 11pm, with goal of getting to bed by 11pm. AM walking encouraged to help with energy as well.       I spent 30 minutes with this patient today. All changes were made via collaborative practice agreement with . A copy of the visit note was provided to the patient's primary care provider.     Will follow up in 1 month.    The patient was given a summary of these recommendations as an after visit summary.    Sushila Fernandes, Pharm.D, [facility]  Medication Therapy Management Pharmacist  [phone]

## 2019-05-16 NOTE — PATIENT INSTRUCTIONS
Recommendations from today's MTM visit:                                                      1. Could try taking melatonin 1-3mg around 9-10pm then take night medications at 11pm and try to be in bed by this time.     2. Try to increase day time exercise to get going for the day. Work up on the time spent with walking.     Next MTM visit: 1 month    To schedule another MTM appointment, please call the clinic directly or you may call the MTM scheduling line at [phone] or toll-free at [phone].     My Clinical Pharmacist's contact information:                                                      It was a pleasure talking with you today!  Please feel free to contact me with any questions or concerns you have.      Sushila Fernandes, Pharm.D, [facility]  Medication Therapy Management Pharmacist  [phone]    You may receive a survey about the MTM services you received by email and/or US Mail.  I would appreciate your feedback to help me serve you better in the future. Your comments will be anonymous.

## 2019-05-16 NOTE — LETTER
May 19, 2019      Alma CANADA Abena  [address]  [facility] [address]        Dear ,    We are writing to inform you of your test results.    Improved Hemoglobin A1C (goal is less than 7)  Glucose 148  Normal kidney function   Normal potassium    Resulted Orders   Hemoglobin A1c (BFP)   Result Value Ref Range    Hemoglobin A1C 7.4 (A) 4.0 - 7.0 %   Glucose Fasting (BFP)   Result Value Ref Range    Glucose 148 (A) 60 - 99 mg/dL   Basic Metabolic Panel (BFP)   Result Value Ref Range    Carbon Dioxide 28.1 20 - 32 mmol/L    Creatinine 0.60 (A) 0.70 - 1.18 mg/dL    Glucose 149 (A) 60 - 99 mg/dL    Sodium 140.5 135 - 146 mmol/L    Potassium 3.76 3.5 - 5.3 mmol/L    Chloride 102.3 98 - 110 mmol/L    Urea Nitrogen 18 7 - 25 mg/dL    Calcium 9.3 8.6 - 10.3 mg/dL       If you have any questions or concerns, please call the clinic at the number listed above.       Sincerely,        Susu Brody MD

## 2019-05-16 NOTE — PROGRESS NOTES
SUBJECTIVE:   Alma Kraft is a 68 year old female who presents to clinic today for the following   health issues:     Recheck diabetis- also seen by MTM  Abnormal sleep pattern  Stays up until 2 am- sleeps until 10- or 11  With much of the day already gone when she wakes up, she doesn't feel she has time to get things done including exercise    MTM: counseled on sleep - trial of  Melatonin  She is still taking amitriptyline 10 mg at hs     Other concerns:  Multiple keratosis, one on the superior edge of her right breast is bothersome  Liquid nitrogen treatment- sprayed    Cancelled appointment for genetic testing- may not get tested - 2 sister's have breast cancer, neither had genetic testing, one diagnosed  in her fifties, another in her 70's  She will be 69 this year- less likely to have inherited cause of breast cancer  paternal Family history of colon cancer- she is up to date with her colonoscopy screening    Diabetes Follow-up  MTM consult 3/21/2019: Pt would benefit from continuing the current regimen and checking blood glucose 2 hours after dinner or at bedtime. Encourage exercise and diet control    She did not check postprandial blood sugars  Weight is stable- no change  She has difficulty being active      Depression symptoms; to contact [facility]-   She has had a couple of sessions- finding therapy helpful    Headaches- taking amitriptyline- restarted 10 mg  Dose  She denies current problems with headaches      Patient is checking blood sugars: fasting- not postprandial 113 today - as high as 159 after high carb day including pop    Diabetic concerns: None     Symptoms of hypoglycemia (low blood sugar): none     Paresthesias (numbness or burning in feet) or sores: no complaints today     Date of last diabetic eye exam:  October 2019    BP Readings from Last 2 Encounters:   03/21/19 139/78   02/21/19 138/88     Hemoglobin A1C (%)   Date Value   02/21/2019 8.8 (A)   11/21/2018 8.2 (A)      LDL Cholesterol Calculated (mg/dL (calc))   Date Value   02/21/2019 87   02/21/2018 86       Diabetes Management Resources    Amount of exercise or physical activity:none    Problems taking medications regularly: No    Medication side effects: none    Diet: meal planning encouraged    Additional history: as documented    Reviewed  and updated as needed this visit by clinical staff         Reviewed and updated as needed this visit by Provider         Patient Active Problem List   Diagnosis     Essential hypertension, benign     Mixed hyperlipidemia     Obesity, morbid, BMI 40.0-49.9 (H)     Esophageal reflux     Allergic rhinitis     Obstructive sleep apnea     Family history of malignant neoplasm of breast     ACP (advance care planning)     Type 2 diabetes mellitus without complication, with long-term current use of insulin (H)     Adjustment disorder with mixed anxiety and depressed mood     Acne     Health Care Home     Past Surgical History:   Procedure Laterality Date     ------------OTHER-------------  9/5/2013    L hand, cyst excision     ------------OTHER------------- Right 03/14/2014    shoulder manipulation     C APPENDECTOMY  1956     C EYE EXAM ESTABLISHED PT  4/20/11    No retinopathy     C TOTAL KNEE ARTHROPLASTY  2/06    Knee Replacement, Total Right     C TOTAL KNEE ARTHROPLASTY  3/5/07    Knee Replacement, Total  Left     C VAGINAL HYSTERECTOMY  8/01    Hysterectomy, Vaginal & BSO     HC COLONOSCOPY W/WO BRUSH/WASH  6/03     HC INCISION TENDON SHEATH FINGER Left 09/05/2013    left thumb trigger finger     HC KNEE SCOPE, DIAGNOSTIC  4/2005    Arthroscopy, Knee Left     HC REMOVE TONSILS/ADENOIDS,<11 Y/O  1953    T & A <11y.o.     TEST NOT FOUND  6/27/07    R heel/ inocencio's deformity       Social History     Tobacco Use     Smoking status: Never Smoker     Smokeless tobacco: Never Used   Substance Use Topics     Alcohol use: Yes     Alcohol/week: 0.0 oz     Comment: very rare     Family History    Problem Relation Age of Onset     Cancer Father         prostate     Gastrointestinal Disease Father         GERD     Heart Disease Paternal Grandfather      Cerebrovascular Disease Maternal Grandfather      Heart Disease Maternal Grandmother      Gastrointestinal Disease Other         Niece with GERD/hiatal hernia     Breast Cancer Sister         51     Alzheimer Disease No family hx of      Diabetes No family hx of          Current Outpatient Medications   Medication Sig Dispense Refill     amitriptyline (ELAVIL) 10 MG tablet TAKE ONE TABLET BY MOUTH DAILY.  90 tablet 1     amLODIPine (NORVASC) 5 MG tablet Take 1 tablet (5 mg) by mouth daily 90 tablet 1     ASPIRIN 81 MG OR TABS 1 tab po QD (Once per day) 100 3     atorvastatin (LIPITOR) 10 MG tablet Take 1 tablet (10 mg) by mouth daily 90 tablet 3     blood glucose (NO BRAND SPECIFIED) test strip Use to test blood sugar one times daily or as directed. To accompany: {Blood Glucose Monitor Brands One Touch Viro test strips 100 strip 3     blood glucose monitoring (ONE TOUCH DELICA) lancets Use to test blood sugars 1 times daily or as directed. 100 each 3     Blood Glucose Monitoring Suppl (ONE TOUCH ULTRA 2) W/DEVICE KIT Use to test blood sugars 2 times daily or as directed. 1 kit 0     budesonide (RINOCORT AQUA) 32 MCG/ACT nasal spray SPRAY 2 SPRAYS INTO BOTH NOSTRILS ONCE DAILY 3 Bottle 3     glipiZIDE (GLUCOTROL XL) 5 MG 24 hr tablet Take 1 tablet (5 mg) by mouth daily 90 tablet 3     insulin glargine (BASAGLAR KWIKPEN) 100 UNIT/ML pen Inject 33 Units Subcutaneous daily 30 mL 0     insulin pen needle (BD ROSE U/F) 32G X 4 MM miscellaneous Use 1 pen needles daily or as directed. 100 each 3     losartan-hydrochlorothiazide (HYZAAR) 100-25 MG per tablet Take 1 tablet by mouth daily 90 tablet 1     metFORMIN (GLUCOPHAGE-XR) 500 MG 24 hr tablet Take 4 tablets (2,000 mg) by mouth daily (with dinner) 360 tablet 1     Multiple Vitamins-Minerals (CENTRUM SILVER) per  tablet 1 tablet 4 times weekly 100 tablet      ORDER FOR DME Equipment being ordered: Mediven plus compression stockings    34009  20-30 compression 3 Device 0     potassium chloride ER (MICRO-K) 10 MEQ CR capsule TAKE TWO CAPSULES BY MOUTH TWICE DAILY 360 capsule 3     sitagliptin (JANUVIA) 100 MG tablet Take 1 tablet (100 mg) by mouth daily 30 tablet 0     venlafaxine (EFFEXOR-XR) 150 MG 24 hr capsule TAKE ONE CAPSULE BY MOUTH DAILY.  90 capsule 1       ROS:  Constitutional, HEENT, cardiovascular, pulmonary, GI, , musculoskeletal, neuro, skin, endocrine and psych systems are negative, except as otherwise noted.    Wishes compression stockings  Keratosis- right breast    OBJECTIVE:     /82 (BP Location: Right arm, Patient Position: Sitting, Cuff Size: Adult Large)   Pulse 74   Resp 20   Wt 117.9 kg (260 lb)   LMP 05/12/2001   SpO2 97%   BMI 50.36 kg/m    Body mass index is 50.36 kg/m .   GENERAL: healthy, alert and no distress  NECK: no adenopathy, no asymmetry, masses, or scars and thyroid normal to palpation  RESP: lungs clear to auscultation - no rales, rhonchi or wheezes  CV: regular rate and rhythm, , no murmur, click or rub, she has mild peripheral edema  She is requesting an RX for support hose- knee high     Diagnostic Test Results:  Results for orders placed or performed in visit on 05/16/19 (from the past 24 hour(s))   Basic Metabolic Panel (BFP)   Result Value Ref Range    Carbon Dioxide 28.1 20 - 32 mmol/L    Creatinine 0.60 (A) 0.70 - 1.18 mg/dL    Glucose 149 (A) 60 - 99 mg/dL    Sodium 140.5 135 - 146 mmol/L    Potassium 3.76 3.5 - 5.3 mmol/L    Chloride 102.3 98 - 110 mmol/L    Urea Nitrogen 18 7 - 25 mg/dL    Calcium 9.3 8.6 - 10.3 mg/dL   Hemoglobin A1c (BFP)   Result Value Ref Range    Hemoglobin A1C 7.4 (A) 4.0 - 7.0 %   Glucose Fasting (BFP)   Result Value Ref Range    Glucose 148 (A) 60 - 99 mg/dL       ASSESSMENT/PLAN:     (E11.9,  Z79.4) Type 2 diabetes mellitus without  complication, with long-term current use of insulin (H)  (primary encounter diagnosis)  Comment: borderline control- no change in medication  Encouraged increasing her activity  Plan: Hemoglobin A1c (BFP), VENOUS COLLECTION,         Glucose Fasting (BFP), Basic Metabolic Panel         (BFP), CANCELED: BASIC METABOLIC PANEL ([facility])       Hemoglobin A1C has improved to 7.4 (down from 8.8)  Medication refills are up to date  No change    (I10) Essential hypertension, benign  Comment:  No change in medications  Plan: Basic Metabolic Panel (BFP), CANCELED: BASIC         METABOLIC PANEL ([facility])           (R60.9) Edema, unspecified type  Comment:    Plan: order for DME- compression stockings        (L82.0) Inflamed seborrheic keratosis  Comment: treated with liquid nitrogen  Plan:             FUTURE APPOINTMENTS:       - Follow-up visit in 3 months with me  One month with MTM    Susu Brody MD  [facility]

## 2019-05-16 NOTE — NURSING NOTE
Chief Complaint   Patient presents with     Diabetes     3 month diabetic check     Pre-visit Screening:  Immunizations:  up to date  Colonoscopy:  is up to date  Mammogram: is up to date  Asthma Action Test/Plan:  NA  PHQ9:  Given today   GAD7:  Given today   Questioned patient about current smoking habits Pt. has never smoked.  Ok to leave detailed message on voice mail for today's visit only Yes, phone # [phone]

## 2019-05-17 ASSESSMENT — ANXIETY QUESTIONNAIRES: GAD7 TOTAL SCORE: 0

## 2019-05-18 DIAGNOSIS — I10 ESSENTIAL HYPERTENSION, BENIGN: ICD-10-CM

## 2019-05-20 RX ORDER — LOSARTAN POTASSIUM AND HYDROCHLOROTHIAZIDE 25; 100 MG/1; MG/1
1 TABLET ORAL DAILY
Qty: 90 TABLET | Refills: 1 | Status: SHIPPED | OUTPATIENT
Start: 2019-05-20 | End: 2019-11-14

## 2019-05-20 NOTE — TELEPHONE ENCOUNTER
Pending Prescriptions:                       Disp   Refills    losartan-hydrochlorothiazide (HYZAAR) 100*90 tab*             Sig: Take 1 tablet by mouth daily.     Last refill was   Pt here on 5-2019  Change qty fax and close encounter when finished  Louise  [phone] (home)

## 2019-05-25 DIAGNOSIS — I10 ESSENTIAL HYPERTENSION, BENIGN: ICD-10-CM

## 2019-05-28 RX ORDER — AMLODIPINE BESYLATE 5 MG/1
TABLET ORAL
Qty: 90 TABLET | Refills: 1 | Status: SHIPPED | OUTPATIENT
Start: 2019-05-28 | End: 2019-11-14

## 2019-05-28 NOTE — TELEPHONE ENCOUNTER
Pending Prescriptions:                       Disp   Refills    amLODIPine (NORVASC) 5 MG tablet [Pharmac*90 tab*             Sig: take one tablet by mouth daily.     Last refill was   Last ov and BMP 5-2019  Change qty fax and close encounter  Louise  [phone] (home)

## 2019-06-20 ENCOUNTER — OFFICE VISIT (OUTPATIENT)
Dept: PHARMACY | Facility: PHYSICIAN GROUP | Age: 69
End: 2019-06-20
Payer: COMMERCIAL

## 2019-06-20 VITALS — SYSTOLIC BLOOD PRESSURE: 144 MMHG | DIASTOLIC BLOOD PRESSURE: 78 MMHG

## 2019-06-20 DIAGNOSIS — Z79.4 TYPE 2 DIABETES MELLITUS WITHOUT COMPLICATION, WITH LONG-TERM CURRENT USE OF INSULIN (H): ICD-10-CM

## 2019-06-20 DIAGNOSIS — G47.8 POOR SLEEP PATTERN: ICD-10-CM

## 2019-06-20 DIAGNOSIS — E66.01 OBESITY, MORBID, BMI 40.0-49.9 (H): Primary | ICD-10-CM

## 2019-06-20 DIAGNOSIS — I10 ESSENTIAL HYPERTENSION, BENIGN: ICD-10-CM

## 2019-06-20 DIAGNOSIS — E11.9 TYPE 2 DIABETES MELLITUS WITHOUT COMPLICATION, WITH LONG-TERM CURRENT USE OF INSULIN (H): ICD-10-CM

## 2019-06-20 DIAGNOSIS — F43.23 ADJUSTMENT DISORDER WITH MIXED ANXIETY AND DEPRESSED MOOD: ICD-10-CM

## 2019-06-20 DIAGNOSIS — R51.9 GENERALIZED HEADACHE: ICD-10-CM

## 2019-06-20 PROCEDURE — 99607 MTMS BY PHARM ADDL 15 MIN: CPT | Performed by: PHARMACIST

## 2019-06-20 PROCEDURE — 99606 MTMS BY PHARM EST 15 MIN: CPT | Performed by: PHARMACIST

## 2019-06-20 NOTE — PROGRESS NOTES
SUBJECTIVE/OBJECTIVE:                Alma Kraft is a 68 year old female coming in for a follow-up visit for Medication Therapy Management.  She was referred to me from Dr. Brody.     Chief Complaint: Follow up from our visit on 5/16.      Tobacco: No tobacco use  Alcohol: rarely-1 wine sometimes    Medication Adherence/Access:  Sometimes will miss the metformin at dinner if out occasionally.     Diabetes:  Pt currently taking metformin ER 2000mg daily, Basaglar 33units daily, Januvia 100mg daily and glipizide XL 5mg daily (started on 2/21/19).  Pt does not report problems taking medications regularly.  Waking up late, eating around 4pm  SMBG: one time daily. Ranges:   193, 157, 137   Not checking too often.   Symptoms of low blood sugar? none. Frequency of hypoglycemia? no.  Recent symptoms of high blood sugar? None.  ACEi/ARB: yes - losartan   Eye exam: due  Foot exam: up to date  Aspirin: yes - 81mg and denies side effects   No low episodes.   Diet/Exercise: She continues to eat pies, candies etc in the evening because her  buys them and she doesn't want him to feel bad. She eats at night as a coping mechanism for stress/emotions.   Has been drinking more regular soda (coke) lately.     Lab Results   Component Value Date    A1C 7.4 05/16/2019    A1C 8.8 02/21/2019    A1C 8.2 11/21/2018    A1C 9.4 08/22/2018    A1C 8.4 05/23/2018   Estimated Creatinine Clearance: 104.5 mL/min (A) (based on SCr of 0.6 mg/dL (A)).      Hypertension: Current HTN meds include losartan/hctz 100/25mg daily and amlodipine 5mg daily, potassium 20mEq tablets BID. Last potassium level was 3.76 on 5/16/19. Pt doesn't self-monitor BP. Pt reports taking medications as instructed, no current medication side effects.   BP Readings from Last 3 Encounters:   05/16/19 130/82   03/21/19 139/78   02/21/19 138/88       Depression/Headaches/sleep patterns:   Current medications include: venlafaxine 150mg daily in the AM and amitriptyline  10mg daily. In the past month, patient tried tapering off amitriptyline by taking 5mg daily for 2 weeks then stopped. However her headache came back which were unbearable with the lower dose and discontinuation. Patient went back to 10mg daily 2 days after stopped the amitriptyline. Patient also mentioned that the highest amitriptyline dose she had was 50mg daily.      July 3rd surgery for her mouth and July 18th- son's surgery, so after that is willing to try lowering the amitriptyline again.     Pt does not report problems taking medications regularly. Pt reports that symptoms are not changed. Patient reports the following stressors: family. Coping mechanism is eating. Started seeing a therapist in [facility],  every other week set up for now. Feels this is a good fit with this therapist.     States that her sleeping routine is likely impacting her energy, motivation, eating and ability to get thing done. She is going to bed very late midnight-2am and then not waking up until 10-11am and sitting around most of the day because she wakes up late.  Has not tried the melatonin yet because she is worried about it making her too drowsy in the morning, but looking to start this next week to try.     Today's Vitals: /78   LMP 05/12/2001      BP Readings from Last 3 Encounters:   06/20/19 142/82   05/16/19 130/82   03/21/19 139/78       ASSESSMENT:              Current medications were reviewed today as discussed above.      Medication Adherence: fair, discussed metformin rationale for taking with food.     Diabetes: Needs Improvement. Patient is not meeting A1c goal of < 7%. Self monitoring of blood glucose is not at goal of fasting  mg/dL. Pt would benefit from diet modification and weight loss- long discussion around her diet patterns and poor eating choices - she has done well with more formal structure on diet adjustments, recommend she look into the  weight management program for healthy living.  Discussed metformin okay to take after a meal to not forget it, but watch for nausea.     Hypertension: Needs Improvement. Patient is not meeting BP goal of < 140/90mmHg.  Pt would benefit from the following changes - home monitoring. Diet changes and weight loss encouraged as well.     Depression/Headaches/sleep patterns:  Will try lowering amitriptyline again after her son's surgery- decrease to 5mg daily to see if she is able to tolerate less.      PLAN:                    1. Lifestyle program referral sent for weight management    2. Okay to take metformin later in the day after food if you are out, watch for nausea.     3. Try lowering amitriptyline to 5mg daily after son's surgery in July- okay per pcp     4. Start checking BP at home 2 x per week, call if getting readings over 140mmHg.    I spent 30 minutes with this patient today. All changes were made via collaborative practice agreement with Dr. Brody. A copy of the visit note was provided to the patient's primary care provider.     Will follow up in 2 months with pcp.    The patient was given a summary of these recommendations as an after visit summary.    Sushila Fernandes, Pharm.D, [facility]  Medication Therapy Management Pharmacist  [phone]

## 2019-06-20 NOTE — PATIENT INSTRUCTIONS
Recommendations from today's MTM visit:                                                      1. Lifestyle program referral sent for weight management    2. Okay to take metformin later in the day after food if you are out, watch for nausea.     3. Try lowering amitriptyline to 5mg daily after son's surgery in July.     4. Start checking BP at home 2 x per week, call if getting readings over 140mmHg.    Next MTM visit: See Dr. Brody in Aug, [facility] after    To schedule another MTM appointment, please call the clinic directly or you may call the MTM scheduling line at [phone] or toll-free at [phone].     My Clinical Pharmacist's contact information:                                                      It was a pleasure talking with you today!  Please feel free to contact me with any questions or concerns you have.      Sushila Fernandes, Pharm.D, [facility]  Medication Therapy Management Pharmacist  [phone]    You may receive a survey about the MTM services you received by email and/or US Mail.  I would appreciate your feedback to help me serve you better in the future. Your comments will be anonymous.

## 2019-06-28 ENCOUNTER — OFFICE VISIT (OUTPATIENT)
Dept: FAMILY MEDICINE | Facility: CLINIC | Age: 69
End: 2019-06-28

## 2019-06-28 VITALS
DIASTOLIC BLOOD PRESSURE: 70 MMHG | OXYGEN SATURATION: 97 % | HEART RATE: 75 BPM | SYSTOLIC BLOOD PRESSURE: 146 MMHG | TEMPERATURE: 97.6 F

## 2019-06-28 DIAGNOSIS — S39.012A STRAIN OF LUMBAR REGION, INITIAL ENCOUNTER: Primary | ICD-10-CM

## 2019-06-28 DIAGNOSIS — E66.01 OBESITY, MORBID, BMI 40.0-49.9 (H): ICD-10-CM

## 2019-06-28 PROCEDURE — 99214 OFFICE O/P EST MOD 30 MIN: CPT | Performed by: FAMILY MEDICINE

## 2019-06-28 RX ORDER — CYCLOBENZAPRINE HCL 5 MG
5 TABLET ORAL 3 TIMES DAILY PRN
Qty: 30 TABLET | Refills: 0 | Status: SHIPPED | OUTPATIENT
Start: 2019-06-28

## 2019-06-28 NOTE — PROGRESS NOTES
SUBJECTIVE:  68 morbidly obese female who has a history of chronic low back pain, presents with the following concern:    Exacerbation of back pain four days ago, worse the last 48 hours  Location: a couple of inches above and below waistline, bilaterally  ? Radicular symptoms: has a little pain down her right leg to her knee  She is able to sit comfortably in a chair  Not able to stand for very long- pain exacerbated  Able to sleep, laying on her side    Treatment thus far:  Heat  Aleve/ advil not helpful  Historically symptoms relieved with a muscle relaxant     Under the care of ortho:  Seeing Dr Chiu, [facility]- for shoulder njections    Patient Active Problem List   Diagnosis     Essential hypertension, benign     Mixed hyperlipidemia     Obesity, morbid, BMI 40.0-49.9 (H)     Esophageal reflux     Allergic rhinitis     Obstructive sleep apnea     Family history of malignant neoplasm of breast     ACP (advance care planning)     Type 2 diabetes mellitus without complication, with long-term current use of insulin (H)     Adjustment disorder with mixed anxiety and depressed mood     Acne     Health Care Home     Past Surgical History:   Procedure Laterality Date     ------------OTHER-------------  9/5/2013    L hand, cyst excision     ------------OTHER------------- Right 03/14/2014    shoulder manipulation     C APPENDECTOMY  1956     C EYE EXAM ESTABLISHED PT  4/20/11    No retinopathy     C TOTAL KNEE ARTHROPLASTY  2/06    Knee Replacement, Total Right     C TOTAL KNEE ARTHROPLASTY  3/5/07    Knee Replacement, Total  Left     C VAGINAL HYSTERECTOMY  8/01    Hysterectomy, Vaginal & BSO     HC COLONOSCOPY W/WO BRUSH/WASH  6/03     HC INCISION TENDON SHEATH FINGER Left 09/05/2013    left thumb trigger finger     HC KNEE SCOPE, DIAGNOSTIC  4/2005    Arthroscopy, Knee Left     HC REMOVE TONSILS/ADENOIDS,<11 Y/O  1953    T & A <11y.o.     TEST NOT FOUND  6/27/07    R heel/ inocencio's deformity     Current Outpatient  Medications   Medication     amitriptyline (ELAVIL) 10 MG tablet     amLODIPine (NORVASC) 5 MG tablet     ASPIRIN 81 MG OR TABS     atorvastatin (LIPITOR) 10 MG tablet     blood glucose (NO BRAND SPECIFIED) test strip     blood glucose monitoring (ONE TOUCH DELICA) lancets     Blood Glucose Monitoring Suppl (ONE TOUCH ULTRA 2) W/DEVICE KIT     budesonide (RINOCORT AQUA) 32 MCG/ACT nasal spray     cyclobenzaprine (FLEXERIL) 5 MG tablet     glipiZIDE (GLUCOTROL XL) 5 MG 24 hr tablet     insulin glargine (BASAGLAR KWIKPEN) 100 UNIT/ML pen     insulin pen needle (BD ROSE U/F) 32G X 4 MM miscellaneous     losartan-hydrochlorothiazide (HYZAAR) 100-25 MG tablet     metFORMIN (GLUCOPHAGE-XR) 500 MG 24 hr tablet     Multiple Vitamins-Minerals (CENTRUM SILVER) per tablet     order for DME     ORDER FOR DME     potassium chloride ER (MICRO-K) 10 MEQ CR capsule     sitagliptin (JANUVIA) 100 MG tablet     venlafaxine (EFFEXOR-XR) 150 MG 24 hr capsule     No current facility-administered medications for this visit.       ROS: 10 point ROS neg other than the symptoms noted above in the HPI.    OBJECTIVE:  /70 (BP Location: Left arm, Patient Position: Sitting, Cuff Size: Adult Large)   Pulse 75   Temp 97.6  F (36.4  C) (Oral)   LMP 05/12/2001   SpO2 97%   Sits comfortably in the exam room chair  Normal straight leg raising.  No weakness of the lower extremities; reflexes decreased but symmetrical patella and achilles reflex  Gait is normal.  No palpable muscle spasm or point tenderness of the vertebrae.     Assessment    (S39.012A) Strain of lumbar region, initial encounter  (primary encounter diagnosis)  Comment:   Plan: cyclobenzaprine (FLEXERIL) 5 MG tablet,         PHYSICAL THERAPY REFERRAL

## 2019-06-28 NOTE — NURSING NOTE
Jeny is here for back pain    Pre-visit Screening:  Immunizations:  up to date  Colonoscopy:  is up to date  Mammogram: is up to date  Asthma Action Test/Plan:  NA  PHQ9:  None  GAD7:  None  Questioned patient about current smoking habits Pt. has never smoked.  Ok to leave detailed message on voice mail for today's visit only Yes, phone # [phone]

## 2019-07-01 ENCOUNTER — TRANSFERRED RECORDS (OUTPATIENT)
Dept: FAMILY MEDICINE | Facility: CLINIC | Age: 69
End: 2019-07-01

## 2019-07-15 RX ORDER — METFORMIN HCL 500 MG
TABLET, EXTENDED RELEASE 24 HR ORAL
Qty: 120 TABLET | Refills: 0 | Status: SHIPPED | OUTPATIENT
Start: 2019-07-15 | End: 2019-08-14

## 2019-08-14 ENCOUNTER — OFFICE VISIT (OUTPATIENT)
Dept: FAMILY MEDICINE | Facility: CLINIC | Age: 69
End: 2019-08-14

## 2019-08-14 VITALS
OXYGEN SATURATION: 98 % | BODY MASS INDEX: 51.25 KG/M2 | WEIGHT: 264.6 LBS | DIASTOLIC BLOOD PRESSURE: 82 MMHG | HEART RATE: 78 BPM | SYSTOLIC BLOOD PRESSURE: 140 MMHG

## 2019-08-14 DIAGNOSIS — F32.5 MAJOR DEPRESSION IN REMISSION (H): ICD-10-CM

## 2019-08-14 LAB — HBA1C MFR BLD: 8.6 % (ref 4–7)

## 2019-08-14 PROCEDURE — 99214 OFFICE O/P EST MOD 30 MIN: CPT | Performed by: FAMILY MEDICINE

## 2019-08-14 PROCEDURE — 83036 HEMOGLOBIN GLYCOSYLATED A1C: CPT | Performed by: FAMILY MEDICINE

## 2019-08-14 PROCEDURE — 36415 COLL VENOUS BLD VENIPUNCTURE: CPT | Performed by: FAMILY MEDICINE

## 2019-08-14 RX ORDER — METFORMIN HCL 500 MG
TABLET, EXTENDED RELEASE 24 HR ORAL
Qty: 360 TABLET | Refills: 3 | Status: SHIPPED | OUTPATIENT
Start: 2019-08-14 | End: 2019-08-30

## 2019-08-14 RX ORDER — INSULIN GLARGINE 100 [IU]/ML
35 INJECTION, SOLUTION SUBCUTANEOUS DAILY
Qty: 30 ML | Refills: 0 | Status: SHIPPED | OUTPATIENT
Start: 2019-08-14 | End: 2019-10-14

## 2019-08-14 RX ORDER — VENLAFAXINE HYDROCHLORIDE 150 MG/1
150 CAPSULE, EXTENDED RELEASE ORAL DAILY
Qty: 90 CAPSULE | Refills: 3 | Status: SHIPPED | OUTPATIENT
Start: 2019-08-14 | End: 2020-09-21

## 2019-08-14 NOTE — LETTER
[facility]                 [facility]  [address]  [place]  [address]        For Emergencies:  Call 911      For Clinic Appointments:   [phone]                     Check your blood daily to help you with staying on task     Increase insulin to 35 units

## 2019-08-14 NOTE — NURSING NOTE
Chief Complaint   Patient presents with     Diabetes     recheck and fasting A1c     Pre-visit Screening:  Immunizations:  up to date  Colonoscopy:  is up to date  Mammogram: is up to date  Asthma Action Test/Plan:  NA  PHQ9:  NA  GAD7:  NA  Questioned patient about current smoking habits Pt. has never smoked.  Ok to leave detailed message on voice mail for today's visit only yes, phone # [phone]

## 2019-08-14 NOTE — PROGRESS NOTES
"Subjective     Alma Kraft is a 68 year old female who presents to clinic today for the following health issues:    HPI   Diabetes Follow-up  See MTM note:  Pt currently taking metformin ER 2000mg daily, Basaglar 33units daily, Januvia 100mg daily and glipizide XL 5mg daily (started on 2/21/19).      How often are you checking your blood sugar? Only checking this past week-    194/174/200/159 all am sugars     What time of day are you checking your blood sugars (select all that apply)?  Before meals    Have you had any blood sugars above 200?  No    Have you had any blood sugars below 70?  No    What symptoms do you notice when your blood sugar is low?  None    What concerns do you have today about your diabetes?  High fasting blood sugars     Do you have any of these symptoms? (Select all that apply)  Weight gain and No numbness or tingling in feet.  No redness, sores or blisters on feet.  No complaints of excessive thirst.  No reports of blurry vision.  No significant changes to weight.     Have you had a diabetic eye exam in the last 12 months? Yes- Date of last eye exam: 10/17/2018     Eats deserts  Trying to meal plan-   Weight up four pounds    BP Readings from Last 2 Encounters:   06/28/19 146/70   06/20/19 144/78     Hemoglobin A1C (%)   Date Value   05/16/2019 7.4 (A)   02/21/2019 8.8 (A)     LDL Cholesterol Calculated (mg/dL (calc))   Date Value   02/21/2019 87   02/21/2018 86       Diabetes Management Resources     Diet is poor- impulsively eats baked goods bought by other family members- she went grocery shopping this week- bought \"healthy foods\"    Goal is to prepare healthy meals for herself ( usually cooks his own meals- adult daughter is a \"picky eater\"    How many days per week do you miss taking your medication? 0        PROBLEMS TO ADD ON...  Depression: seeing a therapist. Problems with relationship with her     Low back pain: in therapy: has exercises she needs to " do  Interested in starting to use her treadmill again  Currently sedentary    Patient Active Problem List   Diagnosis     Essential hypertension, benign     Mixed hyperlipidemia     Obesity, morbid, BMI 40.0-49.9 (H)     Esophageal reflux     Allergic rhinitis     Obstructive sleep apnea     Family history of malignant neoplasm of breast     ACP (advance care planning)     Type 2 diabetes mellitus without complication, with long-term current use of insulin (H)     Adjustment disorder with mixed anxiety and depressed mood     Acne     Health Care Home     Past Surgical History:   Procedure Laterality Date     ------------OTHER-------------  9/5/2013    L hand, cyst excision     ------------OTHER------------- Right 03/14/2014    shoulder manipulation     C APPENDECTOMY  1956     C EYE EXAM ESTABLISHED PT  4/20/11    No retinopathy     C TOTAL KNEE ARTHROPLASTY  2/06    Knee Replacement, Total Right     C TOTAL KNEE ARTHROPLASTY  3/5/07    Knee Replacement, Total  Left     C VAGINAL HYSTERECTOMY  8/01    Hysterectomy, Vaginal & BSO     HC COLONOSCOPY W/WO BRUSH/WASH  6/03     HC INCISION TENDON SHEATH FINGER Left 09/05/2013    left thumb trigger finger     HC KNEE SCOPE, DIAGNOSTIC  4/2005    Arthroscopy, Knee Left     HC REMOVE TONSILS/ADENOIDS,<11 Y/O  1953    T & A <11y.o.     TEST NOT FOUND  6/27/07    R heel/ inocencio's deformity       Social History     Tobacco Use     Smoking status: Never Smoker     Smokeless tobacco: Never Used   Substance Use Topics     Alcohol use: Yes     Alcohol/week: 0.0 oz     Comment: very rare     Family History   Problem Relation Age of Onset     Cancer Father         prostate     Gastrointestinal Disease Father         GERD     Heart Disease Paternal Grandfather      Cerebrovascular Disease Maternal Grandfather      Heart Disease Maternal Grandmother      Gastrointestinal Disease Other         Niece with GERD/hiatal hernia     Breast Cancer Sister         51     Alzheimer Disease No  family hx of      Diabetes No family hx of             Reviewed and updated as needed this visit by Provider         Review of Systems   ROS COMP: Constitutional, HEENT, cardiovascular, pulmonary, GI, , musculoskeletal, neuro, skin, endocrine and psych systems are negative, except as otherwise noted.           Objective    BP (!) 140/82 (BP Location: Right arm, Patient Position: Sitting, Cuff Size: Adult Large)   Pulse 78   Wt 120 kg (264 lb 9.6 oz)   LMP 05/12/2001   SpO2 98%   BMI 51.25 kg/m    There is no height or weight on file to calculate BMI.  Physical Exam   GENERAL: healthy, alert and no distress  NECK: no adenopathy, no asymmetry, masses, or scars and thyroid normal to palpation  RESP: lungs clear to auscultation - no rales, rhonchi or wheezes  CV: regular rate and rhythm,  no murmur, click or rub,   MS: no gross musculoskeletal defects noted,mild edema-she has her support hose on    Diagnostic Test Results:  Labs reviewed in Epic  Results for orders placed or performed in visit on 08/14/19 (from the past 24 hour(s))   Hemoglobin A1c (BFP)   Result Value Ref Range    Hemoglobin A1C 8.6 (A) 4.0 - 7.0 %           Assessment & Plan   (E11.65,  Z79.4) Uncontrolled type 2 diabetes mellitus without complication, with long-term current use of insulin (H)  (primary encounter diagnosis)  Comment: increase insulin to 35 units (? [facility] RX to [facility] or mail in- check with MTM for guidance  Plan: insulin glargine (BASAGLAR KWIKPEN) 100 UNIT/ML        pen, sitagliptin (JANUVIA) 100 MG tablet,         metFORMIN (GLUCOPHAGE-XR) 500 MG 24 hr tablet           BLOOD PRESSURE NOT AT GOAL  WEIGHT INCREASED  DIABETIS NOT CONTROLLED- needs to check blood sugars- continue therapy- IMPROVE DIET- move more    (F32.5) Major depression in remission (H)  Comment:situational triggers- relationships  Encouraged her to continue seeing her therapist- motivated to make changes in her life  Plan: venlafaxine (EFFEXOR-XR) 150 MG 24 hr  capsule        Continue her current dose of effexor             FUTURE APPOINTMENTS:       - Follow-up visit in 3 months  No follow-ups on file.    Susu Brody MD  [facility]

## 2019-08-15 ENCOUNTER — TRANSFERRED RECORDS (OUTPATIENT)
Dept: FAMILY MEDICINE | Facility: CLINIC | Age: 69
End: 2019-08-15

## 2019-08-19 ENCOUNTER — TELEPHONE (OUTPATIENT)
Dept: PHARMACY | Facility: PHYSICIAN GROUP | Age: 69
End: 2019-08-19

## 2019-08-19 NOTE — TELEPHONE ENCOUNTER
Spoke with  to clarify the medication is coming from the different places     [facility] (not mail order pharmacy through insurance- this is why they said it would be $300 copay).     [employer]- will be applying for Tresiba to replace basaglar- they are getting the pharmacy spend report, 1040 and signature page to drop off at the office, will combine this with the written rx and provider section of the application to fax in.     NEW rx will be for Tresiba 100u/ml 35units once daily  To replace basaglar.     Will also include rx for pen needles 1 daily.     Sushila Fernandes, Pharm.D, [facility]  Medication Therapy Management Pharmacist  [phone]

## 2019-08-30 RX ORDER — METFORMIN HCL 500 MG
TABLET, EXTENDED RELEASE 24 HR ORAL
Qty: 360 TABLET | Refills: 3 | Status: SHIPPED | OUTPATIENT
Start: 2019-08-30 | End: 2020-08-21

## 2019-08-30 NOTE — TELEPHONE ENCOUNTER
Patient called in and stated that she needs her Metformin sent to the [facility] pharmacy in [place] instead of the mail order. She informed mail order to cancel that one and is hoping to have this resent to the [facility] pharmacy today. Routing to Dr. Brody to send in prescription, [facility] pharmacy is chosen.

## 2019-09-03 RX ORDER — PEN NEEDLE, DIABETIC 32GX 5/32"
NEEDLE, DISPOSABLE MISCELLANEOUS
Qty: 100 EACH | Refills: 2 | COMMUNITY
Start: 2019-09-03

## 2019-09-03 NOTE — TELEPHONE ENCOUNTER
Alma Kraft is requesting a refill of:    Refused Prescriptions:                       Disp   Refills    ULTICARE MICRO 32G X 4 MM insulin pen need*100 ea*2        Sig: USE 1 NEEDLE DAILY  Refused By: RAVEN JENKINS  Reason for Refusal: Refill not appropriate  Reason for Refusal Comment: Refilled 100 with 3 refills on 05/16/19

## 2019-09-05 ENCOUNTER — TELEPHONE (OUTPATIENT)
Dept: FAMILY MEDICINE | Facility: CLINIC | Age: 69
End: 2019-09-05

## 2019-09-05 DIAGNOSIS — F32.5 MAJOR DEPRESSION IN REMISSION (H): ICD-10-CM

## 2019-09-05 NOTE — TELEPHONE ENCOUNTER
Alma Kraft is requesting a refill of:    Pending Prescriptions:                       Disp   Refills    venlafaxine (EFFEXOR-XR) 150 MG 24 hr cap*90 cap*3            Sig: Take 1 capsule (150 mg) by mouth daily    Needs to go to her local pharmacy. Called into [facility]

## 2019-09-06 ENCOUNTER — TELEPHONE (OUTPATIENT)
Dept: FAMILY MEDICINE | Facility: CLINIC | Age: 69
End: 2019-09-06

## 2019-09-06 NOTE — TELEPHONE ENCOUNTER
Sanchez, patients spouse called stating that they received a letter from [employer] that patient was denied for the Assistance Program.    I asked Sanchez if he could fax, mail or bring in a copy of the letter from [employer] so we can review and see what we can do. Patient said that he will stop in today to drop off a copy of the letter.     I will the toll free # that Sanchez states is on the letter to call RE the denial when we get the copy.     Sanchez ( spouse ) gave his cell # [phone] with any questions.     LISSETTE

## 2019-09-06 NOTE — TELEPHONE ENCOUNTER
Tresiba is not going to be covered with patient assistance program. Is there another medication that she can be on?    Please advise    Thanks,[place]

## 2019-09-09 NOTE — TELEPHONE ENCOUNTER
Pt  is to come in and discuss with BJS. The application is online and Pt does not want to go online for this.

## 2019-09-10 NOTE — TELEPHONE ENCOUNTER
I talked with Sanchez, patients spouse yesterday RE the denial they received from [employer] for the Tresiba.     I did review the letter from [employer]. The letter did advise that patient can apply for extra help or low Income Subsidy. They can call Social Security Administration ( [employer] ) @ 967.172.9137 or go to www.socialsecurity.gov/prescriptionhelp/.     If they are denied assistance from the Federal Government Medicare Prescription Drug Plan or Medicaid, you may be eligible for the [employer] Patient Assistance Program. You will need to send the denial of notification.     After talking with the patient RE this, they do not want to do the on line application. They are asking if patient can go back on Basaglar.  I let Sanchez know that I will talk with BJS RE this and call him back.     Dr. Brody advised to have patient come in for an office visit to discuss the next step.     I left a message for Sanchez ( [phone] ) to call me back, will advise him that an appt is recommended.

## 2019-09-10 NOTE — TELEPHONE ENCOUNTER
I talked with Sanchez, patients spouse yesterday RE the denial they received from [employer] for the Tresiba.     I did review the letter from [employer]. The letter did advise that patient can apply for extra help or low Income Subsidy. They can call Social Security Administration ( [employer] ) @ [phone] or go to www.socialsecurity.gov/prescriptionhelp/.     If they are denied assistance from the Federal Government Medicare Prescription Drug Plan or Medicaid, you may be eligible for the [employer] Patient Assistance Program. You will need to send the denial of notification.     After talking with the patient RE this, they do not want to do the on line application. They are asking if patient can go back on Basaglar.  I let Sanchez know that I will talk with BJS RE this and call him back.     Dr. Brody advised to have patient come in for an office visit to discuss the next step.     I left a message for Sanchez ( [phone] ) to call me back, will advise him that an appt is recommended.

## 2019-09-11 ENCOUNTER — TELEPHONE (OUTPATIENT)
Dept: FAMILY MEDICINE | Facility: CLINIC | Age: 69
End: 2019-09-11

## 2019-09-11 NOTE — TELEPHONE ENCOUNTER
We received a fax from [facility] for PT evaluation and it  needs your signature. I will place the form in your in basket in your office.     Fax: [phone]

## 2019-09-11 NOTE — TELEPHONE ENCOUNTER
I talked with Sanchez, patients spouse RE note below. Patient will call back to schedule an appt with [facility]. -- Patient is scheduled for 11/14/19. At this time we will keep this appt. Will see what [facility] advises after sooner appt.

## 2019-10-12 DIAGNOSIS — R51.9 CHRONIC NONINTRACTABLE HEADACHE, UNSPECIFIED HEADACHE TYPE: ICD-10-CM

## 2019-10-12 DIAGNOSIS — G89.29 CHRONIC NONINTRACTABLE HEADACHE, UNSPECIFIED HEADACHE TYPE: ICD-10-CM

## 2019-10-12 DIAGNOSIS — F51.04 PSYCHOPHYSIOLOGICAL INSOMNIA: ICD-10-CM

## 2019-10-14 ENCOUNTER — OFFICE VISIT (OUTPATIENT)
Dept: FAMILY MEDICINE | Facility: CLINIC | Age: 69
End: 2019-10-14

## 2019-10-14 VITALS
OXYGEN SATURATION: 98 % | TEMPERATURE: 98.1 F | HEART RATE: 82 BPM | DIASTOLIC BLOOD PRESSURE: 86 MMHG | SYSTOLIC BLOOD PRESSURE: 148 MMHG

## 2019-10-14 DIAGNOSIS — G44.049 CHRONIC PAROXYSMAL HEMICRANIA, NOT INTRACTABLE: ICD-10-CM

## 2019-10-14 LAB
ALBUMIN URINE MG/G CR: ABNORMAL MG/G CREATININE
ALBUMIN URINE MG/SPEC: 80
CREATININE URINE: 200

## 2019-10-14 PROCEDURE — G0008 ADMIN INFLUENZA VIRUS VAC: HCPCS | Performed by: FAMILY MEDICINE

## 2019-10-14 PROCEDURE — 99213 OFFICE O/P EST LOW 20 MIN: CPT | Mod: 25 | Performed by: FAMILY MEDICINE

## 2019-10-14 PROCEDURE — 90686 IIV4 VACC NO PRSV 0.5 ML IM: CPT | Performed by: FAMILY MEDICINE

## 2019-10-14 PROCEDURE — 82043 UR ALBUMIN QUANTITATIVE: CPT | Performed by: FAMILY MEDICINE

## 2019-10-14 RX ORDER — AMITRIPTYLINE HYDROCHLORIDE 10 MG/1
TABLET ORAL
Qty: 30 TABLET | Refills: 0 | COMMUNITY
Start: 2019-10-14

## 2019-10-14 RX ORDER — NORTRIPTYLINE HCL 10 MG
10 CAPSULE ORAL AT BEDTIME
Qty: 90 CAPSULE | Refills: 1 | Status: SHIPPED | OUTPATIENT
Start: 2019-10-14 | End: 2020-02-13

## 2019-10-14 RX ORDER — INSULIN GLARGINE 100 [IU]/ML
35 INJECTION, SOLUTION SUBCUTANEOUS DAILY
Qty: 30 ML | Refills: 0 | Status: SHIPPED | OUTPATIENT
Start: 2019-10-14 | End: 2019-11-14

## 2019-10-14 NOTE — LETTER
October 15, 2019      Alma CANADA Abena  [address]  [place] [address]        Dear ,    We are writing to inform you of your test results.    Microalbumin is present in your urine- this is an abnormal result.  Treatment is improved diabetis control  Your Hemoglobin A1C is due in mid November.    Resulted Orders   Albumin Random Urine Quantitative with Creat Ratio   Result Value Ref Range    Albumin Urine mg/spec 80 (A) <30    Albumin Urine mg/g Cr  (A) <30 MG/G Creatinine    Creatinine Urine 200 <300       If you have any questions or concerns, please call the clinic at the number listed above.       Sincerely,        Susu Brody MD

## 2019-10-14 NOTE — PROGRESS NOTES
"SUBJECTIVE:  69 year old female presents with the following concerns:   She needs refill of amitriptyline  Contraindicated over 65  She has been prescribed this for years, initially by Dr Reeves, [facility] for cranial muscle spasms  She takes low dose- 10 mg at hs  Previous attempts to wean off have been unsuccessful.    Type 2 Diabetis:  Under more stress (daughter is moving out) house is discombobulated- not focussed on her health  Not checking blood sugars  Diet: poor- has been eating donuts, unhealthy snacks (\"my family keeps buying\")  inactive  No due for Hemoglobin A1C for another month        Patient Active Problem List   Diagnosis     Essential hypertension, benign     Mixed hyperlipidemia     Obesity, morbid, BMI 40.0-49.9 (H)     Esophageal reflux     Allergic rhinitis     Obstructive sleep apnea     Family history of malignant neoplasm of breast     ACP (advance care planning)     Type 2 diabetes mellitus without complication, with long-term current use of insulin (H)     Adjustment disorder with mixed anxiety and depressed mood     Acne     Health Care Home     Past Surgical History:   Procedure Laterality Date     ------------OTHER-------------  9/5/2013    L hand, cyst excision     ------------OTHER------------- Right 03/14/2014    shoulder manipulation     C APPENDECTOMY  1956     C EYE EXAM ESTABLISHED PT  4/20/11    No retinopathy     C TOTAL KNEE ARTHROPLASTY  2/06    Knee Replacement, Total Right     C TOTAL KNEE ARTHROPLASTY  3/5/07    Knee Replacement, Total  Left     C VAGINAL HYSTERECTOMY  8/01    Hysterectomy, Vaginal & BSO     HC COLONOSCOPY W/WO BRUSH/WASH  6/03     HC INCISION TENDON SHEATH FINGER Left 09/05/2013    left thumb trigger finger     HC KNEE SCOPE, DIAGNOSTIC  4/2005    Arthroscopy, Knee Left     HC REMOVE TONSILS/ADENOIDS,<13 Y/O  1953    T & A <11y.o.     TEST NOT FOUND  6/27/07    R heel/ inocencio's deformity     Current Outpatient Medications   Medication     " amLODIPine (NORVASC) 5 MG tablet     ASPIRIN 81 MG OR TABS     atorvastatin (LIPITOR) 10 MG tablet     budesonide (RINOCORT AQUA) 32 MCG/ACT nasal spray     cyclobenzaprine (FLEXERIL) 5 MG tablet     glipiZIDE (GLUCOTROL XL) 5 MG 24 hr tablet     insulin glargine (BASAGLAR KWIKPEN) 100 UNIT/ML pen     insulin pen needle (BD ROSE U/F) 32G X 4 MM miscellaneous     losartan-hydrochlorothiazide (HYZAAR) 100-25 MG tablet     metFORMIN (GLUCOPHAGE-XR) 500 MG 24 hr tablet     Multiple Vitamins-Minerals (CENTRUM SILVER) per tablet     nortriptyline (PAMELOR) 10 MG capsule     potassium chloride ER (MICRO-K) 10 MEQ CR capsule     sitagliptin (JANUVIA) 100 MG tablet     venlafaxine (EFFEXOR-XR) 150 MG 24 hr capsule     blood glucose (NO BRAND SPECIFIED) test strip     blood glucose monitoring (ONE TOUCH DELICA) lancets     Blood Glucose Monitoring Suppl (ONE TOUCH ULTRA 2) W/DEVICE KIT     order for DME     ORDER FOR DME     No current facility-administered medications for this visit.      OBJECTIVE:  BP (!) 148/86 (BP Location: Right arm, Patient Position: Sitting, Cuff Size: Adult Large)   Pulse 82   Temp 98.1  F (36.7  C) (Oral)   LMP 05/12/2001   SpO2 98%   No weight today    Labs: pos Micro albumin    Assessment   (G44.049) Chronic paroxysmal hemicrania, not intractable  (primary encounter diagnosis)  Comment:  Trial of nortriptyline rather than amitriptyline  Plan: nortriptyline (PAMELOR) 10 MG capsule             (E11.65,  Z79.4) Uncontrolled type 2 diabetes mellitus without complication, with long-term current use of insulin (H)  Comment:    Plan: Albumin Random Urine Quantitative with Creat         Ratio, insulin glargine (BASAGLAR KWIKPEN) 100         UNIT/ML pen    Due for Hemoglobin A1C in one month             More than 50% of visit spent in counseling.  15 mn visit

## 2019-10-14 NOTE — TELEPHONE ENCOUNTER
Alma Kraft is requesting a refill of:    Refused Prescriptions:                       Disp   Refills    amitriptyline (ELAVIL) 10 MG tablet [Pharm*30 tab*0        Sig: TAKE ONE TABLET BY MOUTH DAILY.  Refused By: RAVEN JENKINS  Reason for Refusal: Patient needs appointment  Reason for Refusal Comment: Pt has appt scheduled for today

## 2019-10-14 NOTE — NURSING NOTE
Chief Complaint   Patient presents with     Recheck Medication     consult on her meds     Pre-visit Screening:  Immunizations:  up to date  Colonoscopy:  is up to date  Mammogram: is up to date  Asthma Action Test/Plan:  MELLISSA  PHQ9:  NA  GAD7:  NA  Questioned patient about current smoking habits Pt. has never smoked.  Ok to leave detailed message on voice mail for today's visit only yes, phone # [phone]

## 2019-10-21 ENCOUNTER — TRANSFERRED RECORDS (OUTPATIENT)
Dept: FAMILY MEDICINE | Facility: CLINIC | Age: 69
End: 2019-10-21

## 2019-11-09 DIAGNOSIS — I10 ESSENTIAL HYPERTENSION, BENIGN: ICD-10-CM

## 2019-11-11 RX ORDER — LOSARTAN POTASSIUM AND HYDROCHLOROTHIAZIDE 25; 100 MG/1; MG/1
1 TABLET ORAL DAILY
Qty: 90 TABLET | Refills: 0 | OUTPATIENT
Start: 2019-11-11

## 2019-11-11 RX ORDER — AMLODIPINE BESYLATE 5 MG/1
TABLET ORAL
Qty: 90 TABLET | Refills: 0 | OUTPATIENT
Start: 2019-11-11

## 2019-11-11 NOTE — TELEPHONE ENCOUNTER
Refused Prescriptions:                       Disp   Refills    amLODIPine (NORVASC) 5 MG tablet [Pharmacy*90 tab*0        Sig: Take one tablet by mouth daily.  Refused By: LOUISE REARDON  Reason for Refusal: Appt required, please call patient    losartan-hydrochlorothiazide (HYZAAR) 100-*90 tab*0        Sig: Take 1 tablet by mouth daily.  Refused By: LOUISE REARDON  Reason for Refusal: Appt required, please call patient    Pt has an appt on 11-  Louise  [phone] (home)

## 2019-11-14 ENCOUNTER — OFFICE VISIT (OUTPATIENT)
Dept: FAMILY MEDICINE | Facility: CLINIC | Age: 69
End: 2019-11-14

## 2019-11-14 VITALS
TEMPERATURE: 98.1 F | HEART RATE: 82 BPM | OXYGEN SATURATION: 97 % | WEIGHT: 261.2 LBS | DIASTOLIC BLOOD PRESSURE: 88 MMHG | BODY MASS INDEX: 50.59 KG/M2 | SYSTOLIC BLOOD PRESSURE: 152 MMHG

## 2019-11-14 DIAGNOSIS — I10 ESSENTIAL HYPERTENSION, BENIGN: ICD-10-CM

## 2019-11-14 DIAGNOSIS — E66.01 OBESITY, MORBID, BMI 40.0-49.9 (H): ICD-10-CM

## 2019-11-14 LAB
BUN SERPL-MCNC: 17 MG/DL (ref 7–25)
BUN/CREATININE RATIO: 27.4 (ref 6–22)
CALCIUM SERPL-MCNC: 9.6 MG/DL (ref 8.6–10.3)
CHLORIDE SERPLBLD-SCNC: 100.4 MMOL/L (ref 98–110)
CO2 SERPL-SCNC: 30.4 MMOL/L (ref 20–32)
CREAT SERPL-MCNC: 0.62 MG/DL (ref 0.7–1.18)
GLUCOSE SERPL-MCNC: 170 MG/DL (ref 60–99)
HBA1C MFR BLD: 8.5 % (ref 4–7)
POTASSIUM SERPL-SCNC: 3.38 MMOL/L (ref 3.5–5.3)
SODIUM SERPL-SCNC: 138.7 MMOL/L (ref 135–146)

## 2019-11-14 PROCEDURE — 36415 COLL VENOUS BLD VENIPUNCTURE: CPT | Performed by: FAMILY MEDICINE

## 2019-11-14 PROCEDURE — 80048 BASIC METABOLIC PNL TOTAL CA: CPT | Performed by: FAMILY MEDICINE

## 2019-11-14 PROCEDURE — 83036 HEMOGLOBIN GLYCOSYLATED A1C: CPT | Performed by: FAMILY MEDICINE

## 2019-11-14 PROCEDURE — 99214 OFFICE O/P EST MOD 30 MIN: CPT | Performed by: FAMILY MEDICINE

## 2019-11-14 RX ORDER — INSULIN GLARGINE 100 [IU]/ML
35 INJECTION, SOLUTION SUBCUTANEOUS DAILY
Qty: 30 ML | Refills: 0 | Status: SHIPPED | OUTPATIENT
Start: 2019-11-14 | End: 2020-02-12

## 2019-11-14 RX ORDER — AMLODIPINE BESYLATE 5 MG/1
5 TABLET ORAL DAILY
Qty: 90 TABLET | Refills: 1 | Status: CANCELLED | OUTPATIENT
Start: 2019-11-14

## 2019-11-14 RX ORDER — AMLODIPINE BESYLATE 2.5 MG/1
7.5 TABLET ORAL DAILY
Qty: 270 TABLET | Refills: 1 | Status: SHIPPED | OUTPATIENT
Start: 2019-11-14 | End: 2019-12-11 | Stop reason: DRUGHIGH

## 2019-11-14 RX ORDER — POTASSIUM CHLORIDE 750 MG/1
CAPSULE, EXTENDED RELEASE ORAL
Qty: 360 CAPSULE | Refills: 3 | Status: SHIPPED | OUTPATIENT
Start: 2019-11-14 | End: 2019-12-11

## 2019-11-14 RX ORDER — LOSARTAN POTASSIUM AND HYDROCHLOROTHIAZIDE 25; 100 MG/1; MG/1
1 TABLET ORAL DAILY
Qty: 90 TABLET | Refills: 1 | Status: SHIPPED | OUTPATIENT
Start: 2019-11-14 | End: 2019-11-14

## 2019-11-14 RX ORDER — LOSARTAN POTASSIUM AND HYDROCHLOROTHIAZIDE 12.5; 1 MG/1; MG/1
1 TABLET ORAL DAILY
Qty: 90 TABLET | Refills: 1 | Status: SHIPPED | OUTPATIENT
Start: 2019-11-14 | End: 2020-02-13

## 2019-11-14 ASSESSMENT — ANXIETY QUESTIONNAIRES
1. FEELING NERVOUS, ANXIOUS, OR ON EDGE: SEVERAL DAYS
GAD7 TOTAL SCORE: 2
7. FEELING AFRAID AS IF SOMETHING AWFUL MIGHT HAPPEN: NOT AT ALL
3. WORRYING TOO MUCH ABOUT DIFFERENT THINGS: SEVERAL DAYS
6. BECOMING EASILY ANNOYED OR IRRITABLE: NOT AT ALL
IF YOU CHECKED OFF ANY PROBLEMS ON THIS QUESTIONNAIRE, HOW DIFFICULT HAVE THESE PROBLEMS MADE IT FOR YOU TO DO YOUR WORK, TAKE CARE OF THINGS AT HOME, OR GET ALONG WITH OTHER PEOPLE: NOT DIFFICULT AT ALL
5. BEING SO RESTLESS THAT IT IS HARD TO SIT STILL: NOT AT ALL
2. NOT BEING ABLE TO STOP OR CONTROL WORRYING: NOT AT ALL

## 2019-11-14 ASSESSMENT — PATIENT HEALTH QUESTIONNAIRE - PHQ9
5. POOR APPETITE OR OVEREATING: NOT AT ALL
SUM OF ALL RESPONSES TO PHQ QUESTIONS 1-9: 7

## 2019-11-14 NOTE — PROGRESS NOTES
Subjective     Alma Kraft is a 69 year old female who presents to clinic today for the following health issues:    HPI   Diabetes Follow-up      How often are you checking your blood sugar? She just started checking her blood sugar in the last week or two    What concerns do you have today about your diabetes? None     Do you have any of these symptoms? (Select all that apply)  No numbness or tingling in feet.  No redness, sores or blisters on feet.  No complaints of excessive thirst.  No reports of blurry vision.  No significant changes to weight.     Have you had a diabetic eye exam in the last 12 months? Yes- Date of last eye exam: 10/21/2019     Started cooking again this week- cooking vegetables, meat- noticeable improvement in fasting blood sugars-114 fasting today-  175 fasting a week ago   trying to eat better- sucking on hard candy- three jaw breakers= for 15 gm carbs    Sugars coming down 175/166/141/138/120/114  Seeing therapist every other week which she finds helpful    BP Readings from Last 2 Encounters:   10/14/19 (!) 148/86   08/14/19 (!) 140/82     Hemoglobin A1C (%)   Date Value   08/14/2019 8.6 (A)   05/16/2019 7.4 (A)     LDL Cholesterol Calculated (mg/dL (calc))   Date Value   02/21/2019 87   02/21/2018 86       Diabetes Management Resources    Hypertension Follow-up      Do you check your blood pressure regularly outside of the clinic? No     Are you following a low salt diet? No- often eats out-     Are your blood pressures ever more than 140 on the top number (systolic) OR more   than 90 on the bottom number (diastolic), for example 140/90?          How many servings of fruits and vegetables do you eat daily?  Needs improvement    On average, how many sweetened beverages do you drink each day (soda, juice, sweet tea, etc)?   Drinks diet pop-     How many days per week do you miss taking your medication? 0    Chronic headaches: trial of nortriptyline rather than amitriptyline  Getting  some headaches- readdress again in a month      Patient Active Problem List   Diagnosis     Essential hypertension, benign     Mixed hyperlipidemia     Obesity, morbid, BMI 40.0-49.9 (H)     Esophageal reflux     Allergic rhinitis     Obstructive sleep apnea     Family history of malignant neoplasm of breast     ACP (advance care planning)     Type 2 diabetes mellitus without complication, with long-term current use of insulin (H)     Adjustment disorder with mixed anxiety and depressed mood     Acne     Health Care Home     Past Surgical History:   Procedure Laterality Date     ------------OTHER-------------  9/5/2013    L hand, cyst excision     ------------OTHER------------- Right 03/14/2014    shoulder manipulation     C APPENDECTOMY  1956     C EYE EXAM ESTABLISHED PT  4/20/11    No retinopathy     C TOTAL KNEE ARTHROPLASTY  2/06    Knee Replacement, Total Right     C TOTAL KNEE ARTHROPLASTY  3/5/07    Knee Replacement, Total  Left     C VAGINAL HYSTERECTOMY  8/01    Hysterectomy, Vaginal & BSO     HC COLONOSCOPY W/WO BRUSH/WASH  6/03     HC INCISION TENDON SHEATH FINGER Left 09/05/2013    left thumb trigger finger     HC KNEE SCOPE, DIAGNOSTIC  4/2005    Arthroscopy, Knee Left     HC REMOVE TONSILS/ADENOIDS,<13 Y/O  1953    T & A <11y.o.     TEST NOT FOUND  6/27/07    R heel/ inocencio's deformity       Social History     Tobacco Use     Smoking status: Never Smoker     Smokeless tobacco: Never Used   Substance Use Topics     Alcohol use: Yes     Alcohol/week: 0.0 standard drinks     Comment: very rare     Family History   Problem Relation Age of Onset     Cancer Father         prostate     Gastrointestinal Disease Father         GERD     Heart Disease Paternal Grandfather      Cerebrovascular Disease Maternal Grandfather      Heart Disease Maternal Grandmother      Gastrointestinal Disease Other         Niece with GERD/hiatal hernia     Breast Cancer Sister         51     Alzheimer Disease No family hx of       Diabetes No family hx of          Current Outpatient Medications   Medication Sig Dispense Refill     amLODIPine (NORVASC) 2.5 MG tablet Take 3 tablets (7.5 mg) by mouth daily 270 tablet 1     ASPIRIN 81 MG OR TABS 1 tab po QD (Once per day) 100 3     atorvastatin (LIPITOR) 10 MG tablet Take 1 tablet (10 mg) by mouth daily 90 tablet 3     blood glucose monitoring (ONE TOUCH DELICA) lancets Use to test blood sugars 1 times daily or as directed. 100 each 3     budesonide (RINOCORT AQUA) 32 MCG/ACT nasal spray SPRAY 2 SPRAYS INTO BOTH NOSTRILS ONCE DAILY 3 Bottle 3     cyclobenzaprine (FLEXERIL) 5 MG tablet Take 1 tablet (5 mg) by mouth 3 times daily as needed for muscle spasms 30 tablet 0     glipiZIDE (GLUCOTROL XL) 5 MG 24 hr tablet Take 1 tablet (5 mg) by mouth daily 90 tablet 3     insulin glargine (BASAGLAR KWIKPEN) 100 UNIT/ML pen Inject 35 Units Subcutaneous daily 30 mL 0     insulin pen needle (BD ROSE U/F) 32G X 4 MM miscellaneous Use 1 pen needles daily or as directed. 100 each 3     losartan-hydrochlorothiazide (HYZAAR) 100-12.5 MG tablet Take 1 tablet by mouth daily 90 tablet 1     metFORMIN (GLUCOPHAGE-XR) 500 MG 24 hr tablet Take 4 tablets by mouth daily with dinner. 360 tablet 3     Multiple Vitamins-Minerals (CENTRUM SILVER) per tablet 1 tablet 4 times weekly 100 tablet      nortriptyline (PAMELOR) 10 MG capsule Take 1 capsule (10 mg) by mouth At Bedtime 90 capsule 1     potassium chloride ER (MICRO-K) 10 MEQ CR capsule TAKE TWO CAPSULES BY MOUTH TWICE DAILY 360 capsule 3     sitagliptin (JANUVIA) 100 MG tablet Take 1 tablet (100 mg) by mouth daily 90 tablet 3     venlafaxine (EFFEXOR-XR) 150 MG 24 hr capsule Take 1 capsule (150 mg) by mouth daily 90 capsule 3     blood glucose (NO BRAND SPECIFIED) test strip Use to test blood sugar one times daily or as directed. To accompany: {Blood Glucose Monitor Brands One Touch Viro test strips 100 strip 3     Blood Glucose Monitoring Suppl (ONE TOUCH ULTRA 2)  W/DEVICE KIT Use to test blood sugars 2 times daily or as directed. 1 kit 0     order for DME Equipment being ordered: compression socks, below knee- light compression    Dx: edema 2 Device 1     ORDER FOR DME Equipment being ordered: Mediven plus compression stockings    29656  20-30 compression 3 Device 0       Taking three advil before bed- joint injections recommended as a better alternative    PROBLEMS TO ADD ON...  Morbid obesity: weight unchanged  Depression- seeing therapist, and mental health provider  Headaches- having a few more since change from amitriptyline to nortriptyline  Shoulder pain- cortisone injection in the last month- contemplating shoulder surgery  Reviewed and updated as needed this visit by Provider         Review of Systems   ROS COMP: Constitutional, HEENT, cardiovascular, pulmonary, GI, , musculoskeletal, neuro, skin, endocrine and psych systems are negative, except as otherwise noted.      Objective    BP (!) 152/88 (BP Location: Right arm, Patient Position: Sitting, Cuff Size: Adult Large)   Pulse 82   Temp 98.1  F (36.7  C) (Oral)   Wt 118.5 kg (261 lb 3.2 oz)   LMP 05/12/2001   SpO2 97%   BMI 50.59 kg/m    There is no height or weight on file to calculate BMI.     144/84 on recheck  Physical Exam   GENERAL: healthy, alert and no distress  NECK: no adenopathy, no asymmetry, masses, or scars and thyroid normal to palpation  RESP: lungs clear to auscultation - no rales, rhonchi or wheezes  CV: regular rate and rhythm, no murmur, click or rub,      Diagnostic Test Results:  Labs reviewed in Epic  Results for orders placed or performed in visit on 11/14/19 (from the past 24 hour(s))   Hemoglobin A1c (BFP)   Result Value Ref Range    Hemoglobin A1C 8.5 (A) 4.0 - 7.0 %   Basic Metabolic Panel (BFP)   Result Value Ref Range    Carbon Dioxide 30.4 20 - 32 mmol/L    Creatinine 0.62 (A) 0.70 - 1.18 mg/dL    Glucose 170 (A) 60 - 99 mg/dL    Sodium 138.7 135 - 146 mmol/L    Potassium  3.38 (A) 3.5 - 5.3 mmol/L    Chloride 100.4 98 - 110 mmol/L    Urea Nitrogen 17 7 - 25 mg/dL    Calcium 9.6 8.6 - 10.3 mg/dL    BUN/Creatinine Ratio 27.4 (A) 6 - 22           Assessment & Plan   Problem List Items Addressed This Visit     Essential hypertension, benign    Relevant Medications    potassium chloride ER (MICRO-K) 10 MEQ CR capsule    amLODIPine (NORVASC) 2.5 MG tablet    losartan-hydrochlorothiazide (HYZAAR) 100-12.5 MG tablet    Other Relevant Orders    VENOUS COLLECTION (Completed)    Basic Metabolic Panel (BFP) (Completed)      Other Visit Diagnoses     Uncontrolled type 2 diabetes mellitus without complication, with long-term current use of insulin (H)        Relevant Medications    insulin glargine (BASAGLAR KWIKPEN) 100 UNIT/ML pen    blood glucose monitoring (ONE TOUCH DELICA) lancets    Other Relevant Orders    Hemoglobin A1c (BFP) (Completed)    VENOUS COLLECTION (Completed)               FUTURE APPOINTMENTS:       - Follow-up visit in on [facility]    Recheck blood pressure in one month- non fasting  Increase amlodipine from 5 mg to 7.5 mg  Despite taking 40 meq of potassium supplement daily, her serum potassium remains low  Losartan 100/25 was changed to losartan 100/12.5    MTM today - for help with  assistance program for januvia RX

## 2019-11-14 NOTE — PATIENT INSTRUCTIONS
It was a pleasure meeting you today Jeny.  I look forward to being a part of your care team.    PLAN DETAILS:  1) Call [employer] at the phone number that you use to refill your Januvia and ask about the renewal process and paperwork to continue being on the assistance program.  Call me and let me know what they say.  2) Check your calendar and your 's calendar and call the clinic to schedule an appointment with me that works for you.  If no Thursdays seem to be working before the end of the year, call me and we will find a different time to meet.        If you have any questions or concerns, please feel free to contact me.  If I do not answer my phone, leave a message and I will return your call as soon as I can.      Best Regards,    Doe Packer, PharmD  Medication Therapy Management Pharmacist  [facility]  General [facility] Phone: [phone]  Direct Office Phone: [phone]

## 2019-11-14 NOTE — LETTER
November 14, 2019      Alma Kraft  [address]  [place] [facility] [place] [address]        Dear ,    We are writing to inform you of your test results.    Normal kidney function - nevertheless avoid taking ibuprofen-  Potassium remains below normal range- continue your current dose, but hydrochlorothiazide was lowered in your losartan RX from 25 t 12.5  Increase amlodipine to 7.5 mg daily  RECHECK YOUR BLOOD PRESSURE IN ONE MONTH    Continue to check your blood sugars  Home cooking is best for managing your diabetis.  Bring your recorded blood sugars to your next office visit    Resulted Orders   Hemoglobin A1c (BFP)   Result Value Ref Range    Hemoglobin A1C 8.5 (A) 4.0 - 7.0 %   Basic Metabolic Panel (BFP)   Result Value Ref Range    Carbon Dioxide 30.4 20 - 32 mmol/L    Creatinine 0.62 (A) 0.70 - 1.18 mg/dL    Glucose 170 (A) 60 - 99 mg/dL    Sodium 138.7 135 - 146 mmol/L    Potassium 3.38 (A) 3.5 - 5.3 mmol/L    Chloride 100.4 98 - 110 mmol/L    Urea Nitrogen 17 7 - 25 mg/dL    Calcium 9.6 8.6 - 10.3 mg/dL    BUN/Creatinine Ratio 27.4 (A) 6 - 22       If you have any questions or concerns, please call the clinic at the number listed above.       Sincerely,        Susu Brody MD

## 2019-11-14 NOTE — PATIENT INSTRUCTIONS
Schedule shoulder injection with ortho    Follow up in one month- try to stay focussed on your diet and daily blood sugar testing  Bring results to clinic  Count carbs

## 2019-11-14 NOTE — PROGRESS NOTES
SUBJECTIVE/OBJECTIVE:    I met Jeny today in the clinic.  She was referred to me by Dr. Brody to assist with medication mgmt, specifically for cost concerns with diabetes.  Pt had been working with Dr. Fernandes, PharmD in the past and earlier this year, patient enrolled in the patient assistance program for Januvia through [employer].  Pt also mentioned that she was receiving some assistance for her insulin.  I was unable to find any documentation that an assistance program is in place for Basaglar for Jeny.    We talked about how her  is usually involved in helping with the financial aspect of her medications, however, he was not here today with her.    We also spent a little time talking about how she thinks about food and diabetes and stress and lifestyle.  Jeny reports that the stress in her life is the main trigger for her to turn to food.  She has 3 children in their 40's who all at home currently, although one is moving out.    ASSESSMENT/PLAN:    1) Jeny will the phone number at [employer] where she refills her medication and ask them what she needs to do to renew her enrollment in the patient assistance program.  It is likely they can mail her the paperwork and I can assist with filling it out and sending it back.  Jeny will let me know what she learns.  2) Jeny will schedule an appointment to meet with me in the future at the clinic when her  can join her.  At that visit, we plan to discuss Social Security Extra Help and see if they qualify for extra help with prescription drugs.  We will also follow-up on reviewing all of her medications and specifically focusing on her diabetes.    Doe Packer, PharmD  Medication Therapy Management Pharmacist  [facility]  General Clinic Phone: [phone]  Direct Office Phone: [phone]

## 2019-11-14 NOTE — NURSING NOTE
Chief Complaint   Patient presents with     Recheck Medication     fasting     Pre-visit Screening:  Immunizations:  up to date  Colonoscopy:  is up to date  Mammogram: is up to date  Asthma Action Test/Plan:  NA  PHQ9:  given  GAD7:  given  Questioned patient about current smoking habits Pt. has never smoked.  Ok to leave detailed message on voice mail for today's visit only yes, phone # [phone]

## 2019-11-15 ASSESSMENT — ANXIETY QUESTIONNAIRES: GAD7 TOTAL SCORE: 2

## 2019-11-16 DIAGNOSIS — I10 ESSENTIAL HYPERTENSION, BENIGN: ICD-10-CM

## 2019-11-16 RX ORDER — AMLODIPINE BESYLATE 5 MG/1
TABLET ORAL
Qty: 90 TABLET | Refills: 0 | OUTPATIENT
Start: 2019-11-16

## 2019-11-16 NOTE — TELEPHONE ENCOUNTER
Refused Prescriptions:                       Disp   Refills    amLODIPine (NORVASC) 5 MG tablet [Pharmacy*90 tab*0        Sig: Take one tablet by mouth daily.  Refused By: LOUISE REARDON  Reason for Refusal: Adjustment in Therapy  Reason for Refusal Comment: new Rx on 11-    Last med check 11- f/u in one month  Louise  [phone] (home)

## 2019-12-05 ENCOUNTER — OFFICE VISIT (OUTPATIENT)
Dept: FAMILY MEDICINE | Facility: CLINIC | Age: 69
End: 2019-12-05

## 2019-12-05 NOTE — Clinical Note
Dr. Brody,I left some forms for you to sign and then mail to the [facility] Patient Assistance Program.  I already completed the forms, you just need to sign and mail.  I will probably have more for you to sign for Lantus, but I need to look into it a bit more.Let me know if you have any questions/concerns.Thanks,Doe

## 2019-12-05 NOTE — PROGRESS NOTES
"SUBJECTIVE / OBJECTIVE:                                                Alma Kraft is a 69 year old female coming in for an initial visit for Medication Therapy Management.  She was referred to me by Dr. Brody.     REASON FOR MTM REFERRAL: Concerns about the cost of her medications.     PATIENT CHIEF COMPLAINT/CONCERN: She needs to renew her application for Januvia patient assistance program through [employer].  Also would like to address cost concerns for Basaglar.    PAST MEDICAL HISTORY: Reviewed in Epic    MEDICAL CONDITIONS REVIEWED:    DIABETES:      - Current Diabetes Medications:  Basaglar 35 units once daily  Metformin ER 500mg taking 4 tablets daily  Januvia 100mg once daily  Glipizide ER 5mg once daily  - Patient does not report problems taking medications regularly.  - Patient Questions/Concerns about medications:   Cost  - Discontinued Diabetes Medication History (Drug/Dose/Reason for Discontinuation):   Not discussed in detail today  - A1c Values:   Lab Results   Component Value Date    A1C 8.5 11/14/2019    A1C 8.6 08/14/2019    A1C 7.4 05/16/2019    A1C 8.8 02/21/2019    A1C 8.2 11/21/2018       - A1c Goal = < 7.0%  - Self-Monitoring of Blood Glucose (SMBG): Occasionally, however, patient did not have meter with her today.  She acknowledges her readings have been high lately and she has not checked since before Thanksgiving.  - Blood sugar ranges: \"high\"  - Additional Information:   Today's visit was focused on filling out the renewal forms for the [employer] Patient Assistance Program and also looking into the Patient Assistance program for Lantus.    Current Outpatient Medications   Medication Sig Dispense Refill     amLODIPine (NORVASC) 2.5 MG tablet Take 3 tablets (7.5 mg) by mouth daily 270 tablet 1     ASPIRIN 81 MG OR TABS 1 tab po QD (Once per day) 100 3     atorvastatin (LIPITOR) 10 MG tablet Take 1 tablet (10 mg) by mouth daily 90 tablet 3     blood glucose (NO BRAND SPECIFIED) test strip Use " to test blood sugar one times daily or as directed. To accompany: {Blood Glucose Monitor Brands One Touch Viro test strips 100 strip 3     blood glucose monitoring (ONE TOUCH DELICA) lancets Use to test blood sugars 1 times daily or as directed. 100 each 3     Blood Glucose Monitoring Suppl (ONE TOUCH ULTRA 2) W/DEVICE KIT Use to test blood sugars 2 times daily or as directed. 1 kit 0     budesonide (RINOCORT AQUA) 32 MCG/ACT nasal spray SPRAY 2 SPRAYS INTO BOTH NOSTRILS ONCE DAILY 3 Bottle 3     cyclobenzaprine (FLEXERIL) 5 MG tablet Take 1 tablet (5 mg) by mouth 3 times daily as needed for muscle spasms 30 tablet 0     glipiZIDE (GLUCOTROL XL) 5 MG 24 hr tablet Take 1 tablet (5 mg) by mouth daily 90 tablet 3     insulin glargine (BASAGLAR KWIKPEN) 100 UNIT/ML pen Inject 35 Units Subcutaneous daily 30 mL 0     insulin pen needle (BD ROSE U/F) 32G X 4 MM miscellaneous Use 1 pen needles daily or as directed. 100 each 3     losartan-hydrochlorothiazide (HYZAAR) 100-12.5 MG tablet Take 1 tablet by mouth daily 90 tablet 1     metFORMIN (GLUCOPHAGE-XR) 500 MG 24 hr tablet Take 4 tablets by mouth daily with dinner. 360 tablet 3     Multiple Vitamins-Minerals (CENTRUM SILVER) per tablet 1 tablet 4 times weekly 100 tablet      nortriptyline (PAMELOR) 10 MG capsule Take 1 capsule (10 mg) by mouth At Bedtime 90 capsule 1     order for DME Equipment being ordered: compression socks, below knee- light compression    Dx: edema 2 Device 1     ORDER FOR DME Equipment being ordered: Mediven plus compression stockings    [id number]  20-30 compression 3 Device 0     potassium chloride ER (MICRO-K) 10 MEQ CR capsule TAKE TWO CAPSULES BY MOUTH TWICE DAILY 360 capsule 3     sitagliptin (JANUVIA) 100 MG tablet Take 1 tablet (100 mg) by mouth daily 90 tablet 3     venlafaxine (EFFEXOR-XR) 150 MG 24 hr capsule Take 1 capsule (150 mg) by mouth daily 90 capsule 3       Current labs include:  BP Readings from Last 3 Encounters:   11/14/19 (!)  152/88   10/14/19 (!) 148/86   08/14/19 (!) 140/82       LMP 05/12/2001     Most Recent Immunizations   Administered Date(s) Administered     Influenza (IIV3) PF 09/25/2012     Influenza Vaccine IM > 6 months Valent IIV4 10/14/2019     Pneumo Conj 13-V (2010&after) 04/29/2015     Pneumococcal 23 valent 07/26/2017     TD (ADULT, 7+) 11/09/1999     TDAP Vaccine (Adacel) 03/18/2008     TDAP Vaccine (Boostrix) 05/23/2018     Zoster vaccine recombinant adjuvanted (SHINGRIX) 03/12/2019     Zoster vaccine, live 09/24/2014       ASSESSMENT / PLAN:                                                       DIABETES:  Assessment: Not at A1c goal.  We completed the paperwork the [employer] Patient Assistance program today for Januvia.  I left the forms for Dr. Brody to sign and then mail to [employer].  Drug Therapy Problems:  1) Adherence - Cost  Plan:  1) Completed application to [employer] Patient Assistance Program for Januvia.  Once Dr. Brody signs the forms, they will be mailed to [employer] Patient Assistance  2) I will look into the application for Lantus through [facility] Patient Connection/Patient Assistance Program.  3) I will call patient in a couple of weeks to follow-up on what I find out about the Lantus PA program and also discuss more about patients diabetes control at that time.    I spent 45 minutes with this patient today.  All changes were made via collaborative practice agreement with Susu Brody. A copy of the visit note was provided to the patient's primary care provider.    The patient was provided with a summary of these recommendations in an after visit summary.       Doe Packer, PharmD  Medication Therapy Management Pharmacist  [facility]  Office Phone: [phone]

## 2019-12-06 ENCOUNTER — TELEPHONE (OUTPATIENT)
Dept: FAMILY MEDICINE | Facility: CLINIC | Age: 69
End: 2019-12-06

## 2019-12-06 NOTE — TELEPHONE ENCOUNTER
Received a form from Doe, patient saw him yesterday and Doe started a form for [employer] Patient Assistance Program. Please review and sign.     Doe stated:   -Needs to be completed by 12/31/19  -Doe would like a copy for himself on his desk after Dr. Brody Signs

## 2019-12-11 ENCOUNTER — OFFICE VISIT (OUTPATIENT)
Dept: FAMILY MEDICINE | Facility: CLINIC | Age: 69
End: 2019-12-11

## 2019-12-11 VITALS
DIASTOLIC BLOOD PRESSURE: 80 MMHG | HEART RATE: 80 BPM | TEMPERATURE: 98.1 F | SYSTOLIC BLOOD PRESSURE: 162 MMHG | OXYGEN SATURATION: 96 %

## 2019-12-11 DIAGNOSIS — Z86.39 HISTORY OF LOW POTASSIUM: Primary | ICD-10-CM

## 2019-12-11 DIAGNOSIS — I10 ESSENTIAL HYPERTENSION, BENIGN: ICD-10-CM

## 2019-12-11 LAB — POTASSIUM SERPL-SCNC: 3.8 MMOL/L (ref 3.5–5.3)

## 2019-12-11 PROCEDURE — 36415 COLL VENOUS BLD VENIPUNCTURE: CPT | Performed by: FAMILY MEDICINE

## 2019-12-11 PROCEDURE — 99213 OFFICE O/P EST LOW 20 MIN: CPT | Performed by: FAMILY MEDICINE

## 2019-12-11 PROCEDURE — 84132 ASSAY OF SERUM POTASSIUM: CPT | Performed by: FAMILY MEDICINE

## 2019-12-11 RX ORDER — ATORVASTATIN CALCIUM 10 MG/1
10 TABLET, FILM COATED ORAL DAILY
Qty: 90 TABLET | Refills: 0 | Status: SHIPPED | OUTPATIENT
Start: 2019-12-11 | End: 2020-02-13

## 2019-12-11 RX ORDER — AMLODIPINE BESYLATE 10 MG/1
10 TABLET ORAL DAILY
COMMUNITY
Start: 2019-12-11 | End: 2020-05-14

## 2019-12-11 RX ORDER — POTASSIUM CHLORIDE 750 MG/1
CAPSULE, EXTENDED RELEASE ORAL
Qty: 360 CAPSULE | Refills: 3 | Status: CANCELLED | OUTPATIENT
Start: 2019-12-11

## 2019-12-11 RX ORDER — POTASSIUM CHLORIDE 750 MG/1
CAPSULE, EXTENDED RELEASE ORAL
Qty: 270 CAPSULE | Refills: 3 | Status: SHIPPED | OUTPATIENT
Start: 2019-12-11 | End: 2021-01-08

## 2019-12-11 NOTE — PROGRESS NOTES
SUBJECTIVE:  69 year old female presents for recheck of blood pressure  and potassium after a change in medications  Losartan 100/12.5 ( hydrochlorothiazide reduced)  Amlodipine increased to 7.5 mg    Brother in law recently - traveled out of town   She complains of increased stress-  Poor diet-     BP (!) 162/80 (BP Location: Left arm, Patient Position: Sitting, Cuff Size: Adult Large)   Pulse 80   Temp 98.1  F (36.7  C) (Oral)   LMP 2001   SpO2 96%      158/80 on recheck   Cor: RRR- no significant murmur    PLAN:  Patient requests refill of potassium  Recheck today with lowering of dose- typically takes 40 mg-  With low potassiumresults     Potassium is now in the normal range  Refill of potassium chloride - 30 mg daily (rather than 40 mg)  Increase amlodipine to 10 mg daily  Continue other medications unchanged  Recheck blood pressure in four weeks-     one touch lancets refilled  Encouraged monitoring of her blood sugars    Recheck blood pressure in one month

## 2019-12-11 NOTE — NURSING NOTE
Chief Complaint   Patient presents with     Consult     bp check and refills     Pre-visit Screening:  Immunizations:  up to date  Colonoscopy:  is up to date  Mammogram: NA  Asthma Action Test/Plan:  NA  PHQ9:  NA  GAD7:  NA  Questioned patient about current smoking habits Pt. has never smoked.  Ok to leave detailed message on voice mail for today's visit only yes, phone # [phone]

## 2019-12-11 NOTE — LETTER
December 11, 2019      Alma CANA[place] Abena  [address]  [place] [place] [address]        Dear ,    We are writing to inform you of your test results.    Normal potassium result.  Reduce your dose to three 10 meq tablets daily, split in two doses with food.    Resulted Orders   Potassium (BFP)   Result Value Ref Range    Potassium 3.80 3.5 - 5.3 mmol/L       If you have any questions or concerns, please call the clinic at the number listed above.       Sincerely,        Susu Brody MD

## 2019-12-11 NOTE — PATIENT INSTRUCTIONS
Try to increase amlodipine to 4 tablets a day (can be taken at the same time)  Watch for worsening swelling of your legs- go back to three a day if this is problems      Recheck blood pressure again in one month

## 2019-12-12 ENCOUNTER — OFFICE VISIT (OUTPATIENT)
Dept: FAMILY MEDICINE | Facility: CLINIC | Age: 69
End: 2019-12-12

## 2019-12-12 ENCOUNTER — TELEPHONE (OUTPATIENT)
Dept: FAMILY MEDICINE | Facility: CLINIC | Age: 69
End: 2019-12-12

## 2019-12-12 VITALS
DIASTOLIC BLOOD PRESSURE: 78 MMHG | TEMPERATURE: 97.7 F | HEART RATE: 82 BPM | OXYGEN SATURATION: 97 % | SYSTOLIC BLOOD PRESSURE: 158 MMHG

## 2019-12-12 DIAGNOSIS — M54.12 CERVICAL RADICULOPATHY: Primary | ICD-10-CM

## 2019-12-12 PROCEDURE — 99213 OFFICE O/P EST LOW 20 MIN: CPT | Performed by: FAMILY MEDICINE

## 2019-12-12 RX ORDER — MELOXICAM 15 MG/1
15 TABLET ORAL DAILY
Qty: 7 TABLET | Refills: 0 | Status: SHIPPED | OUTPATIENT
Start: 2019-12-12 | End: 2019-12-19

## 2019-12-12 RX ORDER — GABAPENTIN 300 MG/1
300 CAPSULE ORAL 3 TIMES DAILY
Qty: 30 CAPSULE | Refills: 1 | Status: SHIPPED | OUTPATIENT
Start: 2019-12-12 | End: 2020-08-27

## 2019-12-12 RX ORDER — VALACYCLOVIR HYDROCHLORIDE 1 G/1
1000 TABLET, FILM COATED ORAL 3 TIMES DAILY
Qty: 21 TABLET | Refills: 0 | Status: SHIPPED | OUTPATIENT
Start: 2019-12-12 | End: 2019-12-19

## 2019-12-12 NOTE — NURSING NOTE
Chief Complaint   Patient presents with     Consult     sore neck and soreness down into her left arm/shoulder     Pre-visit Screening:  Immunizations:  up to date  Colonoscopy:  is up to date  Mammogram: is up to date  Asthma Action Test/Plan:  NA  PHQ9:  NA  GAD7:  NA  Questioned patient about current smoking habits Pt. has never smoked.  Ok to leave detailed message on voice mail for today's visit only yes, phone # [phone]

## 2019-12-12 NOTE — TELEPHONE ENCOUNTER
Due to Part B the pharmacy called and said she needs her Lancets to say brand specifically:  blood glucose monitoring (One Touch Delica) lancets    Patient has them in her medication list but a further action is needed when picked. Send to her [facility].

## 2019-12-12 NOTE — TELEPHONE ENCOUNTER
Got it cleared up with the pharmacy, they will fill the RX that was sent on 11/14 of 100 lancets with 3 refills so she should be good for 1 year.

## 2019-12-12 NOTE — PROGRESS NOTES
SUBJECTIVE:  69 year old female presents with the following concern:    Worsening neck pain over the past 24 hours  Located left side, posterior neck- radiates toward her shoulder  No radicular symptoms in arm    Had similar symptoms seven years ago- right sided neck pain.    She is unaware of a  trigger  Felt fine until yesterday  She has been helping her daughter move, but no repetitive lifting of boxes, reaching over head    She denies weakness of her UE  Associated symptoms: headache    Patient Active Problem List   Diagnosis     Essential hypertension, benign     Mixed hyperlipidemia     Obesity, morbid, BMI 40.0-49.9 (H)     Esophageal reflux     Allergic rhinitis     Obstructive sleep apnea     Family history of malignant neoplasm of breast     ACP (advance care planning)     Type 2 diabetes mellitus without complication, with long-term current use of insulin (H)     Adjustment disorder with mixed anxiety and depressed mood     Acne     Health Care Home     Past Surgical History:   Procedure Laterality Date     ------------OTHER-------------  9/5/2013    L hand, cyst excision     ------------OTHER------------- Right 03/14/2014    shoulder manipulation     C APPENDECTOMY  1956     C EYE EXAM ESTABLISHED PT  4/20/11    No retinopathy     C TOTAL KNEE ARTHROPLASTY  2/06    Knee Replacement, Total Right     C TOTAL KNEE ARTHROPLASTY  3/5/07    Knee Replacement, Total  Left     C VAGINAL HYSTERECTOMY  8/01    Hysterectomy, Vaginal & BSO     HC COLONOSCOPY W/WO BRUSH/WASH  6/03     HC INCISION TENDON SHEATH FINGER Left 09/05/2013    left thumb trigger finger     HC KNEE SCOPE, DIAGNOSTIC  4/2005    Arthroscopy, Knee Left     HC REMOVE TONSILS/ADENOIDS,<11 Y/O  1953    T & A <11y.o.     TEST NOT FOUND  6/27/07    R heel/ inocencio's deformity     Current Outpatient Medications   Medication     amLODIPine (NORVASC) 10 MG tablet     ASPIRIN 81 MG OR TABS     atorvastatin (LIPITOR) 10 MG tablet     budesonide (RINOCORT  AQUA) 32 MCG/ACT nasal spray     cyclobenzaprine (FLEXERIL) 5 MG tablet     gabapentin (NEURONTIN) 300 MG capsule     glipiZIDE (GLUCOTROL XL) 5 MG 24 hr tablet     insulin glargine (BASAGLAR KWIKPEN) 100 UNIT/ML pen     losartan-hydrochlorothiazide (HYZAAR) 100-12.5 MG tablet     meloxicam (MOBIC) 15 MG tablet     metFORMIN (GLUCOPHAGE-XR) 500 MG 24 hr tablet     Multiple Vitamins-Minerals (CENTRUM SILVER) per tablet     nortriptyline (PAMELOR) 10 MG capsule     sitagliptin (JANUVIA) 100 MG tablet     valACYclovir (VALTREX) 1000 mg tablet     venlafaxine (EFFEXOR-XR) 150 MG 24 hr capsule     blood glucose (NO BRAND SPECIFIED) test strip     blood glucose monitoring (ONE TOUCH DELICA) lancets     Blood Glucose Monitoring Suppl (ONE TOUCH ULTRA 2) W/DEVICE KIT     insulin pen needle (BD ROSE U/F) 32G X 4 MM miscellaneous     order for DME     ORDER FOR DME     potassium chloride ER (MICRO-K) 10 MEQ CR capsule     No current facility-administered medications for this visit.       ROS: 7 point ROS neg other than the symptoms noted above in the HPI.    OBJECTIVE:  BP (!) 158/78 (BP Location: Right arm, Patient Position: Sitting, Cuff Size: Adult Large)   Pulse 82   Temp 97.7  F (36.5  C) (Oral)   LMP 05/12/2001   SpO2 97%    No acute distress  Limited ROM with rotation  No exacerbation of symptoms with extension (normal flexion and extension)  Normal motor exam proximal and distal muscles  She has a ? Pimple on her left posterior neck- no vesicles- herpes zoster not likely but prescribed valtrex with zoster in differential  No palpable lymphadenopathy-    Assessment   (M54.12) Cervical radiculopathy  (primary encounter diagnosis)  Comment:    Plan: meloxicam (MOBIC) 15 MG tablet, gabapentin         (NEURONTIN) 300 MG capsule, valACYclovir         (VALTREX) 1000 mg tablet    Consider cervical pillow  Call or return to clinic prn if these symtoms worsen, fail to improve as anticipated, or if new symptoms  develop.  Patient is in agreement with this plan

## 2019-12-19 ENCOUNTER — OFFICE VISIT (OUTPATIENT)
Dept: FAMILY MEDICINE | Facility: CLINIC | Age: 69
End: 2019-12-19

## 2019-12-19 VITALS
WEIGHT: 257.4 LBS | TEMPERATURE: 98.4 F | BODY MASS INDEX: 49.85 KG/M2 | HEART RATE: 82 BPM | OXYGEN SATURATION: 96 % | DIASTOLIC BLOOD PRESSURE: 78 MMHG | SYSTOLIC BLOOD PRESSURE: 148 MMHG

## 2019-12-19 DIAGNOSIS — M54.2 CERVICALGIA: Primary | ICD-10-CM

## 2019-12-19 DIAGNOSIS — I10 ESSENTIAL HYPERTENSION, BENIGN: ICD-10-CM

## 2019-12-19 PROCEDURE — 99213 OFFICE O/P EST LOW 20 MIN: CPT | Performed by: FAMILY MEDICINE

## 2019-12-19 NOTE — PROGRESS NOTES
SUBJECTIVE:  69 year old female presents for follow up o shelia left sided neck pain.  She Completed her 7 days of meloxicam-   Neck pain has improved, but not resolved.  Symptoms are  limited to her left posterior neck (describe as a pulling) with restricted ROM , turning her neck to the left.  She is sleeping better - taking gabapentin at night.    Other concerns:  Blood pressure not at goal  Increased amlodipine to 7.5 mg daily- wears support hose because of leg swelling- has not noticed a change with increasing the dose of amlodipine.    Patient Active Problem List   Diagnosis     Essential hypertension, benign     Mixed hyperlipidemia     Obesity, morbid, BMI 40.0-49.9 (H)     Esophageal reflux     Allergic rhinitis     Obstructive sleep apnea     Family history of malignant neoplasm of breast     ACP (advance care planning)     Type 2 diabetes mellitus without complication, with long-term current use of insulin (H)     Adjustment disorder with mixed anxiety and depressed mood     Acne     Health Care Home     Past Surgical History:   Procedure Laterality Date     ------------OTHER-------------  9/5/2013    L hand, cyst excision     ------------OTHER------------- Right 03/14/2014    shoulder manipulation     C APPENDECTOMY  1956     C EYE EXAM ESTABLISHED PT  4/20/11    No retinopathy     C TOTAL KNEE ARTHROPLASTY  2/06    Knee Replacement, Total Right     C TOTAL KNEE ARTHROPLASTY  3/5/07    Knee Replacement, Total  Left     C VAGINAL HYSTERECTOMY  8/01    Hysterectomy, Vaginal & BSO     HC COLONOSCOPY W/WO BRUSH/WASH  6/03     HC INCISION TENDON SHEATH FINGER Left 09/05/2013    left thumb trigger finger     HC KNEE SCOPE, DIAGNOSTIC  4/2005    Arthroscopy, Knee Left     HC REMOVE TONSILS/ADENOIDS,<12 Y/O  1953    T & A <11y.o.     TEST NOT FOUND  6/27/07    R heel/ inocencio's deformity     Current Outpatient Medications   Medication     amLODIPine (NORVASC) 10 MG tablet     amLODIPine (NORVASC) 10 MG tablet      ASPIRIN 81 MG OR TABS     atorvastatin (LIPITOR) 10 MG tablet     budesonide (RINOCORT AQUA) 32 MCG/ACT nasal spray     cyclobenzaprine (FLEXERIL) 5 MG tablet     gabapentin (NEURONTIN) 300 MG capsule     glipiZIDE (GLUCOTROL XL) 5 MG 24 hr tablet     insulin glargine (BASAGLAR KWIKPEN) 100 UNIT/ML pen     losartan-hydrochlorothiazide (HYZAAR) 100-12.5 MG tablet     metFORMIN (GLUCOPHAGE-XR) 500 MG 24 hr tablet     Multiple Vitamins-Minerals (CENTRUM SILVER) per tablet     nortriptyline (PAMELOR) 10 MG capsule     order for DME     ORDER FOR DME     potassium chloride ER (MICRO-K) 10 MEQ CR capsule     sitagliptin (JANUVIA) 100 MG tablet     venlafaxine (EFFEXOR-XR) 150 MG 24 hr capsule     blood glucose (NO BRAND SPECIFIED) test strip     blood glucose monitoring (ONE TOUCH DELICA) lancets     Blood Glucose Monitoring Suppl (ONE TOUCH ULTRA 2) W/DEVICE KIT     insulin pen needle (BD ROSE U/F) 32G X 4 MM miscellaneous     No current facility-administered medications for this visit.       ROS: 7 point ROS neg other than the symptoms noted above in the HPI.    OBJECTIVE:  BP (!) 148/78 (BP Location: Right arm, Patient Position: Sitting, Cuff Size: Adult Large)   Pulse 82   Temp 98.4  F (36.9  C) (Oral)   Wt 116.8 kg (257 lb 6.4 oz)   LMP 05/12/2001   SpO2 96%   BMI 49.85 kg/m    BP remains above goal  Cervical ROM remains restricted with rotation to the left/ normal flexion and extension  No weakness of upper extremities  Legs: support hose on- no worrisome edema    Assessment   (M54.2) Cervicalgia  (primary encounter diagnosis)  Comment:    Plan: PHYSICAL THERAPY REFERRAL             (I10) Essential hypertension, benign  Comment:amlodipine dose increased to 10 mg  Plan: amLODIPine (NORVASC) 10 MG tablet           Recheck in February- 6-8 weeks for recheck of blood pressure and diabetis

## 2019-12-19 NOTE — LETTER
[facility]                 [facility]  [address]  [place]  [address]        For Emergencies:  Call 911      For Clinic Appointments:   [phone]                     Discontinue meloxicam and valcyclovir    Continue amlodipine 10 mg (one 10 mg tablet instead of four 2.5 mg)    Still take gabapentin : one 300 mg at bedtime:

## 2019-12-19 NOTE — PATIENT INSTRUCTIONS
Discontinue meloxicam and valcyclovir    Continue amlodipine 10 mg (one 10 mg tablet instead of four 2.5 mg)    Still take gabapentin : one 300 mg at bedtime:

## 2019-12-19 NOTE — NURSING NOTE
Chief Complaint   Patient presents with     Consult     neck pain and bp check     Pre-visit Screening:  Immunizations:  up to date  Colonoscopy:  is up to date  Mammogram: is up to date  Asthma Action Test/Plan:  NA  PHQ9:  NA  GAD7:  NA  Questioned patient about current smoking habits Pt. has never smoked.  Ok to leave detailed message on voice mail for today's visit only yes, phone # [phone]

## 2019-12-19 NOTE — NURSING NOTE
SUBJECTIVE:  69 year old female presents for recheck of her neck pain    Recheck of blood pressure after increase in amlodipine to 10 mg    Patient Active Problem List   Diagnosis     Essential hypertension, benign     Mixed hyperlipidemia     Obesity, morbid, BMI 40.0-49.9 (H)     Esophageal reflux     Allergic rhinitis     Obstructive sleep apnea     Family history of malignant neoplasm of breast     ACP (advance care planning)     Type 2 diabetes mellitus without complication, with long-term current use of insulin (H)     Adjustment disorder with mixed anxiety and depressed mood     Acne     Health Care Home     Current Outpatient Medications   Medication     amLODIPine (NORVASC) 10 MG tablet     ASPIRIN 81 MG OR TABS     atorvastatin (LIPITOR) 10 MG tablet     budesonide (RINOCORT AQUA) 32 MCG/ACT nasal spray     cyclobenzaprine (FLEXERIL) 5 MG tablet     gabapentin (NEURONTIN) 300 MG capsule     glipiZIDE (GLUCOTROL XL) 5 MG 24 hr tablet     insulin glargine (BASAGLAR KWIKPEN) 100 UNIT/ML pen     losartan-hydrochlorothiazide (HYZAAR) 100-12.5 MG tablet     meloxicam (MOBIC) 15 MG tablet     metFORMIN (GLUCOPHAGE-XR) 500 MG 24 hr tablet     Multiple Vitamins-Minerals (CENTRUM SILVER) per tablet     nortriptyline (PAMELOR) 10 MG capsule     order for DME     ORDER FOR DME     potassium chloride ER (MICRO-K) 10 MEQ CR capsule     sitagliptin (JANUVIA) 100 MG tablet     valACYclovir (VALTREX) 1000 mg tablet     venlafaxine (EFFEXOR-XR) 150 MG 24 hr capsule     blood glucose (NO BRAND SPECIFIED) test strip     blood glucose monitoring (ONE TOUCH DELICA) lancets     Blood Glucose Monitoring Suppl (ONE TOUCH ULTRA 2) W/DEVICE KIT     insulin pen needle (BD ROSE U/F) 32G X 4 MM miscellaneous     No current facility-administered medications for this visit.                  OBJECTIVE:  BP (!) 148/78 (BP Location: Right arm, Patient Position: Sitting, Cuff Size: Adult Large)   Pulse 82   Temp 98.4  F (36.9  C) (Oral)    Wt 116.8 kg (257 lb 6.4 oz)   LMP 05/12/2001   SpO2 96%   BMI 49.85 kg/m

## 2019-12-20 ENCOUNTER — TELEPHONE (OUTPATIENT)
Dept: FAMILY MEDICINE | Facility: CLINIC | Age: 69
End: 2019-12-20

## 2019-12-20 RX ORDER — LANCETS 33 GAUGE
EACH MISCELLANEOUS
Qty: 100 EACH | Refills: 2 | OUTPATIENT
Start: 2019-12-20

## 2019-12-20 RX ORDER — AMLODIPINE BESYLATE 10 MG/1
10 TABLET ORAL DAILY
Qty: 90 TABLET | Refills: 1 | Status: SHIPPED | OUTPATIENT
Start: 2019-12-20 | End: 2020-02-13

## 2019-12-20 NOTE — TELEPHONE ENCOUNTER
Refused Prescriptions:                       Disp   Refills    OneTouch Delica Lancets 33G MISC [Pharmacy*100 ea*2        Sig: Use to test blood sugars 1 times daily or as           directed.  Refused By: LOUISE REARDON  Reason for Refusal: Request already responded to by other means (phone, fax, etc.)  Reason for Refusal Comment: refilled 11-    Louise  [phone] (home)

## 2019-12-20 NOTE — TELEPHONE ENCOUNTER
Patient was here yesterday and she said that you were changing her BP medication? No RX was sent to the pharmacy. Routing to Dr. Brody to review.     [facility] in [place] on [address]

## 2020-01-09 ENCOUNTER — OFFICE VISIT (OUTPATIENT)
Dept: FAMILY MEDICINE | Facility: CLINIC | Age: 70
End: 2020-01-09

## 2020-01-09 NOTE — PROGRESS NOTES
Jeny and her  came to the clinic to see me today to complete paperwork for the Lantus patient assistance program and to complete a patient attestation form for the Januvia that came in order for her to continue on the Januvia patient assistance program.  The initial Januvia patient assistance form had her name spelled incorrectly on the prescription section of the forms, my error, so we updated the forms and signed the new forms and I will resubmit.  Patient verified that all information was correct.    I need to complete the prescription sections of both Lantus and Januvia patient assistance forms and have Dr. Brody sign and then submit the forms.    Doe Packer, PharmD  Clinical Pharmacist  [facility] Phone: [phone]  Direct Office Phone: [phone]

## 2020-01-20 ENCOUNTER — TRANSFERRED RECORDS (OUTPATIENT)
Dept: FAMILY MEDICINE | Facility: CLINIC | Age: 70
End: 2020-01-20

## 2020-02-10 ENCOUNTER — TRANSFERRED RECORDS (OUTPATIENT)
Dept: FAMILY MEDICINE | Facility: CLINIC | Age: 70
End: 2020-02-10

## 2020-02-10 ENCOUNTER — TELEPHONE (OUTPATIENT)
Dept: FAMILY MEDICINE | Facility: CLINIC | Age: 70
End: 2020-02-10

## 2020-02-10 NOTE — TELEPHONE ENCOUNTER
Received incoming form from [facility] that requires review and signature from Dr. Brody. This is the plan of care for the patient from physical therapist Manfred Hernandez. This was placed on your desk in the in basket.         Form can be faxed back to [phone] when finished.

## 2020-02-12 ENCOUNTER — OFFICE VISIT (OUTPATIENT)
Dept: FAMILY MEDICINE | Facility: CLINIC | Age: 70
End: 2020-02-12

## 2020-02-12 VITALS
DIASTOLIC BLOOD PRESSURE: 80 MMHG | OXYGEN SATURATION: 95 % | BODY MASS INDEX: 50.26 KG/M2 | RESPIRATION RATE: 22 BRPM | WEIGHT: 256 LBS | SYSTOLIC BLOOD PRESSURE: 150 MMHG | HEIGHT: 60 IN | HEART RATE: 92 BPM

## 2020-02-12 DIAGNOSIS — G44.049 CHRONIC PAROXYSMAL HEMICRANIA, NOT INTRACTABLE: ICD-10-CM

## 2020-02-12 DIAGNOSIS — I10 ESSENTIAL HYPERTENSION: ICD-10-CM

## 2020-02-12 LAB
ALT 1742-6: 32 U/L (ref 0–32)
BUN SERPL-MCNC: 15 MG/DL (ref 7–25)
BUN/CREATININE RATIO: 22.7 (ref 6–22)
CALCIUM SERPL-MCNC: 8.9 MG/DL (ref 8.6–10.3)
CHLORIDE SERPLBLD-SCNC: 102.3 MMOL/L (ref 98–110)
CHOLEST SERPL-MCNC: 173 MG/DL (ref 0–199)
CHOLEST/HDLC SERPL: 2 {RATIO} (ref 0–5)
CO2 SERPL-SCNC: 25.8 MMOL/L (ref 20–32)
CREAT SERPL-MCNC: 0.66 MG/DL (ref 0.7–1.18)
GLUCOSE SERPL-MCNC: 179 MG/DL (ref 60–99)
HBA1C MFR BLD: 8.2 % (ref 4–7)
HDLC SERPL-MCNC: 72 MG/DL (ref 40–150)
LDLC SERPL CALC-MCNC: 85 MG/DL (ref 0–130)
POTASSIUM SERPL-SCNC: 3.92 MMOL/L (ref 3.5–5.3)
SODIUM SERPL-SCNC: 138.6 MMOL/L (ref 135–146)
TRIGL SERPL-MCNC: 79 MG/DL (ref 0–149)

## 2020-02-12 PROCEDURE — 99214 OFFICE O/P EST MOD 30 MIN: CPT | Performed by: FAMILY MEDICINE

## 2020-02-12 PROCEDURE — 84460 ALANINE AMINO (ALT) (SGPT): CPT | Performed by: FAMILY MEDICINE

## 2020-02-12 PROCEDURE — 36415 COLL VENOUS BLD VENIPUNCTURE: CPT | Performed by: FAMILY MEDICINE

## 2020-02-12 PROCEDURE — 83036 HEMOGLOBIN GLYCOSYLATED A1C: CPT | Performed by: FAMILY MEDICINE

## 2020-02-12 PROCEDURE — 80061 LIPID PANEL: CPT | Performed by: FAMILY MEDICINE

## 2020-02-12 PROCEDURE — 80048 BASIC METABOLIC PNL TOTAL CA: CPT | Performed by: FAMILY MEDICINE

## 2020-02-12 RX ORDER — INSULIN GLARGINE 100 [IU]/ML
40 INJECTION, SOLUTION SUBCUTANEOUS DAILY
Qty: 30 ML | Refills: 1 | Status: SHIPPED | OUTPATIENT
Start: 2020-02-12 | End: 2020-08-18

## 2020-02-12 RX ORDER — GLIPIZIDE 5 MG/1
5 TABLET, FILM COATED, EXTENDED RELEASE ORAL DAILY
Qty: 90 TABLET | Refills: 3 | Status: SHIPPED | OUTPATIENT
Start: 2020-02-12 | End: 2020-06-18 | Stop reason: DRUGHIGH

## 2020-02-12 RX ORDER — METOPROLOL SUCCINATE 25 MG/1
25 TABLET, EXTENDED RELEASE ORAL DAILY
Qty: 30 TABLET | Refills: 0 | Status: SHIPPED | OUTPATIENT
Start: 2020-02-12 | End: 2020-03-02

## 2020-02-12 ASSESSMENT — PATIENT HEALTH QUESTIONNAIRE - PHQ9: SUM OF ALL RESPONSES TO PHQ QUESTIONS 1-9: 12

## 2020-02-12 ASSESSMENT — MIFFLIN-ST. JEOR: SCORE: 1602.71

## 2020-02-12 NOTE — PATIENT INSTRUCTIONS
Increase insulin to 40 units daily    Take blood sugars daily  Some fasting , some 2 hours after you eat  New blood pressure pill is metoprolol    Recheck in office in two weeks with blood sugars and recheck of blood pressure    Dermatologists    [facility] [phone] [address]   [address]    [place]elle Haji  [email or web address]  [phone]

## 2020-02-12 NOTE — LETTER
February 13, 2020      [facility]  [address]  [place] [address]        Dear ,    We are writing to inform you of your test results.    Hemoglobin A1C is above goal-blood sugar is 179.   Increase your insulin dose as we discussed, start checking your blood sugar, and talk with your therapist about self motivation for better health.    Cholesterol is at goal.  Normal liver function test (ALT)   Normal kidney function test  Normal potassium.    Recheck in three months.    Resulted Orders   Hemoglobin A1c ([facility])   Result Value Ref Range    Hemoglobin A1C 8.2 (A) 4.0 - 7.0 %   Lipid Panel ([facility])   Result Value Ref Range    Cholesterol 173 0 - 199 mg/dL    Triglycerides 79 0 - 149 mg/dL    HDL Cholesterol 72 40 - 150 mg/dL    LDL Cholesterol Direct 85 0 - 130 mg/dL    Cholesterol/HDL Ratio 2 0 - 5   ALT ([facility])   Result Value Ref Range    ALT 32 0 - 32 U/L   Basic Metabolic Panel ([facility])   Result Value Ref Range    Carbon Dioxide 25.8 20 - 32 mmol/L    Creatinine 0.66 (A) 0.70 - 1.18 mg/dL    Glucose 179 (A) 60 - 99 mg/dL    Sodium 138.6 135 - 146 mmol/L    Potassium 3.92 3.5 - 5.3 mmol/L    Chloride 102.3 98 - 110 mmol/L    Urea Nitrogen 15 7 - 25 mg/dL    Calcium 8.9 8.6 - 10.3 mg/dL    BUN/Creatinine Ratio 22.7 (A) 6 - 22       If you have any questions or concerns, please call the clinic at the number listed above.       Sincerely,        Susu Brody MD

## 2020-02-12 NOTE — NURSING NOTE
Chief Complaint   Patient presents with     Recheck Medication     fasting medication check      Pre-visit Screening:  Immunizations:  up to date  Colonoscopy:  is up to date  Mammogram: is up to date  Asthma Action Test/Plan:  NA  PHQ9:  Given today   GAD7:  Given today   Questioned patient about current smoking habits Pt. has never smoked.  Ok to leave detailed message on voice mail for today's visit only Yes, phone # [phone]

## 2020-02-12 NOTE — PROGRESS NOTES
Subjective     Alma Kraft is a 69 year old female who presents to clinic today for the following health issues:    HPI   Diabetes Follow-up    How often are you checking your blood sugar? Infrequently- Checking blood sugars in the last week-  200 fasting this last week        What concerns do you have today about your diabetes? She has been eating poorly and expects her numbers to be high.     Do you have any of these symptoms? (Select all that apply)  Numbness in feet and Burning in feet     In chronic pain, not focussed on diet  Taking advil for pain- cautioned about it's use    Under the care of [facility] for painful thumbs  Surgery is advised        BP Readings from Last 2 Encounters:   02/12/20 (!) 150/80   12/19/19 (!) 148/78     Hemoglobin A1C (%)   Date Value   02/12/2020 8.2 (A)   11/14/2019 8.5 (A)     LDL Cholesterol Calculated (mg/dL (calc))   Date Value   02/21/2019 87   02/21/2018 86         How many days per week do you exercise enough to make your heart beat faster? None     How many days per week do you miss taking your medication? Patient reports taking her medication daily    PROBLEMS TO ADD ON...  Multiple musculoskeletal complaints  PT dhavalenltjohn for neck pain and stiffness  Bilateral shoulder problems- recent cortisone shots  Bilateral thumb pain    Patient Active Problem List   Diagnosis     Essential hypertension, benign     Mixed hyperlipidemia     Obesity, morbid, BMI 40.0-49.9 (H)     Esophageal reflux     Allergic rhinitis     Obstructive sleep apnea     Family history of malignant neoplasm of breast     ACP (advance care planning)     Type 2 diabetes mellitus without complication, with long-term current use of insulin (H)     Adjustment disorder with mixed anxiety and depressed mood     Acne     Health Care Home     Past Surgical History:   Procedure Laterality Date     ------------OTHER-------------  9/5/2013    L hand, cyst excision     ------------OTHER------------- Right 03/14/2014     shoulder manipulation     C APPENDECTOMY  1956     C EYE EXAM ESTABLISHED PT  4/20/11    No retinopathy     C TOTAL KNEE ARTHROPLASTY  2/06    Knee Replacement, Total Right     C TOTAL KNEE ARTHROPLASTY  3/5/07    Knee Replacement, Total  Left     C VAGINAL HYSTERECTOMY  8/01    Hysterectomy, Vaginal & BSO     HC COLONOSCOPY W/WO BRUSH/WASH  6/03     HC INCISION TENDON SHEATH FINGER Left 09/05/2013    left thumb trigger finger     HC KNEE SCOPE, DIAGNOSTIC  4/2005    Arthroscopy, Knee Left     HC REMOVE TONSILS/ADENOIDS,<13 Y/O  1953    T & A <11y.o.     TEST NOT FOUND  6/27/07    R heel/ inocencio's deformity       Social History     Tobacco Use     Smoking status: Never Smoker     Smokeless tobacco: Never Used   Substance Use Topics     Alcohol use: Yes     Alcohol/week: 0.0 standard drinks     Comment: very rare     Family History   Problem Relation Age of Onset     Cancer Father         prostate     Gastrointestinal Disease Father         GERD     Heart Disease Paternal Grandfather      Cerebrovascular Disease Maternal Grandfather      Heart Disease Maternal Grandmother      Gastrointestinal Disease Other         Niece with GERD/hiatal hernia     Breast Cancer Sister         51     Alzheimer Disease No family hx of      Diabetes No family hx of          Current Outpatient Medications   Medication Sig Dispense Refill     amLODIPine (NORVASC) 10 MG tablet Take 1 tablet (10 mg) by mouth daily 90 tablet 1     ASPIRIN 81 MG OR TABS 1 tab po QD (Once per day) 100 3     atorvastatin (LIPITOR) 10 MG tablet Take 1 tablet (10 mg) by mouth daily 90 tablet 3     blood glucose (NO BRAND SPECIFIED) test strip Use to test blood sugar one times daily or as directed. To accompany: {Blood Glucose Monitor Brands One Touch Viro test strips 100 strip 3     blood glucose monitoring (ONE TOUCH DELICA) lancets Use to test blood sugars 1 times daily or as directed. 100 each 3     Blood Glucose Monitoring Suppl (ONE TOUCH ULTRA 2)  W/DEVICE KIT Use to test blood sugars 2 times daily or as directed. 1 kit 0     budesonide (RINOCORT AQUA) 32 MCG/ACT nasal spray SPRAY 2 SPRAYS INTO BOTH NOSTRILS ONCE DAILY 3 Bottle 3     glipiZIDE (GLUCOTROL XL) 5 MG 24 hr tablet Take 1 tablet (5 mg) by mouth daily 90 tablet 3     insulin glargine (BASAGLAR KWIKPEN) 100 UNIT/ML pen Inject 40 Units Subcutaneous daily 30 mL 1     insulin pen needle (BD ROSE U/F) 32G X 4 MM miscellaneous Use 1 pen needles daily or as directed. 100 each 3     metFORMIN (GLUCOPHAGE-XR) 500 MG 24 hr tablet Take 4 tablets by mouth daily with dinner. 360 tablet 3     metoprolol succinate ER (TOPROL-XL) 25 MG 24 hr tablet Take 1 tablet (25 mg) by mouth daily 30 tablet 0     Multiple Vitamins-Minerals (CENTRUM SILVER) per tablet 1 tablet 4 times weekly 100 tablet      nortriptyline (PAMELOR) 10 MG capsule Take 1 capsule (10 mg) by mouth At Bedtime 90 capsule 1     order for DME Equipment being ordered: compression socks, below knee- light compression    Dx: edema 2 Device 1     ORDER FOR DME Equipment being ordered: Mediven plus compression stockings    05469  20-30 compression 3 Device 0     potassium chloride ER (MICRO-K) 10 MEQ CR capsule Take three tablets daily with food in split dosages. 270 capsule 3     sitagliptin (JANUVIA) 100 MG tablet Take 1 tablet (100 mg) by mouth daily 90 tablet 3     venlafaxine (EFFEXOR-XR) 150 MG 24 hr capsule Take 1 capsule (150 mg) by mouth daily 90 capsule 3     amLODIPine (NORVASC) 10 MG tablet Take 1 tablet (10 mg) by mouth daily       cyclobenzaprine (FLEXERIL) 5 MG tablet Take 1 tablet (5 mg) by mouth 3 times daily as needed for muscle spasms (Patient not taking: Reported on 2/12/2020) 30 tablet 0     gabapentin (NEURONTIN) 300 MG capsule Take 1 capsule (300 mg) by mouth 3 times daily (Patient not taking: Reported on 2/12/2020) 30 capsule 1     hydrochlorothiazide (HYDRODIURIL) 12.5 MG tablet Take 1 tablet (12.5 mg) by mouth daily 90 tablet 1      losartan (COZAAR) 100 MG tablet Take 1 tablet (100 mg) by mouth daily 90 tablet 1     BP Readings from Last 3 Encounters:   02/12/20 (!) 150/80   12/19/19 (!) 148/78   12/12/19 (!) 158/78    Wt Readings from Last 3 Encounters:   02/12/20 116.1 kg (256 lb)   12/19/19 116.8 kg (257 lb 6.4 oz)   11/14/19 118.5 kg (261 lb 3.2 oz)                Reviewed and updated as needed this visit by Provider         Review of Systems   Review Of Systems  Skin: negative  Eyes: negative  Ears/Nose/Throat: negative  Respiratory: No shortness of breath, dyspnea on exertion, cough, or hemoptysis  Cardiovascular: negative  Gastrointestinal: negative  Genitourinary: she denies complaints  Musculoskeletal: as above  Neurologic: negative- nocturnal headaches controlled with amitriptyline- benefit outweighs risk- no history of falling  Psychiatric: low mood- poorly motivated to follow health reocmendations- marital and family stress- sees therapist regularly  Hematologic/Lymphatic/Immunologic: negative  Endocrine:  Weight down five pounds since November          Objective    BP (!) 150/80 (BP Location: Right arm, Patient Position: Sitting, Cuff Size: Adult Large)   Pulse 92   Resp 22   Ht 1.524 m (5')   Wt 116.1 kg (256 lb)   LMP 05/12/2001   SpO2 95%   BMI 50.00 kg/m    Body mass index is 50 kg/m .  Physical Exam   GENERAL: healthy, alert and no distress  NECK: no adenopathy, no asymmetry, masses, or scars and thyroid normal to palpation  RESP: lungs clear to auscultation - no rales, rhonchi or wheezes  CV: regular rate and rhythm, no murmur, click or rub,  MS: no gross musculoskeletal defects noted, no edema  PSYCH: normal affect    Diabetic foot exam:has her compression stockings on - removed for diabetis exam- mild edema    Dorsalis pedis pulse R:3  Dorsalis pedis pulse L: 2  Skin temperature R:slightly decreased  Skin temperature L:slightly decreased  Capillary refill R:normal  Capillary refill L:normal  Hair growth  R:normal  Hair growth L:normal  Nail growth R:normal  Monofilament R foot:normal monofilament exam .  Monofilament L foot:normal monofilament exam     Diagnostic Test Results:  Labs reviewed in Epic  Results for orders placed or performed in visit on 02/12/20   Hemoglobin A1c (BFP)     Status: Abnormal   Result Value Ref Range    Hemoglobin A1C 8.2 (A) 4.0 - 7.0 %   Lipid Panel (BFP)     Status: None   Result Value Ref Range    Cholesterol 173 0 - 199 mg/dL    Triglycerides 79 0 - 149 mg/dL    HDL Cholesterol 72 40 - 150 mg/dL    LDL Cholesterol Direct 85 0 - 130 mg/dL    Cholesterol/HDL Ratio 2 0 - 5   ALT (BFP)     Status: None   Result Value Ref Range    ALT 32 0 - 32 U/L   Basic Metabolic Panel (BFP)     Status: Abnormal   Result Value Ref Range    Carbon Dioxide 25.8 20 - 32 mmol/L    Creatinine 0.66 (A) 0.70 - 1.18 mg/dL    Glucose 179 (A) 60 - 99 mg/dL    Sodium 138.6 135 - 146 mmol/L    Potassium 3.92 3.5 - 5.3 mmol/L    Chloride 102.3 98 - 110 mmol/L    Urea Nitrogen 15 7 - 25 mg/dL    Calcium 8.9 8.6 - 10.3 mg/dL    BUN/Creatinine Ratio 22.7 (A) 6 - 22           Assessment & Plan   (E11.22,  E11.65,  N18.2,  Z79.4) Uncontrolled type 2 diabetes mellitus with stage 2 chronic kidney disease, with long-term current use of insulin (H)  (primary encounter diagnosis)  Comment:  Increase insulin dose to 40 units  Her diabetis is controlled when she eats healthy- continue therapy-   Plan: glipiZIDE (GLUCOTROL XL) 5 MG 24 hr tablet,         Hemoglobin A1c (BFP), VENOUS COLLECTION, C FOOT        EXAM  NO CHARGE, Lipid Panel (BFP), ALT (BFP),         Basic Metabolic Panel (BFP), insulin glargine         (BASAGLAR KWIKPEN) 100 UNIT/ML pen,         atorvastatin (LIPITOR) 10 MG tablet            (I10) Essential hypertension  Comment: low dose metoprolol added to her current antihypertensives  Plan: metoprolol succinate ER (TOPROL-XL) 25 MG 24 hr        tablet          (G44.049) Chronic paroxysmal hemicrania, not  intractable  Comment:   Plan: nortriptyline (PAMELOR) 10 MG capsule                   BMI:   Estimated body mass index is 50 kg/m  as calculated from the following:    Height as of this encounter: 1.524 m (5').    Weight as of this encounter: 116.1 kg (256 lb).   Weight management plan: Discussed healthy diet and exercise guidelines- unable to motivate herself to make change        FUTURE APPOINTMENTS:       - Follow-up visit in 2 weeks  Check blood sugars daily and bring to appointment  Recheck blood pressure  No follow-ups on file.     She wishes to see a dermatologist- given names of Dr Ward and Dr Adithya Brody MD  [facility]

## 2020-02-13 RX ORDER — ATORVASTATIN CALCIUM 10 MG/1
10 TABLET, FILM COATED ORAL DAILY
Qty: 90 TABLET | Refills: 3 | Status: SHIPPED | OUTPATIENT
Start: 2020-02-13 | End: 2021-03-09

## 2020-02-13 RX ORDER — NORTRIPTYLINE HCL 10 MG
10 CAPSULE ORAL AT BEDTIME
Qty: 90 CAPSULE | Refills: 1 | Status: SHIPPED | OUTPATIENT
Start: 2020-02-13 | End: 2020-10-08

## 2020-02-13 RX ORDER — AMLODIPINE BESYLATE 10 MG/1
10 TABLET ORAL DAILY
Qty: 90 TABLET | Refills: 1 | Status: SHIPPED | OUTPATIENT
Start: 2020-02-13 | End: 2020-08-18

## 2020-02-13 RX ORDER — LOSARTAN POTASSIUM AND HYDROCHLOROTHIAZIDE 12.5; 1 MG/1; MG/1
1 TABLET ORAL DAILY
Qty: 90 TABLET | Refills: 1 | Status: SHIPPED | OUTPATIENT
Start: 2020-02-13 | End: 2020-02-14

## 2020-02-14 DIAGNOSIS — E11.9 TYPE 2 DIABETES MELLITUS WITHOUT COMPLICATION, WITH LONG-TERM CURRENT USE OF INSULIN (H): ICD-10-CM

## 2020-02-14 DIAGNOSIS — Z79.4 TYPE 2 DIABETES MELLITUS WITHOUT COMPLICATION, WITH LONG-TERM CURRENT USE OF INSULIN (H): ICD-10-CM

## 2020-02-14 DIAGNOSIS — I10 ESSENTIAL HYPERTENSION, BENIGN: Primary | ICD-10-CM

## 2020-02-14 RX ORDER — HYDROCHLOROTHIAZIDE 12.5 MG/1
12.5 TABLET ORAL DAILY
Qty: 90 TABLET | Refills: 1 | Status: SHIPPED | OUTPATIENT
Start: 2020-02-14 | End: 2020-08-18

## 2020-02-14 RX ORDER — LOSARTAN POTASSIUM 100 MG/1
100 TABLET ORAL DAILY
Qty: 90 TABLET | Refills: 1 | Status: SHIPPED | OUTPATIENT
Start: 2020-02-14 | End: 2020-08-18

## 2020-02-14 NOTE — TELEPHONE ENCOUNTER
Alma Kraft is requesting a refill of:    Pending Prescriptions:                       Disp   Refills    losartan (COZAAR) 100 MG tablet           90 tab*1            Sig: Take 1 tablet (100 mg) by mouth daily    hydrochlorothiazide (HYDRODIURIL) 12.5 MG*90 tab*1            Sig: Take 1 tablet (12.5 mg) by mouth daily    Need prescription for the separate medication.    Thanks,[place]

## 2020-02-27 ENCOUNTER — OFFICE VISIT (OUTPATIENT)
Dept: FAMILY MEDICINE | Facility: CLINIC | Age: 70
End: 2020-02-27

## 2020-02-27 VITALS
RESPIRATION RATE: 20 BRPM | OXYGEN SATURATION: 98 % | HEART RATE: 67 BPM | DIASTOLIC BLOOD PRESSURE: 82 MMHG | WEIGHT: 256 LBS | BODY MASS INDEX: 50 KG/M2 | SYSTOLIC BLOOD PRESSURE: 156 MMHG

## 2020-02-27 DIAGNOSIS — E66.01 OBESITY, MORBID, BMI 40.0-49.9 (H): ICD-10-CM

## 2020-02-27 DIAGNOSIS — E11.9 TYPE 2 DIABETES MELLITUS WITHOUT COMPLICATION, WITH LONG-TERM CURRENT USE OF INSULIN (H): ICD-10-CM

## 2020-02-27 DIAGNOSIS — Z79.4 TYPE 2 DIABETES MELLITUS WITHOUT COMPLICATION, WITH LONG-TERM CURRENT USE OF INSULIN (H): ICD-10-CM

## 2020-02-27 DIAGNOSIS — G47.33 OBSTRUCTIVE SLEEP APNEA: ICD-10-CM

## 2020-02-27 DIAGNOSIS — I10 UNCONTROLLED HYPERTENSION: ICD-10-CM

## 2020-02-27 PROCEDURE — 99214 OFFICE O/P EST MOD 30 MIN: CPT | Performed by: FAMILY MEDICINE

## 2020-02-27 RX ORDER — LOSARTAN POTASSIUM AND HYDROCHLOROTHIAZIDE 12.5; 1 MG/1; MG/1
TABLET ORAL
COMMUNITY
Start: 2020-02-13 | End: 2020-05-14

## 2020-02-27 NOTE — PATIENT INSTRUCTIONS
Take one two blood pressures a week    OMRON cuff if you can't find your cuff    Your next visit , bring blood sugars and any recorded blood pressures    RECHECK DIABETIS IN MAY- healthy snacks    Follow up with Dr Figueroa

## 2020-02-27 NOTE — NURSING NOTE
Chief Complaint   Patient presents with     Recheck Medication     2 week follow up for blood sugars and blood pressure     Pre-visit Screening:  Immunizations:  up to date  Colonoscopy:  is up to date  Mammogram: is up to date  Asthma Action Test/Plan:  NA  PHQ9:  NA  GAD7:  NA  Questioned patient about current smoking habits Pt. has never smoked.  Ok to leave detailed message on voice mail for today's visit only Yes, phone # [phone]

## 2020-02-27 NOTE — PROGRESS NOTES
SUBJECTIVE:  69 year old female presents with the following concern:    Recheck blood pressure after starting low dose metoprolol in 2/12/2020  HR down to 67    Went to sleep clinic: CPAP reevaluated- no changes made    Uncontrolled diabetis:  She checked fasting blood sugars five times this week  Tuesday 247 (cortisone on Monday)  Wednesday 181  Today 146    She has been complaint with her diabetis medications.      Patient Active Problem List   Diagnosis     Essential hypertension, benign     Mixed hyperlipidemia     Obesity, morbid, BMI 40.0-49.9 (H)     Esophageal reflux     Allergic rhinitis     Obstructive sleep apnea     Family history of malignant neoplasm of breast     ACP (advance care planning)     Type 2 diabetes mellitus without complication, with long-term current use of insulin (H)     Adjustment disorder with mixed anxiety and depressed mood     Acne     Health Care Home     Past Surgical History:   Procedure Laterality Date     ------------OTHER-------------  9/5/2013    L hand, cyst excision     ------------OTHER------------- Right 03/14/2014    shoulder manipulation     C APPENDECTOMY  1956     C EYE EXAM ESTABLISHED PT  4/20/11    No retinopathy     C TOTAL KNEE ARTHROPLASTY  2/06    Knee Replacement, Total Right     C TOTAL KNEE ARTHROPLASTY  3/5/07    Knee Replacement, Total  Left     C VAGINAL HYSTERECTOMY  8/01    Hysterectomy, Vaginal & BSO     HC COLONOSCOPY W/WO BRUSH/WASH  6/03     HC INCISION TENDON SHEATH FINGER Left 09/05/2013    left thumb trigger finger     HC KNEE SCOPE, DIAGNOSTIC  4/2005    Arthroscopy, Knee Left     HC REMOVE TONSILS/ADENOIDS,<10 Y/O  1953    T & A <11y.o.     TEST NOT FOUND  6/27/07    R heel/ inocencio's deformity     Current Outpatient Medications   Medication     amLODIPine (NORVASC) 10 MG tablet     amLODIPine (NORVASC) 10 MG tablet     ASPIRIN 81 MG OR TABS     atorvastatin (LIPITOR) 10 MG tablet     blood glucose (NO BRAND SPECIFIED) test strip     blood  glucose monitoring (ONE TOUCH DELICA) lancets     Blood Glucose Monitoring Suppl (ONE TOUCH ULTRA 2) W/DEVICE KIT     budesonide (RINOCORT AQUA) 32 MCG/ACT nasal spray     cyclobenzaprine (FLEXERIL) 5 MG tablet     gabapentin (NEURONTIN) 300 MG capsule     glipiZIDE (GLUCOTROL XL) 5 MG 24 hr tablet     insulin glargine (BASAGLAR KWIKPEN) 100 UNIT/ML pen     insulin pen needle (BD ROSE U/F) 32G X 4 MM miscellaneous     metFORMIN (GLUCOPHAGE-XR) 500 MG 24 hr tablet     Multiple Vitamins-Minerals (CENTRUM SILVER) per tablet     nortriptyline (PAMELOR) 10 MG capsule     order for DME     ORDER FOR DME     potassium chloride ER (MICRO-K) 10 MEQ CR capsule     sitagliptin (JANUVIA) 100 MG tablet     venlafaxine (EFFEXOR-XR) 150 MG 24 hr capsule     hydrochlorothiazide (HYDRODIURIL) 12.5 MG tablet     losartan (COZAAR) 100 MG tablet     losartan-hydrochlorothiazide (HYZAAR) 100-12.5 MG tablet     metoprolol succinate ER (TOPROL-XL) 25 MG 24 hr tablet     No current facility-administered medications for this visit.       ROS: 10 point ROS neg other than the symptoms noted above in the HPI.  She complains of multiple joint pains: hands, shoulders  Contemplating surgery in the near future      OBJECTIVE:  BP (!) 156/82 (BP Location: Left arm, Patient Position: Sitting, Cuff Size: Adult Large)   Pulse 67   Resp 20   Wt 116.1 kg (256 lb)   LMP 05/12/2001   SpO2 98%   BMI 50.00 kg/m     No acute distress  Regular rate and  rhythm. S1 and S2 normal, no murmurs, clicks, gallops or rubs. No edema or JVD. Chest is clear; no wheezes or rales.  ALert and oriented times 3; Coherent speech, normal rate and volume, able to articulate, logical thoughts, able to abstract reason, no tangential thoughts, no hallucinations or delusions.  Affect is pleasant      Assessment    (I10) Uncontrolled hypertension  Comment:   Plan:  Continue current medications    (E11.9,  Z79.4) Type 2 diabetes mellitus without complication, with long-term  current use of insulin (H)  Comment: last fasting blood sugar nearly at goal a few days after cortisone injection  Plan: blood glucose (NO BRAND SPECIFIED) test strip    No change in her diabetis medication-  She acknowledges that her diabetis is controlled if she eats a healthy diet. Checks her blood sugars    Currently in counseling- caretaker of everyone in her family but herself  RECHECK DIABETIS IN THREE MONTHS with DR Figueroa  If diabetis remains above goal- MTM consult    CHECK BLOOD SUGARS DAILY to stay focussed on her diabetis control    CHECK BLOOD PRESSURES AT HOME-  Purchase an omron cuff if needed     Continue her current four medications for treatment of diabetis  With HR of 67, beta blocker not increased  AVOID PROCESSED FOOD    Obstructive sleep apnea: wears CPAP daily    More than 50% of visit spent in counseling.  Level 4 charge   - 20 mn

## 2020-02-29 DIAGNOSIS — I10 ESSENTIAL HYPERTENSION: ICD-10-CM

## 2020-03-02 RX ORDER — METOPROLOL SUCCINATE 25 MG/1
25 TABLET, EXTENDED RELEASE ORAL DAILY
Qty: 90 TABLET | Refills: 3 | Status: SHIPPED | OUTPATIENT
Start: 2020-03-02 | End: 2020-11-23

## 2020-03-02 NOTE — TELEPHONE ENCOUNTER
Pending Prescriptions:                       Disp   Refills    metoprolol succinate ER (TOPROL-XL) 25 MG*90 tab*             Sig: Take 1 tablet (25 mg) by mouth daily    [facility] on 2[facility]  [facility]  Here on 2-  Blood work done  Change qty fax or deny

## 2020-03-04 ENCOUNTER — TRANSFERRED RECORDS (OUTPATIENT)
Dept: FAMILY MEDICINE | Facility: CLINIC | Age: 70
End: 2020-03-04

## 2020-04-08 DIAGNOSIS — J30.2 CHRONIC SEASONAL ALLERGIC RHINITIS: ICD-10-CM

## 2020-04-08 NOTE — TELEPHONE ENCOUNTER
Pt called back, she just opened her last bottle. She will be ok until her next OV is due at the end of May.

## 2020-04-08 NOTE — TELEPHONE ENCOUNTER
Alma Kraft is requesting a refill of:    Refused Prescriptions:                       Disp   Refills    budesonide (RINOCORT AQUA) 32 MCG/ACT nasa*       2        Sig: SPRAY 2 SPRAYS INTO BOTH NOSTRILS ONCE DAILY  Refused By: RAVEN JENKINS  Reason for Refusal: Patient should contact provider first    Last med recheck 02/27/20 she is due back in 3 months. Pt hasn't discussed this spray. Left pt voicemail asking for a return call if she needs refill or has enough.

## 2020-05-14 ENCOUNTER — OFFICE VISIT (OUTPATIENT)
Dept: FAMILY MEDICINE | Facility: CLINIC | Age: 70
End: 2020-05-14

## 2020-05-14 VITALS
SYSTOLIC BLOOD PRESSURE: 144 MMHG | HEART RATE: 72 BPM | BODY MASS INDEX: 50.53 KG/M2 | TEMPERATURE: 97.5 F | WEIGHT: 257.4 LBS | DIASTOLIC BLOOD PRESSURE: 72 MMHG | RESPIRATION RATE: 20 BRPM | HEIGHT: 60 IN

## 2020-05-14 DIAGNOSIS — E11.9 TYPE 2 DIABETES MELLITUS WITHOUT COMPLICATION, WITH LONG-TERM CURRENT USE OF INSULIN (H): Primary | ICD-10-CM

## 2020-05-14 DIAGNOSIS — Z79.4 TYPE 2 DIABETES MELLITUS WITHOUT COMPLICATION, WITH LONG-TERM CURRENT USE OF INSULIN (H): Primary | ICD-10-CM

## 2020-05-14 DIAGNOSIS — F43.23 ADJUSTMENT DISORDER WITH MIXED ANXIETY AND DEPRESSED MOOD: ICD-10-CM

## 2020-05-14 DIAGNOSIS — I10 ESSENTIAL HYPERTENSION, BENIGN: ICD-10-CM

## 2020-05-14 DIAGNOSIS — E78.2 MIXED HYPERLIPIDEMIA: ICD-10-CM

## 2020-05-14 DIAGNOSIS — J30.2 CHRONIC SEASONAL ALLERGIC RHINITIS: ICD-10-CM

## 2020-05-14 LAB
ALBUMIN SERPL-MCNC: 3.7 G/DL (ref 3.6–5.1)
ALBUMIN/GLOB SERPL: 1.1 {RATIO} (ref 1–2.5)
ALP SERPL-CCNC: 127 U/L (ref 33–130)
ALT 1742-6: 21 U/L (ref 0–32)
AST 1920-8: 32 U/L (ref 0–35)
BILIRUB SERPL-MCNC: 1 MG/DL (ref 0.2–1.2)
BUN SERPL-MCNC: 16 MG/DL (ref 7–25)
BUN/CREATININE RATIO: 23.9 (ref 6–22)
CALCIUM SERPL-MCNC: 9.3 MG/DL (ref 8.6–10.3)
CHLORIDE SERPLBLD-SCNC: 99.6 MMOL/L (ref 98–110)
CHOLEST SERPL-MCNC: 168 MG/DL (ref 0–199)
CHOLEST/HDLC SERPL: 3 {RATIO} (ref 0–5)
CO2 SERPL-SCNC: 31.9 MMOL/L (ref 20–32)
CREAT SERPL-MCNC: 0.67 MG/DL (ref 0.7–1.18)
GLOBULIN, CALCULATED - QUEST: 3.3 (ref 1.9–3.7)
GLUCOSE SERPL-MCNC: 207 MG/DL (ref 60–99)
HBA1C MFR BLD: 8.7 % (ref 4–7)
HDLC SERPL-MCNC: 67 MG/DL (ref 40–150)
LDLC SERPL CALC-MCNC: 82 MG/DL (ref 0–130)
POTASSIUM SERPL-SCNC: 3.9 MMOL/L (ref 3.5–5.3)
PROT SERPL-MCNC: 7 G/DL (ref 6.1–8.1)
SODIUM SERPL-SCNC: 140.2 MMOL/L (ref 135–146)
TRIGL SERPL-MCNC: 97 MG/DL (ref 0–149)

## 2020-05-14 PROCEDURE — 80061 LIPID PANEL: CPT | Performed by: FAMILY MEDICINE

## 2020-05-14 PROCEDURE — 99214 OFFICE O/P EST MOD 30 MIN: CPT | Performed by: FAMILY MEDICINE

## 2020-05-14 PROCEDURE — 80053 COMPREHEN METABOLIC PANEL: CPT | Performed by: FAMILY MEDICINE

## 2020-05-14 PROCEDURE — 83036 HEMOGLOBIN GLYCOSYLATED A1C: CPT | Performed by: FAMILY MEDICINE

## 2020-05-14 PROCEDURE — 36415 COLL VENOUS BLD VENIPUNCTURE: CPT | Performed by: FAMILY MEDICINE

## 2020-05-14 RX ORDER — HYDROCHLOROTHIAZIDE 12.5 MG/1
12.5 TABLET ORAL DAILY
Qty: 90 TABLET | Refills: 1 | Status: SHIPPED | OUTPATIENT
Start: 2020-05-14 | End: 2020-08-18

## 2020-05-14 RX ORDER — LOSARTAN POTASSIUM 100 MG/1
100 TABLET ORAL DAILY
Qty: 90 TABLET | Refills: 1 | Status: SHIPPED | OUTPATIENT
Start: 2020-05-14 | End: 2020-08-18

## 2020-05-14 ASSESSMENT — MIFFLIN-ST. JEOR: SCORE: 1609.06

## 2020-05-14 NOTE — PROGRESS NOTES
Subjective     Alma Kraft is a 69 year old female who presents to clinic today for the following health issues:    HPI   Diabetes Follow-up Lantus 40, Januvia, metformin, glipizide    How often are you checking your blood sugar? A few times a month  What time of day are you checking your blood sugars (select all that apply)?  Before meals 140-190  Have you had any blood sugars above 200?  No  Have you had any blood sugars below 70?  No    What symptoms do you notice when your blood sugar is low?  None    What concerns do you have today about your diabetes? None     Do you have any of these symptoms? (Select all that apply)  No numbness or tingling in feet.  No redness, sores or blisters on feet.  No complaints of excessive thirst.  No reports of blurry vision.  No significant changes to weight.              Hyperlipidemia Follow-Up      Are you regularly taking any medication or supplement to lower your cholesterol?   Yes- atorvastatin    Are you having muscle aches or other side effects that you think could be caused by your cholesterol lowering medication?  No    Hypertension Follow-up      Do you check your blood pressure regularly outside of the clinic? Yes -120's/60's at home on wrist cuff    Are you following a low salt diet? No    Are your blood pressures ever more than 140 on the top number (systolic) OR more   than 90 on the bottom number (diastolic), for example 140/90? No    BP Readings from Last 2 Encounters:   05/14/20 (!) 144/72   02/27/20 (!) 156/82     Hemoglobin A1C (%)   Date Value   02/12/2020 8.2 (A)   11/14/2019 8.5 (A)     LDL Cholesterol Calculated (mg/dL (calc))   Date Value   02/21/2019 87   02/21/2018 86     LDL Cholesterol Direct (mg/dL)   Date Value   02/12/2020 85       Depression and Anxiety Follow-Up    How are you doing with your depression since your last visit? No change    How are you doing with your anxiety since your last visit?  No change    Are you having other symptoms  that might be associated with depression or anxiety? No    Have you had a significant life event?  has depression and can be diffiucult, also covid 19     Do you have any concerns with your use of alcohol or other drugs? No    Social History     Tobacco Use     Smoking status: Never Smoker     Smokeless tobacco: Never Used   Substance Use Topics     Alcohol use: Yes     Alcohol/week: 0.0 standard drinks     Comment: very rare     Drug use: No     PHQ 5/16/2019 11/14/2019 2/12/2020   PHQ-9 Total Score 6 7 12   Q9: Thoughts of better off dead/self-harm past 2 weeks Not at all Not at all Not at all     KATHERINE-7 SCORE 8/22/2018 5/16/2019 11/14/2019   Total Score - - -   Total Score 0 0 2     Last PHQ-9 2/12/2020   1.  Little interest or pleasure in doing things 2   2.  Feeling down, depressed, or hopeless 2   3.  Trouble falling or staying asleep, or sleeping too much 1   4.  Feeling tired or having little energy 3   5.  Poor appetite or overeating 3   6.  Feeling bad about yourself 1   7.  Trouble concentrating 0   8.  Moving slowly or restless 0   Q9: Thoughts of better off dead/self-harm past 2 weeks 0   PHQ-9 Total Score 12   Difficulty at work, home, or with people Somewhat difficult     KATHERINE-7  11/14/2019   1. Feeling nervous, anxious, or on edge 1   2. Not being able to stop or control worrying 0   3. Worrying too much about different things 1   4. Trouble relaxing 0   5. Being so restless that it is hard to sit still 0   6. Becoming easily annoyed or irritable 0   7. Feeling afraid, as if something awful might happen 0   KATHERINE-7 Total Score 2   If you checked any problems, how difficult have they made it for you to do your work, take care of things at home, or get along with other people? Not difficult at all         Suicide Assessment Five-step Evaluation and Treatment (SAFE-T)      How many servings of fruits and vegetables do you eat daily?  2-3    On average, how many sweetened beverages do you drink each  day (Examples: soda, juice, sweet tea, etc.  Do NOT count diet or artificially sweetened beverages)?   1    How many days per week do you exercise enough to make your heart beat faster? 3 or less    How many minutes a day do you exercise enough to make your heart beat faster? 9 or less    How many days per week do you miss taking your medication? 0        Patient Active Problem List   Diagnosis     Essential hypertension, benign     Mixed hyperlipidemia     Obesity, morbid, BMI 40.0-49.9 (H)     Esophageal reflux     Allergic rhinitis     Obstructive sleep apnea     Family history of malignant neoplasm of breast     ACP (advance care planning)     Type 2 diabetes mellitus without complication, with long-term current use of insulin (H)     Adjustment disorder with mixed anxiety and depressed mood     Acne     Health Care Home     Past Surgical History:   Procedure Laterality Date     ------------OTHER-------------  9/5/2013    L hand, cyst excision     ------------OTHER------------- Right 03/14/2014    shoulder manipulation     C APPENDECTOMY  1956     C EYE EXAM ESTABLISHED PT  4/20/11    No retinopathy     C TOTAL KNEE ARTHROPLASTY  2/06    Knee Replacement, Total Right     C TOTAL KNEE ARTHROPLASTY  3/5/07    Knee Replacement, Total  Left     C VAGINAL HYSTERECTOMY  8/01    Hysterectomy, Vaginal & BSO     HC COLONOSCOPY W/WO BRUSH/WASH  6/03     HC INCISION TENDON SHEATH FINGER Left 09/05/2013    left thumb trigger finger     HC KNEE SCOPE, DIAGNOSTIC  4/2005    Arthroscopy, Knee Left     HC REMOVE TONSILS/ADENOIDS,<10 Y/O  1953    T & A <11y.o.     TEST NOT FOUND  6/27/07    R heel/ inocencio's deformity       Social History     Tobacco Use     Smoking status: Never Smoker     Smokeless tobacco: Never Used   Substance Use Topics     Alcohol use: Yes     Alcohol/week: 0.0 standard drinks     Comment: very rare     Family History   Problem Relation Age of Onset     Cancer Father         prostate     Gastrointestinal  Disease Father         GERD     Heart Disease Paternal Grandfather      Cerebrovascular Disease Maternal Grandfather      Heart Disease Maternal Grandmother      Gastrointestinal Disease Other         Niece with GERD/hiatal hernia     Breast Cancer Sister         51     Alzheimer Disease No family hx of      Diabetes No family hx of          Current Outpatient Medications   Medication Sig Dispense Refill     amLODIPine (NORVASC) 10 MG tablet Take 1 tablet (10 mg) by mouth daily 90 tablet 1     ASPIRIN 81 MG OR TABS 1 tab po QD (Once per day) 100 3     atorvastatin (LIPITOR) 10 MG tablet Take 1 tablet (10 mg) by mouth daily 90 tablet 3     blood glucose (NO BRAND SPECIFIED) test strip Use to test blood sugar one times daily or as directed. To accompany: {Blood Glucose Monitor Brands One Touch Viro test strips 100 strip 3     blood glucose monitoring (ONE TOUCH DELICA) lancets Use to test blood sugars 1 times daily or as directed. 100 each 3     Blood Glucose Monitoring Suppl (ONE TOUCH ULTRA 2) W/DEVICE KIT Use to test blood sugars 2 times daily or as directed. 1 kit 0     budesonide (RINOCORT AQUA) 32 MCG/ACT nasal spray SPRAY 2 SPRAYS INTO BOTH NOSTRILS ONCE DAILY 3 Bottle 3     cyclobenzaprine (FLEXERIL) 5 MG tablet Take 1 tablet (5 mg) by mouth 3 times daily as needed for muscle spasms 30 tablet 0     gabapentin (NEURONTIN) 300 MG capsule Take 1 capsule (300 mg) by mouth 3 times daily 30 capsule 1     glipiZIDE (GLUCOTROL XL) 5 MG 24 hr tablet Take 1 tablet (5 mg) by mouth daily 90 tablet 3     hydrochlorothiazide (HYDRODIURIL) 12.5 MG tablet Take 1 tablet (12.5 mg) by mouth daily 90 tablet 1     insulin glargine (BASAGLAR KWIKPEN) 100 UNIT/ML pen Inject 40 Units Subcutaneous daily 30 mL 1     insulin pen needle (BD ROSE U/F) 32G X 4 MM miscellaneous Use 1 pen needles daily or as directed. 100 each 3     losartan (COZAAR) 100 MG tablet Take 1 tablet (100 mg) by mouth daily 90 tablet 1     metFORMIN  (GLUCOPHAGE-XR) 500 MG 24 hr tablet Take 4 tablets by mouth daily with dinner. 360 tablet 3     metoprolol succinate ER (TOPROL-XL) 25 MG 24 hr tablet Take 1 tablet (25 mg) by mouth daily 90 tablet 3     Multiple Vitamins-Minerals (CENTRUM SILVER) per tablet 1 tablet 4 times weekly 100 tablet      nortriptyline (PAMELOR) 10 MG capsule Take 1 capsule (10 mg) by mouth At Bedtime 90 capsule 1     order for DME Equipment being ordered: compression socks, below knee- light compression    Dx: edema 2 Device 1     ORDER FOR DME Equipment being ordered: Mediven plus compression stockings    27720  20-30 compression 3 Device 0     potassium chloride ER (MICRO-K) 10 MEQ CR capsule Take three tablets daily with food in split dosages. 270 capsule 3     sitagliptin (JANUVIA) 100 MG tablet Take 1 tablet (100 mg) by mouth daily 90 tablet 3     venlafaxine (EFFEXOR-XR) 150 MG 24 hr capsule Take 1 capsule (150 mg) by mouth daily 90 capsule 3     Allergies   Allergen Reactions     Demerol Nausea and Vomiting     Erythromycin      GI upset     Peanuts [Nuts] Hives     Shrimp Hives     Recent Labs   Lab Test 05/14/20 02/12/20  1011 02/12/20 12/11/19 11/14/19 02/21/19  1008  11/21/18  1031  05/23/18  1034  02/21/18  1047  02/11/18  0250  01/19/17  1043  10/05/13  1023  08/29/13  1349   A1C 8.7* 8.2*  --   --  8.5*   < >  --    < >  --    < >  --    < >  --    < >  --    < >  --    < >  --    < >  --    LDL  --   --  85  --   --   --  87  --   --   --   --   --  86  --   --    < >  --    < > 88  --   --    HDL  --   --  72  --   --   --  66  --   --   --   --   --  61  --   --    < >  --    < > 69  --   --    TRIG  --   --  79  --   --   --  77  --   --   --   --   --  99  --   --    < >  --    < > 68  --   --    ALT  --   --   --   --   --   --  32*  --   --   --   --   --   --   --   --   --   --   --  34*  --  25   CR  --   --  0.66*  --  0.62*   < >  --   --  0.50  --  0.49*  --   --   --  0.46*   < > 0.47*   < > 0.67  --  0.53    GFRESTIMATED  --   --   --   --   --   --   --   --  99  --  100  --   --   --  >90   < > 102   < > 93  --  101   GFRESTBLACK  --   --   --   --   --   --   --   --   --   --   --   --   --   --  >90  --   --   --   --   --   --    POTASSIUM  --   --  3.92 3.80 3.38*   < > 3.9  --  3.7  --  3.4*  --   --    < > 3.1*   < > 3.8   < > 3.4*  --  4.0   TSH  --   --   --   --   --   --  2.09  --   --   --   --   --   --   --   --   --  2.28  --  2.55  --   --     < > = values in this interval not displayed.      BP Readings from Last 3 Encounters:   05/14/20 (!) 144/72   02/27/20 (!) 156/82   02/12/20 (!) 150/80    Wt Readings from Last 3 Encounters:   05/14/20 116.8 kg (257 lb 6.4 oz)   02/27/20 116.1 kg (256 lb)   02/12/20 116.1 kg (256 lb)                      Reviewed and updated as needed this visit by Provider         Review of Systems   Constitutional, HEENT, cardiovascular, pulmonary, gi and gu systems are negative, except as otherwise noted.      Objective    BP (!) 144/72 (BP Location: Left arm, Patient Position: Chair, Cuff Size: Adult Large)   Pulse 72   Temp 97.5  F (36.4  C) (Oral)   Resp 20   Ht 1.524 m (5')   Wt 116.8 kg (257 lb 6.4 oz)   LMP 05/12/2001   BMI 50.27 kg/m    Body mass index is 50.27 kg/m .  Physical Exam   GENERAL: healthy, alert and no distress  EYES: Eyes grossly normal to inspection, PERRL and conjunctivae and sclerae normal  HENT: ear canals and TM's normal, nose and mouth without ulcers or lesions  NECK: no adenopathy, no asymmetry, masses, or scars and thyroid normal to palpation  RESP: lungs clear to auscultation - no rales, rhonchi or wheezes  CV: regular rate and rhythm, normal S1 S2, no S3 or S4, no murmur, click or rub, no peripheral edema and peripheral pulses strong  ABDOMEN: soft, nontender, no hepatosplenomegaly, no masses and bowel sounds normal  MS: no gross musculoskeletal defects noted, no edema  SKIN: no suspicious lesions or rashes  NEURO: Normal strength and  tone, mentation intact and speech normal  PSYCH: mentation appears normal, affect normal/bright  Diabetic foot exam: normal DP and PT pulses, no trophic changes or ulcerative lesions and normal sensory exam    Diagnostic Test Results:  Labs reviewed in Epic  Results for orders placed or performed in visit on 05/14/20 (from the past 24 hour(s))   Hemoglobin A1c (BFP)   Result Value Ref Range    Hemoglobin A1C 8.7 (A) 4.0 - 7.0 %           Assessment & Plan   Assessment      Plan  (E11.9,  Z79.4) Type 2 diabetes mellitus without complication, with long-term current use of insulin (H)  (primary encounter diagnosis)  Comment: poorly controlled, poor habits, no exercise-pt agrees ot / with MTM  Plan: Hemoglobin A1c (BFP), VENOUS COLLECTION        continue current medications at current doses other than increase Lantus to 43 units see MTM    (E78.2) Mixed hyperlipidemia  Comment: control uncertain  Plan: continue current medications at current doses pendignlabs    (I10) Essential hypertension, benign  Comment: elevated here but OK at home on rist cuff  Plan: losartan (COZAAR) 100 MG tablet,         hydrochlorothiazide (HYDRODIURIL) 12.5 MG         tablet        continue current medications at current doses Check blood pressure readings outside of the clinic several times per week, write down values, and follow up if elevated within the next several weeks. Blood pressure can be checked at the firestation, drugstore,  or any valid site.     (J30.2) Chronic seasonal allergic rhinitis  Comment: stable symptomatically   Plan: budesonide (RINOCORT AQUA) 32 MCG/ACT nasal         spray        continue current medications at current doses     (F43.23) Adjustment disorder with mixed anxiety and depressed mood  Comment: stable symptomatically   Plan: continue current medications at current doses       FUTURE APPOINTMENTS:       - Follow-up visit in 3 mo, MTM sooner  Work on weight loss  Regular exercise    No follow-ups on  file.    Yonny Figueroa MD  [facility]

## 2020-05-14 NOTE — LETTER
May 14, 2020      Alma Kraft  [address]  [place] [address]        Dear ,    We are writing to inform you of your test results.    Your test results fall within the expected range(s) or remain unchanged from previous results other than the diabetes numbers.  Please continue with current treatment plan. Lipid, liver and kidney look good    Resulted Orders   Hemoglobin A1c (BFP)   Result Value Ref Range    Hemoglobin A1C 8.7 (A) 4.0 - 7.0 %   Lipid Panel (BFP)   Result Value Ref Range    Cholesterol 168 0 - 199 mg/dL    Triglycerides 97 0 - 149 mg/dL    HDL Cholesterol 67 40 - 150 mg/dL    LDL Cholesterol Direct 82 0 - 130 mg/dL    Cholesterol/HDL Ratio 3 0 - 5   Comprehensive Metobolic Panel (BFP)   Result Value Ref Range    Carbon Dioxide 31.9 20 - 32 mmol/L    Creatinine 0.67 (A) 0.70 - 1.18 mg/dL    Glucose 207 (A) 60 - 99 mg/dL    Sodium 140.2 135 - 146 mmol/L    Potassium 3.90 3.5 - 5.3 mmol/L    Chloride 99.6 98 - 110 mmol/L    Protein Total 7.0 6.1 - 8.1 g/dL    Albumin 3.7 3.6 - 5.1 g/dL    Alkaline Phosphatase 127 33 - 130 U/L    ALT 21 0 - 32 U/L    AST 32 0 - 35 U/L    Bilirubin Total 1.0 0.2 - 1.2 mg/dL    Urea Nitrogen 16 7 - 25 mg/dL    Calcium 9.3 8.6 - 10.3 mg/dL    BUN/Creatinine Ratio 23.9 (A) 6 - 22    Globulin Calculated 3.3 1.9 - 3.7    A/G Ratio 1.1 1 - 2.5       If you have any questions or concerns, please call the clinic at the number listed above.       Sincerely,        Yonny Figueroa MD

## 2020-05-21 ENCOUNTER — HOSPITAL ENCOUNTER (OUTPATIENT)
Dept: MAMMOGRAPHY | Facility: CLINIC | Age: 70
Discharge: HOME | End: 2020-05-21
Attending: FAMILY MEDICINE | Admitting: FAMILY MEDICINE
Payer: MEDICARE

## 2020-05-21 DIAGNOSIS — Z12.31 VISIT FOR SCREENING MAMMOGRAM: ICD-10-CM

## 2020-05-21 PROCEDURE — 77067 SCR MAMMO BI INCL CAD: CPT

## 2020-06-01 ENCOUNTER — TELEPHONE (OUTPATIENT)
Dept: FAMILY MEDICINE | Facility: CLINIC | Age: 70
End: 2020-06-01

## 2020-06-01 NOTE — TELEPHONE ENCOUNTER
Left message for patient to call back.  Trying to connect with patient to address diabetes control and medication adjustments.    Doe Packer, PharmD  Clinical Pharmacist  [facility]  General [facility] Phone: [phone]  Direct Office Phone: [phone]

## 2020-06-04 ENCOUNTER — TRANSFERRED RECORDS (OUTPATIENT)
Dept: FAMILY MEDICINE | Facility: CLINIC | Age: 70
End: 2020-06-04

## 2020-06-11 ENCOUNTER — OFFICE VISIT (OUTPATIENT)
Dept: FAMILY MEDICINE | Facility: CLINIC | Age: 70
End: 2020-06-11

## 2020-06-11 RX ORDER — GLIPIZIDE 10 MG/1
10-20 TABLET, FILM COATED, EXTENDED RELEASE ORAL DAILY
Qty: 60 TABLET | Refills: 0 | Status: SHIPPED | OUTPATIENT
Start: 2020-06-11 | End: 2020-08-04

## 2020-06-11 NOTE — PROGRESS NOTES
SUBJECTIVE/OBJECTIVE:                Alma Kraft is a 69 year old female coming in for a follow-up visit for Medication Management Services.  She was referred to me from Dr. Brody originally, now seeing Dr. Figueroa.     Chief Complaint: Follow up from [facility] visit on 1/9/20.  Diabetes mgmt    DIABETES:  Current Medications:  Metformin ER 500mg - 4 tablets with evening meal  Januvia 100mg daily  Basaglar 43 units daily  Glipizide ER 5mg daily    We discussed the benefits and risks of each medication.  Patient Questions/Concerns:  none  Labs:  Has not been checking blood sugars recently.  Was previously checking fasting in the morning.    Range 140-200 mg/dL.    Additional Information:  Patient and I discussed in great detail her challenges with food and overeating.  Stress is a big trigger for her to eat.  Her activity is minimal.    ASSESSMENT/PLAN:                DIABETES:  Assessment: A1c is trending up.  Patient has not been monitoring her blood sugars for the past several weeks.  Being at home for the past several weeks due to COVID-19 has caused her eating habits to worsen.  We discussed increasing her Glipizide to 10mg daily and eventually to 20mg daily, however, being able to monitor her blood sugars will help ensure she does not become hypoglycemic.  We also discussed increasing insulin at some point in the near future if needed.  Could consider adding on a GLP-1 agonist, however, cost is a major barrier.  Status: not at goal  Drug Therapy Problems:  1) Dose too low - Glipizide ER 10mg  Plan:  1) Increase Glipizide ER to 10mg daily  2) Patient will start to check blood sugars daily in the AM.  3) Follow-up via phone in 1-2 weeks.      I spent 30 minutes with this patient today.  All changes were made via collaborative practice agreement with Yonny Figueroa. A copy of the visit note was provided to the patient's primary care provider.    Doe Packer, PharmD  Clinical Pharmacist  [facility]  General Clinic Phone: [phone]  Direct Office Phone: [phone]

## 2020-06-18 ENCOUNTER — TELEPHONE (OUTPATIENT)
Dept: FAMILY MEDICINE | Facility: CLINIC | Age: 70
End: 2020-06-18

## 2020-06-18 DIAGNOSIS — Z79.4 TYPE 2 DIABETES MELLITUS WITHOUT COMPLICATION, WITH LONG-TERM CURRENT USE OF INSULIN (H): Primary | ICD-10-CM

## 2020-06-18 DIAGNOSIS — E11.9 TYPE 2 DIABETES MELLITUS WITHOUT COMPLICATION, WITH LONG-TERM CURRENT USE OF INSULIN (H): Primary | ICD-10-CM

## 2020-06-18 NOTE — TELEPHONE ENCOUNTER
SUBJECTIVE/OBJECTIVE:                Alma Kraft is a 69 year old female called for a follow-up visit for Medication Management Services.  She was referred to me from Dr. Brody, however, established care with Dr. Figueroa.     Chief Complaint: Follow up from [facility] visit on 6/11/20.  Diabetes focus    DIABETES:  Current Medications:  Metformin ER 500mg taking 4 tablets daily  Januvia 100mg daily  Basaglar 43 units daily  Glipizide ER 10mg daily (increased last week)  We discussed the benefits and risks of each medication.  Patient Questions/Concerns:  none  Labs:  Patient reported morning fasting blood sugars have reduced from around 200 mg/dL on average to around 150 mg/dL on average.  She reports no hypoglycemic glucose readings or s/sxs of hypoglycemia  Additional Information:  none    ASSESSMENT/PLAN:                DIABETES:  Assessment: Slight improvement in fasting glucose readings reported by patient.  No hypoglycemia.  Seems to be tolerating the dose increase of glipizide well.  Discussing waiting another week to increase the dose of Glipizide to target dose of 20mg/day since patient is only checking her blood sugars every other day.  Status: Improved fasting glucose  Drug Therapy Problems:  1) none  Plan:  1) Follow-up with patient in 1 week via phone and increase Glipizide to 20mg at that time if tolerating well with no adverse reactions.      I spent 15 minutes with this patient today.  All changes were made via collaborative practice agreement with Yonny Figueroa. A copy of the visit note was provided to the patient's primary care provider.    Doe Packer, PharmD  Clinical Pharmacist  [facility] Phone: [phone]  Direct Office Phone: [phone]

## 2020-06-26 RX ORDER — PEN NEEDLE, DIABETIC 32GX 5/32"
NEEDLE, DISPOSABLE MISCELLANEOUS
Qty: 100 EACH | Refills: 3 | COMMUNITY
Start: 2020-06-26 | End: 2021-08-02

## 2020-06-26 NOTE — TELEPHONE ENCOUNTER
Pt last seen 5/14/2020 and 6/11/2020 Called a year of pen needles to pharmacy    Signed Prescriptions:                        Disp   Refills    insulin pen needle (BD ROSE U/F) 32G X 4 M*100 ea*3        Sig: Use 1 pen needles daily or as directed.  Authorizing Provider: ALLEGRA LOUIS  Ordering User: MULU POOLE    Pt notified

## 2020-06-30 ENCOUNTER — TELEPHONE (OUTPATIENT)
Dept: FAMILY MEDICINE | Facility: CLINIC | Age: 70
End: 2020-06-30

## 2020-06-30 DIAGNOSIS — Z79.4 TYPE 2 DIABETES MELLITUS WITHOUT COMPLICATION, WITH LONG-TERM CURRENT USE OF INSULIN (H): Primary | ICD-10-CM

## 2020-06-30 DIAGNOSIS — E11.9 TYPE 2 DIABETES MELLITUS WITHOUT COMPLICATION, WITH LONG-TERM CURRENT USE OF INSULIN (H): Primary | ICD-10-CM

## 2020-06-30 NOTE — TELEPHONE ENCOUNTER
I called and spoke with Jeny.  She has checked her blood sugars over the past week and the range, fasting in the morning has been 120-180 mg/dL.  She denies any sxs of hypoglycemia.    Currently taking:  Glipizide ER 10mg daily  Basaglar 43 units daily  Januvia 100mg daily  Metformin ER 500mg 4 tablets daily    PLAN:  1) Increase Glipizide ER to 15mg daily (patient has both 10mg & 5mg tablets at home)  2) Follow-up via phone in 1 week  3) Patient will call me if she experiences any hypoglycemia    Doe Packer, PharmD  Clinical Pharmacist  [facility]  General Clinic Phone: [phone]  Direct Office Phone: [phone]

## 2020-07-07 ENCOUNTER — TELEPHONE (OUTPATIENT)
Dept: FAMILY MEDICINE | Facility: CLINIC | Age: 70
End: 2020-07-07

## 2020-07-28 NOTE — TELEPHONE ENCOUNTER
Left message for patient to call back.    Doe Packer, PharmD  Clinical Pharmacist  [facility] Phone: [phone]  Direct Office Phone: [phone]

## 2020-07-28 NOTE — TELEPHONE ENCOUNTER
Left message for patient to call back.    Doe Packer, PharmD  Clinical Pharmacist  [facility] Phone: [phone]  Direct Office Phone: [phone]

## 2020-08-01 DIAGNOSIS — E11.65 TYPE 2 DIABETES MELLITUS WITH HYPERGLYCEMIA (H): ICD-10-CM

## 2020-08-03 RX ORDER — GLIPIZIDE 10 MG/1
TABLET, FILM COATED, EXTENDED RELEASE ORAL
Qty: 60 TABLET | Refills: 0 | COMMUNITY
Start: 2020-08-03

## 2020-08-03 NOTE — TELEPHONE ENCOUNTER
Alma Kraft is requesting a refill of:    Refused Prescriptions:                       Disp   Refills    glipiZIDE (GLUCOTROL XL) 10 MG 24 hr table*60 tab*0        Sig: Take 1 - 2 tablets by mouth daily.  Refused By: RAGHU FONTAINE  Reason for Refusal: Patient needs appointment

## 2020-08-04 RX ORDER — GLIPIZIDE 10 MG/1
10 TABLET, FILM COATED, EXTENDED RELEASE ORAL
Qty: 30 TABLET | Refills: 0 | COMMUNITY
Start: 2020-08-04 | End: 2020-08-18

## 2020-08-18 ENCOUNTER — OFFICE VISIT (OUTPATIENT)
Dept: FAMILY MEDICINE | Facility: CLINIC | Age: 70
End: 2020-08-18

## 2020-08-18 VITALS
DIASTOLIC BLOOD PRESSURE: 76 MMHG | HEIGHT: 60 IN | HEART RATE: 68 BPM | SYSTOLIC BLOOD PRESSURE: 154 MMHG | TEMPERATURE: 98 F | RESPIRATION RATE: 20 BRPM | BODY MASS INDEX: 51.28 KG/M2 | WEIGHT: 261.2 LBS

## 2020-08-18 DIAGNOSIS — E78.2 MIXED HYPERLIPIDEMIA: ICD-10-CM

## 2020-08-18 DIAGNOSIS — F43.23 ADJUSTMENT DISORDER WITH MIXED ANXIETY AND DEPRESSED MOOD: ICD-10-CM

## 2020-08-18 DIAGNOSIS — I10 ESSENTIAL HYPERTENSION: ICD-10-CM

## 2020-08-18 LAB
ALBUMIN SERPL-MCNC: 3.7 G/DL (ref 3.6–5.1)
ALBUMIN/GLOB SERPL: 1.2 {RATIO} (ref 1–2.5)
ALP SERPL-CCNC: 137 U/L (ref 33–130)
ALT 1742-6: 25 U/L (ref 0–32)
AST 1920-8: 39 U/L (ref 0–35)
BILIRUB SERPL-MCNC: 0.8 MG/DL (ref 0.2–1.2)
BUN SERPL-MCNC: 18 MG/DL (ref 7–25)
BUN/CREATININE RATIO: 29 (ref 6–22)
CALCIUM SERPL-MCNC: 9.3 MG/DL (ref 8.6–10.3)
CHLORIDE SERPLBLD-SCNC: 101.9 MMOL/L (ref 98–110)
CHOLEST SERPL-MCNC: 164 MG/DL (ref 0–199)
CHOLEST/HDLC SERPL: 3 {RATIO} (ref 0–5)
CO2 SERPL-SCNC: 29.2 MMOL/L (ref 20–32)
CREAT SERPL-MCNC: 0.62 MG/DL (ref 0.7–1.18)
GLOBULIN, CALCULATED - QUEST: 3.2 (ref 1.9–3.7)
GLUCOSE SERPL-MCNC: 190 MG/DL (ref 60–99)
HBA1C MFR BLD: 9.3 % (ref 4–7)
HDLC SERPL-MCNC: 64 MG/DL (ref 40–150)
LDLC SERPL CALC-MCNC: 83 MG/DL (ref 0–130)
POTASSIUM SERPL-SCNC: 3.71 MMOL/L (ref 3.5–5.3)
PROT SERPL-MCNC: 6.9 G/DL (ref 6.1–8.1)
SODIUM SERPL-SCNC: 138 MMOL/L (ref 135–146)
TRIGL SERPL-MCNC: 86 MG/DL (ref 0–149)

## 2020-08-18 PROCEDURE — 36415 COLL VENOUS BLD VENIPUNCTURE: CPT | Performed by: FAMILY MEDICINE

## 2020-08-18 PROCEDURE — 80053 COMPREHEN METABOLIC PANEL: CPT | Performed by: FAMILY MEDICINE

## 2020-08-18 PROCEDURE — 80061 LIPID PANEL: CPT | Performed by: FAMILY MEDICINE

## 2020-08-18 PROCEDURE — 99214 OFFICE O/P EST MOD 30 MIN: CPT | Performed by: FAMILY MEDICINE

## 2020-08-18 PROCEDURE — 83036 HEMOGLOBIN GLYCOSYLATED A1C: CPT | Performed by: FAMILY MEDICINE

## 2020-08-18 RX ORDER — GLIPIZIDE 10 MG/1
10 TABLET, FILM COATED, EXTENDED RELEASE ORAL
Qty: 90 TABLET | Refills: 1 | Status: SHIPPED | OUTPATIENT
Start: 2020-08-18 | End: 2020-11-09

## 2020-08-18 RX ORDER — AMLODIPINE BESYLATE 10 MG/1
10 TABLET ORAL DAILY
Qty: 90 TABLET | Refills: 1 | Status: SHIPPED | OUTPATIENT
Start: 2020-08-18 | End: 2021-02-10

## 2020-08-18 RX ORDER — LOSARTAN POTASSIUM AND HYDROCHLOROTHIAZIDE 12.5; 1 MG/1; MG/1
1 TABLET ORAL DAILY
Qty: 90 TABLET | Refills: 1 | Status: SHIPPED | OUTPATIENT
Start: 2020-08-18 | End: 2021-02-09

## 2020-08-18 RX ORDER — INSULIN GLARGINE 100 [IU]/ML
46 INJECTION, SOLUTION SUBCUTANEOUS DAILY
Qty: 39 ML | Refills: 1 | Status: SHIPPED | OUTPATIENT
Start: 2020-08-18 | End: 2021-01-04

## 2020-08-18 ASSESSMENT — MIFFLIN-ST. JEOR: SCORE: 1626.3

## 2020-08-18 NOTE — LETTER
August 19, 2020      Alma Kraft  [address]  [place] [address]        Dear ,    We are writing to inform you of your test results.    Our liver, kidney, and lipids look good.  We need to work on the diabetes as discussed.     Resulted Orders   Hemoglobin A1c (BFP)   Result Value Ref Range    Hemoglobin A1C 9.3 (A) 4.0 - 7.0 %   Lipid Panel (BFP)   Result Value Ref Range    Cholesterol 164 0 - 199 mg/dL    Triglycerides 86 0 - 149 mg/dL    HDL Cholesterol 64 40 - 150 mg/dL    LDL Cholesterol Direct 83 0 - 130 mg/dL    Cholesterol/HDL Ratio 3 0 - 5   Comprehensive Metobolic Panel (BFP)   Result Value Ref Range    Carbon Dioxide 29.2 20 - 32 mmol/L    Creatinine 0.62 (A) 0.70 - 1.18 mg/dL    Glucose 190 (A) 60 - 99 mg/dL    Sodium 138.0 135 - 146 mmol/L    Potassium 3.71 3.5 - 5.3 mmol/L    Chloride 101.9 98 - 110 mmol/L    Protein Total 6.9 6.1 - 8.1 g/dL    Albumin 3.7 3.6 - 5.1 g/dL    Alkaline Phosphatase 137 (A) 33 - 130 U/L    ALT 25 0 - 32 U/L    AST 39 (A) 0 - 35 U/L    Bilirubin Total 0.8 0.2 - 1.2 mg/dL    Urea Nitrogen 18 7 - 25 mg/dL    Calcium 9.3 8.6 - 10.3 mg/dL    BUN/Creatinine Ratio 29.0 (A) 6 - 22    Globulin Calculated 3.2 1.9 - 3.7    A/G Ratio 1.2 1 - 2.5       If you have any questions or concerns, please call the clinic at the number listed above.       Sincerely,        Yonny Figueroa MD

## 2020-08-21 DIAGNOSIS — E11.9 TYPE 2 DIABETES MELLITUS WITHOUT COMPLICATION, WITH LONG-TERM CURRENT USE OF INSULIN (H): Primary | ICD-10-CM

## 2020-08-21 DIAGNOSIS — Z79.4 TYPE 2 DIABETES MELLITUS WITHOUT COMPLICATION, WITH LONG-TERM CURRENT USE OF INSULIN (H): Primary | ICD-10-CM

## 2020-08-21 RX ORDER — METFORMIN HCL 500 MG
TABLET, EXTENDED RELEASE 24 HR ORAL
Qty: 360 TABLET | Refills: 1 | Status: SHIPPED | OUTPATIENT
Start: 2020-08-21 | End: 2021-02-09

## 2020-08-21 NOTE — TELEPHONE ENCOUNTER
Alma Kraft is requesting a refill of:    Pending Prescriptions:                       Disp   Refills    metFORMIN (GLUCOPHAGE-XR) 500 MG 24 hr ta*360 ta*1            Sig: Take 4 tablets by mouth daily with dinner.    Lab Results   Component Value Date    A1C 9.3 08/18/2020    A1C 8.7 05/14/2020    A1C 8.2 02/12/2020    A1C 8.5 11/14/2019    A1C 8.6 08/14/2019

## 2020-08-27 ENCOUNTER — OFFICE VISIT (OUTPATIENT)
Dept: FAMILY MEDICINE | Facility: CLINIC | Age: 70
End: 2020-08-27

## 2020-08-27 DIAGNOSIS — Z79.4 TYPE 2 DIABETES MELLITUS WITHOUT COMPLICATION, WITH LONG-TERM CURRENT USE OF INSULIN (H): Primary | ICD-10-CM

## 2020-08-27 DIAGNOSIS — E11.9 TYPE 2 DIABETES MELLITUS WITHOUT COMPLICATION, WITH LONG-TERM CURRENT USE OF INSULIN (H): Primary | ICD-10-CM

## 2020-08-27 DIAGNOSIS — R52 PAIN: ICD-10-CM

## 2020-08-27 RX ORDER — CELECOXIB 100 MG/1
100 CAPSULE ORAL DAILY
Qty: 30 CAPSULE | Refills: 1 | Status: SHIPPED | OUTPATIENT
Start: 2020-08-27 | End: 2020-09-24 | Stop reason: DRUGHIGH

## 2020-08-27 NOTE — PROGRESS NOTES
SUBJECTIVE/OBJECTIVE:                Alma Kraft is a 69 year old female coming in for a follow-up visit for Medication Management Services.  She was referred to me from Dr. Figueroa.     Chief Complaint: Follow up from MTM visit on 7/7/20.  Diabetes med mgmt focus    DIABETES:  Current Medications:  Glipizide ER 10mg taking 2 tablets daily  Metformin ER 500mg taking 4 tablets daily  Januvia 100mg taking 1 tablet daily  Basaglar 46 units daily (morning, after eating)  We discussed the benefits and risks of each medication.  Patient Questions/Concerns:  none  Labs:  Lab Results   Component Value Date    A1C 9.3 08/18/2020    A1C 8.7 05/14/2020    A1C 8.2 02/12/2020    A1C 8.5 11/14/2019    A1C 8.6 08/14/2019       At home blood sugar monitoring.  Was doing it every other day.  Has not been checking it lately, however, patient is willing to check her blood sugars daily.    Additional Information:  none    PAIN:  Current Medications:  Ibuprofen 200mg taking 3 tablets TID PRN with food.  Patient reports taking this about 1-2 days a week on average.  We discussed the benefits and risks of each medication.  Patient Questions/Concerns:  None.  Labs:  none  Additional Information:  Patient reports chronic pain in her shoulders and hands, likely a result of osteoarthritis and joint degeneration.  We talked in detail about the benefits and risks of Ibuprofen, as well as with LOWE-2 inhibitors.  Patient reports she has had steroid injections in the past.        ASSESSMENT/PLAN:                DIABETES:  Assessment: Patient's A1c has been trending up.  She described multiple life events and situations as barriers to her having better self-care for her diabetes and overall health.  We talked about the importance of good lifestyle choices.  Despite challenges in lifestyle choices, she has remained adherent to taking her medications as prescribed.  We talked in detail about having an insulin titration plan in place to get her  blood sugars under better control quickly by checking her blood sugars daily and increasing by 1 unit every day if her morning, fasting blood sugar is > 120 mg/dL.  She understand the plan and will be tracking her blood sugar and Basaglar doses on a calendar every morning.  We also talked about checking during the day if she experiences any sxs of hypoglycemia.  Status: Not at A1c goal.  Blood sugar status unknown since patient is not checking her blood sugars.  Drug Therapy Problems:  1) Dose increase needed - Basaglar  Plan:  1) Increase Basaglar by 1 unit every day if blood sugar is greater than 120 mg/dL.  Patient will call if she experiences a blood sugar < 80 mg/dL.    PAIN:  Assessment: Patient has been working with an orthopedic surgeon to explore surgical interventions for chronic pain.  She is not using any chronic pain relieving medications and is open to trying a low-dose LOWE-2 inhibitor to see if it helps improve her quality of life and relieve some pain.  Status: Not adequately controlled  Drug Therapy Problems:  1) Needs new therapy - Celecoxib  Plan:  1) Start Celecoxib 100mg daily      I spent 1 hour with this patient today.  All changes were made via collaborative practice agreement with Yonny Figueroa. A copy of the visit note was provided to the patient's primary care provider.    Doe Packer, PharmD  Clinical Pharmacist  [facility] Phone: [phone]  Direct Office Phone: [phone]

## 2020-09-08 ENCOUNTER — TELEPHONE (OUTPATIENT)
Dept: FAMILY MEDICINE | Facility: CLINIC | Age: 70
End: 2020-09-08

## 2020-09-08 DIAGNOSIS — Z79.4 TYPE 2 DIABETES MELLITUS WITHOUT COMPLICATION, WITH LONG-TERM CURRENT USE OF INSULIN (H): Primary | ICD-10-CM

## 2020-09-08 DIAGNOSIS — E11.9 TYPE 2 DIABETES MELLITUS WITHOUT COMPLICATION, WITH LONG-TERM CURRENT USE OF INSULIN (H): Primary | ICD-10-CM

## 2020-09-08 NOTE — TELEPHONE ENCOUNTER
Tried calling 3 times, no answer, not able to leave a voice message since I was not directed to voice mail.      Will try again within the next couple of weeks.    Doe Packer, PharmD  Clinical Pharmacist  [facility] Phone: [phone]  Direct Office Phone: [phone]

## 2020-09-09 NOTE — TELEPHONE ENCOUNTER
I tried calling patient again today and was directed to voice mail.  I left message for patient to call back.    Doe Packer, PharmD  Clinical Pharmacist  [facility] Phone: [phone]  Direct Office Phone: [phone]

## 2020-09-21 DIAGNOSIS — F32.5 MAJOR DEPRESSION IN REMISSION (H): ICD-10-CM

## 2020-09-21 RX ORDER — VENLAFAXINE HYDROCHLORIDE 150 MG/1
150 CAPSULE, EXTENDED RELEASE ORAL DAILY
Qty: 90 CAPSULE | Refills: 0 | Status: SHIPPED | OUTPATIENT
Start: 2020-09-21 | End: 2020-12-14

## 2020-09-21 NOTE — TELEPHONE ENCOUNTER
Pt last seen 08/18/20, now out of medication.    Alma Kraft is requesting a refill of:    Pending Prescriptions:                       Disp   Refills    venlafaxine (EFFEXOR-XR) 150 MG 24 hr cap*90 cap*             Sig: Take 1 capsule (150 mg) by mouth daily

## 2020-09-22 ENCOUNTER — TELEPHONE (OUTPATIENT)
Dept: FAMILY MEDICINE | Facility: CLINIC | Age: 70
End: 2020-09-22

## 2020-09-22 DIAGNOSIS — Z79.4 TYPE 2 DIABETES MELLITUS WITHOUT COMPLICATION, WITH LONG-TERM CURRENT USE OF INSULIN (H): Primary | ICD-10-CM

## 2020-09-22 DIAGNOSIS — E11.9 TYPE 2 DIABETES MELLITUS WITHOUT COMPLICATION, WITH LONG-TERM CURRENT USE OF INSULIN (H): Primary | ICD-10-CM

## 2020-09-22 NOTE — TELEPHONE ENCOUNTER
Left another message for patient to call back to follow-up on diabetes mgmt.    Doe Packer, PharmD  Clinical Pharmacist  [facility] Phone: [phone]  Direct Office Phone: [phone]

## 2020-09-24 ENCOUNTER — TELEPHONE (OUTPATIENT)
Dept: FAMILY MEDICINE | Facility: CLINIC | Age: 70
End: 2020-09-24

## 2020-09-24 DIAGNOSIS — R52 PAIN: Primary | ICD-10-CM

## 2020-09-24 RX ORDER — CELECOXIB 200 MG/1
200 CAPSULE ORAL DAILY
Qty: 30 CAPSULE | Refills: 1 | Status: SHIPPED | OUTPATIENT
Start: 2020-09-24 | End: 2020-11-09

## 2020-09-24 NOTE — TELEPHONE ENCOUNTER
SUBJECTIVE/OBJECTIVE:                Alma Kraft is a 70 year old female emailed for a follow-up visit for Medication Management Services.  She was referred to me from Dr. Figueroa.     Chief Complaint: Follow up from MT visit on 8/27/20.      DIABETES:  Current Medications:  Glipizide ER 10mg BID  Basaglar 52 units daily  Metformin ER 500mg taking 4 tablets daily  Januvia 100mg daily  We discussed the benefits and risks of each medication.  Patient Questions/Concerns:  none  Labs:  Patient reports her blood sugar have improved, however, she did not provide specific values.  I have requested specific values so that we can more accurately track her progress.  Additional Information:  none    ARTHRITIS PAIN:  Current Medications:  Celecoxib 100mg daily - started last visit  We discussed the benefits and risks of each medication.  Patient Questions/Concerns:  none  Labs:  none  Additional Information:  Patient reports the celecoxib has helped relieve her shoulder pain with no noticeable adverse reactions.  As she has become more active lately, helping care for her  and daughter due to their medical conditions, she is requesting to increase the dose to 200mg/day      ASSESSMENT/PLAN:                DIABETES:  Assessment: Subjective improvement in glucose readings per patient.  She has slightly increased her Basaglar dose to 52 units daily.  Status: unknown  Drug Therapy Problems:  1) none  Plan:  1) Requested specific glucose readings so we can more accurately track progress.  Will continue to follow-up on a regular basis to monitor progress    ARTHRITIS PAIN:  Assessment: Improvement of symptoms since staring Celecoxib.  No adverse reactions.  Status: improved symptoms  Drug Therapy Problems:  1) Dose too low - Celecoxib  Plan:  1) Increase Celecoxib dose to 200mg daily      I spent 15 minutes with this patient today.  All changes were made via collaborative practice agreement with Yonny Figueroa.  A copy of the visit note was provided to the patient's primary care provider.    Doe Packer, PharmD  Clinical Pharmacist  [facility] Phone: [phone]  Direct Office Phone: [phone]

## 2020-10-08 DIAGNOSIS — G44.049 CHRONIC PAROXYSMAL HEMICRANIA, NOT INTRACTABLE: ICD-10-CM

## 2020-10-08 RX ORDER — NORTRIPTYLINE HCL 10 MG
10 CAPSULE ORAL AT BEDTIME
Qty: 90 CAPSULE | Refills: 1 | Status: SHIPPED | OUTPATIENT
Start: 2020-10-08 | End: 2021-03-29

## 2020-10-08 NOTE — TELEPHONE ENCOUNTER
Pt last seen on 8/18/2020, this medication was not filled at that time. Please advise Thanks.     Pending Prescriptions:                       Disp   Refills    nortriptyline (PAMELOR) 10 MG capsule     90 cap*1            Sig: Take 1 capsule (10 mg) by mouth At Bedtime

## 2020-10-22 ENCOUNTER — TELEPHONE (OUTPATIENT)
Dept: FAMILY MEDICINE | Facility: CLINIC | Age: 70
End: 2020-10-22

## 2020-10-22 NOTE — TELEPHONE ENCOUNTER
Over the past couple of weeks, I have tried connecting with Jeny to follow-up on how her blood sugars and arthritis pain have been doing since our last follow-up about a month ago.  I have left messages but have not received any responses back.      Doe Packer, PharmD  Clinical Pharmacist  [facility] Phone: [phone]  Direct Office Phone: [phone]

## 2020-11-04 ENCOUNTER — TRANSFERRED RECORDS (OUTPATIENT)
Dept: FAMILY MEDICINE | Facility: CLINIC | Age: 70
End: 2020-11-04

## 2020-11-07 DIAGNOSIS — R52 PAIN: ICD-10-CM

## 2020-11-07 DIAGNOSIS — I10 ESSENTIAL HYPERTENSION, BENIGN: ICD-10-CM

## 2020-11-09 RX ORDER — HYDROCHLOROTHIAZIDE 12.5 MG/1
TABLET ORAL
Qty: 90 TABLET | Refills: 0 | COMMUNITY
Start: 2020-11-09

## 2020-11-09 RX ORDER — GLIPIZIDE 10 MG/1
10 TABLET, FILM COATED, EXTENDED RELEASE ORAL
Qty: 90 TABLET | Refills: 1 | Status: SHIPPED | OUTPATIENT
Start: 2020-11-09 | End: 2021-02-10

## 2020-11-09 RX ORDER — CELECOXIB 200 MG/1
200 CAPSULE ORAL DAILY
Qty: 90 CAPSULE | Refills: 0 | Status: SHIPPED | OUTPATIENT
Start: 2020-11-09 | End: 2021-02-09

## 2020-11-09 RX ORDER — GLIPIZIDE 10 MG/1
TABLET, FILM COATED, EXTENDED RELEASE ORAL
Qty: 90 TABLET | Refills: 0 | COMMUNITY
Start: 2020-11-09

## 2020-11-09 RX ORDER — HYDROCHLOROTHIAZIDE 12.5 MG/1
12.5 TABLET ORAL DAILY
Qty: 180 TABLET | Refills: 0 | Status: SHIPPED | OUTPATIENT
Start: 2020-11-09 | End: 2021-12-01

## 2020-11-09 NOTE — TELEPHONE ENCOUNTER
Alma Kraft is requesting a refill of:    Pending Prescriptions:                       Disp   Refills    celecoxib (CELEBREX) 200 MG capsule [Phar*90 cap*0            Sig: Take 1 capsule (200 mg) by mouth daily  Refused Prescriptions:                       Disp   Refills    glipiZIDE (GLUCOTROL XL) 10 MG 24 hr table*90 tab*0        Sig: Take 1 tablet by mouth 2 times daily.    hydrochlorothiazide (HYDRODIURIL) 12.5 MG *90 tab*0        Sig: Take 1 tablet by mouth daily.

## 2020-11-09 NOTE — TELEPHONE ENCOUNTER
[facility] pharmacy called and stated that Jeny does need a refill for glipizide. On 8/18/20 she was prescribed 45 days worth with 1 refill.    She also needs a refill of hydrochlorothiazide 12.5 mg 2x daily.      Pending Prescriptions:                       Disp   Refills    hydrochlorothiazide (HYDRODIURIL) 12.5 MG*180 ta*0            Sig: Take 1 tablet (12.5 mg) by mouth daily    glipiZIDE (GLUCOTROL XL) 10 MG 24 hr tabl*90 tab*1            Sig: Take 1 tablet (10 mg) by mouth 2 times daily    Signed Prescriptions:                        Disp   Refills    celecoxib (CELEBREX) 200 MG capsule        90 cap*0        Sig: Take 1 capsule (200 mg) by mouth daily  Authorizing Provider: ALLEGRA LOUIS    Refused Prescriptions:                       Disp   Refills    glipiZIDE (GLUCOTROL XL) 10 MG 24 hr table*90 tab*0        Sig: Take 1 tablet by mouth 2 times daily.  Refused By: RAGHU FONTAINE  Reason for Refusal: Should already have refills on file    hydrochlorothiazide (HYDRODIURIL) 12.5 MG *90 tab*0        Sig: Take 1 tablet by mouth daily.  Refused By: RAGHU FONTAINE  Reason for Refusal: Should already have refills on file

## 2020-11-23 ENCOUNTER — OFFICE VISIT (OUTPATIENT)
Dept: FAMILY MEDICINE | Facility: CLINIC | Age: 70
End: 2020-11-23

## 2020-11-23 VITALS
HEART RATE: 78 BPM | TEMPERATURE: 98.3 F | HEIGHT: 60 IN | WEIGHT: 264 LBS | BODY MASS INDEX: 51.83 KG/M2 | SYSTOLIC BLOOD PRESSURE: 152 MMHG | DIASTOLIC BLOOD PRESSURE: 82 MMHG | OXYGEN SATURATION: 97 %

## 2020-11-23 DIAGNOSIS — I10 ESSENTIAL HYPERTENSION: ICD-10-CM

## 2020-11-23 DIAGNOSIS — E11.9 TYPE 2 DIABETES MELLITUS WITHOUT COMPLICATION, WITH LONG-TERM CURRENT USE OF INSULIN (H): Primary | ICD-10-CM

## 2020-11-23 DIAGNOSIS — Z79.4 TYPE 2 DIABETES MELLITUS WITHOUT COMPLICATION, WITH LONG-TERM CURRENT USE OF INSULIN (H): Primary | ICD-10-CM

## 2020-11-23 DIAGNOSIS — E78.2 MIXED HYPERLIPIDEMIA: ICD-10-CM

## 2020-11-23 DIAGNOSIS — I10 ESSENTIAL HYPERTENSION, BENIGN: ICD-10-CM

## 2020-11-23 DIAGNOSIS — F43.23 ADJUSTMENT DISORDER WITH MIXED ANXIETY AND DEPRESSED MOOD: ICD-10-CM

## 2020-11-23 LAB — HBA1C MFR BLD: 9.2 % (ref 4–7)

## 2020-11-23 PROCEDURE — 83036 HEMOGLOBIN GLYCOSYLATED A1C: CPT | Performed by: FAMILY MEDICINE

## 2020-11-23 PROCEDURE — 99214 OFFICE O/P EST MOD 30 MIN: CPT | Performed by: FAMILY MEDICINE

## 2020-11-23 PROCEDURE — 36415 COLL VENOUS BLD VENIPUNCTURE: CPT | Performed by: FAMILY MEDICINE

## 2020-11-23 RX ORDER — METOPROLOL SUCCINATE 50 MG/1
50 TABLET, EXTENDED RELEASE ORAL DAILY
Qty: 30 TABLET | Refills: 0 | COMMUNITY
Start: 2020-11-23 | End: 2021-01-08

## 2020-11-23 ASSESSMENT — MIFFLIN-ST. JEOR: SCORE: 1634

## 2020-11-23 NOTE — PROGRESS NOTES
Subjective     Alma Kraft is a 70 year old female who presents to clinic today for the following health issues:    HPI         Diabetes Follow-up, using 57 unit(s) insulin now, pt admits she was not eating well for awhile but has not changed her ways a week ago    How often are you checking your blood sugar? A few times a week  What time of day are you checking your blood sugars (select all that apply)?  Before [id number] today  Have you had any blood sugars above 200?  Yes a few  Have you had any blood sugars below 70?  No    What symptoms do you notice when your blood sugar is low?  None    What concerns do you have today about your diabetes? None     Do you have any of these symptoms? (Select all that apply)  No numbness or tingling in feet.  No redness, sores or blisters on feet.  No complaints of excessive thirst.  No reports of blurry vision.  No significant changes to weight.              Hyperlipidemia Follow-Up      Are you regularly taking any medication or supplement to lower your cholesterol?   Yes- atorvastatin    Are you having muscle aches or other side effects that you think could be caused by your cholesterol lowering medication?  No    Hypertension Follow-up      Do you check your blood pressure regularly outside of the clinic? No     Are you following a low salt diet? No    Are your blood pressures ever more than 140 on the top number (systolic) OR more   than 90 on the bottom number (diastolic), for example 140/90? unsure    BP Readings from Last 2 Encounters:   11/23/20 (!) 152/82   08/18/20 (!) 154/76     Hemoglobin A1C (%)   Date Value   08/18/2020 9.3 (A)   05/14/2020 8.7 (A)     LDL Cholesterol Calculated (mg/dL (calc))   Date Value   02/21/2019 87   02/21/2018 86     LDL Cholesterol Direct (mg/dL)   Date Value   08/18/2020 83   05/14/2020 82       Vascular Disease Follow-up      How often do you take nitroglycerin? Never    Do you take an aspirin every day? Yes      How many servings  of fruits and vegetables do you eat daily?  2-3    On average, how many sweetened beverages do you drink each day (Examples: soda, juice, sweet tea, etc.  Do NOT count diet or artificially sweetened beverages)?   2    How many days per week do you exercise enough to make your heart beat faster? 3 or less    How many minutes a day do you exercise enough to make your heart beat faster? 9 or less    How many days per week do you miss taking your medication? 0        Review of Systems   Constitutional, HEENT, cardiovascular, pulmonary, gi and gu systems are negative, except as otherwise noted.      Objective    BP (!) 152/82   Pulse 78   Temp 98.3  F (36.8  C)   Ht 1.524 m (5')   Wt 119.7 kg (264 lb)   LMP 05/12/2001   SpO2 97%   BMI 51.56 kg/m    Body mass index is 51.56 kg/m .  Physical Exam   GENERAL: healthy, alert and no distress  EYES: Eyes grossly normal to inspection, PERRL and conjunctivae and sclerae normal  HENT: ear canals and TM's normal, nose and mouth without ulcers or lesions  NECK: no adenopathy, no asymmetry, masses, or scars and thyroid normal to palpation  RESP: lungs clear to auscultation - no rales, rhonchi or wheezes  CV: regular rate and rhythm, normal S1 S2, no S3 or S4, no murmur, click or rub, no peripheral edema and peripheral pulses strong  ABDOMEN: soft, nontender, no hepatosplenomegaly, no masses and bowel sounds normal  MS: no gross musculoskeletal defects noted, no edema  SKIN: no suspicious lesions or rashes  NEURO: Normal strength and tone, mentation intact and speech normal  PSYCH: mentation appears normal, affect normal/bright  Diabetic foot exam: normal DP and PT pulses, no trophic changes or ulcerative lesions and normal sensory exam    Results for orders placed or performed in visit on 11/23/20 (from the past 24 hour(s))   Hemoglobin A1c (BFP)   Result Value Ref Range    Hemoglobin A1C 9.2 (A) 4.0 - 7.0 %           Assessment & Plan     Type 2 diabetes mellitus without  complication, with long-term current use of insulin (H)  suboptimal control but not worsening, will have MTM check in with her again  - Hemoglobin A1c (BFP)  - VENOUS COLLECTION    Mixed hyperlipidemia  Control uncertain, continue current medications at current doses pending labs    Essential hypertension, benign  Elevated, recommend she increase toprol slightly-need to watch for glucose lows but not having any low sugars now, Check blood pressure readings outside of the clinic several times per week, write down values, and follow up if elevated within the next several weeks. Blood pressure can be checked at the firestation, drugstore,  or any valid site.     Adjustment disorder with mixed anxiety and depressed mood  stable symptomatically                 FUTURE APPOINTMENTS:       - Follow-up visit in 3 mo  Work on weight loss  Regular exercise    No follow-ups on file.    Yonny Figueroa MD  [facility]

## 2020-11-23 NOTE — NURSING NOTE
Jeny is here for a fasting diabetic med check.          Pre-visit Screening:  Immunizations:  up to date  Colonoscopy:  is up to date  Mammogram: is up to date  Asthma Action Test/Plan:  NA  PHQ9:  UTD  GAD7:  UTD  Questioned patient about current smoking habits Pt. has never smoked.  Ok to leave detailed message on voice mail for today's visit only Yes, phone # [phone]

## 2020-12-10 ENCOUNTER — OFFICE VISIT (OUTPATIENT)
Dept: FAMILY MEDICINE | Facility: CLINIC | Age: 70
End: 2020-12-10

## 2020-12-10 DIAGNOSIS — Z79.4 TYPE 2 DIABETES MELLITUS WITHOUT COMPLICATION, WITH LONG-TERM CURRENT USE OF INSULIN (H): Primary | ICD-10-CM

## 2020-12-10 DIAGNOSIS — E11.9 TYPE 2 DIABETES MELLITUS WITHOUT COMPLICATION, WITH LONG-TERM CURRENT USE OF INSULIN (H): Primary | ICD-10-CM

## 2020-12-10 NOTE — PROGRESS NOTES
SUBJECTIVE/OBJECTIVE:                Alma Kraft is a 70 year old female coming in for a follow-up visit for Medication Management Services.  She was referred to me from Dr. Brody, now sees Dr. Figueroa.     Chief Complaint: Follow up from [facility] visit on [facility] services.  Diabetes focus    DIABETES:  Current Medications:  Metformin ER 500mg taking 4 tablets daily  Glipizide ER 10mg taking 2 tablets every morning  Januvia 100mg taking 1 tablet daily  Basaglar 58 units daily  We discussed the benefits and risks of each medication.  Patient Questions/Concerns:  Patient concerned about her fluctuating blood sugars.  She does not always see a correlation between better dietary choices and better blood sugars.  Labs:  Home blood sugars, fasting in AM generally running 115 - 200 mg/dL.  Denies any hypoglycemia    Lab Results   Component Value Date    A1C 9.2 11/23/2020    A1C 9.3 08/18/2020    A1C 8.7 05/14/2020    A1C 8.2 02/12/2020    A1C 8.5 11/14/2019       Additional Information:  Patient rarely misses any doses of her medications.  She was previously titrating up her Basaglar dose, but stopped checking her blood sugars and, therefore, stopped titrating up her doses.    ASSESSMENT/PLAN:                DIABETES:  Assessment: Patient has not had any improved A1c despite titrating up her insulin dose.  She has not had any hypoglycemia.  Some of her self-reported blood sugar readings seem to be coming into range, however, I feel it is appropriate to continue increasing her Basaglar dose.  Will consider if adding on an SLT-2 inhibitor should be added, however, the cost will be a concern to address.  Likely [employer] patient assistance program will be best.  Patient needs to renew her Januvia patient assistance program enrollment.  She will reach out to [employer] to request the paperwork and hopefully have it by 12/22/20 so that we can complete it at her next appointment on that day.  Status: not at A1c goal  Drug Therapy  Problems:  1) Dose too low - Basaglar  Plan:  1) Increase Basaglar to 59 units daily  2) Jeny will call [facility] regarding renewal of patient assistance for Januvia.  3) Follow-up in clinic on 12/22/20      I spent 30 minutes with this patient today.  All changes were made via collaborative practice agreement with Yonny Figueroa. A copy of the visit note was provided to the patient's primary care provider.    Doe Packer, PharmD  Clinical Pharmacist  [facility] Phone: [phone]  Direct Office Phone: [phone]

## 2020-12-13 DIAGNOSIS — F32.5 MAJOR DEPRESSION IN REMISSION (H): ICD-10-CM

## 2020-12-14 RX ORDER — VENLAFAXINE HYDROCHLORIDE 150 MG/1
CAPSULE, EXTENDED RELEASE ORAL
Qty: 90 CAPSULE | Refills: 0 | Status: SHIPPED | OUTPATIENT
Start: 2020-12-14 | End: 2021-02-23

## 2020-12-14 NOTE — TELEPHONE ENCOUNTER
Last OV with [facility] 11/23/2020, this medication was not filled at that time.     Pending Prescriptions:                       Disp   Refills    venlafaxine (EFFEXOR-XR) 150 MG 24 hr cap*90 cap*0            Sig: Take 1 capsule by mouth daily.

## 2020-12-22 ENCOUNTER — OFFICE VISIT (OUTPATIENT)
Dept: FAMILY MEDICINE | Facility: CLINIC | Age: 70
End: 2020-12-22

## 2020-12-22 DIAGNOSIS — Z79.4 TYPE 2 DIABETES MELLITUS WITHOUT COMPLICATION, WITH LONG-TERM CURRENT USE OF INSULIN (H): Primary | ICD-10-CM

## 2020-12-22 DIAGNOSIS — E11.9 TYPE 2 DIABETES MELLITUS WITHOUT COMPLICATION, WITH LONG-TERM CURRENT USE OF INSULIN (H): Primary | ICD-10-CM

## 2020-12-22 NOTE — PROGRESS NOTES
Patient presents today to the clinic to complete her paperwork for the Januvia patient assistance program.  We completed the paperwork and the only step left is to have Dr. Figueroa sign and date.  I will mail in the forms after 1/1/2021.      DIABETES:  Current Medications:  Metformin ER 500mg taking 4 tablets daily  Glipizide ER 10mg taking 2 tablets daily  Januvia 100mg taking 1 tablet daily  Basaglar 59 units daily  We discussed the benefits and risks of each medication.  Patient Questions/Concerns:  none  Labs:  Patient also reports that her blood sugars over the past week have been (fasting AM):  105  101  150  154  121  126  111    No hypoglycemia  Additional Information:  None      PLAN:  1) Increase Basaglar to 60 units daily.  Patient will notify me if her blood sugar drops below 80 mg/dL.  I will call her in about a month to check in and see how things are going.  She will also call me if she notices her blood sugar trend increasing.  2) Due for labs at the end of Feb, 2021.    Doe Packer, PharmD  Clinical Pharmacist  [facility] Phone: [phone]  Direct Office Phone: [phone]

## 2021-01-04 RX ORDER — INSULIN GLARGINE 100 [IU]/ML
46 INJECTION, SOLUTION SUBCUTANEOUS DAILY
Qty: 39 ML | Refills: 0 | Status: SHIPPED | OUTPATIENT
Start: 2021-01-04 | End: 2021-02-23

## 2021-01-04 NOTE — TELEPHONE ENCOUNTER
Alma Kraft is requesting a refill of:    Pending Prescriptions:                       Disp   Refills    insulin glargine (BASAGLAR KWIKPEN) 100 U*39 mL  0            Sig: Inject 46 Units Subcutaneous daily    Lab Results   Component Value Date    A1C 9.2 11/23/2020    A1C 9.3 08/18/2020    A1C 8.7 05/14/2020    A1C 8.2 02/12/2020    A1C 8.5 11/14/2019

## 2021-01-08 DIAGNOSIS — I10 ESSENTIAL HYPERTENSION, BENIGN: ICD-10-CM

## 2021-01-08 DIAGNOSIS — I10 ESSENTIAL HYPERTENSION: ICD-10-CM

## 2021-01-08 RX ORDER — METOPROLOL SUCCINATE 50 MG/1
50 TABLET, EXTENDED RELEASE ORAL DAILY
Qty: 30 TABLET | Refills: 0 | Status: SHIPPED | OUTPATIENT
Start: 2021-01-08 | End: 2021-02-23 | Stop reason: ALTCHOICE

## 2021-01-08 RX ORDER — POTASSIUM CHLORIDE 750 MG/1
10 CAPSULE, EXTENDED RELEASE ORAL 3 TIMES DAILY
Qty: 270 CAPSULE | Refills: 0 | Status: SHIPPED | OUTPATIENT
Start: 2021-01-08 | End: 2021-03-15

## 2021-01-08 NOTE — TELEPHONE ENCOUNTER
Alma Kraft is requesting a refill of:    Pending Prescriptions:                       Disp   Refills    potassium chloride ER (MICRO-K) 10 MEQ CR*270 ca*0            Sig: Take 1 capsule (10 mEq) by mouth 3 times daily    metoprolol succinate ER (TOPROL-XL) 50 MG*30 tab*0            Sig: Take 1 tablet (50 mg) by mouth daily    Needs OV for refills  Thanks,[place]

## 2021-01-24 DIAGNOSIS — I10 ESSENTIAL HYPERTENSION: ICD-10-CM

## 2021-01-25 RX ORDER — METOPROLOL SUCCINATE 50 MG/1
TABLET, EXTENDED RELEASE ORAL
Qty: 30 TABLET | Refills: 0 | COMMUNITY
Start: 2021-01-25

## 2021-01-25 NOTE — TELEPHONE ENCOUNTER
Alma Kraft is requesting a refill of:    Refused Prescriptions:                       Disp   Refills    metoprolol succinate ER (TOPROL-XL) 50 MG *30 tab*0        Sig: Take 1 tablet (50 mg) by mouth daily. needs           appointment for refills.  Refused By: RAVEN JENKINS  Reason for Refusal: Patient needs appointment  Reason for Refusal Comment: Appt scheduled for 02/23/21

## 2021-01-31 DIAGNOSIS — I10 ESSENTIAL HYPERTENSION: ICD-10-CM

## 2021-02-01 DIAGNOSIS — I10 ESSENTIAL HYPERTENSION: ICD-10-CM

## 2021-02-01 DIAGNOSIS — Z79.4 TYPE 2 DIABETES MELLITUS WITHOUT COMPLICATION, WITH LONG-TERM CURRENT USE OF INSULIN (H): ICD-10-CM

## 2021-02-01 DIAGNOSIS — E11.9 TYPE 2 DIABETES MELLITUS WITHOUT COMPLICATION, WITH LONG-TERM CURRENT USE OF INSULIN (H): ICD-10-CM

## 2021-02-01 RX ORDER — LOSARTAN POTASSIUM AND HYDROCHLOROTHIAZIDE 12.5; 1 MG/1; MG/1
1 TABLET ORAL DAILY
Qty: 90 TABLET | Refills: 0 | COMMUNITY
Start: 2021-02-01

## 2021-02-01 RX ORDER — METOPROLOL SUCCINATE 50 MG/1
TABLET, EXTENDED RELEASE ORAL
Qty: 30 TABLET | Refills: 0 | COMMUNITY
Start: 2021-02-01

## 2021-02-01 RX ORDER — METFORMIN HCL 500 MG
TABLET, EXTENDED RELEASE 24 HR ORAL
Qty: 360 TABLET | Refills: 0 | COMMUNITY
Start: 2021-02-01

## 2021-02-01 RX ORDER — GLIPIZIDE 10 MG/1
TABLET, FILM COATED, EXTENDED RELEASE ORAL
Qty: 90 TABLET | Refills: 0 | COMMUNITY
Start: 2021-02-01

## 2021-02-01 NOTE — TELEPHONE ENCOUNTER
Last OV 11/23/2020 with instructions to follow up in 3 months. Pt has appt on 2/3/2021 with [facility].     Refused Prescriptions:                       Disp   Refills    metoprolol succinate ER (TOPROL-XL) 50 MG *30 tab*0        Sig: Take 1 tablet (50 mg) by mouth daily. needs           appointment for refills.  Refused By: CORBIN CHUNG  Reason for Refusal: Patient needs appointment  Reason for Refusal Comment: will fill at appt.

## 2021-02-01 NOTE — TELEPHONE ENCOUNTER
Last OV 11/23/2020 with instructions to follow up in 3 months. Pt has appt on 2/3/2021 with [facility].     Refused Prescriptions:                       Disp   Refills    losartan-hydrochlorothiazide (HYZAAR) 100-*90 tab*0        Sig: Take 1 tablet by mouth daily.  Refused By: CORBIN CHUNG  Reason for Refusal: Patient needs appointment    metFORMIN (GLUCOPHAGE-XR) 500 MG 24 hr tab*360 ta*0        Sig: Take 4 tablets by mouth daily with dinner.  Refused By: CORBIN CHUNG  Reason for Refusal: Patient needs appointment    glipiZIDE (GLUCOTROL XL) 10 MG 24 hr table*90 tab*0        Sig: Take 1 tablet by mouth 2 times daily.  Refused By: CORBIN CHUNG  Reason for Refusal: Patient needs appointment

## 2021-02-03 ENCOUNTER — OFFICE VISIT (OUTPATIENT)
Dept: FAMILY MEDICINE | Facility: CLINIC | Age: 71
End: 2021-02-03

## 2021-02-03 VITALS
BODY MASS INDEX: 52.34 KG/M2 | DIASTOLIC BLOOD PRESSURE: 72 MMHG | SYSTOLIC BLOOD PRESSURE: 144 MMHG | RESPIRATION RATE: 20 BRPM | TEMPERATURE: 97.8 F | HEIGHT: 60 IN | HEART RATE: 72 BPM | WEIGHT: 266.6 LBS

## 2021-02-03 DIAGNOSIS — K21.9 GASTROESOPHAGEAL REFLUX DISEASE, UNSPECIFIED WHETHER ESOPHAGITIS PRESENT: Primary | ICD-10-CM

## 2021-02-03 PROCEDURE — 99213 OFFICE O/P EST LOW 20 MIN: CPT | Performed by: FAMILY MEDICINE

## 2021-02-03 ASSESSMENT — MIFFLIN-ST. JEOR: SCORE: 1645.79

## 2021-02-03 NOTE — PROGRESS NOTES
SUBJECTIVE:  Alma Kraft is a 70 year old female who complains of GERD type symptoms. She has been experiencing fullness after meals, belching and eructation, abdominal bloating, bilious reflux, nocturnal burning, symptoms primarily relate to meals, and lying down after meals, symptoms occur at night for many months, worsening over time. ROS: patient denies weight loss, dysphagia, black stools, hemetemesis or history of GI bleeding. Social history: no or minimal alcohol, nonsmoker, no or mild caffeine use, no ASA or NSAID's other than daily celebrex.    Pt has had some relief from alvaro seltzer    Pt did not like Tums    Current Outpatient Medications   Medication Sig Dispense Refill     amLODIPine (NORVASC) 10 MG tablet Take 1 tablet (10 mg) by mouth daily 90 tablet 1     ASPIRIN 81 MG OR TABS 1 tab po QD (Once per day) 100 3     atorvastatin (LIPITOR) 10 MG tablet Take 1 tablet (10 mg) by mouth daily 90 tablet 3     blood glucose (NO BRAND SPECIFIED) test strip Use to test blood sugar one times daily or as directed. To accompany: {Blood Glucose Monitor Brands One Touch Viro test strips 100 strip 3     blood glucose monitoring (ONE TOUCH DELICA) lancets Use to test blood sugars 1 times daily or as directed. 100 each 3     Blood Glucose Monitoring Suppl (ONE TOUCH ULTRA 2) W/DEVICE KIT Use to test blood sugars 2 times daily or as directed. 1 kit 0     budesonide (RINOCORT AQUA) 32 MCG/ACT nasal spray Spray 2 sprays into both nostrils daily 3 Bottle 3     celecoxib (CELEBREX) 200 MG capsule Take 1 capsule (200 mg) by mouth daily 90 capsule 0     cyclobenzaprine (FLEXERIL) 5 MG tablet Take 1 tablet (5 mg) by mouth 3 times daily as needed for muscle spasms 30 tablet 0     glipiZIDE (GLUCOTROL XL) 10 MG 24 hr tablet Take 1 tablet (10 mg) by mouth 2 times daily 90 tablet 1     hydrochlorothiazide (HYDRODIURIL) 12.5 MG tablet Take 1 tablet (12.5 mg) by mouth daily 180 tablet 0     insulin glargine (BASAGLAR KWIKPEN)  100 UNIT/ML pen Inject 46 Units Subcutaneous daily 39 mL 0     insulin pen needle (BD ROSE U/F) 32G X 4 MM miscellaneous Use 1 pen needles daily or as directed. 100 each 3     losartan-hydrochlorothiazide (HYZAAR) 100-12.5 MG tablet Take 1 tablet by mouth daily 90 tablet 1     metFORMIN (GLUCOPHAGE-XR) 500 MG 24 hr tablet Take 4 tablets by mouth daily with dinner. 360 tablet 1     metoprolol succinate ER (TOPROL-XL) 50 MG 24 hr tablet Take 1 tablet (50 mg) by mouth daily 30 tablet 0     Multiple Vitamins-Minerals (CENTRUM SILVER) per tablet 1 tablet 4 times weekly 100 tablet      nortriptyline (PAMELOR) 10 MG capsule Take 1 capsule (10 mg) by mouth At Bedtime 90 capsule 1     ORDER FOR DME Equipment being ordered: [employer] Network plus compression stockings    20950  20-30 compression 3 Device 0     potassium chloride ER (MICRO-K) 10 MEQ CR capsule Take 1 capsule (10 mEq) by mouth 3 times daily 270 capsule 0     sitagliptin (JANUVIA) 100 MG tablet Take 1 tablet (100 mg) by mouth daily 90 tablet 3     venlafaxine (EFFEXOR-XR) 150 MG 24 hr capsule Take 1 capsule by mouth daily. 90 capsule 0       OBJECTIVE:BP (!) 144/72 (BP Location: Right arm, Patient Position: Chair, Cuff Size: Adult Large)   Pulse 72   Temp 97.8  F (36.6  C)   Resp 20   Ht 1.524 m (5')   Wt 120.9 kg (266 lb 9.6 oz)   LMP 05/12/2001   BMI 52.07 kg/m     Appears well, alert and oriented x 3, pleasant cooperative in NAD. Anicteric. Vitals as noted. Neck free of lymphadenopathy or mass. Abdomen - mild tenderness in the epigastric area.    ASSESSMENT:  GERD     PLAN:  The pathophysiology of reflux is discussed.  Anti-reflux measures such as raising the head of the bed, avoiding tight clothing or belts, avoiding eating late at night and not lying down shortly after mealtime and achieving weight loss are discussed. Avoid ASA, NSAID's, caffeine, peppermints, alcohol and tobacco. OTC H2 blockers and/or antacids are often very helpful for PRN use. However, for  chronic or daily symptoms, prescription strength H2 blockers or a trial of PPI's should be used. I've explained that although PPI's are extremely effective, many insurance companies may not cover the routine use of these drugs long term due to expense. Further recommendations to her: Rx for PPI is written, medication side effects fully discussed, labs studies are not necessary, UGI series not indicated unless a therapeutic trial is unsuccessful. She should alert me if there are persistent symptoms, dysphagia, weight loss or GI bleeding. FUV is scheduled.

## 2021-02-03 NOTE — NURSING NOTE
Alma Kraft is here for possible acid reflux. Has been getting worse over the past few months.    Questioned patient about current smoking habits.  Pt. has never smoked.  PULSE regular  My Chart: declines  CLASSIFICATION OF OVERWEIGHT AND OBESITY BY BMI                        Obesity Class           BMI(kg/m2)  Underweight                                    < 18.5  Normal                                         18.5-24.9  Overweight                                     25.0-29.9  OBESITY                     I                  30.0-34.9                             II                 35.0-39.9  EXTREME OBESITY             III                >40                            Patient's  BMI Body mass index is 52.07 kg/m .  http://hin.nhlbi.nih.gov/menuplanner/menu.cgi  Pre-visit planning  Immunizations - up to date  Colonoscopy - is up to date  Mammogram - is up to date  Asthma -   PHQ9 -    KATHERINE-7 -

## 2021-02-07 DIAGNOSIS — R52 PAIN: ICD-10-CM

## 2021-02-07 DIAGNOSIS — I10 ESSENTIAL HYPERTENSION: ICD-10-CM

## 2021-02-08 RX ORDER — LOSARTAN POTASSIUM AND HYDROCHLOROTHIAZIDE 12.5; 1 MG/1; MG/1
1 TABLET ORAL DAILY
Qty: 90 TABLET | Refills: 0 | COMMUNITY
Start: 2021-02-08

## 2021-02-08 RX ORDER — CELECOXIB 200 MG/1
CAPSULE ORAL
Qty: 90 CAPSULE | Refills: 0 | COMMUNITY
Start: 2021-02-08

## 2021-02-08 RX ORDER — GLIPIZIDE 10 MG/1
TABLET, FILM COATED, EXTENDED RELEASE ORAL
Qty: 90 TABLET | Refills: 0 | COMMUNITY
Start: 2021-02-08

## 2021-02-08 NOTE — TELEPHONE ENCOUNTER
Alma Kraft is requesting a refill of:    Refused Prescriptions:                       Disp   Refills    celecoxib (CELEBREX) 200 MG capsule [Pharm*90 cap*0        Sig: Take 1 capsule by mouth daily.  Refused By: RAGHU FONTAINE  Reason for Refusal: Patient needs appointment    losartan-hydrochlorothiazide (HYZAAR) 100-*90 tab*0        Sig: Take 1 tablet by mouth daily.  Refused By: RAGHU FONTAINE  Reason for Refusal: Patient needs appointment    glipiZIDE (GLUCOTROL XL) 10 MG 24 hr table*90 tab*0        Sig: Take 1 tablet by mouth 2 times daily.  Refused By: RAGHU FONTAINE  Reason for Refusal: Patient needs appointment

## 2021-02-09 ENCOUNTER — TELEPHONE (OUTPATIENT)
Dept: FAMILY MEDICINE | Facility: CLINIC | Age: 71
End: 2021-02-09

## 2021-02-09 DIAGNOSIS — I10 ESSENTIAL HYPERTENSION: ICD-10-CM

## 2021-02-09 DIAGNOSIS — E11.9 TYPE 2 DIABETES MELLITUS WITHOUT COMPLICATION, WITH LONG-TERM CURRENT USE OF INSULIN (H): ICD-10-CM

## 2021-02-09 DIAGNOSIS — R52 PAIN: ICD-10-CM

## 2021-02-09 DIAGNOSIS — Z79.4 TYPE 2 DIABETES MELLITUS WITHOUT COMPLICATION, WITH LONG-TERM CURRENT USE OF INSULIN (H): ICD-10-CM

## 2021-02-09 NOTE — TELEPHONE ENCOUNTER
Alma M Abena called the clinic support line with the following:    States that she was in to see you last week ans you said you would refill her medications. I denied this last week as I did not see any notes for a medication refill.    Please advise    Lab Results   Component Value Date    A1C 9.2 11/23/2020    A1C 9.3 08/18/2020    A1C 8.7 05/14/2020    A1C 8.2 02/12/2020    A1C 8.5 11/14/2019     Potassium   Date Value Ref Range Status   08/18/2020 3.71 3.5 - 5.3 mmol/L Final     Alma Kraft is requesting a refill of:    Pending Prescriptions:                       Disp   Refills    celecoxib (CELEBREX) 200 MG capsule       90 cap*0            Sig: Take 1 capsule (200 mg) by mouth daily    losartan-hydrochlorothiazide (HYZAAR) 100*90 tab*0            Sig: Take 1 tablet by mouth daily    metFORMIN (GLUCOPHAGE-XR) 500 MG 24 hr ta*360 ta*             Sig: Take 4 tablets (2,000 mg) by mouth daily (with           dinner)

## 2021-02-10 DIAGNOSIS — I10 ESSENTIAL HYPERTENSION: ICD-10-CM

## 2021-02-10 DIAGNOSIS — E11.9 TYPE 2 DIABETES MELLITUS WITHOUT COMPLICATION, WITH LONG-TERM CURRENT USE OF INSULIN (H): ICD-10-CM

## 2021-02-10 DIAGNOSIS — Z79.4 TYPE 2 DIABETES MELLITUS WITHOUT COMPLICATION, WITH LONG-TERM CURRENT USE OF INSULIN (H): ICD-10-CM

## 2021-02-10 DIAGNOSIS — R52 PAIN: ICD-10-CM

## 2021-02-10 RX ORDER — GLIPIZIDE 10 MG/1
TABLET, FILM COATED, EXTENDED RELEASE ORAL
Qty: 90 TABLET | Refills: 0 | COMMUNITY
Start: 2021-02-10

## 2021-02-10 RX ORDER — METFORMIN HCL 500 MG
2000 TABLET, EXTENDED RELEASE 24 HR ORAL
Qty: 56 TABLET | Refills: 0 | Status: SHIPPED | OUTPATIENT
Start: 2021-02-10 | End: 2021-02-23

## 2021-02-10 RX ORDER — CELECOXIB 200 MG/1
200 CAPSULE ORAL DAILY
Qty: 14 CAPSULE | Refills: 0 | Status: SHIPPED | OUTPATIENT
Start: 2021-02-10 | End: 2021-02-23

## 2021-02-10 RX ORDER — AMLODIPINE BESYLATE 10 MG/1
TABLET ORAL
Qty: 14 TABLET | Refills: 0 | Status: SHIPPED | OUTPATIENT
Start: 2021-02-10 | End: 2021-02-23

## 2021-02-10 RX ORDER — CELECOXIB 200 MG/1
CAPSULE ORAL
Qty: 90 CAPSULE | Refills: 0 | COMMUNITY
Start: 2021-02-10

## 2021-02-10 RX ORDER — METFORMIN HCL 500 MG
TABLET, EXTENDED RELEASE 24 HR ORAL
Qty: 360 TABLET | Refills: 0 | COMMUNITY
Start: 2021-02-10

## 2021-02-10 RX ORDER — GLIPIZIDE 10 MG/1
10 TABLET, FILM COATED, EXTENDED RELEASE ORAL
Qty: 28 TABLET | Refills: 0 | Status: SHIPPED | OUTPATIENT
Start: 2021-02-10 | End: 2021-02-23

## 2021-02-10 RX ORDER — LOSARTAN POTASSIUM AND HYDROCHLOROTHIAZIDE 12.5; 1 MG/1; MG/1
1 TABLET ORAL DAILY
Qty: 14 TABLET | Refills: 0 | Status: SHIPPED | OUTPATIENT
Start: 2021-02-10 | End: 2021-02-23

## 2021-02-10 RX ORDER — LOSARTAN POTASSIUM AND HYDROCHLOROTHIAZIDE 12.5; 1 MG/1; MG/1
1 TABLET ORAL DAILY
Qty: 90 TABLET | Refills: 0 | COMMUNITY
Start: 2021-02-10

## 2021-02-10 NOTE — TELEPHONE ENCOUNTER
Please advise, see other TE.    Alma Kraft is requesting a refill of:    Pending Prescriptions:                       Disp   Refills    amLODIPine (NORVASC) 10 MG tablet [Pharma*       0            Sig: Take 1 tablet by mouth daily.    glipiZIDE (GLUCOTROL XL) 10 MG 24 hr tabl*180 ta*             Sig: Take 1 tablet (10 mg) by mouth 2 times daily      ThanksGeneva

## 2021-02-10 NOTE — TELEPHONE ENCOUNTER
I clearly remember we discussed her coming in late February to do diabetes check with labs- we can fill if this is set up- is she planning on that visit?

## 2021-02-10 NOTE — TELEPHONE ENCOUNTER
Has ZACKARY Kraft is requesting a refill of:    Pending Prescriptions:                       Disp   Refills    celecoxib (CELEBREX) 200 MG capsule       14 cap*0            Sig: Take 1 capsule (200 mg) by mouth daily    losartan-hydrochlorothiazide (HYZAAR) 100*14 tab*0            Sig: Take 1 tablet by mouth daily    metFORMIN (GLUCOPHAGE-XR) 500 MG 24 hr ta*56 tab*0            Sig: Take 4 tablets (2,000 mg) by mouth daily (with           dinner)

## 2021-02-10 NOTE — TELEPHONE ENCOUNTER
Alma Kraft is requesting a refill of:    Refused Prescriptions:                       Disp   Refills    celecoxib (CELEBREX) 200 MG capsule [Pharm*90 cap*0        Sig: Take 1 capsule by mouth daily.  Refused By: RAGHU FONTAINE  Reason for Refusal: OTHER    glipiZIDE (GLUCOTROL XL) 10 MG 24 hr table*90 tab*0        Sig: Take 1 tablet by mouth 2 times daily.  Refused By: RAGHU FONTAINE  Reason for Refusal: OTHER    losartan-hydrochlorothiazide (HYZAAR) 100-*90 tab*0        Sig: Take 1 tablet by mouth daily.  Refused By: RAGHU FONTAINE  Reason for Refusal: OTHER    metFORMIN (GLUCOPHAGE-XR) 500 MG 24 hr tab*360 ta*0        Sig: Take 4 tablets by mouth daily with dinner.  Refused By: RAGHU FONTAINE  Reason for Refusal: OTHER    Duplicate request already sent to MD. Waiting on response

## 2021-02-15 ENCOUNTER — TRANSFERRED RECORDS (OUTPATIENT)
Dept: FAMILY MEDICINE | Facility: CLINIC | Age: 71
End: 2021-02-15

## 2021-02-23 ENCOUNTER — OFFICE VISIT (OUTPATIENT)
Dept: FAMILY MEDICINE | Facility: CLINIC | Age: 71
End: 2021-02-23

## 2021-02-23 VITALS
DIASTOLIC BLOOD PRESSURE: 84 MMHG | WEIGHT: 266.4 LBS | TEMPERATURE: 97.4 F | HEIGHT: 60 IN | RESPIRATION RATE: 20 BRPM | SYSTOLIC BLOOD PRESSURE: 162 MMHG | BODY MASS INDEX: 52.3 KG/M2 | HEART RATE: 80 BPM

## 2021-02-23 DIAGNOSIS — G47.33 OBSTRUCTIVE SLEEP APNEA: ICD-10-CM

## 2021-02-23 DIAGNOSIS — E78.2 MIXED HYPERLIPIDEMIA: Primary | ICD-10-CM

## 2021-02-23 DIAGNOSIS — F32.5 MAJOR DEPRESSION IN REMISSION (H): ICD-10-CM

## 2021-02-23 DIAGNOSIS — I10 ESSENTIAL HYPERTENSION: ICD-10-CM

## 2021-02-23 DIAGNOSIS — R52 PAIN: ICD-10-CM

## 2021-02-23 DIAGNOSIS — K21.9 GASTROESOPHAGEAL REFLUX DISEASE, UNSPECIFIED WHETHER ESOPHAGITIS PRESENT: ICD-10-CM

## 2021-02-23 LAB
ALBUMIN SERPL-MCNC: 3.5 G/DL (ref 3.6–5.1)
ALBUMIN URINE MG/G CR: ABNORMAL MG/G CREATININE
ALBUMIN URINE MG/SPEC: 30
ALBUMIN/GLOB SERPL: 0.9 {RATIO} (ref 1–2.5)
ALP SERPL-CCNC: 133 U/L (ref 33–130)
ALT 1742-6: 27 U/L (ref 0–32)
AST 1920-8: 44 U/L (ref 0–35)
BILIRUB SERPL-MCNC: 0.7 MG/DL (ref 0.2–1.2)
BUN SERPL-MCNC: 16 MG/DL (ref 7–25)
BUN/CREATININE RATIO: 25.4 (ref 6–22)
CALCIUM SERPL-MCNC: 9.3 MG/DL (ref 8.6–10.3)
CHLORIDE SERPLBLD-SCNC: 102.4 MMOL/L (ref 98–110)
CHOLEST SERPL-MCNC: 162 MG/DL (ref 0–199)
CHOLEST/HDLC SERPL: 2 {RATIO} (ref 0–5)
CO2 SERPL-SCNC: 30.5 MMOL/L (ref 20–32)
CREAT SERPL-MCNC: 0.63 MG/DL (ref 0.6–1.3)
CREATININE URINE: 50
GLOBULIN, CALCULATED - QUEST: 3.7 (ref 1.9–3.7)
GLUCOSE SERPL-MCNC: 166 MG/DL (ref 60–99)
HBA1C MFR BLD: 8.5 % (ref 4–7)
HDLC SERPL-MCNC: 68 MG/DL (ref 40–150)
LDLC SERPL CALC-MCNC: 77 MG/DL (ref 0–130)
POTASSIUM SERPL-SCNC: 3.99 MMOL/L (ref 3.5–5.3)
PROT SERPL-MCNC: 7.2 G/DL (ref 6.1–8.1)
SODIUM SERPL-SCNC: 139.9 MMOL/L (ref 135–146)
TRIGL SERPL-MCNC: 86 MG/DL (ref 0–149)

## 2021-02-23 PROCEDURE — 36415 COLL VENOUS BLD VENIPUNCTURE: CPT | Performed by: FAMILY MEDICINE

## 2021-02-23 PROCEDURE — 80053 COMPREHEN METABOLIC PANEL: CPT | Performed by: FAMILY MEDICINE

## 2021-02-23 PROCEDURE — 83036 HEMOGLOBIN GLYCOSYLATED A1C: CPT | Performed by: FAMILY MEDICINE

## 2021-02-23 PROCEDURE — 82043 UR ALBUMIN QUANTITATIVE: CPT | Performed by: FAMILY MEDICINE

## 2021-02-23 PROCEDURE — 99214 OFFICE O/P EST MOD 30 MIN: CPT | Performed by: FAMILY MEDICINE

## 2021-02-23 PROCEDURE — 80061 LIPID PANEL: CPT | Performed by: FAMILY MEDICINE

## 2021-02-23 RX ORDER — INSULIN GLARGINE 100 [IU]/ML
63 INJECTION, SOLUTION SUBCUTANEOUS DAILY
Qty: 60 ML | Refills: 1 | Status: SHIPPED | OUTPATIENT
Start: 2021-02-23 | End: 2021-05-13

## 2021-02-23 RX ORDER — CELECOXIB 200 MG/1
200 CAPSULE ORAL DAILY
Qty: 90 CAPSULE | Refills: 1 | Status: SHIPPED | OUTPATIENT
Start: 2021-02-23 | End: 2021-08-18

## 2021-02-23 RX ORDER — CARVEDILOL 6.25 MG/1
6.25 TABLET ORAL 2 TIMES DAILY WITH MEALS
Qty: 180 TABLET | Refills: 1 | Status: SHIPPED | OUTPATIENT
Start: 2021-02-23 | End: 2021-08-18

## 2021-02-23 RX ORDER — GLIPIZIDE 10 MG/1
10 TABLET, FILM COATED, EXTENDED RELEASE ORAL
Qty: 180 TABLET | Refills: 0 | Status: SHIPPED | OUTPATIENT
Start: 2021-02-23 | End: 2021-05-20

## 2021-02-23 RX ORDER — AMLODIPINE BESYLATE 10 MG/1
10 TABLET ORAL DAILY
Qty: 90 TABLET | Refills: 1 | Status: SHIPPED | OUTPATIENT
Start: 2021-02-23 | End: 2021-07-16

## 2021-02-23 RX ORDER — LOSARTAN POTASSIUM AND HYDROCHLOROTHIAZIDE 12.5; 1 MG/1; MG/1
1 TABLET ORAL DAILY
Qty: 90 TABLET | Refills: 1 | Status: SHIPPED | OUTPATIENT
Start: 2021-02-23 | End: 2021-08-18

## 2021-02-23 RX ORDER — METOPROLOL SUCCINATE 50 MG/1
50 TABLET, EXTENDED RELEASE ORAL DAILY
Qty: 90 TABLET | Refills: 1 | Status: CANCELLED | OUTPATIENT
Start: 2021-02-23

## 2021-02-23 RX ORDER — METFORMIN HCL 500 MG
2000 TABLET, EXTENDED RELEASE 24 HR ORAL
Qty: 360 TABLET | Refills: 0 | Status: SHIPPED | OUTPATIENT
Start: 2021-02-23 | End: 2021-05-20

## 2021-02-23 RX ORDER — VENLAFAXINE HYDROCHLORIDE 150 MG/1
150 CAPSULE, EXTENDED RELEASE ORAL DAILY
Qty: 90 CAPSULE | Refills: 1 | Status: SHIPPED | OUTPATIENT
Start: 2021-02-23 | End: 2021-08-18

## 2021-02-23 ASSESSMENT — MIFFLIN-ST. JEOR: SCORE: 1644.88

## 2021-02-23 NOTE — PROGRESS NOTES
Assessment & Plan     Uncontrolled type 2 diabetes mellitus with stage 2 chronic kidney disease, with long-term current use of insulin (H)  Improved but still not to goal- likely can increase basaglar-pt wishes to discuss with MTM  - Hemoglobin A1c (BFP)  - VENOUS COLLECTION  - Comprehensive Metobolic Panel (BFP)  - glipiZIDE (GLUCOTROL XL) 10 MG 24 hr tablet  Dispense: 180 tablet; Refill: 0  - insulin glargine (BASAGLAR KWIKPEN) 100 UNIT/ML pen  Dispense: 60 mL; Refill: 1    Mixed hyperlipidemia  Control uncertain, continue current medications at current doses pending labs  - Comprehensive Metobolic Panel (BFP)  - Lipid Panel (BFP)    Essential hypertension  Elevated today- will switch metoprolol to carvedilol for hopeful better BP control, Check blood pressure readings outside of the clinic several times per week, write down values, and follow up if elevated within the next several weeks. Blood pressure can be checked at the firestation, drugstore,  or any valid site.   - Comprehensive Metobolic Panel (BFP)  - losartan-hydrochlorothiazide (HYZAAR) 100-12.5 MG tablet  Dispense: 90 tablet; Refill: 1    Gastroesophageal reflux disease, unspecified whether esophagitis present  stable symptomatically Your toenail fungal culture was positive. I would recommend we attemp to treat this with Lamisil, a prescription medication that is taken once daily for 12 weeks. Unfortunately this medication is not always covered by insurance and it is quite expensive. I can give you a prescription and see if they cover it or you can call your insurer and ask ahead of time. If you do wish to try the treatment we will need to monitor your liver function prior to starting treatment and monthly during treatment. Please let me know what you would like to do and we can arrange the necessary lab testing and prescription. Please feel free to contact our office if you have further questions.   - omeprazole (PRILOSEC) 20 MG DR capsule   Dispense: 90 capsule; Refill: 1    Pain  [place] shoulder DJD, I reviewed the risks, benefits, and possible side effects of the medication.  The patient had an opportunity to ask any questions regarding the treatment plan. The patient was encouraged to call my office if any problems.   - celecoxib (CELEBREX) 200 MG capsule  Dispense: 90 capsule; Refill: 1    Obstructive sleep apnea  cpap    Major depression in remission (H)  stable symptomatically        Review of the result(s) of each unique test - labs today         BMI:   Estimated body mass index is 52.03 kg/m  as calculated from the following:    Height as of this encounter: 1.524 m (5').    Weight as of this encounter: 120.8 kg (266 lb 6.4 oz).   Weight management plan: Discussed healthy diet and exercise guidelines    FUTURE APPOINTMENTS:       - Follow-up visit in 3-6 mo, seeing MTM soon  Work on weight loss  Regular exercise    No follow-ups on file.    Yonny Figueroa MD  [facility]    Subjective   Jeny is a 70 year old who presents for the following health issues     HPI       Diabetes [id number] units basaglar now    How often are you checking your blood sugar? A few times a month  What time of day are you checking your blood sugars (select all that apply)?  Before meals  Have you had any blood sugars above 200?  No  Have you had any blood sugars below 70?  No    What symptoms do you notice when your blood sugar is low?  Shaky    What concerns do you have today about your diabetes? None and Other: not watching diet at all right now- stress related to husbands recent surgery and now her caregiving     Do you have any of these symptoms? (Select all that apply)  No numbness or tingling in feet.  No redness, sores or blisters on feet.  No complaints of excessive thirst.  No reports of blurry vision.  No significant changes to weight.        Hyperlipidemia Follow-Up      Are you regularly taking any medication or supplement to lower  your cholesterol?   Yes- atrvastatin    Are you having muscle aches or other side effects that you think could be caused by your cholesterol lowering medication?  No    Hypertension Dskzbu-lz-MD ok last week and sleep clinic      Do you check your blood pressure regularly outside of the clinic? No     Are you following a low salt diet? No    Are your blood pressures ever more than 140 on the top number (systolic) OR more   than 90 on the bottom number (diastolic), for example 140/90? No    BP Readings from Last 2 Encounters:   02/23/21 (!) 162/84   02/03/21 (!) 144/72     Hemoglobin A1C (%)   Date Value   02/23/2021 8.5 (A)   11/23/2020 9.2 (A)     LDL Cholesterol Calculated (mg/dL (calc))   Date Value   02/21/2019 87   02/21/2018 86     LDL Cholesterol Direct (mg/dL)   Date Value   08/18/2020 83   05/14/2020 82       Chronic Kidney Disease Follow-up      Do you take any over the counter pain medicine?: No      How many servings of fruits and vegetables do you eat daily?  2-3    On average, how many sweetened beverages do you drink each day (Examples: soda, juice, sweet tea, etc.  Do NOT count diet or artificially sweetened beverages)?   1    How many days per week do you exercise enough to make your heart beat faster? 3 or less    How many minutes a day do you exercise enough to make your heart beat faster? 9 or less    How many days per week do you miss taking your medication? 0    Shoulder DJD- uses celebrex    Review of Systems   Constitutional, HEENT, cardiovascular, pulmonary, gi and gu systems are negative, except as otherwise noted.      Objective    BP (!) 162/84 (BP Location: Right arm, Patient Position: Chair, Cuff Size: Adult Large)   Pulse 80   Temp 97.4  F (36.3  C)   Resp 20   Ht 1.524 m (5')   Wt 120.8 kg (266 lb 6.4 oz)   LMP 05/12/2001   BMI 52.03 kg/m    Body mass index is 52.03 kg/m .  Physical Exam   GENERAL: healthy, alert and no distress  NECK: no adenopathy, no asymmetry, masses, or  scars and thyroid normal to palpation  RESP: lungs clear to auscultation - no rales, rhonchi or wheezes  CV: regular rate and rhythm, normal S1 S2, no S3 or S4, no murmur, click or rub, no peripheral edema and peripheral pulses strong  ABDOMEN: soft, nontender, no hepatosplenomegaly, no masses and bowel sounds normal  MS: no gross musculoskeletal defects noted, no edema  PSYCH: mentation appears normal, affect normal/bright    Results for orders placed or performed in visit on 02/23/21 (from the past 24 hour(s))   Hemoglobin A1c (BFP)   Result Value Ref Range    Hemoglobin A1C 8.5 (A) 4.0 - 7.0 %

## 2021-02-23 NOTE — NURSING NOTE
Alma Kraft is here for a diabetic check and medication refill.    Questioned patient about current smoking habits.  Pt. has never smoked.  Body mass index is 52.03 kg/m .  PULSE regular  My Chart:   CLASSIFICATION OF OVERWEIGHT AND OBESITY BY BMI                        Obesity Class           BMI(kg/m2)  Underweight                                    < 18.5  Normal                                         18.5-24.9  Overweight                                     25.0-29.9  OBESITY                     I                  30.0-34.9                             II                 35.0-39.9  EXTREME OBESITY             III                >40                            Patient's  BMI Body mass index is 52.03 kg/m .  http://hin.nhlbi.nih.gov/menuplanner/menu.cgi    Pre-visit planning  Immunizations - up to date  Colonoscopy - is up to date  Mammogram - is up to date  Asthma -   PHQ9 -    KATHERINE-7 -    Hearing screen -

## 2021-02-23 NOTE — LETTER
February 23, 2021      Alma Kraft  [address]  [place] [address]        Dear ,    We are writing to inform you of your test results.    Your test results fall within the expected range(s) or remain unchanged from previous results.  Please continue with current treatment plan.Liver, kidney, lipids look good.     Resulted Orders   Hemoglobin A1c (BFP)   Result Value Ref Range    Hemoglobin A1C 8.5 (A) 4.0 - 7.0 %   Comprehensive Metobolic Panel (BFP)   Result Value Ref Range    Carbon Dioxide 30.5 20 - 32 mmol/L    Creatinine 0.63 0.60 - 1.30 mg/dL    Glucose 166 (A) 60 - 99 mg/dL    Sodium 139.9 135 - 146 mmol/L    Potassium 3.99 3.5 - 5.3 mmol/L    Chloride 102.4 98 - 110 mmol/L    Protein Total 7.2 6.1 - 8.1 g/dL    Albumin 3.5 (A) 3.6 - 5.1 g/dL    Alkaline Phosphatase 133 (A) 33 - 130 U/L    ALT 27 0 - 32 U/L    AST 44 (A) 0 - 35 U/L    Bilirubin Total 0.7 0.2 - 1.2 mg/dL    Urea Nitrogen 16 7 - 25 mg/dL    Calcium 9.3 8.6 - 10.3 mg/dL    BUN/Creatinine Ratio 25.4 (A) 6 - 22    Globulin Calculated 3.7 1.9 - 3.7    A/G Ratio 0.9 (A) 1 - 2.5   Lipid Panel (BFP)   Result Value Ref Range    Cholesterol 162 0 - 199 mg/dL    Triglycerides 86 0 - 149 mg/dL    HDL Cholesterol 68 40 - 150 mg/dL    LDL Cholesterol Direct 77 0 - 130 mg/dL    Cholesterol/HDL Ratio 2 0 - 5   Albumin Random Urine Quantitative with Creat Ratio   Result Value Ref Range    Albumin Urine mg/spec 30 30    Albumin Urine mg/g Cr  (A) 30 MG/G Creatinine    Creatinine Urine 50 300       If you have any questions or concerns, please call the clinic at the number listed above.       Sincerely,      Yonny Figueroa MD

## 2021-03-01 RX ORDER — METOPROLOL SUCCINATE 50 MG/1
TABLET, EXTENDED RELEASE ORAL
Qty: 30 TABLET | Refills: 0 | COMMUNITY
Start: 2021-03-01

## 2021-03-01 NOTE — TELEPHONE ENCOUNTER
Received incoming refill request for  Pending Prescriptions:                       Disp   Refills    metoprolol succinate ER (TOPROL-XL) 50 MG*30 tab*0            Sig: TAKE ONE TABLET BY MOUTH DAILY.     Patient has switched to a different medication so this is being denied.

## 2021-03-09 RX ORDER — ATORVASTATIN CALCIUM 10 MG/1
10 TABLET, FILM COATED ORAL DAILY
Qty: 90 TABLET | Refills: 1 | Status: SHIPPED | OUTPATIENT
Start: 2021-03-09 | End: 2021-08-18

## 2021-03-09 NOTE — TELEPHONE ENCOUNTER
Pt last seen 02/23/21, pt is now out of her Atorvastatin. Lipids were done at that visit.    Alma Kraft is requesting a refill of:    Pending Prescriptions:                       Disp   Refills    atorvastatin (LIPITOR) 10 MG tablet       90 tab*1            Sig: Take 1 tablet (10 mg) by mouth daily

## 2021-03-14 DIAGNOSIS — I10 ESSENTIAL HYPERTENSION, BENIGN: ICD-10-CM

## 2021-03-15 RX ORDER — POTASSIUM CHLORIDE 750 MG/1
10 CAPSULE, EXTENDED RELEASE ORAL 3 TIMES DAILY
Qty: 270 CAPSULE | Refills: 0 | Status: SHIPPED | OUTPATIENT
Start: 2021-03-15 | End: 2021-05-17

## 2021-03-15 NOTE — TELEPHONE ENCOUNTER
Received incoming refill request for  Pending Prescriptions:                       Disp   Refills    potassium chloride ER (MICRO-K) 10 MEQ CR*270 ca*0            Sig: Take 1 capsule (10 mEq) by mouth 3 times daily    Patient last had a refill of this medication on 1/8/21 for a 90 day supply. The patient was seen on 2/23/21 for medication check and review, routing to Dr. Figueroa for approval or denial of refill request.

## 2021-03-25 ENCOUNTER — OFFICE VISIT (OUTPATIENT)
Dept: FAMILY MEDICINE | Facility: CLINIC | Age: 71
End: 2021-03-25

## 2021-03-25 DIAGNOSIS — E11.9 TYPE 2 DIABETES MELLITUS WITHOUT COMPLICATION, WITH LONG-TERM CURRENT USE OF INSULIN (H): Primary | ICD-10-CM

## 2021-03-25 DIAGNOSIS — Z79.4 TYPE 2 DIABETES MELLITUS WITHOUT COMPLICATION, WITH LONG-TERM CURRENT USE OF INSULIN (H): Primary | ICD-10-CM

## 2021-03-25 NOTE — PROGRESS NOTES
SUBJECTIVE/OBJECTIVE:                Alma Kraft is a 70 year old female coming in for a follow-up visit for Medication Management Services.  She was referred to me from Dr. Figueroa.     Chief Complaint: Follow up from MTM visit on 12/22/20.  Diabetes focus    DIABETES:  Current Medications:  Metformin ER 500mg tablets daily 4 tablets daily  Glipizide ER 10mg taking 2 tablets daily  Januvia 100mg - ran out about a week ago - presents today with the denial letter from [employer] for the patient assistance program that we applied for  Basaglar 64 units daily  We discussed the benefits and risks of each medication.  Patient Questions/Concerns:  Cost  Labs:  She has not been checking her blood sugars over the past month, with the exception of yesterday and today  Today 142  Yesterday 166    Lab Results   Component Value Date    A1C 8.5 02/23/2021    A1C 9.2 11/23/2020    A1C 9.3 08/18/2020    A1C 8.7 05/14/2020    A1C 8.2 02/12/2020       Additional Information:  Patient states that she has not been very disciplined or focused on her health lately as she has been caring for her  after his back surgery.      ASSESSMENT/PLAN:                DIABETES:  Assessment: A1c not at goal and blood sugar patterns unknown since patient not routinely monitoring.  She has stopped her Januvia and received a denial letter from [employer] on the patient assistance program application.  We completed the Patient Assistance Program application through [employer] which would allow patient to get medications through that program if she qualifies.  Since Januvia (DPP-4) in combination with her other medications has not been effective at controlling her blood sugars, we discussed changing to an SGLT-2 Inhibitor.  Talked about pros and cons.  Patient is agreeable to changing if she qualifies.  If approved, will recheck renal function after 2-4 weeks of starting.  Could increase dose in the future if needed or change to  Tradjenta (DPP-4) through the [facility] program if she qualifies.  Status: not at A1c goal - Blood sugars unknown  Drug Therapy Problems:  1) Dose too low - Basaglar  Plan:  1) Increase Basaglar to 66 units daily  2) Complete application for Jardiance Patient Assistance Program through [employer].  Dr. Figueroa will sign and then fax to [facility].  I will check on the status in a couple of weeks.    I spent 30 minutes with this patient today.  All changes were made via collaborative practice agreement with Yonny Figueroa. A copy of the visit note was provided to the patient's primary care provider.    Doe Packer, PharmD  Clinical Pharmacist  [facility]  General Clinic Phone: [phone]  Direct Office Phone: [phone]

## 2021-03-28 DIAGNOSIS — G44.049 CHRONIC PAROXYSMAL HEMICRANIA, NOT INTRACTABLE: ICD-10-CM

## 2021-03-29 RX ORDER — NORTRIPTYLINE HCL 10 MG
CAPSULE ORAL
Qty: 90 CAPSULE | Refills: 0 | Status: SHIPPED | OUTPATIENT
Start: 2021-03-29 | End: 2021-06-28

## 2021-03-29 NOTE — TELEPHONE ENCOUNTER
Received incoming refill request for  Pending Prescriptions:                       Disp   Refills    nortriptyline (PAMELOR) 10 MG capsule [Ph*90 cap*0            Sig: Take 1 capsule by mouth At Bedtime.    Patient last had a refill of this medication on 10/8/2020 for  6 month supply. The patient was last seen for an OV on 2/23/21 with Dr. Figueroa so she is up to date. Routing to Dr. Figueroa for approval or denial of refill request.

## 2021-04-14 ENCOUNTER — TELEPHONE (OUTPATIENT)
Dept: FAMILY MEDICINE | Facility: CLINIC | Age: 71
End: 2021-04-14

## 2021-04-14 DIAGNOSIS — Z79.4 TYPE 2 DIABETES MELLITUS WITHOUT COMPLICATION, WITH LONG-TERM CURRENT USE OF INSULIN (H): Primary | ICD-10-CM

## 2021-04-14 DIAGNOSIS — E11.9 TYPE 2 DIABETES MELLITUS WITHOUT COMPLICATION, WITH LONG-TERM CURRENT USE OF INSULIN (H): Primary | ICD-10-CM

## 2021-04-14 NOTE — TELEPHONE ENCOUNTER
I called and spoke with Jeny to see if she was approved for the Jardiance through the [facility] Patient Assistance Program.    Patient reports that she was approved and received the medication last week and started taking Jardiance 10mg daily last Thursday or Friday.  She has been on it for about 5 days now.    She did not report any blood sugars today.    She reports that, since starting the medication, she is noticing increased urinary frequency, a possible vaginal yeast infection (treating with OTC Monistat) and a dry mouth.  She is remaining hydrated.    Patient would like to give the medication another week before deciding if we should stop and try Tradjenta (another medication covered on the  Care program - alternative to Januvia since it is also a DPP-4 Inhibitor).    Patient will call me if her sxs worsen over the next several days, otherwise I will call her late next week.    Doe Packer, PharmD  Clinical Pharmacist  [facility]  General Clinic Phone: [phone]  Direct Office Phone: [phone]

## 2021-04-29 DIAGNOSIS — I10 ESSENTIAL HYPERTENSION, BENIGN: ICD-10-CM

## 2021-04-29 RX ORDER — HYDROCHLOROTHIAZIDE 12.5 MG/1
12.5 TABLET ORAL DAILY
Qty: 180 TABLET | Refills: 0 | COMMUNITY
Start: 2021-04-29

## 2021-04-29 NOTE — TELEPHONE ENCOUNTER
Received incoming refill request for  Pending Prescriptions:                       Disp   Refills    hydrochlorothiazide (HYDRODIURIL) 12.5 MG*180 ta*0            Sig: Take 1 tablet (12.5 mg) by mouth daily    Patient last had a refill of this medication on 11/9/2020 but the patient had the combo pill sent in on 2/23/21 for 90 day supply with one refill.

## 2021-05-13 ENCOUNTER — OFFICE VISIT (OUTPATIENT)
Dept: FAMILY MEDICINE | Facility: CLINIC | Age: 71
End: 2021-05-13

## 2021-05-13 DIAGNOSIS — Z79.4 TYPE 2 DIABETES MELLITUS WITHOUT COMPLICATION, WITH LONG-TERM CURRENT USE OF INSULIN (H): Primary | ICD-10-CM

## 2021-05-13 DIAGNOSIS — E11.9 TYPE 2 DIABETES MELLITUS WITHOUT COMPLICATION, WITH LONG-TERM CURRENT USE OF INSULIN (H): Primary | ICD-10-CM

## 2021-05-13 LAB — HBA1C MFR BLD: 8.9 % (ref 4–7)

## 2021-05-13 PROCEDURE — 36415 COLL VENOUS BLD VENIPUNCTURE: CPT | Performed by: FAMILY MEDICINE

## 2021-05-13 PROCEDURE — 83036 HEMOGLOBIN GLYCOSYLATED A1C: CPT | Performed by: FAMILY MEDICINE

## 2021-05-13 RX ORDER — LINAGLIPTIN 5 MG/1
5 TABLET, FILM COATED ORAL DAILY
Qty: 90 TABLET | Refills: 3
Start: 2021-05-13 | End: 2022-06-09

## 2021-05-13 RX ORDER — INSULIN GLARGINE 100 [IU]/ML
66 INJECTION, SOLUTION SUBCUTANEOUS DAILY
Qty: 60 ML | Refills: 0 | Status: SHIPPED | OUTPATIENT
Start: 2021-05-13 | End: 2021-05-27

## 2021-05-13 NOTE — PROGRESS NOTES
SUBJECTIVE/OBJECTIVE:                Alma Kraft is a 70 year old female coming in for a follow-up visit for [facility].  She was referred to me from Dr. Figueroa.     Chief Complaint: Follow up from [facility] visit on 3/25/21.  Diabetes focus    DIABETES:  Current Medications:  Glipizide ER 10mg taking 2 tablets daily  Basaglar 63 units daily  Metformin ER 500mg taking 4 tablets daily  Jardiance 10mg daily  We discussed the benefits and risks of each medication.  Patient Questions/Concerns:   Jardiance causing a dry mouth and sore.  She is drinking a lot.  Labs:  Lab Results   Component Value Date    A1C 8.9 05/13/2021    A1C 8.5 02/23/2021    A1C 9.2 11/23/2020    A1C 9.3 08/18/2020    A1C 8.7 05/14/2020       Additional Information:      ASSESSMENT/PLAN:                DIABETES:  Assessment: Not tolerating Jardiance.  Previously tolerated Januvia well, however, became too expensive when the [employer] Patient Assistance Program denied enrollment.  Therefore, we enrolled in [facility] and changed to Jardiance in hopes that an SGLT-2 inhibitor would be more effective at lowering A1c compared to a DPP-4 inhibitor.  Unfortunately, patient is not tolerating SGLT-2 inhibitor and we will therefore change to Tradjenta which is covered under the [facility] program.  I called the [facility] pharmacy and made these changes.  Patient will be due for labs and A1c recheck in 3 months.  Status: Not at A1c goal  Drug Therapy Problems:  1) Adverse effects - Jardiance  Plan:  1) Stop Jardiance  2) Start Tradjenta 5mg daily  3) Recheck A1c in 3 months with Dr. Figueroa      I spent 30 minutes with this patient today.  All changes were made via collaborative practice agreement with Yonny Figueroa. A copy of the visit note was provided to the patient's primary care provider.    Doe Packer, PharmD  Clinical Pharmacist  [facility] Phone: [phone]  Direct Office Phone:  [phone]

## 2021-05-16 DIAGNOSIS — I10 ESSENTIAL HYPERTENSION, BENIGN: ICD-10-CM

## 2021-05-17 RX ORDER — POTASSIUM CHLORIDE 750 MG/1
10 CAPSULE, EXTENDED RELEASE ORAL 3 TIMES DAILY
Qty: 270 CAPSULE | Refills: 0 | Status: SHIPPED | OUTPATIENT
Start: 2021-05-17 | End: 2021-07-26

## 2021-05-17 NOTE — TELEPHONE ENCOUNTER
Alma Kraft is requesting a refill of:    Pending Prescriptions:                       Disp   Refills    potassium chloride ER (MICRO-K) 10 MEQ CR*270 ca*0            Sig: Take 1 capsule (10 mEq) by mouth 3 times daily    Please close encounter if RX was sent. Thanks, [place]

## 2021-05-20 ENCOUNTER — TELEPHONE (OUTPATIENT)
Dept: FAMILY MEDICINE | Facility: CLINIC | Age: 71
End: 2021-05-20

## 2021-05-20 RX ORDER — GLIPIZIDE 10 MG/1
TABLET, FILM COATED, EXTENDED RELEASE ORAL
Qty: 180 TABLET | Refills: 0 | Status: SHIPPED | OUTPATIENT
Start: 2021-05-20 | End: 2021-08-18

## 2021-05-20 RX ORDER — METFORMIN HCL 500 MG
TABLET, EXTENDED RELEASE 24 HR ORAL
Qty: 360 TABLET | Refills: 0 | Status: SHIPPED | OUTPATIENT
Start: 2021-05-20 | End: 2021-08-18

## 2021-05-20 NOTE — TELEPHONE ENCOUNTER
Pharmacist from [facility] called wanting to confirm that patient is on both nortriptyline and venlafaxine. I told her yes, but she said that these two aren't advised to take together.    Routing to Dr. Figueroa for verification. I will call [facility] to inform any different than I told her.

## 2021-05-20 NOTE — TELEPHONE ENCOUNTER
Dr Brody changed her to nortriptyline 10/19 as amitriptyline not recommended over 65-had previously been prescribed by her neurologist Dr Reeves-can you please run this by Doe

## 2021-05-25 ENCOUNTER — HOSPITAL ENCOUNTER (OUTPATIENT)
Dept: MAMMOGRAPHY | Facility: CLINIC | Age: 71
Discharge: HOME | End: 2021-05-25
Attending: FAMILY MEDICINE | Admitting: FAMILY MEDICINE
Payer: MEDICARE

## 2021-05-25 DIAGNOSIS — Z12.31 VISIT FOR SCREENING MAMMOGRAM: ICD-10-CM

## 2021-05-25 DIAGNOSIS — J30.2 CHRONIC SEASONAL ALLERGIC RHINITIS: ICD-10-CM

## 2021-05-25 PROCEDURE — 77063 BREAST TOMOSYNTHESIS BI: CPT

## 2021-05-25 NOTE — TELEPHONE ENCOUNTER
Alma Kraft is requesting a refill of:    Pending Prescriptions:                       Disp   Refills    budesonide (RINOCORT AQUA) 32 MCG/ACT chuy*25.29 *0            Sig: Spray 2 sprays into both nostrils daily.    Please close encounter if RX was sent. Thanks, [place]

## 2021-05-27 ENCOUNTER — OFFICE VISIT (OUTPATIENT)
Dept: FAMILY MEDICINE | Facility: CLINIC | Age: 71
End: 2021-05-27

## 2021-05-27 VITALS
HEIGHT: 60 IN | RESPIRATION RATE: 20 BRPM | TEMPERATURE: 97.3 F | WEIGHT: 268.3 LBS | HEART RATE: 72 BPM | BODY MASS INDEX: 52.67 KG/M2 | SYSTOLIC BLOOD PRESSURE: 142 MMHG | DIASTOLIC BLOOD PRESSURE: 70 MMHG

## 2021-05-27 DIAGNOSIS — I10 ESSENTIAL HYPERTENSION: ICD-10-CM

## 2021-05-27 DIAGNOSIS — J30.2 CHRONIC SEASONAL ALLERGIC RHINITIS: ICD-10-CM

## 2021-05-27 DIAGNOSIS — Z71.89 ACP (ADVANCE CARE PLANNING): ICD-10-CM

## 2021-05-27 DIAGNOSIS — E78.2 MIXED HYPERLIPIDEMIA: ICD-10-CM

## 2021-05-27 DIAGNOSIS — K21.9 GASTROESOPHAGEAL REFLUX DISEASE, UNSPECIFIED WHETHER ESOPHAGITIS PRESENT: ICD-10-CM

## 2021-05-27 DIAGNOSIS — Z79.4 TYPE 2 DIABETES MELLITUS WITHOUT COMPLICATION, WITH LONG-TERM CURRENT USE OF INSULIN (H): Primary | ICD-10-CM

## 2021-05-27 DIAGNOSIS — E11.9 TYPE 2 DIABETES MELLITUS WITHOUT COMPLICATION, WITH LONG-TERM CURRENT USE OF INSULIN (H): Primary | ICD-10-CM

## 2021-05-27 PROCEDURE — 99214 OFFICE O/P EST MOD 30 MIN: CPT | Performed by: FAMILY MEDICINE

## 2021-05-27 RX ORDER — INSULIN GLARGINE 100 [IU]/ML
66 INJECTION, SOLUTION SUBCUTANEOUS DAILY
Qty: 75 ML | Refills: 1 | Status: SHIPPED | OUTPATIENT
Start: 2021-05-27 | End: 2021-12-01

## 2021-05-27 ASSESSMENT — MIFFLIN-ST. JEOR: SCORE: 1653.5

## 2021-05-27 NOTE — NURSING NOTE
Alma Kraft is here for a DM recheck.    Questioned patient about current smoking habits.  Pt. has never smoked.  PULSE regular  My Chart:   CLASSIFICATION OF OVERWEIGHT AND OBESITY BY BMI                        Obesity Class           BMI(kg/m2)  Underweight                                    < 18.5  Normal                                         18.5-24.9  Overweight                                     25.0-29.9  OBESITY                     I                  30.0-34.9                             II                 35.0-39.9  EXTREME OBESITY             III                >40                            Patient's  BMI Body mass index is 52.4 kg/m .  http://hin.nhlbi.nih.gov/menuplanner/menu.cgi  Pre-visit planning  Immunizations - up to date  Colonoscopy - is up to date  Mammogram - is up to date  Asthma -   PHQ9 -    KATHERINE-7 -    Hearing Test -

## 2021-05-27 NOTE — PROGRESS NOTES
Assessment & Plan     Type 2 diabetes mellitus without complication, with long-term current use of insulin (H)  suboptimal control but has not started Tradjenta yet as awaiting it in mail    We agree to check back in 3 months after she has aded Tradjenta    Reviewed MTM notes as well  - insulin glargine (BASAGLAR KWIKPEN) 100 UNIT/ML pen  Dispense: 75 mL; Refill: 1    Mixed hyperlipidemia  Controlled, continue current medications at current doses     Essential hypertension  Borderline today but improved from last check, continue current medications at current doses     Gastroesophageal reflux disease, unspecified whether esophagitis present  stable symptomatically continue current medications at current doses     ACP (advance care planning)      Chronic seasonal allergic rhinitis  stable symptomatically continue current medications at current doses   - budesonide (RINOCORT AQUA) 32 MCG/ACT nasal spray  Dispense: 25.29 mL; Refill: 0      Review of the result(s) of each unique test - labs  Ordering of each unique test  Prescription drug management         FUTURE APPOINTMENTS:       - Follow-up visit in 3 mo  Work on weight loss  Regular exercise    No follow-ups on file.    Yonny Figueroa MD  [facility]    lEijah Fermin is a 70 year old who presents for the following health issues     HPI     Diabetes Follow-up-Glipizide, Basaglar, Metformin, Tradjenta ordered but not arrived(did not tolerate Jardiance)    How often are you checking your blood sugar? One time daily  What time of day are you checking your blood sugars (select all that apply)?  Before meals 131 today  Have you had any blood sugars above 200?  No  Have you had any blood sugars below 70?  No    What symptoms do you notice when your blood sugar is low?  None    What concerns do you have today about your diabetes? None     Do you have any of these symptoms? (Select all that apply)  No numbness or tingling in feet.  No  redness, sores or blisters on feet.  No complaints of excessive thirst.  No reports of blurry vision.  No significant changes to weight.        Hyperlipidemia Follow-Up      Are you regularly taking any medication or supplement to lower your cholesterol?   Yes- atorvastatin    Are you having muscle aches or other side effects that you think could be caused by your cholesterol lowering medication?  No    Hypertension Follow-up      Do you check your blood pressure regularly outside of the clinic? No     Are you following a low salt diet? No    Are your blood pressures ever more than 140 on the top number (systolic) OR more   than 90 on the bottom number (diastolic), for example 140/90? No    BP Readings from Last 2 Encounters:   05/27/21 (!) 142/70   02/23/21 (!) 162/84     Hemoglobin A1C (%)   Date Value   05/13/2021 8.9 (A)   02/23/2021 8.5 (A)     LDL Cholesterol Calculated (mg/dL (calc))   Date Value   02/21/2019 87   02/21/2018 86     LDL Cholesterol Direct (mg/dL)   Date Value   02/23/2021 77   08/18/2020 83         How many servings of fruits and vegetables do you eat daily?  2-3    On average, how many sweetened beverages do you drink each day (Examples: soda, juice, sweet tea, etc.  Do NOT count diet or artificially sweetened beverages)?   1    How many days per week do you exercise enough to make your heart beat faster? 3 or less    How many minutes a day do you exercise enough to make your heart beat faster? 9 or less    How many days per week do you miss taking your medication? 0        Review of Systems   Constitutional, HEENT, cardiovascular, pulmonary, gi and gu systems are negative, except as otherwise noted.      Objective    BP (!) 142/70 (BP Location: Left arm, Patient Position: Chair, Cuff Size: Adult Large)   Pulse 72   Temp 97.3  F (36.3  C)   Resp 20   Ht 1.524 m (5')   Wt 121.7 kg (268 lb 4.8 oz)   LMP 05/12/2001   BMI 52.40 kg/m    Body mass index is 52.4 kg/m .  Physical Exam    GENERAL: healthy, alert and no distress  EYES: Eyes grossly normal to inspection, PERRL and conjunctivae and sclerae normal  HENT: ear canals and TM's normal, nose and mouth without ulcers or lesions  NECK: no adenopathy, no asymmetry, masses, or scars and thyroid normal to palpation  RESP: lungs clear to auscultation - no rales, rhonchi or wheezes  CV: regular rate and rhythm, normal S1 S2, no S3 or S4, no murmur, click or rub, no peripheral edema and peripheral pulses strong  ABDOMEN: soft, nontender, no hepatosplenomegaly, no masses and bowel sounds normal  MS: no gross musculoskeletal defects noted, no edema  PSYCH: mentation appears normal, affect normal/bright  Diabetic foot exam: normal DP and PT pulses, no trophic changes or ulcerative lesions and normal sensory exam    Office Visit on 05/13/2021   Component Date Value Ref Range Status     Hemoglobin A1C 05/13/2021 8.9* 4.0 - 7.0 % Final

## 2021-06-10 ENCOUNTER — OFFICE VISIT (OUTPATIENT)
Dept: FAMILY MEDICINE | Facility: CLINIC | Age: 71
End: 2021-06-10

## 2021-06-10 DIAGNOSIS — Z79.4 TYPE 2 DIABETES MELLITUS WITHOUT COMPLICATION, WITH LONG-TERM CURRENT USE OF INSULIN (H): Primary | ICD-10-CM

## 2021-06-10 DIAGNOSIS — E11.9 TYPE 2 DIABETES MELLITUS WITHOUT COMPLICATION, WITH LONG-TERM CURRENT USE OF INSULIN (H): Primary | ICD-10-CM

## 2021-06-10 NOTE — PROGRESS NOTES
Jeny came into clinic today, with her  Blas, to apply for Social Security Extra help.    A couple of months ago, we enrolled Jeny in a Patient Assistance Program through [employer] for Jardiance (all forms are scanned into Jeny's chart) .  Last month, patient came into the clinic and we changed her from Jardiance to Tradjenta because she was having too many urinary-related side effects with the Jardiance.  I called in the prescription for Tradjenta to the [employer] Cares program last month.    Last week, Jeny called me to tell me that she has still not received the Tradjenta in the mail from [employer].  I called [employer] Care and she was apparently only approved for a temporary supply of that medication and needed to apply for Social Security Extra Help and if she is denied Extra Help then she will be approved for the [employer] Care program through the remainder of the year.  Patient did inform me that she received a temporary 3-month supply of Tradjenta in the mail yesterday, but she will not be able to get a refill unless she faxes in a Denial Letter for Social Security Extra help.    Today, we applied online for Social Security Extra Help.  I anticipate she will be denied Extra Help because their savings/investments/real estate exceeds the eligibility criteria for Extra Help.  However, we still applied to get the letter required by [employer].    Jeny should be receiving a determination letter from Social Security in the upcoming weeks (2-4 weeks).    Once she receives that Denial Letter, she will bring the letter to the clinic which then needs to be faxed to the [employer] Cares Program so that she can be fully approved to get the Tradjenta through the remainder of the year.  The fax number to [employer] can be found on the scanned documents in Jeny's file, but for ease of retrieval is: [phone].    It will likely take 3 months of being on Tradjenta before we know how effective this new combination of  medications with Tradjenta is at controlling diabetes and A1c.      Patient will work with the clinic to establish care with the new pharmacist once they start.  In the meantime, she will also continue to work with Dr. Figueroa for diabetes management.  At some point, insulin dose adjustments may be needed.    Doe Packer, PharmD  Clinical Pharmacist  [facility] Phone: [phone]  Direct Office Phone: [phone]

## 2021-06-26 DIAGNOSIS — G44.049 CHRONIC PAROXYSMAL HEMICRANIA, NOT INTRACTABLE: ICD-10-CM

## 2021-06-28 RX ORDER — NORTRIPTYLINE HCL 10 MG
CAPSULE ORAL
Qty: 90 CAPSULE | Refills: 0 | Status: SHIPPED | OUTPATIENT
Start: 2021-06-28 | End: 2021-09-27

## 2021-06-28 NOTE — TELEPHONE ENCOUNTER
Alma Kraft is requesting a refill of:    Pending Prescriptions:                       Disp   Refills    nortriptyline (PAMELOR) 10 MG capsule [Ph*90 cap*0            Sig: Take 1 capsule by mouth At Bedtime.    Please close encounter if RX was sent. Thanks, [place]

## 2021-06-30 ENCOUNTER — TELEPHONE (OUTPATIENT)
Dept: FAMILY MEDICINE | Facility: CLINIC | Age: 71
End: 2021-06-30

## 2021-06-30 NOTE — TELEPHONE ENCOUNTER
Patient stopped in today with the denial letter(s).     The denial letters with the Patient Assistance Program Application ( total of 15 pages ) was faxed to     [facility] Patient Assistance Program   [phone] -- phone   [phone] -- fax     Patients spouse thought the application was to be mailed. I let patient know that I will check with  to see how we should submit the application. I talked with Chloé @ [facility] Patient Assistance Program, I was advised to fax the application. I was also told that we should have a response within 2 business days.     Denial letters will be scanned to chart. I will also have the 15 pages that was faxed scanned

## 2021-07-02 NOTE — TELEPHONE ENCOUNTER
I left a message for patient to call me regarding the Approval fax form [facility] Patient Assistance Program received and scanned to chart.

## 2021-07-16 ENCOUNTER — OFFICE VISIT (OUTPATIENT)
Dept: FAMILY MEDICINE | Facility: CLINIC | Age: 71
End: 2021-07-16

## 2021-07-16 VITALS
BODY MASS INDEX: 53.79 KG/M2 | TEMPERATURE: 98.2 F | OXYGEN SATURATION: 97 % | SYSTOLIC BLOOD PRESSURE: 138 MMHG | HEART RATE: 89 BPM | WEIGHT: 275.4 LBS | DIASTOLIC BLOOD PRESSURE: 70 MMHG

## 2021-07-16 DIAGNOSIS — K74.69 OTHER CIRRHOSIS OF LIVER (H): ICD-10-CM

## 2021-07-16 DIAGNOSIS — R04.0 EPISTAXIS: Primary | ICD-10-CM

## 2021-07-16 DIAGNOSIS — R16.1 SPLENOMEGALY: ICD-10-CM

## 2021-07-16 DIAGNOSIS — I10 ESSENTIAL HYPERTENSION: ICD-10-CM

## 2021-07-16 DIAGNOSIS — R94.5 ABNORMAL RESULTS OF LIVER FUNCTION STUDIES: ICD-10-CM

## 2021-07-16 DIAGNOSIS — M79.89 LEG SWELLING: ICD-10-CM

## 2021-07-16 LAB
ALBUMIN SERPL-MCNC: 3.6 G/DL (ref 3.6–5.1)
ALBUMIN/GLOB SERPL: 1 {RATIO} (ref 1–2.5)
ALP SERPL-CCNC: 159 U/L (ref 33–130)
ALT 1742-6: 28 U/L (ref 0–32)
AST 1920-8: 43 U/L (ref 0–35)
BILIRUB SERPL-MCNC: 1.2 MG/DL (ref 0.2–1.2)
BUN SERPL-MCNC: 12 MG/DL (ref 7–25)
BUN/CREATININE RATIO: 20 (ref 6–22)
CALCIUM SERPL-MCNC: 8.9 MG/DL (ref 8.6–10.3)
CHLORIDE SERPLBLD-SCNC: 103.5 MMOL/L (ref 98–110)
CO2 SERPL-SCNC: 30 MMOL/L (ref 20–32)
CREAT SERPL-MCNC: 0.6 MG/DL (ref 0.6–1.3)
GLOBULIN, CALCULATED - QUEST: 3.6 (ref 1.9–3.7)
GLUCOSE SERPL-MCNC: 236 MG/DL (ref 60–99)
POTASSIUM SERPL-SCNC: 3.96 MMOL/L (ref 3.5–5.3)
PROT SERPL-MCNC: 7.2 G/DL (ref 6.1–8.1)
SODIUM SERPL-SCNC: 141.2 MMOL/L (ref 135–146)

## 2021-07-16 PROCEDURE — 36415 COLL VENOUS BLD VENIPUNCTURE: CPT | Performed by: FAMILY MEDICINE

## 2021-07-16 PROCEDURE — 80053 COMPREHEN METABOLIC PANEL: CPT | Performed by: FAMILY MEDICINE

## 2021-07-16 PROCEDURE — 99214 OFFICE O/P EST MOD 30 MIN: CPT | Performed by: FAMILY MEDICINE

## 2021-07-16 RX ORDER — AMLODIPINE BESYLATE 10 MG/1
5 TABLET ORAL DAILY
Qty: 90 TABLET | Refills: 1
Start: 2021-07-16 | End: 2021-08-18

## 2021-07-16 NOTE — PATIENT INSTRUCTIONS
Assessment & Plan   Problem List Items Addressed This Visit     None      Visit Diagnoses     Epistaxis    -  Primary    Relevant Orders    Otolaryngology Referral    Essential hypertension        Relevant Medications    amLODIPine (NORVASC) 10 MG tablet    Leg swelling        Relevant Orders    Comprehensive Metobolic Panel ([facility])    VENOUS COLLECTION (Completed)    TSH WITH FREE T4 REFLEX ([facility])           1. Essential hypertension  Decrease amlodipine to 5 mg/day, with the hopes that this will improve the leg swelling. Watch blood pressures. Should be less than 130/80.   - amLODIPine (NORVASC) 10 MG tablet; Take 0.5 tablets (5 mg) by mouth daily  Dispense: 90 tablet; Refill: 1    2. Epistaxis  May need cautery. Referral done to ENT.  - Otolaryngology Referral; Future    3. Leg swelling  Significant. Check labs. Decrease and possibly stop the amlodipine. Watch, continue compression stockings. Consider referral to vein specialists if no improvement noted.  - Comprehensive Metobolic Panel (BFP)  - VENOUS COLLECTION  - TSH WITH FREE T4 REFLEX ([facility])           FUTURE APPOINTMENTS:       - Follow-up visit in 2-4 weeks    No follow-ups on file.    Yuridia Simmons MD  [facility]

## 2021-07-16 NOTE — PROGRESS NOTES
Assessment & Plan   Problem List Items Addressed This Visit     None      Visit Diagnoses     Epistaxis    -  Primary    Relevant Orders    Otolaryngology Referral    Essential hypertension        Relevant Medications    amLODIPine (NORVASC) 10 MG tablet    Leg swelling        Relevant Orders    Comprehensive Metobolic Panel (BFP)    VENOUS COLLECTION (Completed)    TSH WITH FREE T4 REFLEX ([facility])           1. Essential hypertension  Decrease amlodipine to 5 mg/day, with the hopes that this will improve the leg swelling. Watch blood pressures. Should be less than 130/80.   - amLODIPine (NORVASC) 10 MG tablet; Take 0.5 tablets (5 mg) by mouth daily  Dispense: 90 tablet; Refill: 1    2. Epistaxis  May need cautery. Referral done to ENT.  - Otolaryngology Referral; Future    3. Leg swelling  Significant. Check labs. Decrease and possibly stop the amlodipine. Watch, continue compression stockings. Consider referral to vein specialists if no improvement noted.  - Comprehensive Metobolic Panel (BFP)  - VENOUS COLLECTION  - TSH WITH FREE T4 REFLEX ([facility])           FUTURE APPOINTMENTS:       - Follow-up visit in 2-4 weeks    No follow-ups on file.    Yuridia Simmons MD  [facility]    Subjective     Nursing Notes:   Yojana Rodriguez, CARLENE  7/16/2021 12:47 PM  Signed  Chief Complaint   Patient presents with     Leg Swelling     both legs and feet, does wear compresson socks, started May     Epistaxis            Alma Kraft is a 70 year old female who presents to clinic today for the following health issues   HPI     Here for leg swelling. Sometimes wears compression stockings. Sometimes legs feel very tight, has trouble bending ankles and knees.  Bothering her since the first heatwave in May.  But it hasn't gone away. Both legs. Has gained weight.    Bloody nose, sometimes when she blows her nose but other times seems to come randomly. Has dry eyes and mouth from medications, sometimes has sinus  dry feeling. Uses cpap. Also is on medications that can cause it. From both sides. Nose feels stuffy.         Review of Systems   Constitutional, HEENT, cardiovascular, pulmonary, gi and gu systems are negative, except as otherwise noted.      Objective    /70 (BP Location: Right arm, Patient Position: Sitting, Cuff Size: Adult Large)   Pulse 89   Temp 98.2  F (36.8  C)   Wt 124.9 kg (275 lb 6.4 oz)   LMP 05/12/2001   SpO2 97%   BMI 53.79 kg/m    Body mass index is 53.79 kg/m .  Physical Exam   GENERAL: healthy, alert and no distress  ABDOMEN: soft, nontender, no hepatosplenomegaly, no masses and bowel sounds normal  MS: no gross musculoskeletal defects noted, no edema  NEURO: Normal strength and tone, mentation intact and speech normal  PSYCH: mentation appears normal, affect normal/bright  LE--bilateral symmetric swelling, has support stockings on  Nose--muscosa with dried blood bilaterally, septal    No results found for this or any previous visit (from the past 24 hour(s)).

## 2021-07-16 NOTE — LETTER
My Depression Action Plan  Name: Alma Kraft   Date of Birth 1950  Date: 7/16/2021    My doctor: Yonny Figueroa   My clinic: [facility]  [address] [place] [address]  [address]  [facility] [id number] [phone]          GREEN    ZONE   Good Control    What it looks like:     Things are going generally well. You have normal ups and downs. You may even feel depressed from time to time, but bad moods usually last less than a day.   What you need to do:  1. Continue to care for yourself (see self care plan)  2. Check your depression survival kit and update it as needed  3. Follow your physician s recommendations including any medication.  4. Do not stop taking medication unless you consult with your physician first.           YELLOW         ZONE Getting Worse    What it looks like:     Depression is starting to interfere with your life.     It may be hard to get out of bed; you may be starting to isolate yourself from others.    Symptoms of depression are starting to last most all day and this has happened for several days.     You may have suicidal thoughts but they are not constant.   What you need to do:     1. Call your care team. Your response to treatment will improve if you keep your care team informed of your progress. Yellow periods are signs an adjustment may need to be made.     2. Continue your self-care.  Just get dressed and ready for the day.  Don't give yourself time to talk yourself out of it.    3. Talk to someone in your support network.    4. Open up your Depression Self-Care Plan/Wellness Kit.           RED    ZONE Medical Alert - Get Help    What it looks like:     Depression is seriously interfering with your life.     You may experience these or other symptoms: You can t get out of bed most days, can t work or engage in other necessary activities, you have trouble taking care of basic hygiene, or basic responsibilities, thoughts of suicide or death  that will not go away, self-injurious behavior.     What you need to do:  1. Call your care team and request a same-day appointment. If they are not available (weekends or after hours) call your local crisis line, emergency room or 911.          Depression Self-Care Plan / Wellness Kit    Many people find that medication and therapy are helpful treatments for managing depression. In addition, making small changes to your everyday life can help to boost your mood and improve your wellbeing. Below are some tips for you to consider. Be sure to talk with your medical provider and/or behavioral health consultant if your symptoms are worsening or not improving.     Sleep   Sleep hygiene  means all of the habits that support good, restful sleep. It includes maintaining a consistent bedtime and wake time, using your bedroom only for sleeping or sex, and keeping the bedroom dark and free of distractions like a computer, smartphone, or television.     Develop a Healthy Routine  Maintain good hygiene. Get out of bed in the morning, make your bed, brush your teeth, take a shower, and get dressed. Don t spend too much time viewing media that makes you feel stressed. Find time to relax each day.    Exercise  Get some form of exercise every day. This will help reduce pain and release endorphins, the  feel good  chemicals in your brain. It can be as simple as just going for a walk or doing some gardening, anything that will get you moving.      Diet  Strive to eat healthy foods, including fruits and vegetables. Drink plenty of water. Avoid excessive sugar, caffeine, alcohol, and other mood-altering substances.     Stay Connected with Others  Stay in touch with friends and family members.    Manage Your Mood  Try deep breathing, massage therapy, biofeedback, or meditation. Take part in fun activities when you can. Try to find something to smile about each day.     Psychotherapy  Be open to working with a therapist if your provider  recommends it.     Medication  Be sure to take your medication as prescribed. Most anti-depressants need to be taken every day. It usually takes several weeks for medications to work. Not all medicines work for all people. It is important to follow-up with your provider to make sure you have a treatment plan that is working for you. Do not stop your medication abruptly without first discussing it with your provider.    Crisis Resources   These hotlines are for both adults and children. They and are open 24 hours a day, 7 days a week unless noted otherwise.      National Suicide Prevention Lifeline   7-756-410-XTKN (6950)      Crisis Text Line    www.crisistextline.org  Text HOME to 103062 from anywhere in the [place], anytime, about any type of crisis. A live, trained crisis counselor will receive the text and respond quickly.      [facility] Lifeline for LGBTQ Youth  A national crisis intervention and suicide lifeline for LGBTQ youth under 25. Provides a safe place to talk without judgement. Call 1-981.335.8636; text START to 295190 or visit www.thetrevorproject.org to talk to a trained counselor.      For [facility] crisis numbers, visit the [facility] website at:  https://[place].gov/dhs/people-we-serve/adults/health-care/mental-health/resources/crisis-contacts.jsp

## 2021-07-16 NOTE — NURSING NOTE
Chief Complaint   Patient presents with     Leg Swelling     both legs and feet, does wear compresson socks, started May     Epistaxis

## 2021-07-17 LAB — TSH SERPL-ACNC: 3.01 MIU/L (ref 0.4–4.5)

## 2021-07-25 DIAGNOSIS — I10 ESSENTIAL HYPERTENSION, BENIGN: ICD-10-CM

## 2021-07-26 RX ORDER — POTASSIUM CHLORIDE 750 MG/1
10 CAPSULE, EXTENDED RELEASE ORAL 3 TIMES DAILY
Qty: 270 CAPSULE | Refills: 0 | Status: SHIPPED | OUTPATIENT
Start: 2021-07-26 | End: 2021-09-27

## 2021-07-26 NOTE — TELEPHONE ENCOUNTER
Received incoming request for   Pending Prescriptions:                       Disp   Refills    potassium chloride ER (MICRO-K) 10 MEQ CR*270 ca*0            Sig: Take 1 capsule (10 mEq) by mouth 3 times daily.    Patient was last seen by [facility] on 5/27/2021. Routing to physician for approval/denial.

## 2021-08-02 DIAGNOSIS — Z79.4 TYPE 2 DIABETES MELLITUS WITHOUT COMPLICATION, WITH LONG-TERM CURRENT USE OF INSULIN (H): Primary | ICD-10-CM

## 2021-08-02 DIAGNOSIS — E11.9 TYPE 2 DIABETES MELLITUS WITHOUT COMPLICATION, WITH LONG-TERM CURRENT USE OF INSULIN (H): Primary | ICD-10-CM

## 2021-08-02 RX ORDER — PEN NEEDLE, DIABETIC 32GX 5/32"
NEEDLE, DISPOSABLE MISCELLANEOUS
Qty: 100 EACH | Refills: 3 | Status: SHIPPED | OUTPATIENT
Start: 2021-08-02 | End: 2022-09-12

## 2021-08-02 NOTE — TELEPHONE ENCOUNTER
Pending Prescriptions:                       Disp   Refills    insulin pen needle (BD ROSE U/F) 32G X 4 *100 ea*3            Sig: Use 1 pen needles daily or as directed.

## 2021-08-11 DIAGNOSIS — I10 ESSENTIAL HYPERTENSION: ICD-10-CM

## 2021-08-11 DIAGNOSIS — R52 PAIN: ICD-10-CM

## 2021-08-11 RX ORDER — CELECOXIB 200 MG/1
CAPSULE ORAL
Qty: 90 CAPSULE | Refills: 0 | COMMUNITY
Start: 2021-08-11

## 2021-08-11 RX ORDER — METFORMIN HCL 500 MG
TABLET, EXTENDED RELEASE 24 HR ORAL
Qty: 360 TABLET | Refills: 0 | COMMUNITY
Start: 2021-08-11

## 2021-08-11 RX ORDER — AMLODIPINE BESYLATE 10 MG/1
TABLET ORAL
Qty: 90 TABLET | Refills: 0 | COMMUNITY
Start: 2021-08-11

## 2021-08-11 RX ORDER — GLIPIZIDE 10 MG/1
TABLET, FILM COATED, EXTENDED RELEASE ORAL
Qty: 180 TABLET | Refills: 0 | COMMUNITY
Start: 2021-08-11

## 2021-08-11 RX ORDER — CARVEDILOL 6.25 MG/1
TABLET ORAL
Qty: 180 TABLET | Refills: 0 | COMMUNITY
Start: 2021-08-11

## 2021-08-11 NOTE — TELEPHONE ENCOUNTER
Received refill requests for  Pending Prescriptions:                       Disp   Refills    carvedilol (COREG) 6.25 MG tablet [Pharma*180 ta*0            Sig: Take 1 tablet by mouth 2 times daily (with           meals.)    celecoxib (CELEBREX) 200 MG capsule [Phar*90 cap*0            Sig: Take 1 capsule by mouth daily.    amLODIPine (NORVASC) 10 MG tablet [Pharma*90 tab*0            Sig: Take 1 tablet by mouth daily.    metFORMIN (GLUCOPHAGE-XR) 500 MG 24 hr ta*360 ta*0            Sig: TAKE 4 TABLETS BY MOUTH DAILY WITH DINNER    glipiZIDE (GLUCOTROL XL) 10 MG 24 hr tabl*180 ta*0            Sig: Take 1 tablet by mouth 2 times daily.    Patient has appointment scheduled for 8/18/2021. Refills will be given then. Should call if short supply is needed.

## 2021-08-18 ENCOUNTER — OFFICE VISIT (OUTPATIENT)
Dept: FAMILY MEDICINE | Facility: CLINIC | Age: 71
End: 2021-08-18

## 2021-08-18 VITALS
BODY MASS INDEX: 50.82 KG/M2 | WEIGHT: 260.2 LBS | TEMPERATURE: 97.5 F | OXYGEN SATURATION: 97 % | HEART RATE: 83 BPM | SYSTOLIC BLOOD PRESSURE: 140 MMHG | DIASTOLIC BLOOD PRESSURE: 60 MMHG

## 2021-08-18 DIAGNOSIS — R52 PAIN: ICD-10-CM

## 2021-08-18 DIAGNOSIS — K74.69 OTHER CIRRHOSIS OF LIVER (H): ICD-10-CM

## 2021-08-18 DIAGNOSIS — F32.5 MAJOR DEPRESSION IN REMISSION (H): ICD-10-CM

## 2021-08-18 DIAGNOSIS — I10 ESSENTIAL HYPERTENSION: ICD-10-CM

## 2021-08-18 DIAGNOSIS — R22.43 LOCALIZED SWELLING OF BOTH LOWER LEGS: Primary | ICD-10-CM

## 2021-08-18 DIAGNOSIS — I10 ESSENTIAL HYPERTENSION, BENIGN: ICD-10-CM

## 2021-08-18 PROCEDURE — 99213 OFFICE O/P EST LOW 20 MIN: CPT | Performed by: FAMILY MEDICINE

## 2021-08-18 RX ORDER — CARVEDILOL 6.25 MG/1
6.25 TABLET ORAL 2 TIMES DAILY WITH MEALS
Qty: 180 TABLET | Refills: 1 | Status: SHIPPED | OUTPATIENT
Start: 2021-08-18 | End: 2022-02-09

## 2021-08-18 RX ORDER — LOSARTAN POTASSIUM AND HYDROCHLOROTHIAZIDE 12.5; 1 MG/1; MG/1
1 TABLET ORAL DAILY
Qty: 90 TABLET | Refills: 0 | Status: SHIPPED | OUTPATIENT
Start: 2021-08-18 | End: 2021-11-15

## 2021-08-18 RX ORDER — GLIPIZIDE 10 MG/1
10 TABLET, FILM COATED, EXTENDED RELEASE ORAL 2 TIMES DAILY
Qty: 180 TABLET | Refills: 0 | Status: SHIPPED | OUTPATIENT
Start: 2021-08-18 | End: 2021-12-01

## 2021-08-18 RX ORDER — METFORMIN HCL 500 MG
2000 TABLET, EXTENDED RELEASE 24 HR ORAL
Qty: 360 TABLET | Refills: 0 | Status: SHIPPED | OUTPATIENT
Start: 2021-08-18 | End: 2021-12-01

## 2021-08-18 RX ORDER — ATORVASTATIN CALCIUM 10 MG/1
10 TABLET, FILM COATED ORAL DAILY
Qty: 90 TABLET | Refills: 1 | Status: SHIPPED | OUTPATIENT
Start: 2021-08-18 | End: 2022-03-21

## 2021-08-18 RX ORDER — CELECOXIB 200 MG/1
200 CAPSULE ORAL DAILY
Qty: 90 CAPSULE | Refills: 1 | Status: SHIPPED | OUTPATIENT
Start: 2021-08-18 | End: 2022-02-09

## 2021-08-18 RX ORDER — VENLAFAXINE HYDROCHLORIDE 150 MG/1
CAPSULE, EXTENDED RELEASE ORAL
Qty: 90 CAPSULE | Refills: 0 | Status: SHIPPED | OUTPATIENT
Start: 2021-08-18 | End: 2021-12-01

## 2021-08-18 NOTE — TELEPHONE ENCOUNTER
Alma Kraft is requesting a refill of:    Pending Prescriptions:                       Disp   Refills    venlafaxine (EFFEXOR-XR) 150 MG 24 hr cap*90 cap*0            Sig: Take 1 capsule by mouth daily.    losartan-hydrochlorothiazide (HYZAAR) 100*90 tab*0            Sig: Take 1 tablet by mouth daily.    carvedilol (COREG) 6.25 MG tablet         180 ta*1            Sig: Take 1 tablet (6.25 mg) by mouth 2 times daily           (with meals)    celecoxib (CELEBREX) 200 MG capsule       90 cap*1            Sig: Take 1 capsule (200 mg) by mouth daily    atorvastatin (LIPITOR) 10 MG tablet       90 tab*1            Sig: Take 1 tablet (10 mg) by mouth daily    metFORMIN (GLUCOPHAGE-XR) 500 MG 24 hr ta*360 ta*0            Sig: Take 4 tablets (2,000 mg) by mouth daily (with           dinner)    glipiZIDE (GLUCOTROL XL) 10 MG 24 hr tabl*180 ta*0            Sig: Take 1 tablet (10 mg) by mouth 2 times daily    Pt has OV for fluid retention follow up and CPX with you next week

## 2021-08-18 NOTE — PROGRESS NOTES
Assessment & Plan   Problem List Items Addressed This Visit        Circulatory    Essential hypertension, benign      Other Visit Diagnoses     Localized swelling of both lower legs    -  Primary             .1. Localized swelling of both lower legs  This has improved. Continue to drink water and fluids, compression stockings, stay off the norvasc. This could have been contributing. Also the cirrhosis--she will be seeing GI.    2. Essential hypertension, benign  High today.    3. Other cirrhosis of liver (H)  She has apt scheduled with GI.     BMI:   Estimated body mass index is 50.82 kg/m  as calculated from the following:    Height as of 5/27/21: 1.524 m (5').    Weight as of this encounter: 118 kg (260 lb 3.2 oz).         FUTURE APPOINTMENTS:       - Follow-up visit for a physical.    No follow-ups on file.    Yuridia Simmons MD  [facility]    Subjective     Nursing Notes:   Yojana Rodriguez, CARLENE  8/18/2021 12:56 PM  Signed  Chief Complaint   Patient presents with     RECHECK     f/u fluid retention, states much better             Alma Kraft is a 70 year old female who presents to clinic today for the following health issues   HPI     Here to followup on bilateral leg swelling this is much better. More recently has been very active--checking out her mother's house, this seemed to make a difference. Had cut back a little on her fluids. Lost some weight also. Stopped the amlodipine. Wears compression stockings daily.  Blood pressures at home--hasn't checked recently.      Review of Systems   Constitutional, HEENT, cardiovascular, pulmonary, gi and gu systems are negative, except as otherwise noted.      Objective    BP (!) 140/60 (BP Location: Left arm, Patient Position: Sitting, Cuff Size: Adult Large)   Pulse 83   Temp 97.5  F (36.4  C)   Wt 118 kg (260 lb 3.2 oz)   LMP 05/12/2001   SpO2 97%   BMI 50.82 kg/m    Body mass index is 50.82 kg/m .  Physical Exam   GENERAL: healthy,  alert and no distress  MS: no gross musculoskeletal defects noted, no edema  NEURO: Normal strength and tone, mentation intact and speech normal  PSYCH: mentation appears normal, affect normal/bright  LE: trace pedal edema bilaterally, wearing support stockings.

## 2021-08-18 NOTE — NURSING NOTE
Chief Complaint   Patient presents with     RECHECK     f/u fluid retention, states much better

## 2021-08-18 NOTE — PATIENT INSTRUCTIONS
Assessment & Plan   Problem List Items Addressed This Visit        Circulatory    Essential hypertension, benign      Other Visit Diagnoses     Localized swelling of both lower legs    -  Primary             .1. Localized swelling of both lower legs  This has improved. Continue to drink water and fluids, compression stockings, stay off the norvasc. This could have been contributing. Also the cirrhosis--she will be seeing GI.    2. Essential hypertension, benign  High today.    3. Other cirrhosis of liver (H)  She has apt scheduled with GI.     BMI:   Estimated body mass index is 50.82 kg/m  as calculated from the following:    Height as of 5/27/21: 1.524 m (5').    Weight as of this encounter: 118 kg (260 lb 3.2 oz).         FUTURE APPOINTMENTS:       - Follow-up visit for a physical.    No follow-ups on file.    Yuridia Simmons MD  [facility]

## 2021-08-26 ENCOUNTER — OFFICE VISIT (OUTPATIENT)
Dept: FAMILY MEDICINE | Facility: CLINIC | Age: 71
End: 2021-08-26

## 2021-08-26 VITALS
OXYGEN SATURATION: 97 % | TEMPERATURE: 97.3 F | BODY MASS INDEX: 51.55 KG/M2 | HEART RATE: 83 BPM | WEIGHT: 262.6 LBS | HEIGHT: 60 IN | DIASTOLIC BLOOD PRESSURE: 82 MMHG | SYSTOLIC BLOOD PRESSURE: 138 MMHG

## 2021-08-26 DIAGNOSIS — E11.9 TYPE 2 DIABETES MELLITUS WITHOUT COMPLICATION, WITH LONG-TERM CURRENT USE OF INSULIN (H): ICD-10-CM

## 2021-08-26 DIAGNOSIS — Z00.00 ROUTINE GENERAL MEDICAL EXAMINATION AT A HEALTH CARE FACILITY: Primary | ICD-10-CM

## 2021-08-26 DIAGNOSIS — I10 ESSENTIAL HYPERTENSION: ICD-10-CM

## 2021-08-26 DIAGNOSIS — Z79.4 TYPE 2 DIABETES MELLITUS WITHOUT COMPLICATION, WITH LONG-TERM CURRENT USE OF INSULIN (H): ICD-10-CM

## 2021-08-26 DIAGNOSIS — K74.69 OTHER CIRRHOSIS OF LIVER (H): ICD-10-CM

## 2021-08-26 DIAGNOSIS — E78.2 MIXED HYPERLIPIDEMIA: ICD-10-CM

## 2021-08-26 LAB
ALBUMIN SERPL-MCNC: 3.4 G/DL (ref 3.6–5.1)
ALBUMIN/GLOB SERPL: 0.9 {RATIO} (ref 1–2.5)
ALP SERPL-CCNC: 131 U/L (ref 33–130)
ALT 1742-6: 28 U/L (ref 0–32)
AST 1920-8: 38 U/L (ref 0–35)
BILIRUB SERPL-MCNC: 1.3 MG/DL (ref 0.2–1.2)
BUN SERPL-MCNC: 11 MG/DL (ref 7–25)
BUN/CREATININE RATIO: 11.7 (ref 6–22)
CALCIUM SERPL-MCNC: 8.7 MG/DL (ref 8.6–10.3)
CHLORIDE SERPLBLD-SCNC: 106 MMOL/L (ref 98–110)
CHOLEST SERPL-MCNC: 145 MG/DL (ref 0–199)
CHOLEST/HDLC SERPL: 2 {RATIO} (ref 0–5)
CO2 SERPL-SCNC: 30 MMOL/L (ref 20–32)
CREAT SERPL-MCNC: 0.94 MG/DL (ref 0.6–1.3)
GLOBULIN, CALCULATED - QUEST: 3.7 (ref 1.9–3.7)
GLUCOSE SERPL-MCNC: 143 MG/DL (ref 60–99)
HBA1C MFR BLD: 7.9 % (ref 4–7)
HDLC SERPL-MCNC: 67 MG/DL (ref 40–150)
LDLC SERPL CALC-MCNC: 64 MG/DL (ref 0–130)
POTASSIUM SERPL-SCNC: 3.53 MMOL/L (ref 3.5–5.3)
PROT SERPL-MCNC: 7.1 G/DL (ref 6.1–8.1)
SODIUM SERPL-SCNC: 134.3 MMOL/L (ref 135–146)
TRIGL SERPL-MCNC: 71 MG/DL (ref 0–149)

## 2021-08-26 PROCEDURE — 83036 HEMOGLOBIN GLYCOSYLATED A1C: CPT | Performed by: FAMILY MEDICINE

## 2021-08-26 PROCEDURE — 36415 COLL VENOUS BLD VENIPUNCTURE: CPT | Performed by: FAMILY MEDICINE

## 2021-08-26 PROCEDURE — 80061 LIPID PANEL: CPT | Performed by: FAMILY MEDICINE

## 2021-08-26 PROCEDURE — 99397 PER PM REEVAL EST PAT 65+ YR: CPT | Mod: GA | Performed by: FAMILY MEDICINE

## 2021-08-26 PROCEDURE — 80053 COMPREHEN METABOLIC PANEL: CPT | Performed by: FAMILY MEDICINE

## 2021-08-26 ASSESSMENT — MIFFLIN-ST. JEOR: SCORE: 1627.65

## 2021-08-26 NOTE — LETTER
August 26, 2021      Alma Kraft  [address]  [place] [address]        Dear ,    We are writing to inform you of your test results.    Your test results fall within the expected range(s) or remain unchanged from previous results.  Please continue with current treatment plan. Lipids, liver, kidneys look fine.     Resulted Orders   HEMOGLOBIN A1C (BFP)   Result Value Ref Range    Hemoglobin A1C 7.9 (A) 4.0 - 7.0 %   Lipid Panel (BFP)   Result Value Ref Range    Cholesterol 145 0 - 199 mg/dL    Triglycerides 71 0 - 149 mg/dL    HDL Cholesterol 67 40 - 150 mg/dL    LDL Cholesterol Direct 64 0 - 130 mg/dL    Cholesterol/HDL Ratio 2 0 - 5   Comprehensive Metobolic Panel (BFP)   Result Value Ref Range    Carbon Dioxide 30.0 20 - 32 mmol/L    Creatinine 0.94 0.60 - 1.30 mg/dL    Glucose 143 (A) 60 - 99 mg/dL    Sodium 134.3 (A) 135 - 146 mmol/L    Potassium 3.53 3.5 - 5.3 mmol/L    Chloride 106.0 98 - 110 mmol/L    Protein Total 7.1 6.1 - 8.1 g/dL    Albumin 3.4 (A) 3.6 - 5.1 g/dL    Alkaline Phosphatase 131 (A) 33 - 130 U/L    ALT 28 0 - 32 U/L    AST 38 (A) 0 - 35 U/L    Bilirubin Total 1.3 (A) 0.2 - 1.2 mg/dL    Urea Nitrogen 11 7 - 25 mg/dL    Calcium 8.7 8.6 - 10.3 mg/dL    BUN/Creatinine Ratio 11.7 6 - 22    Globulin Calculated 3.7 1.9 - 3.7    A/G Ratio 0.9 (A) 1 - 2.5       If you have any questions or concerns, please call the clinic at the number listed above.       Sincerely,      Yonny Figueroa MD

## 2021-08-26 NOTE — NURSING NOTE
Chief Complaint   Patient presents with     Physical     fasting today, does not need refills today, no concerns     Pre-visit Screening:  Immunizations:  up to date  Colonoscopy:  is up to date  Mammogram: is up to date  Asthma Action Test/Plan:  NA  PHQ9:  NA  GAD7:  NA  Questioned patient about current smoking habits Pt. has never smoked.  Ok to leave detailed message on voice mail for today's visit only Yes, phone # [phone]

## 2021-08-26 NOTE — PROGRESS NOTES
SUBJECTIVE:   CC: Alma Kraft is an 70 year old woman who presents for preventive health visit.       Patient has been advised of split billing requirements and indicates understanding: Yes  Healthy Habits:    Do you get at least three servings of calcium containing foods daily (dairy, green leafy vegetables, etc.)? yes    Amount of exercise or daily activities, outside of work: inimal day(s) per week    Problems taking medications regularly No    Medication side effects: No    Have you had an eye exam in the past two years? yes    Do you see a dentist twice per year? yes    Do you have sleep apnea, excessive snoring or daytime drowsiness?no      Diabetes Follow-up-Metformin, Glipizide, Tradjenta, Basaglar 66u    How often are you checking your blood sugar? A few times a week  What time of day are you checking your blood sugars (select all that apply)?  Before meals  Have you had any blood sugars above 200?  No  Have you had any blood sugars below 70?  No    What symptoms do you notice when your blood sugar is low?  None    What concerns do you have today about your diabetes? None     Do you have any of these symptoms? (Select all that apply)  No numbness or tingling in feet.  No redness, sores or blisters on feet.  No complaints of excessive thirst.  No reports of blurry vision.  No significant changes to weight.              Hyperlipidemia Follow-Up      Are you regularly taking any medication or supplement to lower your cholesterol?   Yes- atorvastatin    Are you having muscle aches or other side effects that you think could be caused by your cholesterol lowering medication?  No    Hypertension Follow-up      Do you check your blood pressure regularly outside of the clinic? Yes     Are you following a low salt diet? No    Are your blood pressures ever more than 140 on the top number (systolic) OR more   than 90 on the bottom number (diastolic), for example 140/90? No    BP Readings from Last 2 Encounters:    08/26/21 138/82   08/18/21 (!) 140/60     Hemoglobin A1C (%)   Date Value   05/13/2021 8.9 (A)   02/23/2021 8.5 (A)     LDL Cholesterol Calculated (mg/dL (calc))   Date Value   02/21/2019 87   02/21/2018 86     LDL Cholesterol Direct (mg/dL)   Date Value   02/23/2021 77   08/18/2020 83         Today's PHQ-2 Score:   PHQ-2 ( 1999 Pfizer) 6/28/2019 11/30/2015   Q1: Little interest or pleasure in doing things 0 0   Q2: Feeling down, depressed or hopeless 0 0   PHQ-2 Score 0 0       Abuse: Current or Past(Physical, Sexual or Emotional)- No  Do you feel safe in your environment? Yes        Social History     Tobacco Use     Smoking status: Never Smoker     Smokeless tobacco: Never Used   Substance Use Topics     Alcohol use: Yes     Comment: very rarely     If you drink alcohol do you typically have >3 drinks per day or >7 drinks per week? No                     Reviewed orders with patient.  Reviewed health maintenance and updated orders accordingly - Yes  BP Readings from Last 3 Encounters:   08/26/21 138/82   08/18/21 (!) 140/60   07/16/21 138/70    Wt Readings from Last 3 Encounters:   08/26/21 119.1 kg (262 lb 9.6 oz)   08/18/21 118 kg (260 lb 3.2 oz)   07/16/21 124.9 kg (275 lb 6.4 oz)                  Patient Active Problem List   Diagnosis     Essential hypertension, benign     Mixed hyperlipidemia     Obesity, morbid, BMI 40.0-49.9 (H)     Esophageal reflux     Allergic rhinitis     Obstructive sleep apnea     Family history of malignant neoplasm of breast     ACP (advance care planning)     Type 2 diabetes mellitus without complication, with long-term current use of insulin (H)     Adjustment disorder with mixed anxiety and depressed mood     Acne     Health Care Home     Past Surgical History:   Procedure Laterality Date     ------------OTHER-------------  9/5/2013    L hand, cyst excision     ------------OTHER------------- Right 03/14/2014    shoulder manipulation     C APPENDECTOMY  1956     C EYE EXAM  ESTABLISHED PT  4/20/11    No retinopathy     C TOTAL KNEE ARTHROPLASTY  2/06    Knee Replacement, Total Right     C TOTAL KNEE ARTHROPLASTY  3/5/07    Knee Replacement, Total  Left     C VAGINAL HYSTERECTOMY  8/01    Hysterectomy, Vaginal & BSO     HC INCISION TENDON SHEATH FINGER Left 09/05/2013    left thumb trigger finger     HC KNEE SCOPE, DIAGNOSTIC  4/2005    Arthroscopy, Knee Left     HC REMOVE TONSILS/ADENOIDS,<13 Y/O  1953    T & A <11y.o.     TEST NOT FOUND  6/27/07    R heel/ inocencio's deformity     ZZHC COLONOSCOPY W/WO BRUSH/WASH  6/03       Social History     Tobacco Use     Smoking status: Never Smoker     Smokeless tobacco: Never Used   Substance Use Topics     Alcohol use: Yes     Comment: very rarely     Family History   Problem Relation Age of Onset     Cancer Father         prostate     Gastrointestinal Disease Father         GERD     Breast Cancer Sister         51     Heart Disease Maternal Grandmother      Cerebrovascular Disease Maternal Grandfather      Heart Disease Paternal Grandfather      Gastrointestinal Disease Other         Niece with GERD/hiatal hernia     Alzheimer Disease No family hx of      Diabetes No family hx of          Current Outpatient Medications   Medication Sig Dispense Refill     ASPIRIN 81 MG OR TABS 1 tab po QD (Once per day) 100 3     atorvastatin (LIPITOR) 10 MG tablet Take 1 tablet (10 mg) by mouth daily 90 tablet 1     blood glucose (NO BRAND SPECIFIED) test strip Use to test blood sugar one times daily or as directed. To accompany: {Blood Glucose Monitor Brands One Touch Viro test strips 100 strip 3     blood glucose monitoring (ONE TOUCH DELICA) lancets Use to test blood sugars 1 times daily or as directed. 100 each 3     Blood Glucose Monitoring Suppl (ONE TOUCH ULTRA 2) W/DEVICE KIT Use to test blood sugars 2 times daily or as directed. 1 kit 0     budesonide (RINOCORT AQUA) 32 MCG/ACT nasal spray Spray 2 sprays into both nostrils daily 25.29 mL 0      carvedilol (COREG) 6.25 MG tablet Take 1 tablet (6.25 mg) by mouth 2 times daily (with meals) 180 tablet 1     celecoxib (CELEBREX) 200 MG capsule Take 1 capsule (200 mg) by mouth daily 90 capsule 1     cyclobenzaprine (FLEXERIL) 5 MG tablet Take 1 tablet (5 mg) by mouth 3 times daily as needed for muscle spasms 30 tablet 0     glipiZIDE (GLUCOTROL XL) 10 MG 24 hr tablet Take 1 tablet (10 mg) by mouth 2 times daily 180 tablet 0     hydrochlorothiazide (HYDRODIURIL) 12.5 MG tablet Take 1 tablet (12.5 mg) by mouth daily 180 tablet 0     insulin glargine (BASAGLAR KWIKPEN) 100 UNIT/ML pen Inject 66 Units Subcutaneous daily 75 mL 1     insulin pen needle (BD ROSE U/F) 32G X 4 MM miscellaneous Use 1 pen needles daily or as directed. 100 each 3     linagliptin (TRADJENTA) 5 MG TABS tablet Take 1 tablet (5 mg) by mouth daily 90 tablet 3     losartan-hydrochlorothiazide (HYZAAR) 100-12.5 MG tablet Take 1 tablet by mouth daily. 90 tablet 0     metFORMIN (GLUCOPHAGE-XR) 500 MG 24 hr tablet Take 4 tablets (2,000 mg) by mouth daily (with dinner) 360 tablet 0     Multiple Vitamins-Minerals (CENTRUM SILVER) per tablet 1 tablet 4 times weekly 100 tablet      nortriptyline (PAMELOR) 10 MG capsule Take 1 capsule by mouth At Bedtime. 90 capsule 0     omeprazole (PRILOSEC) 20 MG DR capsule Take 1 capsule (20 mg) by mouth daily 90 capsule 1     ORDER FOR DME Equipment being ordered: LevelUp plus compression stockings    99123  20-30 compression 3 Device 0     potassium chloride ER (MICRO-K) 10 MEQ CR capsule Take 1 capsule (10 mEq) by mouth 3 times daily. 270 capsule 0     venlafaxine (EFFEXOR-XR) 150 MG 24 hr capsule Take 1 capsule by mouth daily. 90 capsule 0     Allergies   Allergen Reactions     Demerol Nausea and Vomiting     Erythromycin      GI upset     Peanuts [Nuts] Hives     Shrimp Hives     Recent Labs   Lab Test 07/16/21  1400 07/16/21  0000 05/13/21  1245 02/23/21  1214 02/23/21  1213 02/23/21  1104 11/23/20  1016  08/18/20  1121 08/18/20  1013 05/14/20  0000 05/16/19  0000 02/21/19  1008 02/21/19  0947 11/21/18  1031 05/23/18  1034 02/11/18  0537 02/11/18  0250 10/05/13  1026 10/05/13  1023 08/29/13  1349   A1C  --   --  8.9*  --   --  8.5* 9.2*  --   --  8.7*   < >  --   --   --   --   --   --   --   --   --    LDL  --   --   --   --  77  --   --  83  --  82   < > 87  --   --   --    < >  --    < > 88  --    HDL  --   --   --   --  68  --   --  64  --  67   < > 66  --   --   --    < >  --    < > 69  --    TRIG  --   --   --   --  86  --   --  86  --  97   < > 77  --   --   --    < >  --    < > 68  --    ALT  --   --   --   --   --   --   --   --   --   --   --  32*  --   --   --   --   --   --  34* 25   CR  --  0.60  --  0.63  --   --   --  0.62*   < > 0.67*  --   --    < > 0.50 0.49*  --  0.46*   < > 0.67 0.53   GFRESTIMATED  --   --   --   --   --   --   --   --   --   --   --   --   --  99 100  --  >90   < > 93 101   GFRESTBLACK  --   --   --   --   --   --   --   --   --   --   --   --   --   --   --   --  >90  --   --   --    POTASSIUM  --  3.96  --  3.99  --   --   --  3.71   < > 3.90  --  3.9  --  3.7 3.4*   < > 3.1*   < > 3.4* 4.0   TSH 3.01  --   --   --   --   --   --   --   --   --   --  2.09  --   --   --   --   --    < > 2.55  --     < > = values in this interval not displayed.            Pertinent mammograms are reviewed under the imaging tab.  History of abnormal Pap smear: NO - age 64 - see link Cervical Cytology Screening Guidelines     Reviewed and updated as needed this visit by clinical staff  Tobacco  Allergies   Problems             Reviewed and updated as needed this visit by Provider                Past Medical History:   Diagnosis Date     Diabetes (H)      Hypertension       Past Surgical History:   Procedure Laterality Date     ------------OTHER-------------  9/5/2013    L hand, cyst excision     ------------OTHER------------- Right 03/14/2014    shoulder manipulation     C APPENDECTOMY  1956      C EYE EXAM ESTABLISHED PT  4/20/11    No retinopathy     C TOTAL KNEE ARTHROPLASTY  2/06    Knee Replacement, Total Right     C TOTAL KNEE ARTHROPLASTY  3/5/07    Knee Replacement, Total  Left     C VAGINAL HYSTERECTOMY  8/01    Hysterectomy, Vaginal & BSO     HC INCISION TENDON SHEATH FINGER Left 09/05/2013    left thumb trigger finger     HC KNEE SCOPE, DIAGNOSTIC  4/2005    Arthroscopy, Knee Left     HC REMOVE TONSILS/ADENOIDS,<13 Y/O  1953    T & A <11y.o.     TEST NOT FOUND  6/27/07    R heel/ inocencio's deformity     ZZHC COLONOSCOPY W/WO BRUSH/WASH  6/03       ROS:  CONSTITUTIONAL: NEGATIVE for fever, chills, change in weight  INTEGUMENTARY/SKIN: NEGATIVE for worrisome rashes, moles or lesions  EYES: NEGATIVE for vision changes or irritation  ENT: NEGATIVE for ear, mouth and throat problems  RESP: NEGATIVE for significant cough or SOB  BREAST: NEGATIVE for masses, tenderness or discharge  CV: NEGATIVE for chest pain, palpitations or peripheral edema  GI: NEGATIVE for nausea, abdominal pain, heartburn, or change in bowel habits  : NEGATIVE for unusual urinary or vaginal symptoms. No vaginal bleeding.  MUSCULOSKELETAL: NEGATIVE for significant arthralgias or myalgia  NEURO: NEGATIVE for weakness, dizziness or paresthesias  PSYCHIATRIC: NEGATIVE for changes in mood or affect     OBJECTIVE:   /82 (BP Location: Left arm, Patient Position: Sitting, Cuff Size: Adult Large)   Pulse 83   Temp 97.3  F (36.3  C) (Temporal)   Ht 1.524 m (5')   Wt 119.1 kg (262 lb 9.6 oz)   LMP 05/12/2001   SpO2 97%   BMI 51.29 kg/m    EXAM:  GENERAL: healthy, alert and no distress  EYES: Eyes grossly normal to inspection, PERRL and conjunctivae and sclerae normal  HENT: ear canals and TM's normal, nose and mouth without ulcers or lesions  NECK: no adenopathy, no asymmetry, masses, or scars and thyroid normal to palpation  RESP: lungs clear to auscultation - no rales, rhonchi or wheezes  CV: regular rate and rhythm,  normal S1 S2, no S3 or S4, no murmur, click or rub, no peripheral edema and peripheral pulses strong  ABDOMEN: soft, nontender, no hepatosplenomegaly, no masses and bowel sounds normal  MS: no gross musculoskeletal defects noted, no edema  SKIN: no suspicious lesions or rashes and keratoses - seborrheic  PSYCH: mentation appears normal, affect normal/bright    Diagnostic Test Results:  Labs reviewed in Epic  Results for orders placed or performed in visit on 08/26/21 (from the past 24 hour(s))   HEMOGLOBIN A1C (BFP)   Result Value Ref Range    Hemoglobin A1C 7.9 (A) 4.0 - 7.0 %       ASSESSMENT/PLAN:   (Z00.00) Routine general medical examination at a health care facility  (primary encounter diagnosis)  Comment: discussed preventitive healthcare   Plan: Continue to work on healthy diet and exercise, discussed healthy habits     (E11.9,  Z79.4) Type 2 diabetes mellitus without complication, with long-term current use of insulin (H)  Comment: improved control with addition Tradjenta, discussed possibly increasing Basaglar but pt would like to meet MTM first, continue current medications at current doses     F/u 3-6 mo depending on plan with MTM  Plan: HEMOGLOBIN A1C (BFP), VENOUS COLLECTION,         Comprehensive Metobolic Panel (BFP), Other         Specialty Referral            (E78.2) Mixed hyperlipidemia  Comment: control uncertain  Plan: Lipid Panel (BFP), Comprehensive Metobolic         Panel (BFP)        continue current medications at current doses     (I10) Essential hypertension  Comment: well controlled  Plan: Comprehensive Metobolic Panel (BFP)        continue current medications at current doses     (K74.69) Other cirrhosis of liver (H)  Comment: seeing GI soon, needs eval in non drinker  Plan:     Patient has been advised of split billing requirements and indicates understanding: Yes  COUNSELING:   Reviewed preventive health counseling, as reflected in patient instructions       Regular exercise        Healthy diet/nutrition       Vision screening    Estimated body mass index is 51.29 kg/m  as calculated from the following:    Height as of this encounter: 1.524 m (5').    Weight as of this encounter: 119.1 kg (262 lb 9.6 oz).    Weight management plan: Discussed healthy diet and exercise guidelines    She reports that she has never smoked. She has never used smokeless tobacco.      Counseling Resources:  ATP IV Guidelines  Pooled Cohorts Equation Calculator  Breast Cancer Risk Calculator  BRCA-Related Cancer Risk Assessment: FHS-7 Tool  FRAX Risk Assessment  ICSI Preventive Guidelines  Dietary Guidelines for Americans, 2010  USDA's MyPlate  ASA Prophylaxis  Lung CA Screening    Yonny Figueroa MD  [facility]

## 2021-08-27 DIAGNOSIS — J30.2 CHRONIC SEASONAL ALLERGIC RHINITIS: ICD-10-CM

## 2021-08-27 NOTE — TELEPHONE ENCOUNTER
Pt was seen yesterday for CPX. Last refilled 05/27/21.    Alma Kraft is requesting a refill of:    Pending Prescriptions:                       Disp   Refills    budesonide (RINOCORT AQUA) 32 MCG/ACT chuy*25.29 *0            Sig: Spray 2 sprays into both nostrils daily.

## 2021-08-30 DIAGNOSIS — K21.9 GASTROESOPHAGEAL REFLUX DISEASE, UNSPECIFIED WHETHER ESOPHAGITIS PRESENT: ICD-10-CM

## 2021-08-30 NOTE — TELEPHONE ENCOUNTER
Alma Kraft is requesting a refill of:    Pending Prescriptions:                       Disp   Refills    omeprazole (PRILOSEC) 20 MG DR capsule    90 cap*1            Sig: Take 1 capsule (20 mg) by mouth daily

## 2021-09-26 DIAGNOSIS — G44.049 CHRONIC PAROXYSMAL HEMICRANIA, NOT INTRACTABLE: ICD-10-CM

## 2021-09-26 DIAGNOSIS — I10 ESSENTIAL HYPERTENSION, BENIGN: ICD-10-CM

## 2021-09-27 RX ORDER — NORTRIPTYLINE HCL 10 MG
CAPSULE ORAL
Qty: 90 CAPSULE | Refills: 0 | Status: SHIPPED | OUTPATIENT
Start: 2021-09-27 | End: 2021-12-01

## 2021-09-27 RX ORDER — POTASSIUM CHLORIDE 750 MG/1
10 CAPSULE, EXTENDED RELEASE ORAL 3 TIMES DAILY
Qty: 270 CAPSULE | Refills: 0 | Status: SHIPPED | OUTPATIENT
Start: 2021-09-27 | End: 2021-12-01

## 2021-09-27 NOTE — TELEPHONE ENCOUNTER
Alma Kraft is requesting a refill of:    Pending Prescriptions:                       Disp   Refills    nortriptyline (PAMELOR) 10 MG capsule [Ph*90 cap*0            Sig: Take 1 capsule by mouth At Bedtime.    potassium chloride ER (MICRO-K) 10 MEQ CR*270 ca*0            Sig: Take 1 capsule (10 mEq) by mouth 3 times daily.    Please close encounter if RX was sent. Thanks, Geneva

## 2021-10-29 ENCOUNTER — MEDICAL CORRESPONDENCE (OUTPATIENT)
Dept: HEALTH INFORMATION MANAGEMENT | Facility: CLINIC | Age: 71
End: 2021-10-29
Payer: MEDICARE

## 2021-11-01 ENCOUNTER — TRANSFERRED RECORDS (OUTPATIENT)
Dept: FAMILY MEDICINE | Facility: CLINIC | Age: 71
End: 2021-11-01

## 2021-11-14 DIAGNOSIS — I10 ESSENTIAL HYPERTENSION: ICD-10-CM

## 2021-11-15 RX ORDER — LOSARTAN POTASSIUM AND HYDROCHLOROTHIAZIDE 12.5; 1 MG/1; MG/1
1 TABLET ORAL DAILY
Qty: 90 TABLET | Refills: 0 | Status: SHIPPED | OUTPATIENT
Start: 2021-11-15 | End: 2021-12-01

## 2021-11-15 NOTE — TELEPHONE ENCOUNTER
Alma Kraft is requesting a refill of:    Pending Prescriptions:                       Disp   Refills    losartan-hydrochlorothiazide (HYZAAR) 100*90 tab*0            Sig: Take 1 tablet by mouth daily.    Please close encounter if RX was sent. Thanks, [place]

## 2021-11-19 ENCOUNTER — TRANSFERRED RECORDS (OUTPATIENT)
Dept: FAMILY MEDICINE | Facility: CLINIC | Age: 71
End: 2021-11-19

## 2021-11-23 DIAGNOSIS — M79.89 LEG SWELLING: ICD-10-CM

## 2021-11-23 DIAGNOSIS — R94.5 ABNORMAL RESULTS OF LIVER FUNCTION STUDIES: ICD-10-CM

## 2021-12-01 ENCOUNTER — TELEPHONE (OUTPATIENT)
Dept: FAMILY MEDICINE | Facility: CLINIC | Age: 71
End: 2021-12-01

## 2021-12-01 ENCOUNTER — OFFICE VISIT (OUTPATIENT)
Dept: FAMILY MEDICINE | Facility: CLINIC | Age: 71
End: 2021-12-01

## 2021-12-01 VITALS
OXYGEN SATURATION: 96 % | WEIGHT: 252.8 LBS | BODY MASS INDEX: 49.63 KG/M2 | TEMPERATURE: 97.8 F | HEIGHT: 60 IN | HEART RATE: 73 BPM | SYSTOLIC BLOOD PRESSURE: 130 MMHG | DIASTOLIC BLOOD PRESSURE: 86 MMHG

## 2021-12-01 DIAGNOSIS — F32.5 MAJOR DEPRESSION IN REMISSION (H): ICD-10-CM

## 2021-12-01 DIAGNOSIS — L91.8 SKIN TAG: ICD-10-CM

## 2021-12-01 DIAGNOSIS — G44.049 CHRONIC PAROXYSMAL HEMICRANIA, NOT INTRACTABLE: ICD-10-CM

## 2021-12-01 DIAGNOSIS — K74.69 OTHER CIRRHOSIS OF LIVER (H): Primary | ICD-10-CM

## 2021-12-01 DIAGNOSIS — E11.9 TYPE 2 DIABETES MELLITUS WITHOUT COMPLICATION, WITH LONG-TERM CURRENT USE OF INSULIN (H): ICD-10-CM

## 2021-12-01 DIAGNOSIS — I10 ESSENTIAL HYPERTENSION, BENIGN: ICD-10-CM

## 2021-12-01 DIAGNOSIS — Z79.4 TYPE 2 DIABETES MELLITUS WITHOUT COMPLICATION, WITH LONG-TERM CURRENT USE OF INSULIN (H): ICD-10-CM

## 2021-12-01 DIAGNOSIS — L98.9 FACE LESION: ICD-10-CM

## 2021-12-01 DIAGNOSIS — Z78.0 POSTMENOPAUSE: ICD-10-CM

## 2021-12-01 DIAGNOSIS — I10 ESSENTIAL HYPERTENSION: ICD-10-CM

## 2021-12-01 LAB
BUN SERPL-MCNC: 14 MG/DL (ref 7–25)
BUN/CREATININE RATIO: 25.9 (ref 6–22)
CALCIUM SERPL-MCNC: 9.4 MG/DL (ref 8.6–10.3)
CHLORIDE SERPLBLD-SCNC: 104.2 MMOL/L (ref 98–110)
CO2 SERPL-SCNC: 28.7 MMOL/L (ref 20–32)
CREAT SERPL-MCNC: 0.56 MG/DL (ref 0.6–1.3)
GLUCOSE SERPL-MCNC: 147 MG/DL (ref 60–99)
HBA1C MFR BLD: 6.8 % (ref 4–7)
POTASSIUM SERPL-SCNC: 3.85 MMOL/L (ref 3.5–5.3)
SODIUM SERPL-SCNC: 140.9 MMOL/L (ref 135–146)

## 2021-12-01 PROCEDURE — 83036 HEMOGLOBIN GLYCOSYLATED A1C: CPT | Performed by: FAMILY MEDICINE

## 2021-12-01 PROCEDURE — 99214 OFFICE O/P EST MOD 30 MIN: CPT | Performed by: FAMILY MEDICINE

## 2021-12-01 PROCEDURE — 80048 BASIC METABOLIC PNL TOTAL CA: CPT | Performed by: FAMILY MEDICINE

## 2021-12-01 PROCEDURE — 36415 COLL VENOUS BLD VENIPUNCTURE: CPT | Performed by: FAMILY MEDICINE

## 2021-12-01 RX ORDER — LOSARTAN POTASSIUM AND HYDROCHLOROTHIAZIDE 12.5; 1 MG/1; MG/1
1 TABLET ORAL DAILY
Qty: 90 TABLET | Refills: 1 | Status: SHIPPED | OUTPATIENT
Start: 2021-12-01 | End: 2022-03-21

## 2021-12-01 RX ORDER — METFORMIN HCL 500 MG
2000 TABLET, EXTENDED RELEASE 24 HR ORAL
Qty: 360 TABLET | Refills: 1 | Status: SHIPPED | OUTPATIENT
Start: 2021-12-01 | End: 2022-03-21

## 2021-12-01 RX ORDER — INSULIN GLARGINE 100 [IU]/ML
66 INJECTION, SOLUTION SUBCUTANEOUS DAILY
Qty: 75 ML | Refills: 1 | Status: SHIPPED | OUTPATIENT
Start: 2021-12-01 | End: 2022-03-21

## 2021-12-01 RX ORDER — VENLAFAXINE HYDROCHLORIDE 150 MG/1
150 CAPSULE, EXTENDED RELEASE ORAL DAILY
Qty: 90 CAPSULE | Refills: 1 | Status: SHIPPED | OUTPATIENT
Start: 2021-12-01 | End: 2022-06-09

## 2021-12-01 RX ORDER — NORTRIPTYLINE HCL 10 MG
CAPSULE ORAL
Qty: 90 CAPSULE | Refills: 1 | Status: SHIPPED | OUTPATIENT
Start: 2021-12-01 | End: 2022-03-21

## 2021-12-01 RX ORDER — GLIPIZIDE 10 MG/1
10 TABLET, FILM COATED, EXTENDED RELEASE ORAL 2 TIMES DAILY
Qty: 180 TABLET | Refills: 1 | Status: SHIPPED | OUTPATIENT
Start: 2021-12-01 | End: 2022-03-21

## 2021-12-01 RX ORDER — POTASSIUM CHLORIDE 750 MG/1
10 CAPSULE, EXTENDED RELEASE ORAL 3 TIMES DAILY
Qty: 270 CAPSULE | Refills: 1 | Status: SHIPPED | OUTPATIENT
Start: 2021-12-01 | End: 2022-03-14

## 2021-12-01 ASSESSMENT — MIFFLIN-ST. JEOR: SCORE: 1578.19

## 2021-12-01 NOTE — LETTER
December 1, 2021      Alma CANA[place] Abena  [address]  [place] [address]        Dear ,    We are writing to inform you of your test results.    Your electrolytes look ok.    Resulted Orders   HEMOGLOBIN A1C (BFP)   Result Value Ref Range    Hemoglobin A1C 6.8 4.0 - 7.0 %   Basic Metabolic Panel (BFP)   Result Value Ref Range    Carbon Dioxide 28.7 20 - 32 mmol/L    Creatinine 0.56 (A) 0.60 - 1.30 mg/dL      Comment:      ran twice     Glucose 147 (A) 60 - 99 mg/dL    Sodium 140.9 135 - 146 mmol/L    Potassium 3.85 3.5 - 5.3 mmol/L    Chloride 104.2 98 - 110 mmol/L    Urea Nitrogen 14 7 - 25 mg/dL    Calcium 9.4 8.6 - 10.3 mg/dL    BUN/Creatinine Ratio 25.9 (A) 6 - 22       If you have any questions or concerns, please call the clinic at the number listed above.       Sincerely,      Yuridia Simmons MD

## 2021-12-01 NOTE — NURSING NOTE
Chief Complaint   Patient presents with     Recheck Medication     fasting today, A1C, refill medications      Pre-visit Screening:  Immunizations:  up to date  Colonoscopy:  is up to date  Mammogram: is up to date  Asthma Action Test/Plan:  NA  PHQ9:  Done today  GAD7:  Done today  Questioned patient about current smoking habits Pt. has never smoked.  Ok to leave detailed message on voice mail for today's visit only Yes, phone # [phone]

## 2021-12-01 NOTE — TELEPHONE ENCOUNTER
I talked with patient regarding the Nutrition Referral Dr. Mcmillan issued. I did suggest to patient that she call [facility] ( [phone] ) to ask her GI provider, Dr. Angie Sanchez to issue a internal referral for Nutritional Services as [facility] --- [facility] does not take outside nutritional referrals.     I will follow up with patient on the nutrition referral 12/3/2021. If [facility] will not work will offer     [facility]  [phone] -- appt line  [phone] -- fax

## 2021-12-01 NOTE — LETTER
January 3, 2022      Denny Benitez  [address]  [facility] [place] [address]        Dear ,    We are writing to inform you of your test results.    Your dexa scan showed normal bone density.  Take calcium 4138-9121 mg/day + vitamin d, weight bearing exercise 30-40 min 3-4 times per week. Recheck a dexa scan in 2 years.    Resulted Orders   HEMOGLOBIN A1C (BFP)   Result Value Ref Range    Hemoglobin A1C POCT 6.8 4.0 - 7.0 %   Basic Metabolic Panel (BFP)   Result Value Ref Range    Carbon Dioxide 28.7 20 - 32 mmol/L    Creatinine 0.56 (A) 0.60 - 1.30 mg/dL      Comment:      ran twice     Glucose 147 (A) 60 - 99 mg/dL    Sodium 140.9 135 - 146 mmol/L    Potassium 3.85 3.5 - 5.3 mmol/L    Chloride 104.2 98 - 110 mmol/L    Urea Nitrogen 14 7 - 25 mg/dL    Calcium 9.4 8.6 - 10.3 mg/dL    BUN/Creatinine Ratio 25.9 (A) 6 - 22   Dexa hip/pelvis/spine*    Narrative    [address], [address]  [place] [id number]  Phone: [phone]  [place]  : 1950 Req Phys: Yuridia Simmons MD  Clinic: [place] Patient name: [facility]  Dept No: [id number]  BONE DENSITY Exam Date: 2021 Accession: [id number]  EXAM: BONE DENSITY  LOCATION: [facility] [place]  DATE/TIME: 2021 1:27 PM  INDICATION: Postmenopause.  COMPARISON: None.  TECHNIQUE: Dual-energy x-ray absorptiometry performed with routine technique.  FINDINGS:  Lumbar Spine: L1-L4: BMD: 1.114 g/cm2. T-score: 0.6. Z-score: 2.8  RIGHT Hip Total: BMD: 0.977 g/cm2. T-score: 0.3. Z-score: 1.9  RIGHT Hip Femoral neck: BMD: 1.004 g/cm2. T-score: 1.4. Z-score: 3.3  LEFT Hip Total: BMD: 1.013 g/cm2. T-score: 0.6. Z-score: 2.2  LEFT Hip Femoral neck: BMD: 0.769 g/cm2. T-score: -0.7. Z-score: 1.2  WHO Criteria:  Normal: T score at or above -1 SD  Osteopenia: T score between -1 and -2.5 SD  Osteoporosis: T score at or below -2.5 SD  COMPARISON: No priors.  FRAX Results: Not applicable due  to Normal bone mineral density.  IMPRESSION: NORMAL. Bone mineral density measurements are within normal limits using T score.  Dictated By: JOSÉ WARE M.D.  Password protected electronic signature by: GUALBERTO  Trans: CU  Date Of Trans: 12/30/2021 1:49:00PM  Date report approved and signed by interpreting physician: 12/30/2021 1:52:00PM  Page 1 of 1       If you have any questions or concerns, please call the clinic at the number listed above.       Sincerely,      Yuridia Simmons MD

## 2021-12-01 NOTE — PROGRESS NOTES
Assessment & Plan   Problem List Items Addressed This Visit        Endocrine    Type 2 diabetes mellitus without complication, with long-term current use of insulin (H)       Circulatory    Essential hypertension, benign      Other Visit Diagnoses     Essential hypertension        Uncontrolled type 2 diabetes mellitus with stage 2 chronic kidney disease, with long-term current use of insulin (H)        Relevant Orders    HEMOGLOBIN A1C (BFP)    VENOUS COLLECTION (Completed)    Chronic paroxysmal hemicrania, not intractable        Major depression in remission (H)               1. Essential hypertension  Controlled, check labs, refilled medications.  - losartan-hydrochlorothiazide (HYZAAR) 100-12.5 MG tablet; Take 1 tablet by mouth daily  Dispense: 90 tablet; Refill: 1  - Basic Metabolic Panel (BFP)  - Other Specialty Referral; Future    2. Uncontrolled type 2 diabetes mellitus with stage 2 chronic kidney disease, with long-term current use of insulin (H)  Controlled, refilled medicatons. Check labs.  - glipiZIDE (GLUCOTROL XL) 10 MG 24 hr tablet; Take 1 tablet (10 mg) by mouth 2 times daily  Dispense: 180 tablet; Refill: 1  - metFORMIN (GLUCOPHAGE-XR) 500 MG 24 hr tablet; Take 4 tablets (2,000 mg) by mouth daily (with dinner)  Dispense: 360 tablet; Refill: 1  - HEMOGLOBIN A1C (BFP)  - VENOUS COLLECTION    3. Essential hypertension, benign    - potassium chloride ER (MICRO-K) 10 MEQ CR capsule; Take 1 capsule (10 mEq) by mouth 3 times daily  Dispense: 270 capsule; Refill: 1  - Other Specialty Referral; Future    4. Type 2 diabetes mellitus without complication, with long-term current use of insulin (H)    - insulin glargine (BASAGLAR KWIKPEN) 100 UNIT/ML pen; Inject 66 Units Subcutaneous daily  Dispense: 75 mL; Refill: 1  - Other Specialty Referral; Future    5. Chronic paroxysmal hemicrania, not intractable  Refilled. Takes this for sleep.  - nortriptyline (PAMELOR) 10 MG capsule; Take 1 capsule by mouth At Bedtime.   Dispense: 90 capsule; Refill: 1    6. BENIGN HYPERTENSION    - potassium chloride ER (MICRO-K) 10 MEQ CR capsule; Take 1 capsule (10 mEq) by mouth 3 times daily  Dispense: 270 capsule; Refill: 1  - Other Specialty Referral; Future    7. Major depression in remission (H)  Controlled, refilled.   - venlafaxine (EFFEXOR-XR) 150 MG 24 hr capsule; Take 1 capsule (150 mg) by mouth daily  Dispense: 90 capsule; Refill: 1    8. Other cirrhosis of liver (H)  Sees GI. Referral done to nutritionist per patient request.  - Other Specialty Referral; Future    9. Postmenopause    - Radiology Referral; Future  - Dexa hip/pelvis/spine*    10. Face lesion  Referral to dermatology.  - Adult Dermatology Referral; Future    11. Skin tag  Referral to dermatology  - Adult Dermatology Referral; Future           FUTURE APPOINTMENTS:       - Follow-up visit in 6 months.    No follow-ups on file.    Yuridia Simmons MD  [facility]    Subjective     Nursing Notes:   Latha Watts CMA  12/1/2021 10:18 AM  Signed  Chief Complaint   Patient presents with     Recheck Medication     fasting today, A1C, refill medications      Pre-visit Screening:  Immunizations:  up to date  Colonoscopy:  is up to date  Mammogram: is up to date  Asthma Action Test/Plan:  NA  PHQ9:  Done today  GAD7:  Done today  Questioned patient about current smoking habits Pt. has never smoked.  Ok to leave detailed message on voice mail for today's visit only Yes, phone # [phone]           Alma Kraft is a 71 year old female who presents to clinic today for the following health issues   HPI     Here to followup on her labs, medications. Diabetes--home blood sugars are ok. Blood pressures are also stable. No problems ;with the medications. Due for labs and refills.  Is seeing GI, abnl liver function and was dx with cirrhosis. They are following her.  Also has a couple of face lesions she wants the dermatologist to check.  And skin tags on left  side of neck, wants to have them removed, wants to see derm.        Review of Systems   Constitutional, HEENT, cardiovascular, pulmonary, gi and gu systems are negative, except as otherwise noted.      Objective    /86 (BP Location: Left arm, Patient Position: Sitting, Cuff Size: Adult Large)   Pulse 73   Temp 97.8  F (36.6  C) (Temporal)   Ht 1.524 m (5')   Wt 114.7 kg (252 lb 12.8 oz)   LMP 05/12/2001   SpO2 96%   BMI 49.37 kg/m    Body mass index is 49.37 kg/m .  Physical Exam   GENERAL: healthy, alert and no distress  EYES: Eyes grossly normal to inspection, PERRL and conjunctivae and sclerae normal  RESP: lungs clear to auscultation - no rales, rhonchi or wheezes  CV: regular rate and rhythm, normal S1 S2, no S3 or S4, no murmur, click or rub, no peripheral edema and peripheral pulses strong  ABDOMEN: soft, nontender, no hepatosplenomegaly, no masses and bowel sounds normal  MS: no gross musculoskeletal defects noted, no edema  NEURO: Normal strength and tone, mentation intact and speech normal  PSYCH: mentation appears normal, affect normal/bright  Skin: left neck, few tiny skin tags    Results for orders placed or performed in visit on 12/01/21   HEMOGLOBIN A1C (BFP)     Status: None   Result Value Ref Range    Hemoglobin A1C 6.8 4.0 - 7.0 %   Basic Metabolic Panel (BFP)     Status: Abnormal   Result Value Ref Range    Carbon Dioxide 28.7 20 - 32 mmol/L    Creatinine 0.56 (A) 0.60 - 1.30 mg/dL    Glucose 147 (A) 60 - 99 mg/dL    Sodium 140.9 135 - 146 mmol/L    Potassium 3.85 3.5 - 5.3 mmol/L    Chloride 104.2 98 - 110 mmol/L    Urea Nitrogen 14 7 - 25 mg/dL    Calcium 9.4 8.6 - 10.3 mg/dL    BUN/Creatinine Ratio 25.9 (A) 6 - 22

## 2021-12-06 NOTE — TELEPHONE ENCOUNTER
I left a message for patient to call me back regarding note below. Did she schedule with [facility] Nutritionist?

## 2021-12-08 ENCOUNTER — TELEPHONE (OUTPATIENT)
Dept: FAMILY MEDICINE | Facility: CLINIC | Age: 71
End: 2021-12-08

## 2021-12-08 NOTE — TELEPHONE ENCOUNTER
Patient called in and left a message on the CS line stating that her blood sugar readings keep dropping and dropping and she is wondering what she should do. I called her back and informed her that seeing Romina is going to be the best and that it is good she is scheduled with her tomorrow. She does want to know what she should do about her medications until tomorrow. Routing to Dr. Simmons for review, please advise.

## 2021-12-08 NOTE — TELEPHONE ENCOUNTER
I called her back. Her blood sugar was low yesterday 80s and today 70s. Is otherwise feeling well. Takes her basaglar in the morning. Doesn't check it during the day at all. She will check her blood sugar now.

## 2021-12-09 ENCOUNTER — OFFICE VISIT (OUTPATIENT)
Dept: FAMILY MEDICINE | Facility: CLINIC | Age: 71
End: 2021-12-09

## 2021-12-09 DIAGNOSIS — Z79.4 TYPE 2 DIABETES MELLITUS WITHOUT COMPLICATION, WITH LONG-TERM CURRENT USE OF INSULIN (H): Primary | ICD-10-CM

## 2021-12-09 DIAGNOSIS — E11.9 TYPE 2 DIABETES MELLITUS WITHOUT COMPLICATION, WITH LONG-TERM CURRENT USE OF INSULIN (H): Primary | ICD-10-CM

## 2021-12-09 NOTE — PROGRESS NOTES
SUBJECTIVE / OBJECTIVE:                                                Alma Kraft is a 71 year old female seen for an initial visit for Medication Therapy Management.  She was referred to me by Dr. Yuridia Simmons..     REASON FOR MTM REFERRAL: medication management services     PATIENT CHIEF COMPLAINT/CONCERN: Hypoglycemia and cost of medication    PAST MEDICAL HISTORY: Reviewed in chart    MEDICAL CONDITIONS REVIEWED:    diabetes    Current Outpatient Medications   Medication Sig Dispense Refill     ASPIRIN 81 MG OR TABS 1 tab po QD (Once per day) 100 3     atorvastatin (LIPITOR) 10 MG tablet Take 1 tablet (10 mg) by mouth daily 90 tablet 1     blood glucose (NO BRAND SPECIFIED) test strip Use to test blood sugar one times daily or as directed. To accompany: {Blood Glucose Monitor Brands One Touch Viro test strips 100 strip 3     blood glucose monitoring (ONE TOUCH DELICA) lancets Use to test blood sugars 1 times daily or as directed. 100 each 3     Blood Glucose Monitoring Suppl (ONE TOUCH ULTRA 2) W/DEVICE KIT Use to test blood sugars 2 times daily or as directed. 1 kit 0     budesonide (RINOCORT AQUA) 32 MCG/ACT nasal spray Spray 2 sprays into both nostrils daily. 25.29 mL 0     carvedilol (COREG) 6.25 MG tablet Take 1 tablet (6.25 mg) by mouth 2 times daily (with meals) 180 tablet 1     celecoxib (CELEBREX) 200 MG capsule Take 1 capsule (200 mg) by mouth daily 90 capsule 1     cyclobenzaprine (FLEXERIL) 5 MG tablet Take 1 tablet (5 mg) by mouth 3 times daily as needed for muscle spasms 30 tablet 0     glipiZIDE (GLUCOTROL XL) 10 MG 24 hr tablet Take 1 tablet (10 mg) by mouth 2 times daily 180 tablet 1     insulin glargine (BASAGLAR KWIKPEN) 100 UNIT/ML pen Inject 66 Units Subcutaneous daily 75 mL 1     insulin pen needle (BD ROSE U/F) 32G X 4 MM miscellaneous Use 1 pen needles daily or as directed. 100 each 3     linagliptin (TRADJENTA) 5 MG TABS tablet Take 1 tablet (5 mg) by mouth daily 90 tablet 3      losartan-hydrochlorothiazide (HYZAAR) 100-12.5 MG tablet Take 1 tablet by mouth daily 90 tablet 1     metFORMIN (GLUCOPHAGE-XR) 500 MG 24 hr tablet Take 4 tablets (2,000 mg) by mouth daily (with dinner) 360 tablet 1     Multiple Vitamins-Minerals (CENTRUM SILVER) per tablet 1 tablet 4 times weekly 100 tablet      nortriptyline (PAMELOR) 10 MG capsule Take 1 capsule by mouth At Bedtime. 90 capsule 1     omeprazole (PRILOSEC) 20 MG DR capsule Take 1 capsule (20 mg) by mouth daily 90 capsule 1     ORDER FOR DME Equipment being ordered: LOAG plus compression stockings    90027  20-30 compression 3 Device 0     potassium chloride ER (MICRO-K) 10 MEQ CR capsule Take 1 capsule (10 mEq) by mouth 3 times daily 270 capsule 1     venlafaxine (EFFEXOR-XR) 150 MG 24 hr capsule Take 1 capsule (150 mg) by mouth daily 90 capsule 1       Current labs include:  BP Readings from Last 3 Encounters:   12/01/21 130/86   08/26/21 138/82   08/18/21 (!) 140/60       LMP 05/12/2001     Most Recent Immunizations   Administered Date(s) Administered     COVID-19,PF,[place] 03/09/2021     Influenza (IIV3) PF 09/25/2012     Influenza Vaccine IM > 6 months Valent IIV4 (Alfuria,Fluzone) 10/14/2019     Influenza, Quad, High Dose, Pf, 65yr+ (Fluzone HD) 10/19/2021     Pneumo Conj 13-V (2010&after) 04/29/2015     Pneumococcal 23 valent 07/26/2017     TD (ADULT, 7+) 11/09/1999     TDAP Vaccine (Adacel) 03/18/2008     TDAP Vaccine (Boostrix) 05/23/2018     Zoster vaccine recombinant adjuvanted (SHINGRIX) 03/12/2019     Zoster vaccine, live 09/24/2014       ASSESSMENT / PLAN:                                                       diabetes:  Assessment: Patient has seen great improvement in A1c in the past 6 months. Patient struggles from emotional eating for which she is being referred to a nutritionist at [facility]. She will have [facility] provider follow through with referral as they will not take nutrition consult from outside providers.  Patient contributes  improvement in A1c to decreased stress as well as the addition of Tradjenta 5mg into the regimen back in May. Patient currently has Tradjenta covered by  patient assistance, so will renew the application for next year. Patient did experience hypoglycemia, BG reading of 64 yesterday, and Dr. Simmons recommended decreasing basal insulin to 63 units daily from the 64 units daily she was currently taking. Patient started exercising, walking on treadmill, consistently 3 weeks ago. She plans to continue this, and we discussed how this additional exercise my lead to decreases in BG and potentially a decrease in insulin. We discussed monitoring for hypoglycemia as well what to do if hypoglycemia occurs.    Patient signed paperwork to reenroll in  PA program. Will fill out and send in.    Status: A1c at goal  Drug Therapy Problems:  1) Hypoglycemia likely due to increased exercise and possible additional units of insulin given over the weekend.  Plan:  1) Keep insulin at 63 units daily and if hypoglycemia occurs again, reach out for further dosing instructions.    I spent 1 hour with this patient today.  All changes were made via collaborative practice agreement with Yuridia Simmons. A copy of the visit note was provided to the patient's primary care provider.    Romina Caro, PharmD  Medication Therapy Management Pharmacist  [facility]  Office Phone: [phone]

## 2021-12-09 NOTE — TELEPHONE ENCOUNTER
I called her back. Blood sugar was 131. Recommended to cut back on super long acting insulin by 2-3 units, can go over blood sugars tomorrow with Romina. If low in the morning again tomorrow drink a small glass of juice or eat some fruit.

## 2021-12-14 DIAGNOSIS — E11.9 TYPE 2 DIABETES MELLITUS WITHOUT COMPLICATION, WITH LONG-TERM CURRENT USE OF INSULIN (H): Primary | ICD-10-CM

## 2021-12-14 DIAGNOSIS — Z79.4 TYPE 2 DIABETES MELLITUS WITHOUT COMPLICATION, WITH LONG-TERM CURRENT USE OF INSULIN (H): Primary | ICD-10-CM

## 2021-12-14 NOTE — TELEPHONE ENCOUNTER
Alma Kraft is requesting a refill of:    Pending Prescriptions:                       Disp   Refills    blood glucose (NO BRAND SPECIFIED) test s*100 st*1            Sig: Use to test blood sugar one time daily or as           directed.    Please close encounter if RX was sent. Thanks, [place]

## 2021-12-15 NOTE — TELEPHONE ENCOUNTER
Patient left a message that she is scheduled 3/14/2022 with [facility] Nutritionist.

## 2021-12-16 NOTE — TELEPHONE ENCOUNTER
Please sign pending order. Pt's insurance requires a specific type of test strip on the script. I added this to the SIG.    Alma Kraft is requesting a refill of:    Pending Prescriptions:                       Disp   Refills    blood glucose (NO BRAND SPECIFIED) test s*100 st*3            Sig: Use to test blood sugar one times daily or as           directed. To accompany: {Blood Glucose Monitor           Brands One Touch Verio test strips

## 2021-12-26 DIAGNOSIS — Z11.59 ENCOUNTER FOR SCREENING FOR OTHER VIRAL DISEASES: ICD-10-CM

## 2021-12-27 ENCOUNTER — OFFICE VISIT (OUTPATIENT)
Dept: FAMILY MEDICINE | Facility: CLINIC | Age: 71
End: 2021-12-27

## 2021-12-27 VITALS
OXYGEN SATURATION: 96 % | TEMPERATURE: 97.3 F | HEART RATE: 73 BPM | SYSTOLIC BLOOD PRESSURE: 132 MMHG | DIASTOLIC BLOOD PRESSURE: 82 MMHG

## 2021-12-27 DIAGNOSIS — M79.674 PAIN OF TOE OF RIGHT FOOT: Primary | ICD-10-CM

## 2021-12-27 PROBLEM — Z86.0100 HISTORY OF COLONIC POLYPS: Status: ACTIVE | Noted: 2018-11-06

## 2021-12-27 PROBLEM — R77.2 ELEVATED AFP: Status: ACTIVE | Noted: 2021-12-27

## 2021-12-27 PROCEDURE — 73660 X-RAY EXAM OF TOE(S): CPT | Mod: RT | Performed by: FAMILY MEDICINE

## 2021-12-27 PROCEDURE — 99213 OFFICE O/P EST LOW 20 MIN: CPT | Performed by: FAMILY MEDICINE

## 2021-12-27 NOTE — NURSING NOTE
Chief Complaint   Patient presents with     Toe Pain     right foot little toe pain, pain is near end of nailbed, painful with socks an shoes that last 10 days, feels it is now slowly resolving     Pre-visit Screening:  Immunizations:  up to date  Colonoscopy:  is up to date  Mammogram: is up to date  Asthma Action Test/Plan:  NA  PHQ9:  NA  GAD7:  NA  Questioned patient about current smoking habits Pt. has never smoked.  Ok to leave detailed message on voice mail for today's visit only Yes, phone # [phone]       [FreeTextEntry1] : Diagnosis discussed with the patient and .\par Currently minor symptoms and hopefully no worsening of symptoms.\par Told rest with frequent ambulation.\par Tylenol not NSAIDs\par Report any worsening symptoms.\par Quarantine for 10 days\par Order put in for  to get tested

## 2021-12-27 NOTE — PROGRESS NOTES
Assessment & Plan   Problem List Items Addressed This Visit     None      Visit Diagnoses     Pain of toe of right foot    -  Primary    Relevant Orders    XR Toe Right G/E 2 Views           1. Pain of toe of right foot  No obvious infection, xrays do not show any acute changes. Since her symptoms are improving already, continue with warm soaks and followup to recheck if needed.  - XR Toe Right G/E 2 Views; Future  - XR Toe Right G/E 2 Views           FUTURE APPOINTMENTS:       - Follow-up visit as needed.    No follow-ups on file.    Yuridia Simmons MD  [facility]    Subjective     Nursing Notes:   Latha Watts [facility]  12/27/2021  1:51 PM  Signed  Chief Complaint   Patient presents with     Toe Pain     right foot little toe pain, pain is near end of nailbed, painful with socks an shoes that last 10 days, feels it is now slowly resolving     Pre-visit Screening:  Immunizations:  up to date  Colonoscopy:  is up to date  Mammogram: is up to date  Asthma Action Test/Plan:  NA  PHQ9:  NA  GAD7:  NA  Questioned patient about current smoking habits Pt. has never smoked.  Ok to leave detailed message on voice mail for today's visit only Yes, phone # [phone]           Alma Kraft is a 71 year old female who presents to clinic today for the following health issues   HPI     Pain right 5th toe proximal toe for 10 days. No injury. Was  A little swollen but this is better. Can walk ok. It is overall getting better.   Jan 24th has egd scheduled.        Review of Systems   Constitutional, HEENT, cardiovascular, pulmonary, gi and gu systems are negative, except as otherwise noted.      Objective    /82 (BP Location: Left arm, Patient Position: Sitting, Cuff Size: Adult Large)   Pulse 73   Temp 97.3  F (36.3  C) (Temporal)   LMP 05/12/2001   SpO2 96%   There is no height or weight on file to calculate BMI.  Physical Exam   GENERAL: healthy, alert and no distress  MS: no gross  musculoskeletal defects noted, no edema  NEURO: Normal strength and tone, mentation intact and speech normal  PSYCH: mentation appears normal, affect normal/bright  Right 5th toe no swelling, redness, tenderness or distortion. No abnormality noted. No ulcerations.    Xray right 5th toe: no acute abnl noted

## 2021-12-27 NOTE — LETTER
2021      Alma Kraft  [address] [facility] [address]        Dear ,    We are writing to inform you of your test results.    The radiologist also read the toe xray with no fractures.    Resulted Orders   XR Toe Right G/E 2 Views    Narrative    [facility]  Phone: [phone] * Fax: [phone] [address] [facility], [place] [address]  [address] [place] [address]  Age: 71 Y  Dept No.: [id number]  ALMA KRAFT : 1950  Chart #  Gender: F  Req. Phys: Yuridia Simmons MD Clinic MRN:  Clinic: [facility] Acc#: [id number]  Exam: TOE 3 VIEWS / RT Exam Date: 2021  EXAM: TOE 3 VIEWS RIGHT  LOCATION: [facility]  DATE/TIME: 2021 3:34 PM  INDICATION: Right foot toe pain - little toe.  COMPARISON: None.  IMPRESSION: No evidence of acute fracture.  Performed by: AWA  Transcribed By: MEGHAN on: 2021 9:09 PM CST  Finalized By: PETRA MENDOZA M.D. on: 2021 9:40 PM CST  Dictated By: PETRA MENDOZA M.D. Signed by: [facility]  Page 1 of 1       If you have any questions or concerns, please call the clinic at the number listed above.       Sincerely,      Yuridia Simmons MD

## 2022-01-10 ENCOUNTER — OFFICE VISIT (OUTPATIENT)
Dept: FAMILY MEDICINE | Facility: CLINIC | Age: 72
End: 2022-01-10

## 2022-01-10 VITALS
TEMPERATURE: 97.8 F | WEIGHT: 253.6 LBS | HEIGHT: 60 IN | DIASTOLIC BLOOD PRESSURE: 82 MMHG | OXYGEN SATURATION: 97 % | BODY MASS INDEX: 49.79 KG/M2 | HEART RATE: 75 BPM | SYSTOLIC BLOOD PRESSURE: 132 MMHG

## 2022-01-10 DIAGNOSIS — K74.69 OTHER CIRRHOSIS OF LIVER (H): ICD-10-CM

## 2022-01-10 DIAGNOSIS — G47.33 OBSTRUCTIVE SLEEP APNEA: ICD-10-CM

## 2022-01-10 DIAGNOSIS — Z79.4 TYPE 2 DIABETES MELLITUS WITHOUT COMPLICATION, WITH LONG-TERM CURRENT USE OF INSULIN (H): ICD-10-CM

## 2022-01-10 DIAGNOSIS — E78.2 MIXED HYPERLIPIDEMIA: ICD-10-CM

## 2022-01-10 DIAGNOSIS — F43.23 ADJUSTMENT DISORDER WITH MIXED ANXIETY AND DEPRESSED MOOD: ICD-10-CM

## 2022-01-10 DIAGNOSIS — I10 ESSENTIAL HYPERTENSION, BENIGN: ICD-10-CM

## 2022-01-10 DIAGNOSIS — Z01.818 PREOPERATIVE EXAMINATION: Primary | ICD-10-CM

## 2022-01-10 DIAGNOSIS — E11.9 TYPE 2 DIABETES MELLITUS WITHOUT COMPLICATION, WITH LONG-TERM CURRENT USE OF INSULIN (H): ICD-10-CM

## 2022-01-10 LAB
% GRANULOCYTES: 60 %
BUN SERPL-MCNC: 14 MG/DL (ref 7–25)
BUN/CREATININE RATIO: 24.6 (ref 6–22)
CALCIUM SERPL-MCNC: 8.9 MG/DL (ref 8.6–10.3)
CHLORIDE SERPLBLD-SCNC: 103.4 MMOL/L (ref 98–110)
CO2 SERPL-SCNC: 29.4 MMOL/L (ref 20–32)
CREAT SERPL-MCNC: 0.57 MG/DL (ref 0.6–1.3)
GLUCOSE SERPL-MCNC: 116 MG/DL (ref 60–99)
HCT VFR BLD AUTO: 41.1 % (ref 35–47)
HEMOGLOBIN: 13.3 G/DL (ref 11.7–15.7)
LYMPHOCYTES NFR BLD AUTO: 25.6 %
MCH RBC QN AUTO: 31 PG (ref 26–33)
MCHC RBC AUTO-ENTMCNC: 32.4 G/DL (ref 31–36)
MCV RBC AUTO: 95.9 FL (ref 78–100)
MONOCYTES NFR BLD AUTO: 13.7 %
PLATELET COUNT - QUEST: 202 10^9/L (ref 150–375)
POTASSIUM SERPL-SCNC: 3.73 MMOL/L (ref 3.5–5.3)
RBC # BLD AUTO: 4.29 10*12/L (ref 3.8–5.2)
SODIUM SERPL-SCNC: 140.7 MMOL/L (ref 135–146)
WBC # BLD AUTO: 5.3 10*9/L (ref 4–11)

## 2022-01-10 PROCEDURE — 80048 BASIC METABOLIC PNL TOTAL CA: CPT | Performed by: FAMILY MEDICINE

## 2022-01-10 PROCEDURE — 85025 COMPLETE CBC W/AUTO DIFF WBC: CPT | Performed by: FAMILY MEDICINE

## 2022-01-10 PROCEDURE — 36415 COLL VENOUS BLD VENIPUNCTURE: CPT | Performed by: FAMILY MEDICINE

## 2022-01-10 PROCEDURE — 99215 OFFICE O/P EST HI 40 MIN: CPT | Performed by: FAMILY MEDICINE

## 2022-01-10 ASSESSMENT — MIFFLIN-ST. JEOR: SCORE: 1581.82

## 2022-01-10 NOTE — PROGRESS NOTES
[facility] PHYSICIANS  [address] [place] [address]  [address]  [facility] [address]  Phone: [phone]  Fax: [phone]  Primary Provider: Yuridia Simmons  Pre-op Performing Provider: YURIDIA SIMMONS      PREOPERATIVE EVALUATION:  Today's date: 1/10/2022    Alma Kraft is a 71 year old female who presents for a preoperative evaluation. ( 49)    Surgical Information:  Surgery/Procedure: EGD  Surgery Location: [facility]  Surgeon: Dr. Heller  Surgery Date: 22  Time of Surgery: AM  Where patient plans to recover: At home with family  Fax number for surgical facility: Note does not need to be faxed, will be available electronically in Epic.    Type of Anesthesia Anticipated: to be determined    Assessment & Plan     The proposed surgical procedure is considered LOW risk.    Problem List Items Addressed This Visit        Respiratory    Obstructive sleep apnea       Digestive    Other cirrhosis of liver (H)       Endocrine    Mixed hyperlipidemia    Type 2 diabetes mellitus without complication, with long-term current use of insulin (H)       Circulatory    Essential hypertension, benign       Behavioral    Adjustment disorder with mixed anxiety and depressed mood      Other Visit Diagnoses     Preoperative examination    -  Primary    Relevant Orders    HEMOGRAM PLATELET DIFF (BFP) (Completed)    Basic Metabolic Panel (BFP) (Completed)    VENOUS COLLECTION (Completed)        1. Preoperative examination    - HEMOGRAM PLATELET DIFF (BFP)  - Basic Metabolic Panel (BFP)  - VENOUS COLLECTION    2. Type 2 diabetes mellitus without complication, with long-term current use of insulin (H)  She will followup with Romina to go over her perioperative medication instructions, stopping the aspirin and celebrex 5-7 days prior. Then also holding diabetes meds prior to surgery. Romina will help her with this.     3. Mixed hyperlipidemia      4. Essential hypertension, benign  Controlled.    5. Adjustment  disorder with mixed anxiety and depressed mood  Symptoms are controlled.    6. Other cirrhosis of liver (H)  Sees [facility].    7. Obstructive sleep apnea  Uses cpap      Risks and Recommendations:  The patient has the following additional risks and recommendations for perioperative complications:   - No identified additional risk factors other than previously addressed    Medication Instructions:  Patient is to take all scheduled medications on the day of surgery EXCEPT for modifications listed below:  hold aspirin and celebrex 5-7 days prior to the procedure. she will schedule with Romina to go over diabetes medications prior and just after the procedure.    RECOMMENDATION:  APPROVAL GIVEN to proceed with proposed procedure, without further diagnostic evaluation.      Subjective     HPI related to upcoming procedure: here for preop for EGD. This will be done, ordered by GI. Does have some acid problems with the stomach, usually brought on by acidy foods. Tries to stay away from this. Hasn't had one in the past.    1. No - Have you ever had a heart attack or stroke?  2. No - Have you ever had surgery on your heart or blood vessels, such as a stent, coronary (heart) bypass, or surgery on an artery in the head, neck, heart, or legs?  3. No - Do you have chest pain when you are physically active?  4. No - Do you have a history of heart failure?  5. No - Do you currently have a cold, bronchitis, or symptoms of other respiratory (head and chest) infections?  6. No - Do you have a cough, shortness of breath, or wheezing?  7. No - Do you or anyone in your family have a history of blood clots?  8. Yes - Do you or anyone in your family have a serious bleeding problem, such as long-lasting bleeding after surgeries or cuts? Mother with blood clots and stroke  9. No - Have you ever had anemia or been told to take iron pills?  10. No - Have you had any abnormal blood loss such as black, tarry or bloody stools, or abnormal vaginal  bleeding?  11. No - Have you ever had a blood transfusion?  12. Yes - Are you willing to have a blood transfusion if it is medically needed before, during, or after your surgery?  13. No - Have you or anyone in your family ever had problems with anesthesia (sedation for surgery)?  14. Yes - Do you have sleep apnea, excessive snoring, or daytime drowsiness? yes Do you have a CPAP machine? yes  15. No - Do you have any artifical heart valves or other implanted medical devices, such as a pacemaker, defibrillator, or continuous glucose monitor?  16. Yes - Do you have any artifical joints? Bilateral knee replacement  17. No - Are you allergic to latex?  18. No - Is there any chance that you may be pregnant?    Health Care Directive:  Patient does not have a Health Care Directive or Living Will: Discussed advance care planning with patient; information given to patient to review.    Preoperative Review of :   reviewed - no record of controlled substances prescribed.      Status of Chronic Conditions:  DEPRESSION - Patient has a long history of Depression of moderate severity requiring medication for control with recent symptoms being stable..Current symptoms of depression include none.     DIABETES - Patient has a longstanding history of DiabetesType Type II . Patient is being treated with diet, oral agents and insulin injections and denies significant side effects. Control has been good. Complicating factors include but are not limited to: hypertension and hyperlipidemia.     HYPERLIPIDEMIA - Patient has a long history of significant Hyperlipidemia requiring medication for treatment with recent good control. Patient reports no problems or side effects with the medication.     HYPERTENSION - Patient has longstanding history of HTN , currently denies any symptoms referable to elevated blood pressure. Specifically denies chest pain, palpitations, dyspnea, orthopnea, PND or peripheral edema. Blood pressure readings  have been in normal range. Current medication regimen is as listed below. Patient denies any side effects of medication.       Review of Systems  CONSTITUTIONAL: NEGATIVE for fever, chills, change in weight  INTEGUMENTARY/SKIN: NEGATIVE for worrisome rashes, moles or lesions  EYES: NEGATIVE for vision changes or irritation  ENT/MOUTH: NEGATIVE for ear, mouth and throat problems  RESP: NEGATIVE for significant cough or SOB  CV: NEGATIVE for chest pain, palpitations or peripheral edema  GI: NEGATIVE for nausea, abdominal pain, heartburn, or change in bowel habits  : NEGATIVE for frequency, dysuria, or hematuria  MUSCULOSKELETAL: NEGATIVE for significant arthralgias or myalgia  NEURO: NEGATIVE for weakness, dizziness or paresthesias  ENDOCRINE: NEGATIVE for temperature intolerance, skin/hair changes  HEME: NEGATIVE for bleeding problems  PSYCHIATRIC: NEGATIVE for changes in mood or affect    Patient Active Problem List    Diagnosis Date Noted     Elevated AFP 12/27/2021     Priority: Medium     Other cirrhosis of liver (H) 12/01/2021     Priority: Medium     History of colonic polyps 11/06/2018     Priority: Medium     Health Care Home 09/25/2012     Priority: Medium     Tier:  1  1/22/2013  [facility]    State Tier Level:  Tier 3  Status:  NA  Care Coordinator:      See Letters for [facility] Care Plan           Acne 11/22/2011     Priority: Medium     Adjustment disorder with mixed anxiety and depressed mood 03/29/2011     Priority: Medium     Type 2 diabetes mellitus without complication, with long-term current use of insulin (H) 10/26/2010     Priority: Medium     Problem list name updated by automated process. Provider to review       ACP (advance care planning) 09/22/2010     Priority: Medium                Family history of malignant neoplasm of breast 12/06/2005     Priority: Medium     Sister         Obstructive sleep apnea 09/30/2002     Priority: Medium     Allergic rhinitis 05/29/2001     Priority: Medium     Problem  list name updated by automated process. Provider to review       Esophageal reflux 02/26/2001     Priority: Medium     Mixed hyperlipidemia 09/20/1999     Priority: Medium     Obesity, morbid, BMI 40.0-49.9 (H)      Priority: Medium     Essential hypertension, benign      Priority: Medium      Past Medical History:   Diagnosis Date     Diabetes (H)      Hypertension      Past Surgical History:   Procedure Laterality Date     ------------OTHER-------------  9/5/2013    L hand, cyst excision     ------------OTHER------------- Right 03/14/2014    shoulder manipulation     [facility] INCISION TENDON SHEATH FINGER Left 09/05/2013    left thumb trigger finger     [facility] KNEE SCOPE, DIAGNOSTIC  4/2005    Arthroscopy, Knee Left     [facility] REMOVE TONSILS/ADENOIDS,<12 Y/O  1953    T & A <11y.o.     TEST NOT FOUND  6/27/07    R heel/ inocencio's deformity     [facility] APPENDECTOMY  1956     [facility] EYE EXAM ESTABLISHED PT  4/20/11    No retinopathy     [facility] TOTAL KNEE ARTHROPLASTY  2/06    Knee Replacement, Total Right     [facility] TOTAL KNEE ARTHROPLASTY  3/5/07    Knee Replacement, Total  Left     [facility] VAGINAL HYSTERECTOMY  8/01    Hysterectomy, Vaginal & BSO     [facility] COLONOSCOPY W/WO BRUSH/WASH  6/03     Current Outpatient Medications   Medication Sig Dispense Refill     ASPIRIN 81 MG OR TABS 1 tab po QD (Once per day) 100 3     atorvastatin (LIPITOR) 10 MG tablet Take 1 tablet (10 mg) by mouth daily 90 tablet 1     blood glucose (NO BRAND SPECIFIED) test strip Use to test blood sugar one times daily or as directed. To accompany: {Blood Glucose Monitor Brands One Touch Verio test strips 100 strip 3     blood glucose monitoring (ONE TOUCH DELICA) lancets Use to test blood sugars 1 times daily or as directed. 100 each 3     Blood Glucose Monitoring Suppl (ONE TOUCH ULTRA 2) W/DEVICE KIT Use to test blood sugars 2 times daily or as directed. 1 kit 0     budesonide (RINOCORT AQUA) 32 MCG/ACT nasal spray Spray 2 sprays into both nostrils daily. 25.29 mL 0      carvedilol (COREG) 6.25 MG tablet Take 1 tablet (6.25 mg) by mouth 2 times daily (with meals) 180 tablet 1     celecoxib (CELEBREX) 200 MG capsule Take 1 capsule (200 mg) by mouth daily 90 capsule 1     cyclobenzaprine (FLEXERIL) 5 MG tablet Take 1 tablet (5 mg) by mouth 3 times daily as needed for muscle spasms 30 tablet 0     glipiZIDE (GLUCOTROL XL) 10 MG 24 hr tablet Take 1 tablet (10 mg) by mouth 2 times daily 180 tablet 1     insulin glargine (BASAGLAR KWIKPEN) 100 UNIT/ML pen Inject 66 Units Subcutaneous daily 75 mL 1     insulin pen needle (BD ROSE U/F) 32G X 4 MM miscellaneous Use 1 pen needles daily or as directed. 100 each 3     linagliptin (TRADJENTA) 5 MG TABS tablet Take 1 tablet (5 mg) by mouth daily 90 tablet 3     losartan-hydrochlorothiazide (HYZAAR) 100-12.5 MG tablet Take 1 tablet by mouth daily 90 tablet 1     metFORMIN (GLUCOPHAGE-XR) 500 MG 24 hr tablet Take 4 tablets (2,000 mg) by mouth daily (with dinner) 360 tablet 1     Multiple Vitamins-Minerals (CENTRUM SILVER) per tablet 1 tablet 4 times weekly 100 tablet      nortriptyline (PAMELOR) 10 MG capsule Take 1 capsule by mouth At Bedtime. 90 capsule 1     omeprazole (PRILOSEC) 20 MG DR capsule Take 1 capsule (20 mg) by mouth daily 90 capsule 1     ORDER FOR DME Equipment being ordered: Mediven plus compression stockings    63280  20-30 compression 3 Device 0     potassium chloride ER (MICRO-K) 10 MEQ CR capsule Take 1 capsule (10 mEq) by mouth 3 times daily 270 capsule 1     venlafaxine (EFFEXOR-XR) 150 MG 24 hr capsule Take 1 capsule (150 mg) by mouth daily 90 capsule 1       Allergies   Allergen Reactions     Demerol Nausea and Vomiting     Erythromycin      GI upset     Peanuts [Nuts] Hives     Shrimp Hives        Social History     Tobacco Use     Smoking status: Never Smoker     Smokeless tobacco: Never Used   Substance Use Topics     Alcohol use: Yes     Comment: very rarely     Family History   Problem Relation Age of Onset      Cerebrovascular Disease Mother      Deep Vein Thrombosis Mother      Cancer Father         prostate     Gastrointestinal Disease Father      Breast Cancer Sister      Breast Cancer Sister      Prostate Cancer Brother      Prostate Cancer Brother      Heart Disease Maternal Grandmother      Cerebrovascular Disease Maternal Grandfather      Heart Disease Paternal Grandfather      Gastrointestinal Disease Other         Niece with GERD/hiatal hernia     Alzheimer Disease No family hx of      Diabetes No family hx of      History   Drug Use No         Objective     /82 (BP Location: Right arm, Patient Position: Sitting, Cuff Size: Adult Large)   Pulse 75   Temp 97.8  F (36.6  C) (Temporal)   Ht 1.524 m (5')   Wt 115 kg (253 lb 9.6 oz)   LMP 05/12/2001   SpO2 97%   BMI 49.53 kg/m      Physical Exam    GENERAL APPEARANCE: healthy, alert and no distress     EYES: EOMI, PERRL     HENT: ear canals and TM's normal and nose and mouth without ulcers or lesions     NECK: no adenopathy, no asymmetry, masses, or scars and thyroid normal to palpation     RESP: lungs clear to auscultation - no rales, rhonchi or wheezes     CV: regular rates and rhythm, normal S1 S2, no S3 or S4 and no murmur, click or rub     ABDOMEN:  soft, nontender, no HSM or masses and bowel sounds normal     MS: extremities normal- no gross deformities noted, no evidence of inflammation in joints, FROM in all extremities.     SKIN: no suspicious lesions or rashes     NEURO: Normal strength and tone, sensory exam grossly normal, mentation intact and speech normal     PSYCH: mentation appears normal. and affect normal/bright     LYMPHATICS: No cervical adenopathy    Recent Labs   Lab Test 12/01/21  1300 12/01/21  1037 08/26/21  1127 08/26/21  0000   .9  --   --  134.3*   POTASSIUM 3.85  --   --  3.53   CR 0.56*  --   --  0.94   A1C  --  6.8 7.9*  --         Diagnostics:  Recent Results (from the past 168 hour(s))   Basic Metabolic Panel (BFP)     Collection Time: 01/10/22 12:00 AM   Result Value Ref Range    Carbon Dioxide 29.4 20 - 32 mmol/L    Creatinine 0.57 (A) 0.60 - 1.30 mg/dL    Glucose 116 (A) 60 - 99 mg/dL    Sodium 140.7 135 - 146 mmol/L    Potassium 3.73 3.5 - 5.3 mmol/L    Chloride 103.4 98 - 110 mmol/L    Urea Nitrogen 14 7 - 25 mg/dL    Calcium 8.9 8.6 - 10.3 mg/dL    BUN/Creatinine Ratio 24.6 (A) 6 - 22   HEMOGRAM PLATELET DIFF (BFP)    Collection Time: 01/10/22 10:26 AM   Result Value Ref Range    WBC 5.3 4.0 - 11 10*9/L    RBC Count 4.29 3.8 - 5.2 10*12/L    Hemoglobin 13.3 11.7 - 15.7 g/dL    Hematocrit 41.1 35.0 - 47.0 %    MCV 95.9 78 - 100 fL    MCH 31.0 26 - 33 pg    MCHC 32.4 31 - 36 g/dL    Platelet Count 202 150 - 375 10^9/L    % Granulocytes 60 %    % Lymphocytes 25.6 %    % Monocytes 13.7 %          Revised Cardiac Risk Index (RCRI):  The patient has the following serious cardiovascular risks for perioperative complications:   - No serious cardiac risks = 0 points     RCRI Interpretation: 0 points: Class I (very low risk - 0.4% complication rate)           Signed Electronically by: Yuridia Simmons MD  Copy of this evaluation report is provided to requesting physician.

## 2022-01-13 ENCOUNTER — OFFICE VISIT (OUTPATIENT)
Dept: FAMILY MEDICINE | Facility: CLINIC | Age: 72
End: 2022-01-13

## 2022-01-13 DIAGNOSIS — Z79.4 TYPE 2 DIABETES MELLITUS WITHOUT COMPLICATION, WITH LONG-TERM CURRENT USE OF INSULIN (H): Primary | ICD-10-CM

## 2022-01-13 DIAGNOSIS — E11.9 TYPE 2 DIABETES MELLITUS WITHOUT COMPLICATION, WITH LONG-TERM CURRENT USE OF INSULIN (H): Primary | ICD-10-CM

## 2022-01-13 NOTE — Clinical Note
Patient is understanding of holding medications. Will hold morning meds and continue as prescribed after procedure. Discussed

## 2022-01-13 NOTE — PROGRESS NOTES
Current Outpatient Medications   Medication     ASPIRIN 81 MG OR TABS     atorvastatin (LIPITOR) 10 MG tablet     blood glucose (NO BRAND SPECIFIED) test strip     blood glucose monitoring (ONE TOUCH DELICA) lancets     Blood Glucose Monitoring Suppl (ONE TOUCH ULTRA 2) W/DEVICE KIT     budesonide (RINOCORT AQUA) 32 MCG/ACT nasal spray     carvedilol (COREG) 6.25 MG tablet     celecoxib (CELEBREX) 200 MG capsule     cyclobenzaprine (FLEXERIL) 5 MG tablet     glipiZIDE (GLUCOTROL XL) 10 MG 24 hr tablet     insulin glargine (BASAGLAR KWIKPEN) 100 UNIT/ML pen     insulin pen needle (BD ROSE U/F) 32G X 4 MM miscellaneous     linagliptin (TRADJENTA) 5 MG TABS tablet     losartan-hydrochlorothiazide (HYZAAR) 100-12.5 MG tablet     metFORMIN (GLUCOPHAGE-XR) 500 MG 24 hr tablet     Multiple Vitamins-Minerals (CENTRUM SILVER) per tablet     nortriptyline (PAMELOR) 10 MG capsule     omeprazole (PRILOSEC) 20 MG DR capsule     ORDER FOR DME     potassium chloride ER (MICRO-K) 10 MEQ CR capsule     venlafaxine (EFFEXOR-XR) 150 MG 24 hr capsule     No current facility-administered medications for this visit.     SUBJECTIVE/OBJECTIVE:                Alma Kraft is a 71 year old female seen for a follow-up visit for Medication Management Services.  She was referred to me from Dr. Yuridia Simmons.     Chief Complaint: Plan for holding medications for upcoming procedure.    Patient states that procedure is at 7:00am and is a 15 minute procedure on the morning of January 24th. Recommendations are as follows:    Celebrex and Aspirin- Discontinue on January 17th.    Basaglar- Doses in morning. Dose normally until day of procedure. Day of procedure, hold dose until meal after procedure.    Other medications- Continue normal administration of medications until evening before procedure. Morning of procedure, hold medications until procedure complete. May take morning medications when home and can take with food  (mid-morning).    Patient is understanding of plan and will call with any questions.      I spent 15 minutes with this patient today.  All changes were made via verbal approval with Yuridia Simmons. A copy of the visit note was provided to the patient's primary care provider.    Romina Caro, PharmD  Clinical Pharmacist  [facility][note 3] Phone: [phone]  Direct Office Phone: [phone]

## 2022-01-17 RX ORDER — AMOXICILLIN 500 MG/1
CAPSULE ORAL
COMMUNITY
Start: 2022-01-11 | End: 2024-01-29

## 2022-01-19 ENCOUNTER — ANESTHESIA EVENT (OUTPATIENT)
Dept: SURGERY | Facility: CLINIC | Age: 72
End: 2022-01-19
Payer: MEDICARE

## 2022-01-21 ENCOUNTER — LAB (OUTPATIENT)
Dept: LAB | Facility: CLINIC | Age: 72
End: 2022-01-21
Attending: INTERNAL MEDICINE
Payer: MEDICARE

## 2022-01-21 DIAGNOSIS — Z11.59 ENCOUNTER FOR SCREENING FOR OTHER VIRAL DISEASES: ICD-10-CM

## 2022-01-21 PROCEDURE — U0003 INFECTIOUS AGENT DETECTION BY NUCLEIC ACID (DNA OR RNA); SEVERE ACUTE RESPIRATORY SYNDROME CORONAVIRUS 2 (SARS-COV-2) (CORONAVIRUS DISEASE [COVID-19]), AMPLIFIED PROBE TECHNIQUE, MAKING USE OF HIGH THROUGHPUT TECHNOLOGIES AS DESCRIBED BY CMS-2020-01-R: HCPCS

## 2022-01-23 LAB — SARS-COV-2 RNA RESP QL NAA+PROBE: NEGATIVE

## 2022-01-24 ENCOUNTER — HOSPITAL ENCOUNTER (OUTPATIENT)
Facility: CLINIC | Age: 72
Discharge: HOME | End: 2022-01-24
Attending: INTERNAL MEDICINE | Admitting: INTERNAL MEDICINE
Payer: MEDICARE

## 2022-01-24 ENCOUNTER — ANESTHESIA (OUTPATIENT)
Dept: SURGERY | Facility: CLINIC | Age: 72
End: 2022-01-24
Payer: MEDICARE

## 2022-01-24 VITALS
OXYGEN SATURATION: 100 % | HEIGHT: 60 IN | RESPIRATION RATE: 12 BRPM | DIASTOLIC BLOOD PRESSURE: 70 MMHG | SYSTOLIC BLOOD PRESSURE: 140 MMHG | TEMPERATURE: 98.1 F | WEIGHT: 251 LBS | HEART RATE: 88 BPM | BODY MASS INDEX: 49.28 KG/M2

## 2022-01-24 LAB
GLUCOSE BLDC GLUCOMTR-MCNC: 121 MG/DL (ref 70–99)
UPPER GI ENDOSCOPY: NORMAL

## 2022-01-24 PROCEDURE — 258N000003 HC RX IP 258 OP 636: Performed by: ANESTHESIOLOGY

## 2022-01-24 PROCEDURE — 82962 GLUCOSE BLOOD TEST: CPT

## 2022-01-24 PROCEDURE — 272N000001 HC OR GENERAL SUPPLY STERILE: Performed by: INTERNAL MEDICINE

## 2022-01-24 PROCEDURE — 999N000141 HC STATISTIC PRE-PROCEDURE NURSING ASSESSMENT: Performed by: INTERNAL MEDICINE

## 2022-01-24 PROCEDURE — 710N000012 HC RECOVERY PHASE 2, PER MINUTE: Performed by: INTERNAL MEDICINE

## 2022-01-24 PROCEDURE — 250N000009 HC RX 250: Performed by: NURSE ANESTHETIST, CERTIFIED REGISTERED

## 2022-01-24 PROCEDURE — 360N000075 HC SURGERY LEVEL 2, PER MIN: Performed by: INTERNAL MEDICINE

## 2022-01-24 PROCEDURE — 710N000009 HC RECOVERY PHASE 1, LEVEL 1, PER MIN: Performed by: INTERNAL MEDICINE

## 2022-01-24 PROCEDURE — 250N000011 HC RX IP 250 OP 636: Performed by: NURSE ANESTHETIST, CERTIFIED REGISTERED

## 2022-01-24 PROCEDURE — 370N000017 HC ANESTHESIA TECHNICAL FEE, PER MIN: Performed by: INTERNAL MEDICINE

## 2022-01-24 RX ORDER — GLYCOPYRROLATE 0.2 MG/ML
INJECTION, SOLUTION INTRAMUSCULAR; INTRAVENOUS PRN
Status: DISCONTINUED | OUTPATIENT
Start: 2022-01-24 | End: 2022-01-24

## 2022-01-24 RX ORDER — LABETALOL HYDROCHLORIDE 5 MG/ML
10 INJECTION, SOLUTION INTRAVENOUS
Status: CANCELLED | OUTPATIENT
Start: 2022-01-24

## 2022-01-24 RX ORDER — NALOXONE HYDROCHLORIDE 0.4 MG/ML
0.2 INJECTION, SOLUTION INTRAMUSCULAR; INTRAVENOUS; SUBCUTANEOUS
Status: CANCELLED | OUTPATIENT
Start: 2022-01-24

## 2022-01-24 RX ORDER — FENTANYL CITRATE 50 UG/ML
25 INJECTION, SOLUTION INTRAMUSCULAR; INTRAVENOUS EVERY 5 MIN PRN
Status: CANCELLED | OUTPATIENT
Start: 2022-01-24

## 2022-01-24 RX ORDER — FENTANYL CITRATE 50 UG/ML
INJECTION, SOLUTION INTRAMUSCULAR; INTRAVENOUS PRN
Status: DISCONTINUED | OUTPATIENT
Start: 2022-01-24 | End: 2022-01-24

## 2022-01-24 RX ORDER — FENTANYL CITRATE 50 UG/ML
25 INJECTION, SOLUTION INTRAMUSCULAR; INTRAVENOUS
Status: CANCELLED | OUTPATIENT
Start: 2022-01-24

## 2022-01-24 RX ORDER — FLUMAZENIL 0.1 MG/ML
0.2 INJECTION, SOLUTION INTRAVENOUS
Status: CANCELLED | OUTPATIENT
Start: 2022-01-24 | End: 2022-01-24

## 2022-01-24 RX ORDER — LIDOCAINE 40 MG/G
CREAM TOPICAL
Status: DISCONTINUED | OUTPATIENT
Start: 2022-01-24 | End: 2022-01-24 | Stop reason: HOSPADM

## 2022-01-24 RX ORDER — ONDANSETRON 2 MG/ML
INJECTION INTRAMUSCULAR; INTRAVENOUS PRN
Status: DISCONTINUED | OUTPATIENT
Start: 2022-01-24 | End: 2022-01-24

## 2022-01-24 RX ORDER — NALOXONE HYDROCHLORIDE 0.4 MG/ML
0.4 INJECTION, SOLUTION INTRAMUSCULAR; INTRAVENOUS; SUBCUTANEOUS
Status: CANCELLED | OUTPATIENT
Start: 2022-01-24

## 2022-01-24 RX ORDER — DIMENHYDRINATE 50 MG/ML
25 INJECTION, SOLUTION INTRAMUSCULAR; INTRAVENOUS
Status: CANCELLED | OUTPATIENT
Start: 2022-01-24

## 2022-01-24 RX ORDER — ACETAMINOPHEN 325 MG/1
975 TABLET ORAL ONCE
Status: CANCELLED | OUTPATIENT
Start: 2022-01-24 | End: 2022-01-24

## 2022-01-24 RX ORDER — PROPOFOL 10 MG/ML
INJECTION, EMULSION INTRAVENOUS CONTINUOUS PRN
Status: DISCONTINUED | OUTPATIENT
Start: 2022-01-24 | End: 2022-01-24

## 2022-01-24 RX ORDER — DEXAMETHASONE SODIUM PHOSPHATE 4 MG/ML
INJECTION, SOLUTION INTRA-ARTICULAR; INTRALESIONAL; INTRAMUSCULAR; INTRAVENOUS; SOFT TISSUE PRN
Status: DISCONTINUED | OUTPATIENT
Start: 2022-01-24 | End: 2022-01-24

## 2022-01-24 RX ORDER — MEPERIDINE HYDROCHLORIDE 25 MG/ML
12.5 INJECTION INTRAMUSCULAR; INTRAVENOUS; SUBCUTANEOUS
Status: CANCELLED | OUTPATIENT
Start: 2022-01-24

## 2022-01-24 RX ORDER — SODIUM CHLORIDE, SODIUM LACTATE, POTASSIUM CHLORIDE, CALCIUM CHLORIDE 600; 310; 30; 20 MG/100ML; MG/100ML; MG/100ML; MG/100ML
INJECTION, SOLUTION INTRAVENOUS CONTINUOUS
Status: CANCELLED | OUTPATIENT
Start: 2022-01-24

## 2022-01-24 RX ORDER — HYDROMORPHONE HCL IN WATER/PF 6 MG/30 ML
0.2 PATIENT CONTROLLED ANALGESIA SYRINGE INTRAVENOUS EVERY 5 MIN PRN
Status: CANCELLED | OUTPATIENT
Start: 2022-01-24

## 2022-01-24 RX ORDER — PROPOFOL 10 MG/ML
INJECTION, EMULSION INTRAVENOUS PRN
Status: DISCONTINUED | OUTPATIENT
Start: 2022-01-24 | End: 2022-01-24

## 2022-01-24 RX ORDER — LIDOCAINE HYDROCHLORIDE 10 MG/ML
INJECTION, SOLUTION EPIDURAL; INFILTRATION; INTRACAUDAL; PERINEURAL PRN
Status: DISCONTINUED | OUTPATIENT
Start: 2022-01-24 | End: 2022-01-24

## 2022-01-24 RX ORDER — ONDANSETRON 2 MG/ML
4 INJECTION INTRAMUSCULAR; INTRAVENOUS
Status: DISCONTINUED | OUTPATIENT
Start: 2022-01-24 | End: 2022-01-24 | Stop reason: HOSPADM

## 2022-01-24 RX ORDER — ONDANSETRON 4 MG/1
4 TABLET, ORALLY DISINTEGRATING ORAL EVERY 6 HOURS PRN
Status: CANCELLED | OUTPATIENT
Start: 2022-01-24

## 2022-01-24 RX ORDER — PROCHLORPERAZINE MALEATE 5 MG
5 TABLET ORAL EVERY 6 HOURS PRN
Status: CANCELLED | OUTPATIENT
Start: 2022-01-24

## 2022-01-24 RX ORDER — ONDANSETRON 4 MG/1
4 TABLET, ORALLY DISINTEGRATING ORAL EVERY 30 MIN PRN
Status: CANCELLED | OUTPATIENT
Start: 2022-01-24

## 2022-01-24 RX ORDER — SODIUM CHLORIDE, SODIUM LACTATE, POTASSIUM CHLORIDE, CALCIUM CHLORIDE 600; 310; 30; 20 MG/100ML; MG/100ML; MG/100ML; MG/100ML
INJECTION, SOLUTION INTRAVENOUS CONTINUOUS
Status: DISCONTINUED | OUTPATIENT
Start: 2022-01-24 | End: 2022-01-24 | Stop reason: HOSPADM

## 2022-01-24 RX ORDER — OXYCODONE HYDROCHLORIDE 5 MG/1
5 TABLET ORAL EVERY 4 HOURS PRN
Status: CANCELLED | OUTPATIENT
Start: 2022-01-24

## 2022-01-24 RX ORDER — ALBUTEROL SULFATE 0.83 MG/ML
2.5 SOLUTION RESPIRATORY (INHALATION) EVERY 4 HOURS PRN
Status: CANCELLED | OUTPATIENT
Start: 2022-01-24

## 2022-01-24 RX ORDER — ONDANSETRON 2 MG/ML
4 INJECTION INTRAMUSCULAR; INTRAVENOUS EVERY 30 MIN PRN
Status: CANCELLED | OUTPATIENT
Start: 2022-01-24

## 2022-01-24 RX ORDER — ONDANSETRON 2 MG/ML
4 INJECTION INTRAMUSCULAR; INTRAVENOUS EVERY 6 HOURS PRN
Status: CANCELLED | OUTPATIENT
Start: 2022-01-24

## 2022-01-24 RX ADMIN — LIDOCAINE HYDROCHLORIDE 50 MG: 10 INJECTION, SOLUTION EPIDURAL; INFILTRATION; INTRACAUDAL; PERINEURAL at 07:55

## 2022-01-24 RX ADMIN — PROPOFOL 60 MCG/KG/MIN: 10 INJECTION, EMULSION INTRAVENOUS at 07:55

## 2022-01-24 RX ADMIN — PROPOFOL 200 MG: 10 INJECTION, EMULSION INTRAVENOUS at 07:55

## 2022-01-24 RX ADMIN — FENTANYL CITRATE 50 MCG: 50 INJECTION, SOLUTION INTRAMUSCULAR; INTRAVENOUS at 07:55

## 2022-01-24 RX ADMIN — ONDANSETRON HYDROCHLORIDE 4 MG: 2 INJECTION, SOLUTION INTRAVENOUS at 07:55

## 2022-01-24 RX ADMIN — Medication 100 MG: at 07:55

## 2022-01-24 RX ADMIN — GLYCOPYRROLATE 0.2 MG: 0.2 INJECTION, SOLUTION INTRAMUSCULAR; INTRAVENOUS at 07:55

## 2022-01-24 RX ADMIN — SODIUM CHLORIDE, POTASSIUM CHLORIDE, SODIUM LACTATE AND CALCIUM CHLORIDE: 600; 310; 30; 20 INJECTION, SOLUTION INTRAVENOUS at 07:00

## 2022-01-24 RX ADMIN — DEXAMETHASONE SODIUM PHOSPHATE 8 MG: 4 INJECTION, SOLUTION INTRA-ARTICULAR; INTRALESIONAL; INTRAMUSCULAR; INTRAVENOUS; SOFT TISSUE at 07:55

## 2022-01-24 ASSESSMENT — MIFFLIN-ST. JEOR: SCORE: 1570.03

## 2022-01-24 NOTE — ANESTHESIA PREPROCEDURE EVALUATION
Anesthesia Pre-Procedure Evaluation    Patient: Alma Kraft   MRN: [id number] : 1950        Preoperative Diagnosis: Cirrhosis (H) [K74.60]    Procedure : Procedure(s):  ESOPHAGOGASTRODUODENOSCOPY (EGD) ([facility])          Past Medical History:   Diagnosis Date     Arthritis     hands, Right shoulder     Diabetes (H)      Hypertension      Sleep apnea     Uses a CPAP      Past Surgical History:   Procedure Laterality Date     ------------OTHER-------------  2013    L hand, cyst excision     ------------OTHER------------- Right 2014    shoulder manipulation     HC INCISION TENDON SHEATH FINGER Left 2013    left thumb trigger finger     HC KNEE SCOPE, DIAGNOSTIC  2005    Arthroscopy, Knee Left     HC REMOVE TONSILS/ADENOIDS,<12 Y/O      T & A <11y.o.     TEST NOT FOUND  07    R heel/ inocencio's deformity     Z APPENDECTOMY       Z EYE EXAM ESTABLISHED PT  11    No retinopathy     [facility] [place] TOTAL KNEE ARTHROPLASTY      Knee Replacement, Total Right     ZZC TOTAL KNEE ARTHROPLASTY  3/5/07    Knee Replacement, Total  Left     ZZC VAGINAL HYSTERECTOMY      Hysterectomy, Vaginal & BSO     ZZHC COLONOSCOPY W/WO BRUSH/WASH        Allergies   Allergen Reactions     Demerol Nausea and Vomiting     Erythromycin      GI upset     Peanuts [Nuts] Hives     Shrimp Hives      Social History     Tobacco Use     Smoking status: Never Smoker     Smokeless tobacco: Never Used   Substance Use Topics     Alcohol use: Yes     Comment: very rarely      Wt Readings from Last 1 Encounters:   22 113.9 kg (251 lb)        Anesthesia Evaluation   Pt has had prior anesthetic. Type: General.    History of anesthetic complications       ROS/MED HX  ENT/Pulmonary:     (+) sleep apnea, uses CPAP,     Neurologic:  - neg neurologic ROS     Cardiovascular:     (+) Dyslipidemia hypertension-----Previous cardiac testing   Echo: Date: Results:    Stress Test: Date:  Results:  Aleks ischemia with  hyperdynamic LV at 81% EF  ECG Reviewed: Date: Results:    Cath: Date: Results:      METS/Exercise Tolerance:     Hematologic:  - neg hematologic  ROS     Musculoskeletal:  - neg musculoskeletal ROS     GI/Hepatic: Comment: Cirrhosis of liver    (+) GERD, liver disease,     Renal/Genitourinary:  - neg Renal ROS     Endo: Comment: Essentially superobesity status    (+) type II DM, Using insulin, - not using insulin pump. Obesity,     Psychiatric/Substance Use:     (+) psychiatric history anxiety and depression     Infectious Disease:  - neg infectious disease ROS     Malignancy:  - neg malignancy ROS     Other:  - neg other ROS          Physical Exam    Airway        Mallampati: III   TM distance: > 3 FB   Neck ROM: full   Mouth opening: > 3 cm    Respiratory Devices and Support         Dental       (+) missing      Cardiovascular   cardiovascular exam normal       Rhythm and rate: regular and normal     Pulmonary   pulmonary exam normal        breath sounds clear to auscultation       Other findings: Lab Test        01/10/22     08/22/18     02/11/18                       1026          0919          0250          WBC          5.3          5.3          7.4           HGB          13.3         12.6         13.3          MCV          95.9         92.0         90            PLT          202          200          256            Lab Test        01/24/22     01/10/22     12/01/21     08/26/21     02/11/18     02/11/18                       0608          0000          1300          0000          1042          0250          NA            --          140.7        140.9        134.3*         < >        137           POTASSIUM     --          3.73         3.85         3.53           < >        3.1*          CHLORIDE      --          103.4        104.2        106.0          < >        100           CO2           --          29.4         28.7         30.0           < >        32            BUN           --          14  24.6*   14   25.9*   11  11.7      < >        15            CR            --          0.57*        0.56*        0.94           < >        0.46*         ANIONGAP      --           --           --           --           --          5             TONI           --          8.9          9.4          8.7            < >        8.6           GLC          121*         116*         147*         143*           < >        151*           < > = values in this interval not displayed.                        EKG Interpretation:   No recent tracing available    OUTSIDE LABS:  CBC:   Lab Results   Component Value Date    WBC 5.3 01/10/2022    WBC 5.3 08/22/2018    HGB 13.3 01/10/2022    HGB 12.6 08/22/2018    HCT 41.1 01/10/2022    HCT 39.0 08/22/2018     01/10/2022     08/22/2018     BMP:   Lab Results   Component Value Date    .7 01/10/2022    .9 12/01/2021    POTASSIUM 3.73 01/10/2022    POTASSIUM 3.85 12/01/2021    CHLORIDE 103.4 01/10/2022    CHLORIDE 104.2 12/01/2021    CO2 29.4 01/10/2022    CO2 28.7 12/01/2021    BUN 14 01/10/2022    BUN 24.6 (A) 01/10/2022    CR 0.57 (A) 01/10/2022    CR 0.56 (A) 12/01/2021     (A) 01/10/2022     (A) 12/01/2021     COAGS:   Lab Results   Component Value Date    PTT 30 03/04/2007    INR 1.84 (H) 03/08/2007     POC:   Lab Results   Component Value Date     (H) 02/11/2018     HEPATIC:   Lab Results   Component Value Date    ALBUMIN 3.4 (A) 08/26/2021    PROTTOTAL 7.1 08/26/2021    ALT 32 (H) 02/21/2019    AST 33 10/05/2013    ALKPHOS 131 (A) 08/26/2021    BILITOTAL 1.3 (A) 08/26/2021    BILIDIRECT 0.1 04/16/2007     OTHER:   Lab Results   Component Value Date    PH 5.5 09/27/2011    A1C 6.8 12/01/2021    TONI 8.9 01/10/2022    TSH 3.01 07/16/2021    T4 0.9 09/23/2010    SED 45 01/24/2011       Anesthesia Plan    ASA Status:  3      Anesthesia Type: General.     - Airway: ETT   Induction: RSI.   Maintenance: Balanced.   Techniques and Equipment:     - Airway:  Video-Laryngoscope         Consents    Anesthesia Plan(s) and associated risks, benefits, and realistic alternatives discussed. Questions answered and patient/representative(s) expressed understanding.    - Discussed:     - Discussed with:  Patient      - Extended Intubation/Ventilatory Support Discussed: No.      - Patient is DNR/DNI Status: No    Use of blood products discussed: No .     Postoperative Care    Pain management: IV analgesics, Oral pain medications, Multi-modal analgesia.   PONV prophylaxis: Ondansetron (or other 5HT-3), Dexamethasone or Solumedrol, Background Propofol Infusion     Comments:                Blas Diaz MD

## 2022-01-24 NOTE — ANESTHESIA CARE TRANSFER NOTE
Patient: Alma Kraft    Procedure: Procedure(s):  ESOPHAGOGASTRODUODENOSCOPY       Diagnosis: Cirrhosis (H) [K74.60]  Diagnosis Additional Information: No value filed.    Anesthesia Type:   General     Note:    Oropharynx: oropharynx clear of all foreign objects  Level of Consciousness: awake  Oxygen Supplementation: face mask  Level of Supplemental Oxygen (L/min / FiO2): 6lpm  Independent Airway: airway patency satisfactory and stable  Dentition: dentition unchanged  Vital Signs Stable: post-procedure vital signs reviewed and stable  Report to RN Given: handoff report given  Patient transferred to: PACU  Comments: Patient oral suctioned. Patient with spontaneous respirations and adequate tidal volumes. Patient awake and responsive. Extubated in OR to 6L facemask. To PACU ventilating well. VSS. Report given.  Handoff Report: Identifed the Patient, Identified the Reponsible Provider, Reviewed the pertinent medical history, Discussed the surgical course, Reviewed Intra-OP anesthesia mangement and issues during anesthesia, Set expectations for post-procedure period and Allowed opportunity for questions and acknowledgement of understanding      Vitals:  Vitals Value Taken Time   /72 01/24/22 0820   Temp     Pulse 95 01/24/22 0821   Resp 17 01/24/22 0821   SpO2 100 % 01/24/22 0821   Vitals shown include unvalidated device data.    Electronically Signed By: ELLIS Stanley CRNA  January 24, 2022  8:22 AM

## 2022-01-24 NOTE — ANESTHESIA POSTPROCEDURE EVALUATION
Patient: Alma Kraft    Procedure: Procedure(s):  ESOPHAGOGASTRODUODENOSCOPY       Diagnosis:Cirrhosis (H) [K74.60]  Diagnosis Additional Information: No value filed.    Anesthesia Type:  General    Note:  Disposition: Outpatient   Postop Pain Control: Uneventful            Sign Out: Well controlled pain   PONV: No   Neuro/Psych: Uneventful            Sign Out: Acceptable/Baseline neuro status   Airway/Respiratory: Uneventful            Sign Out: Acceptable/Baseline resp. status   CV/Hemodynamics: Uneventful            Sign Out: Acceptable CV status; No obvious hypovolemia; No obvious fluid overload   Other NRE: NONE   DID A NON-ROUTINE EVENT OCCUR? No           Last vitals:  Vitals Value Taken Time   /70 01/24/22 0828   Temp     Pulse 101 01/24/22 0829   Resp 27 01/24/22 0829   SpO2 93 % 01/24/22 0829   Vitals shown include unvalidated device data.    Electronically Signed By: Blas Diaz MD  January 24, 2022  8:31 AM

## 2022-01-24 NOTE — ANESTHESIA PROCEDURE NOTES
Airway       Patient location during procedure: OR       Procedure Start/Stop Times: 1/24/2022 7:58 AM  Staff -        Anesthesiologist:  Blas Diaz MD       CRNA: Good Ordaz APRN CRNA       Performed By: CRNAIndications and Patient Condition       Indications for airway management: rick-procedural       Induction type:intravenous       Mask difficulty assessment: 1 - vent by mask    Final Airway Details       Final airway type: endotracheal airway       Successful airway: ETT - single  Endotracheal Airway Details        ETT size (mm): 7.0       Cuffed: yes       Cuff volume (mL): 5       Successful intubation technique: video laryngoscopy (DL not attempted; Glidescope used d/t body habitus and BMI)       VL Blade Size: Glidescope 3       Grade View of Cords: 1       Adjucts: stylet       Position: Right       Measured from: lips       Secured at (cm): 21       Bite block used: None    Post intubation assessment        Placement verified by: capnometry and equal breath sounds        Number of attempts at approach: 1       Number of other approaches attempted: 0       Secured with: plastic tape       Ease of procedure: easy       Dentition: Intact and Unchanged

## 2022-01-24 NOTE — DISCHARGE INSTRUCTIONS
ESOPHAGOGASTRODUODENOSCOPY DISCHARGE INSTRUCTIONS    You may not drive, use heavy equipment or consume alcohol for 24 hours because the drugs you were given may cause dizziness, drowsiness, forgetfulness and slower reaction time.    Small pieces or tissue (biopsies) or polyps may have been removed.    You may resume your regular diet and medications. Exception: If you had a biopsy or polypectomy, do not take aspirin, aleve (naproxen) or ibuprofen for the next 10 days.  Tylenol (acetaminophen) is safe to take.    Additional instructions:  If you had a biopsy or polypectomy, the pathology report will be sent to your doctor.  If you have not received the results within 10 days, call your doctor's office.    What to watch for:  Problems rarely occur after the procedure.  It is important for you to be aware of the early signs of a possible complication.  Call immediately if you notice any of the followin.  Unusual pain or difficulty swallowing.    2.  Unusual abdominal or chest pain.    3.  Vomiting of blood.    4.  Black or bloody stools.    5.  Temperature above 100.6 degrees F       GENERAL ANESTHESIA OR SEDATION ADULT DISCHARGE INSTRUCTIONS   SPECIAL PRECAUTIONS FOR 24 HOURS AFTER SURGERY    IT IS NOT UNUSUAL TO FEEL LIGHT-HEADED OR FAINT, UP TO 24 HOURS AFTER SURGERY OR WHILE TAKING PAIN MEDICATION.  IF YOU HAVE THESE SYMPTOMS; SIT FOR A FEW MINUTES BEFORE STANDING AND HAVE SOMEONE ASSIST YOU WHEN YOU GET UP TO WALK OR USE THE BATHROOM.    YOU SHOULD REST AND RELAX FOR THE NEXT 24 HOURS AND YOU MUST MAKE ARRANGEMENTS TO HAVE SOMEONE STAY WITH YOU FOR AT LEAST 24 HOURS AFTER YOUR DISCHARGE.  AVOID HAZARDOUS AND STRENUOUS ACTIVITIES.  DO NOT MAKE IMPORTANT DECISIONS FOR 24 HOURS.    DO NOT DRIVE ANY VEHICLE OR OPERATE MECHANICAL EQUIPMENT FOR 24 HOURS FOLLOWING THE END OF YOUR SURGERY.  EVEN THOUGH YOU MAY FEEL NORMAL, YOUR REACTIONS MAY BE AFFECTED BY THE MEDICATION YOU HAVE RECEIVED.    DO NOT DRINK ALCOHOLIC  BEVERAGES FOR 24 HOURS FOLLOWING YOUR SURGERY.    DRINK CLEAR LIQUIDS (APPLE JUICE, GINGER ALE, 7-UP, BROTH, ETC.).  PROGRESS TO YOUR REGULAR DIET AS YOU FEEL ABLE.    YOU MAY HAVE A DRY MOUTH, A SORE THROAT, MUSCLES ACHES OR TROUBLE SLEEPING.  THESE SHOULD GO AWAY AFTER 24 HOURS.    CALL YOUR DOCTOR FOR ANY OF THE FOLLOWING:  SIGNS OF INFECTION (FEVER, GROWING TENDERNESS AT THE SURGERY SITE, A LARGE AMOUNT OF DRAINAGE OR BLEEDING, SEVERE PAIN, FOUL-SMELLING DRAINAGE, REDNESS OR SWELLING.    IT HAS BEEN OVER 8 TO 10 HOURS SINCE SURGERY AND YOU ARE STILL NOT ABLE TO URINATE (PASS WATER).

## 2022-02-14 ENCOUNTER — TRANSFERRED RECORDS (OUTPATIENT)
Dept: FAMILY MEDICINE | Facility: CLINIC | Age: 72
End: 2022-02-14

## 2022-02-15 ENCOUNTER — TRANSFERRED RECORDS (OUTPATIENT)
Dept: FAMILY MEDICINE | Facility: CLINIC | Age: 72
End: 2022-02-15

## 2022-02-22 DIAGNOSIS — I10 ESSENTIAL HYPERTENSION, BENIGN: ICD-10-CM

## 2022-02-22 RX ORDER — POTASSIUM CHLORIDE 750 MG/1
10 CAPSULE, EXTENDED RELEASE ORAL 4 TIMES DAILY
Qty: 360 CAPSULE | Status: CANCELLED | OUTPATIENT
Start: 2022-02-22

## 2022-02-22 NOTE — TELEPHONE ENCOUNTER
Alma Kraft is requesting a refill of:    Pending Prescriptions:                       Disp   Refills    potassium chloride ER (MICRO-K) 10 MEQ CR*360 ca*0            Sig: Take 1 capsule (10 mEq) by mouth 4 times daily    Pt states that she is now to be taking QID? Please advise

## 2022-03-14 DIAGNOSIS — I10 ESSENTIAL HYPERTENSION, BENIGN: ICD-10-CM

## 2022-03-14 RX ORDER — POTASSIUM CHLORIDE 750 MG/1
10 CAPSULE, EXTENDED RELEASE ORAL 4 TIMES DAILY
Qty: 360 CAPSULE | Refills: 0 | Status: SHIPPED | OUTPATIENT
Start: 2022-03-14 | End: 2022-03-21

## 2022-03-16 RX ORDER — ATORVASTATIN CALCIUM 10 MG/1
10 TABLET, FILM COATED ORAL DAILY
Qty: 90 TABLET | Refills: 0 | COMMUNITY
Start: 2022-03-16

## 2022-03-16 NOTE — TELEPHONE ENCOUNTER
Alma Kraft is requesting a refill of:    Refused Prescriptions:                       Disp   Refills    atorvastatin (LIPITOR) 10 MG tablet [Pharm*90 tab*0        Sig: Take 1 tablet (10 mg) by mouth daily.  Refused By: RAVEN JENKINS  Reason for Refusal: Patient should contact provider first    Pt has appt for 03/21/22. Spoke with pt's  he will have her call back if she needs short extension.

## 2022-03-21 ENCOUNTER — OFFICE VISIT (OUTPATIENT)
Dept: FAMILY MEDICINE | Facility: CLINIC | Age: 72
End: 2022-03-21

## 2022-03-21 VITALS
DIASTOLIC BLOOD PRESSURE: 84 MMHG | BODY MASS INDEX: 49.94 KG/M2 | HEIGHT: 60 IN | TEMPERATURE: 98.4 F | OXYGEN SATURATION: 96 % | HEART RATE: 79 BPM | WEIGHT: 254.4 LBS | SYSTOLIC BLOOD PRESSURE: 142 MMHG

## 2022-03-21 DIAGNOSIS — E78.2 MIXED HYPERLIPIDEMIA: ICD-10-CM

## 2022-03-21 DIAGNOSIS — I10 ESSENTIAL HYPERTENSION, BENIGN: ICD-10-CM

## 2022-03-21 DIAGNOSIS — E11.9 TYPE 2 DIABETES MELLITUS WITHOUT COMPLICATION, WITH LONG-TERM CURRENT USE OF INSULIN (H): Primary | ICD-10-CM

## 2022-03-21 DIAGNOSIS — Z79.4 TYPE 2 DIABETES MELLITUS WITHOUT COMPLICATION, WITH LONG-TERM CURRENT USE OF INSULIN (H): Primary | ICD-10-CM

## 2022-03-21 DIAGNOSIS — G44.049 CHRONIC PAROXYSMAL HEMICRANIA, NOT INTRACTABLE: ICD-10-CM

## 2022-03-21 DIAGNOSIS — I10 ESSENTIAL HYPERTENSION: ICD-10-CM

## 2022-03-21 LAB
ALBUMIN SERPL-MCNC: 3.5 G/DL (ref 3.6–5.1)
ALBUMIN/GLOB SERPL: 0.9 {RATIO}
ALP SERPL-CCNC: 139 U/L (ref 33–130)
ALT 1742-6: 28 U/L (ref 0–32)
AST 1920-8: 38 U/L (ref 0–35)
BILIRUB SERPL-MCNC: 0.9 MG/DL (ref 0.2–1.2)
BUN SERPL-MCNC: 19 MG/DL (ref 7–25)
BUN/CREATININE RATIO: 31.1 (ref 6–22)
CALCIUM SERPL-MCNC: 9.2 MG/DL (ref 8.6–10.3)
CHLORIDE SERPLBLD-SCNC: 98.5 MMOL/L (ref 98–110)
CO2 SERPL-SCNC: 28.7 MMOL/L (ref 20–32)
CREAT SERPL-MCNC: 0.61 MG/DL (ref 0.6–1.3)
GLOBULIN, CALCULATED - QUEST: 3.8
GLUCOSE SERPL-MCNC: 128 MG/DL (ref 60–99)
HBA1C MFR BLD: 6.8 % (ref 4–7)
POTASSIUM SERPL-SCNC: 3.79 MMOL/L (ref 3.5–5.3)
PROT SERPL-MCNC: 7.3 G/DL (ref 6.1–8.1)
SODIUM SERPL-SCNC: 141.7 MMOL/L (ref 135–146)

## 2022-03-21 PROCEDURE — 36415 COLL VENOUS BLD VENIPUNCTURE: CPT | Performed by: FAMILY MEDICINE

## 2022-03-21 PROCEDURE — 80053 COMPREHEN METABOLIC PANEL: CPT | Performed by: FAMILY MEDICINE

## 2022-03-21 PROCEDURE — 99214 OFFICE O/P EST MOD 30 MIN: CPT | Performed by: FAMILY MEDICINE

## 2022-03-21 PROCEDURE — 83036 HEMOGLOBIN GLYCOSYLATED A1C: CPT | Performed by: FAMILY MEDICINE

## 2022-03-21 RX ORDER — METFORMIN HCL 500 MG
2000 TABLET, EXTENDED RELEASE 24 HR ORAL
Qty: 360 TABLET | Refills: 1 | Status: SHIPPED | OUTPATIENT
Start: 2022-03-21 | End: 2022-09-15

## 2022-03-21 RX ORDER — CARVEDILOL 6.25 MG/1
TABLET ORAL
Qty: 180 TABLET | Refills: 1 | Status: SHIPPED | OUTPATIENT
Start: 2022-03-21 | End: 2022-08-30

## 2022-03-21 RX ORDER — ATORVASTATIN CALCIUM 10 MG/1
10 TABLET, FILM COATED ORAL DAILY
Qty: 90 TABLET | Refills: 1 | Status: SHIPPED | OUTPATIENT
Start: 2022-03-21 | End: 2022-09-12

## 2022-03-21 RX ORDER — INSULIN GLARGINE 100 [IU]/ML
66 INJECTION, SOLUTION SUBCUTANEOUS DAILY
Qty: 75 ML | Refills: 1 | Status: SHIPPED | OUTPATIENT
Start: 2022-03-21 | End: 2022-09-15

## 2022-03-21 RX ORDER — GLIPIZIDE 10 MG/1
10 TABLET, FILM COATED, EXTENDED RELEASE ORAL 2 TIMES DAILY
Qty: 180 TABLET | Refills: 1 | Status: SHIPPED | OUTPATIENT
Start: 2022-03-21 | End: 2022-09-15

## 2022-03-21 RX ORDER — POTASSIUM CHLORIDE 750 MG/1
10 CAPSULE, EXTENDED RELEASE ORAL 4 TIMES DAILY
Qty: 360 CAPSULE | Refills: 1 | Status: SHIPPED | OUTPATIENT
Start: 2022-03-21 | End: 2022-09-15

## 2022-03-21 RX ORDER — LOSARTAN POTASSIUM AND HYDROCHLOROTHIAZIDE 25; 100 MG/1; MG/1
1 TABLET ORAL DAILY
Qty: 90 TABLET | Refills: 1 | Status: SHIPPED | OUTPATIENT
Start: 2022-03-21 | End: 2022-09-15

## 2022-03-21 RX ORDER — NORTRIPTYLINE HCL 10 MG
CAPSULE ORAL
Qty: 90 CAPSULE | Refills: 1 | Status: SHIPPED | OUTPATIENT
Start: 2022-03-21 | End: 2022-09-15

## 2022-03-21 NOTE — LETTER
March 25, 2022      Jeny Kraft  [address]  [place] [address]        Dear ,    We are writing to inform you of your test results.    Your labs were ok: kidney and liver function. The liver function test was a little high but stable.      Resulted Orders   HEMOGLOBIN A1C ([facility])   Result Value Ref Range    Hemoglobin A1C POCT 6.8 4.0 - 7.0 %   Comprehensive Metobolic Panel ([facility])   Result Value Ref Range    Carbon Dioxide 28.7 20 - 32 mmol/L    Creatinine 0.61 0.60 - 1.30 mg/dL    Glucose 128 (A) 60 - 99 mg/dL    Sodium 141.7 135 - 146 mmol/L    Potassium 3.79 3.5 - 5.3 mmol/L    Chloride 98.5 98 - 110 mmol/L    Protein Total 7.3 6.1 - 8.1 g/dL    Albumin 3.5 (A) 3.6 - 5.1 g/dL    Alkaline Phosphatase 139 (A) 33 - 130 U/L    ALT 28 0 - 32 U/L    AST 38 (A) 0 - 35 U/L    Bilirubin Total 0.9 0.2 - 1.2 mg/dL    Urea Nitrogen 19 7 - 25 mg/dL    Calcium 9.2 8.6 - 10.3 mg/dL    BUN/Creatinine Ratio 31.1 (A) 6 - 22    Globulin Calculated 3.8     A/G Ratio 0.9        If you have any questions or concerns, please call the clinic at the number listed above.       Sincerely,      Yuridia Simmons MD

## 2022-03-21 NOTE — PATIENT INSTRUCTIONS
Assessment & Plan   Problem List Items Addressed This Visit        Endocrine    Mixed hyperlipidemia    Relevant Medications    atorvastatin (LIPITOR) 10 MG tablet    Type 2 diabetes mellitus without complication, with long-term current use of insulin (H) - Primary    Relevant Medications    atorvastatin (LIPITOR) 10 MG tablet    glipiZIDE (GLUCOTROL XL) 10 MG 24 hr tablet    insulin glargine (BASAGLAR KWIKPEN) 100 UNIT/ML pen    metFORMIN (GLUCOPHAGE-XR) 500 MG 24 hr tablet    Other Relevant Orders    VENOUS COLLECTION (Completed)    HEMOGLOBIN A1C ([facility]) (Completed)    ALBUMIN RANDOM URINE QUANTITATIVE ([facility])    Comprehensive Metobolic Panel ([facility])       Circulatory    Essential hypertension, benign    Relevant Medications    potassium chloride ER (MICRO-K) 10 MEQ CR capsule    losartan-hydrochlorothiazide (HYZAAR) 100-25 MG tablet      Other Visit Diagnoses     Uncontrolled type 2 diabetes mellitus with stage 2 chronic kidney disease, with long-term current use of insulin (H)        Relevant Medications    atorvastatin (LIPITOR) 10 MG tablet    glipiZIDE (GLUCOTROL XL) 10 MG 24 hr tablet    insulin glargine (BASAGLAR KWIKPEN) 100 UNIT/ML pen    metFORMIN (GLUCOPHAGE-XR) 500 MG 24 hr tablet    Essential hypertension        Relevant Medications    carvedilol (COREG) 6.25 MG tablet    losartan-hydrochlorothiazide (HYZAAR) 100-25 MG tablet    Chronic paroxysmal hemicrania, not intractable        Relevant Medications    carvedilol (COREG) 6.25 MG tablet    nortriptyline (PAMELOR) 10 MG capsule           Results for orders placed or performed in visit on 03/21/22   HEMOGLOBIN A1C ([facility])     Status: None   Result Value Ref Range    Hemoglobin A1C POCT 6.8 4.0 - 7.0 %     1. Type 2 diabetes mellitus without complication, with long-term current use of insulin (H)  Controlled, check labs, refilled medications.  - VENOUS COLLECTION  - HEMOGLOBIN A1C ([facility])  - ALBUMIN RANDOM URINE QUANTITATIVE ([facility])  - Comprehensive Metobolic  Panel ([facility])  - insulin glargine (BASAGLAR KWIKPEN) 100 UNIT/ML pen; Inject 66 Units Subcutaneous daily  Dispense: 75 mL; Refill: 1    2. Mixed hyperlipidemia  Refilled.    3. Essential hypertension, benign  Slightly high. Will check labs.  - potassium chloride ER (MICRO-K) 10 MEQ CR capsule; Take 1 capsule (10 mEq) by mouth 4 times daily  Dispense: 360 capsule; Refill: 1    4. Uncontrolled type 2 diabetes mellitus with stage 2 chronic kidney disease, with long-term current use of insulin (H)  Check labs, refilled medications, controlled.  - atorvastatin (LIPITOR) 10 MG tablet; Take 1 tablet (10 mg) by mouth daily  Dispense: 90 tablet; Refill: 1  - glipiZIDE (GLUCOTROL XL) 10 MG 24 hr tablet; Take 1 tablet (10 mg) by mouth 2 times daily  Dispense: 180 tablet; Refill: 1  - metFORMIN (GLUCOPHAGE-XR) 500 MG 24 hr tablet; Take 4 tablets (2,000 mg) by mouth daily (with dinner)  Dispense: 360 tablet; Refill: 1    5. BENIGN HYPERTENSION    - potassium chloride ER (MICRO-K) 10 MEQ CR capsule; Take 1 capsule (10 mEq) by mouth 4 times daily  Dispense: 360 capsule; Refill: 1    6. Essential hypertension  High today, watch at home. Increase hydrochlorothiazide portion of medication, refilled.  - carvedilol (COREG) 6.25 MG tablet; Take 1 tablet (6.25 mg) by mouth 2 times daily (with meals.)  Dispense: 180 tablet; Refill: 1  - losartan-hydrochlorothiazide (HYZAAR) 100-25 MG tablet; Take 1 tablet by mouth daily  Dispense: 90 tablet; Refill: 1    7. Chronic paroxysmal hemicrania, not intractable  Refilled.  - nortriptyline (PAMELOR) 10 MG capsule; Take 1 capsule by mouth At Bedtime.  Dispense: 90 capsule; Refill: 1         BMI:   Estimated body mass index is 49.68 kg/m  as calculated from the following:    Height as of this encounter: 1.524 m (5').    Weight as of this encounter: 115.4 kg (254 lb 6.4 oz).         FUTURE APPOINTMENTS:       - Follow-up visit in 3 months.    No follow-ups on file.    Yuridia Simmons MD  [place]  [facility]

## 2022-03-21 NOTE — PROGRESS NOTES
Assessment & Plan   Problem List Items Addressed This Visit        Endocrine    Mixed hyperlipidemia    Relevant Medications    atorvastatin (LIPITOR) 10 MG tablet    Type 2 diabetes mellitus without complication, with long-term current use of insulin (H) - Primary    Relevant Medications    atorvastatin (LIPITOR) 10 MG tablet    glipiZIDE (GLUCOTROL XL) 10 MG 24 hr tablet    insulin glargine (BASAGLAR KWIKPEN) 100 UNIT/ML pen    metFORMIN (GLUCOPHAGE-XR) 500 MG 24 hr tablet    Other Relevant Orders    VENOUS COLLECTION (Completed)    HEMOGLOBIN A1C (BFP) (Completed)    ALBUMIN RANDOM URINE QUANTITATIVE (BFP)    Comprehensive Metobolic Panel (BFP)       Circulatory    Essential hypertension, benign    Relevant Medications    potassium chloride ER (MICRO-K) 10 MEQ CR capsule    losartan-hydrochlorothiazide (HYZAAR) 100-25 MG tablet      Other Visit Diagnoses     Uncontrolled type 2 diabetes mellitus with stage 2 chronic kidney disease, with long-term current use of insulin (H)        Relevant Medications    atorvastatin (LIPITOR) 10 MG tablet    glipiZIDE (GLUCOTROL XL) 10 MG 24 hr tablet    insulin glargine (BASAGLAR KWIKPEN) 100 UNIT/ML pen    metFORMIN (GLUCOPHAGE-XR) 500 MG 24 hr tablet    Essential hypertension        Relevant Medications    carvedilol (COREG) 6.25 MG tablet    losartan-hydrochlorothiazide (HYZAAR) 100-25 MG tablet    Chronic paroxysmal hemicrania, not intractable        Relevant Medications    carvedilol (COREG) 6.25 MG tablet    nortriptyline (PAMELOR) 10 MG capsule           Results for orders placed or performed in visit on 03/21/22   HEMOGLOBIN A1C (BFP)     Status: None   Result Value Ref Range    Hemoglobin A1C POCT 6.8 4.0 - 7.0 %     1. Type 2 diabetes mellitus without complication, with long-term current use of insulin (H)  Controlled, check labs, refilled medications.  - VENOUS COLLECTION  - HEMOGLOBIN A1C (BFP)  - ALBUMIN RANDOM URINE QUANTITATIVE (BFP)  - Comprehensive Metobolic  Panel (BFP)  - insulin glargine (BASAGLAR KWIKPEN) 100 UNIT/ML pen; Inject 66 Units Subcutaneous daily  Dispense: 75 mL; Refill: 1    2. Mixed hyperlipidemia  Refilled.    3. Essential hypertension, benign  Slightly high. Will check labs.  - potassium chloride ER (MICRO-K) 10 MEQ CR capsule; Take 1 capsule (10 mEq) by mouth 4 times daily  Dispense: 360 capsule; Refill: 1    4. Uncontrolled type 2 diabetes mellitus with stage 2 chronic kidney disease, with long-term current use of insulin (H)  Check labs, refilled medications, controlled.  - atorvastatin (LIPITOR) 10 MG tablet; Take 1 tablet (10 mg) by mouth daily  Dispense: 90 tablet; Refill: 1  - glipiZIDE (GLUCOTROL XL) 10 MG 24 hr tablet; Take 1 tablet (10 mg) by mouth 2 times daily  Dispense: 180 tablet; Refill: 1  - metFORMIN (GLUCOPHAGE-XR) 500 MG 24 hr tablet; Take 4 tablets (2,000 mg) by mouth daily (with dinner)  Dispense: 360 tablet; Refill: 1    5. BENIGN HYPERTENSION    - potassium chloride ER (MICRO-K) 10 MEQ CR capsule; Take 1 capsule (10 mEq) by mouth 4 times daily  Dispense: 360 capsule; Refill: 1    6. Essential hypertension  High today, watch at home. Increase hydrochlorothiazide portion of medication, refilled.  - carvedilol (COREG) 6.25 MG tablet; Take 1 tablet (6.25 mg) by mouth 2 times daily (with meals.)  Dispense: 180 tablet; Refill: 1  - losartan-hydrochlorothiazide (HYZAAR) 100-25 MG tablet; Take 1 tablet by mouth daily  Dispense: 90 tablet; Refill: 1    7. Chronic paroxysmal hemicrania, not intractable  Refilled.  - nortriptyline (PAMELOR) 10 MG capsule; Take 1 capsule by mouth At Bedtime.  Dispense: 90 capsule; Refill: 1         BMI:   Estimated body mass index is 49.68 kg/m  as calculated from the following:    Height as of this encounter: 1.524 m (5').    Weight as of this encounter: 115.4 kg (254 lb 6.4 oz).         FUTURE APPOINTMENTS:       - Follow-up visit in 3 months.    No follow-ups on file.    Yuridia Simmons MD  [place]    Subjective     Nursing Notes:   Latha Watts CMA  3/21/2022 10:31 AM  Signed  Chief Complaint   Patient presents with     Recheck Medication     fasting today, recheck A1C, refill medications     Pre-visit Screening:  Immunizations:  up to date  Colonoscopy:  is up to date  Mammogram: is up to date  Asthma Action Test/Plan:  MELLISSA  PHQ9:  NA  GAD7:  NA  Questioned patient about current smoking habits Pt. has never smoked.  Ok to leave detailed message on voice mail for today's visit only Yes, phone # [phone]           Alma Kraft is a 71 year old female who presents to clinic today for the following health issues   HPI     Here to followup on her diabetes. bs 114 today, day prior 122. Is diong 66 units is adjusting as needed. Is checking blood sugars daily.     Cholesterol medications-- no problems or side effects, is taking daily.  Blood pressure--is a little high today. Doesn't check it at home.        Review of Systems   Constitutional, HEENT, cardiovascular, pulmonary, gi and gu systems are negative, except as otherwise noted.      Objective    BP (!) 142/84 (BP Location: Right arm, Patient Position: Sitting, Cuff Size: Adult Large)   Pulse 79   Temp 98.4  F (36.9  C) (Temporal)   Ht 1.524 m (5')   Wt 115.4 kg (254 lb 6.4 oz)   LMP 05/12/2001   SpO2 96%   BMI 49.68 kg/m    Body mass index is 49.68 kg/m .  Physical Exam   GENERAL: healthy, alert and no distress  RESP: lungs clear to auscultation - no rales, rhonchi or wheezes  CV: regular rate and rhythm, normal S1 S2, no S3 or S4, no murmur, click or rub, no peripheral edema and peripheral pulses strong  MS: no gross musculoskeletal defects noted, no edema  NEURO: Normal strength and tone, mentation intact and speech normal  PSYCH: mentation appears normal, affect normal/bright    Results for orders placed or performed in visit on 03/21/22   HEMOGLOBIN A1C (BFP)     Status: None   Result Value Ref Range    Hemoglobin A1C  POCT 6.8 4.0 - 7.0 %

## 2022-03-21 NOTE — NURSING NOTE
Chief Complaint   Patient presents with     Recheck Medication     fasting today, recheck A1C, refill medications     Pre-visit Screening:  Immunizations:  up to date  Colonoscopy:  is up to date  Mammogram: is up to date  Asthma Action Test/Plan:  NA  PHQ9:  NA  GAD7:  NA  Questioned patient about current smoking habits Pt. has never smoked.  Ok to leave detailed message on voice mail for today's visit only Yes, phone # [phone]

## 2022-03-24 ENCOUNTER — OFFICE VISIT (OUTPATIENT)
Dept: FAMILY MEDICINE | Facility: CLINIC | Age: 72
End: 2022-03-24

## 2022-03-24 DIAGNOSIS — Z79.4 TYPE 2 DIABETES MELLITUS WITHOUT COMPLICATION, WITH LONG-TERM CURRENT USE OF INSULIN (H): Primary | ICD-10-CM

## 2022-03-24 DIAGNOSIS — E11.9 TYPE 2 DIABETES MELLITUS WITHOUT COMPLICATION, WITH LONG-TERM CURRENT USE OF INSULIN (H): Primary | ICD-10-CM

## 2022-03-25 NOTE — PROGRESS NOTES
SUBJECTIVE/OBJECTIVE:                Alma Kraft is a 71 year old female seen for a follow-up visit for Medication Management Services.  She was referred to me from Dr. Yuridia Simmons.     Chief Complaint: Follow up from [facility] visit on 01/13/2022.      diabetes:  Current Medications:  Current Outpatient Medications   Medication     amoxicillin (AMOXIL) 500 MG capsule     ASPIRIN 81 MG OR TABS     atorvastatin (LIPITOR) 10 MG tablet     blood glucose (NO BRAND SPECIFIED) test strip     blood glucose monitoring (ONE TOUCH DELICA) lancets     Blood Glucose Monitoring Suppl (ONE TOUCH ULTRA 2) W/DEVICE KIT     budesonide (RINOCORT AQUA) 32 MCG/ACT nasal spray     carvedilol (COREG) 6.25 MG tablet     celecoxib (CELEBREX) 200 MG capsule     cyclobenzaprine (FLEXERIL) 5 MG tablet     glipiZIDE (GLUCOTROL XL) 10 MG 24 hr tablet     insulin glargine (BASAGLAR KWIKPEN) 100 UNIT/ML pen     insulin pen needle (BD ROSE U/F) 32G X 4 MM miscellaneous     linagliptin (TRADJENTA) 5 MG TABS tablet     losartan-hydrochlorothiazide (HYZAAR) 100-25 MG tablet     metFORMIN (GLUCOPHAGE-XR) 500 MG 24 hr tablet     Multiple Vitamins-Minerals (CENTRUM SILVER) per tablet     nortriptyline (PAMELOR) 10 MG capsule     omeprazole (PRILOSEC) 20 MG DR capsule     ORDER FOR DME     potassium chloride ER (MICRO-K) 10 MEQ CR capsule     venlafaxine (EFFEXOR-XR) 150 MG 24 hr capsule     No current facility-administered medications for this visit.     We discussed the benefits and risks of each medication.  Patient Questions/Concerns:  Would like to discuss supplement.  Labs:  A1c 6.8  Additional Information:      ASSESSMENT/PLAN:                diabetes:  Assessment: Has held control over the last 3 months with A1c at 6.8. She has increased insulin slightly to 66 units. Fasting readings have been consistently in the low 100s.     Jeny wanted to also discuss the supplement SlimMD Keto BHB. The ingredients are raspberry ketones, african katja,  green tea extract, caffeine, apple cider vinegar powder, kelp, and grape seed extract. Raspberry ketones and african katja can effect BS levels, and green tea extract can cause liver damage with higher doses. Seeing that she has been diagnosed with a fatty liver and has diabetes, I have recommended to not take this supplement.  Status: A1c 6.8  Drug Therapy Problems:  1) None at this time.  Plan:  1) Follow up in 6 months.      I spent 45 minutes with this patient today.  All changes were made via collaborative practice agreement with Yuridia Simmons. A copy of the visit note was provided to the patient's primary care pro    Romina Caro, PharmD  Clinical Pharmacist  [facility]  General Clinic Phone: [phone]  Direct Office Phone: [phone]

## 2022-05-20 ENCOUNTER — TRANSFERRED RECORDS (OUTPATIENT)
Dept: FAMILY MEDICINE | Facility: CLINIC | Age: 72
End: 2022-05-20

## 2022-05-21 DIAGNOSIS — R52 PAIN: ICD-10-CM

## 2022-05-22 NOTE — TELEPHONE ENCOUNTER
Received incoming refill request for  Pending Prescriptions:                       Disp   Refills    celecoxib (CELEBREX) 200 MG capsule [Phar*90 cap*0            Sig: Take 1 capsule (200 mg) by mouth daily.    Patient last had a refill of this medication on 2/9/22 and patient was last seen on 3/21/22 for her diabetic check and saw Romina shortly after that. Routing to Dr. Simmons for approval of one month supply to get patient until next month when they are due to be seen.

## 2022-05-23 RX ORDER — CELECOXIB 200 MG/1
200 CAPSULE ORAL DAILY
Qty: 30 CAPSULE | Refills: 0 | Status: SHIPPED | OUTPATIENT
Start: 2022-05-23 | End: 2022-06-26

## 2022-05-31 ENCOUNTER — HOSPITAL ENCOUNTER (OUTPATIENT)
Dept: MAMMOGRAPHY | Facility: CLINIC | Age: 72
Discharge: HOME | End: 2022-05-31
Attending: FAMILY MEDICINE | Admitting: FAMILY MEDICINE
Payer: MEDICARE

## 2022-05-31 ENCOUNTER — TRANSFERRED RECORDS (OUTPATIENT)
Dept: FAMILY MEDICINE | Facility: CLINIC | Age: 72
End: 2022-05-31

## 2022-05-31 DIAGNOSIS — Z12.31 VISIT FOR SCREENING MAMMOGRAM: ICD-10-CM

## 2022-05-31 PROCEDURE — 77067 SCR MAMMO BI INCL CAD: CPT

## 2022-06-03 ENCOUNTER — HOSPITAL ENCOUNTER (OUTPATIENT)
Facility: CLINIC | Age: 72
End: 2022-06-03
Attending: ORTHOPAEDIC SURGERY | Admitting: ORTHOPAEDIC SURGERY
Payer: MEDICARE

## 2022-06-05 DIAGNOSIS — F32.5 MAJOR DEPRESSION IN REMISSION (H): ICD-10-CM

## 2022-06-08 RX ORDER — VENLAFAXINE HYDROCHLORIDE 150 MG/1
150 CAPSULE, EXTENDED RELEASE ORAL DAILY
Qty: 90 CAPSULE | Refills: 0 | COMMUNITY
Start: 2022-06-08

## 2022-06-08 NOTE — TELEPHONE ENCOUNTER
Alma Kraft is requesting a refill of:    Refused Prescriptions:                       Disp   Refills    venlafaxine (EFFEXOR XR) 150 MG 24 hr caps*90 cap*0        Sig: Take 1 capsule (150 mg) by mouth daily.  Refused By: RAVEN JENKINS  Reason for Refusal: Patient needs appointment

## 2022-06-09 ENCOUNTER — OFFICE VISIT (OUTPATIENT)
Dept: FAMILY MEDICINE | Facility: CLINIC | Age: 72
End: 2022-06-09

## 2022-06-09 VITALS
OXYGEN SATURATION: 99 % | HEART RATE: 74 BPM | HEIGHT: 60 IN | DIASTOLIC BLOOD PRESSURE: 100 MMHG | BODY MASS INDEX: 50.45 KG/M2 | TEMPERATURE: 97.2 F | SYSTOLIC BLOOD PRESSURE: 164 MMHG | WEIGHT: 257 LBS

## 2022-06-09 DIAGNOSIS — J30.2 CHRONIC SEASONAL ALLERGIC RHINITIS: ICD-10-CM

## 2022-06-09 DIAGNOSIS — K74.69 OTHER CIRRHOSIS OF LIVER (H): ICD-10-CM

## 2022-06-09 DIAGNOSIS — E11.9 TYPE 2 DIABETES MELLITUS WITHOUT COMPLICATION, WITH LONG-TERM CURRENT USE OF INSULIN (H): ICD-10-CM

## 2022-06-09 DIAGNOSIS — R76.8 ELEVATED ANTI-TISSUE TRANSGLUTAMINASE (TTG) IGA LEVEL: ICD-10-CM

## 2022-06-09 DIAGNOSIS — Z23 ENCOUNTER FOR VACCINATION: ICD-10-CM

## 2022-06-09 DIAGNOSIS — Z79.4 TYPE 2 DIABETES MELLITUS WITHOUT COMPLICATION, WITH LONG-TERM CURRENT USE OF INSULIN (H): Primary | ICD-10-CM

## 2022-06-09 DIAGNOSIS — Z79.4 TYPE 2 DIABETES MELLITUS WITHOUT COMPLICATION, WITH LONG-TERM CURRENT USE OF INSULIN (H): ICD-10-CM

## 2022-06-09 DIAGNOSIS — I10 ESSENTIAL HYPERTENSION, BENIGN: Primary | ICD-10-CM

## 2022-06-09 DIAGNOSIS — E11.9 TYPE 2 DIABETES MELLITUS WITHOUT COMPLICATION, WITH LONG-TERM CURRENT USE OF INSULIN (H): Primary | ICD-10-CM

## 2022-06-09 DIAGNOSIS — F32.5 MAJOR DEPRESSION IN REMISSION (H): ICD-10-CM

## 2022-06-09 DIAGNOSIS — Z13.21 ENCOUNTER FOR VITAMIN DEFICIENCY SCREENING: ICD-10-CM

## 2022-06-09 DIAGNOSIS — E78.2 MIXED HYPERLIPIDEMIA: ICD-10-CM

## 2022-06-09 LAB
ALBUMIN (URINE) MG/L: 10
ALBUMIN SERPL-MCNC: 3.4 G/DL (ref 3.6–5.1)
ALBUMIN URINE MG/G CR: <30 MG/G CREATININE
ALBUMIN/GLOB SERPL: 0.9 {RATIO} (ref 1–2.5)
ALP SERPL-CCNC: 127 U/L (ref 33–130)
ALT 1742-6: 25 U/L (ref 0–32)
AST 1920-8: 32 U/L (ref 0–35)
BILIRUB SERPL-MCNC: 1.2 MG/DL (ref 0.2–1.2)
BUN SERPL-MCNC: 13 MG/DL (ref 7–25)
BUN/CREATININE RATIO: 23.6 (ref 6–22)
CALCIUM SERPL-MCNC: 9.3 MG/DL (ref 8.6–10.3)
CHLORIDE SERPLBLD-SCNC: 102 MMOL/L (ref 98–110)
CHOLEST SERPL-MCNC: 147 MG/DL (ref 0–199)
CHOLEST/HDLC SERPL: 2 {RATIO} (ref 0–5)
CO2 SERPL-SCNC: 30.8 MMOL/L (ref 20–32)
CREAT SERPL-MCNC: 0.56 MG/DL (ref 0.6–1.3)
CREATININE URINE MG/DL: 100 MG/DL
GLOBULIN, CALCULATED - QUEST: 3.8 (ref 1.9–3.7)
GLUCOSE SERPL-MCNC: 140 MG/DL (ref 60–99)
HBA1C MFR BLD: 7.5 % (ref 4–7)
HDLC SERPL-MCNC: 62 MG/DL (ref 40–150)
LDLC SERPL CALC-MCNC: 72 MG/DL (ref 0–130)
POTASSIUM SERPL-SCNC: 3.88 MMOL/L (ref 3.5–5.3)
PROT SERPL-MCNC: 7.2 G/DL (ref 6.1–8.1)
SODIUM SERPL-SCNC: 140.2 MMOL/L (ref 135–146)
TRIGL SERPL-MCNC: 64 MG/DL (ref 0–149)

## 2022-06-09 PROCEDURE — 36415 COLL VENOUS BLD VENIPUNCTURE: CPT | Performed by: FAMILY MEDICINE

## 2022-06-09 PROCEDURE — 82043 UR ALBUMIN QUANTITATIVE: CPT | Performed by: FAMILY MEDICINE

## 2022-06-09 PROCEDURE — 80053 COMPREHEN METABOLIC PANEL: CPT | Performed by: FAMILY MEDICINE

## 2022-06-09 PROCEDURE — 83036 HEMOGLOBIN GLYCOSYLATED A1C: CPT | Performed by: FAMILY MEDICINE

## 2022-06-09 PROCEDURE — G0010 ADMIN HEPATITIS B VACCINE: HCPCS | Performed by: FAMILY MEDICINE

## 2022-06-09 PROCEDURE — 90746 HEPB VACCINE 3 DOSE ADULT IM: CPT | Performed by: FAMILY MEDICINE

## 2022-06-09 PROCEDURE — 99214 OFFICE O/P EST MOD 30 MIN: CPT | Mod: 25 | Performed by: FAMILY MEDICINE

## 2022-06-09 PROCEDURE — 82570 ASSAY OF URINE CREATININE: CPT | Performed by: FAMILY MEDICINE

## 2022-06-09 PROCEDURE — 80061 LIPID PANEL: CPT | Performed by: FAMILY MEDICINE

## 2022-06-09 RX ORDER — AMLODIPINE BESYLATE 5 MG/1
5 TABLET ORAL DAILY
Qty: 90 TABLET | Refills: 0 | Status: SHIPPED | OUTPATIENT
Start: 2022-06-09 | End: 2022-06-29

## 2022-06-09 RX ORDER — LINAGLIPTIN 5 MG/1
5 TABLET, FILM COATED ORAL DAILY
Qty: 90 TABLET | Refills: 1 | Status: SHIPPED | OUTPATIENT
Start: 2022-06-09 | End: 2023-01-19

## 2022-06-09 RX ORDER — VENLAFAXINE HYDROCHLORIDE 150 MG/1
150 CAPSULE, EXTENDED RELEASE ORAL DAILY
Qty: 90 CAPSULE | Refills: 1 | Status: SHIPPED | OUTPATIENT
Start: 2022-06-09 | End: 2022-11-29

## 2022-06-09 ASSESSMENT — ANXIETY QUESTIONNAIRES
6. BECOMING EASILY ANNOYED OR IRRITABLE: NOT AT ALL
7. FEELING AFRAID AS IF SOMETHING AWFUL MIGHT HAPPEN: NOT AT ALL
2. NOT BEING ABLE TO STOP OR CONTROL WORRYING: NOT AT ALL
5. BEING SO RESTLESS THAT IT IS HARD TO SIT STILL: SEVERAL DAYS
3. WORRYING TOO MUCH ABOUT DIFFERENT THINGS: NOT AT ALL
1. FEELING NERVOUS, ANXIOUS, OR ON EDGE: SEVERAL DAYS
GAD7 TOTAL SCORE: 2
GAD7 TOTAL SCORE: 2
IF YOU CHECKED OFF ANY PROBLEMS ON THIS QUESTIONNAIRE, HOW DIFFICULT HAVE THESE PROBLEMS MADE IT FOR YOU TO DO YOUR WORK, TAKE CARE OF THINGS AT HOME, OR GET ALONG WITH OTHER PEOPLE: NOT DIFFICULT AT ALL

## 2022-06-09 ASSESSMENT — PATIENT HEALTH QUESTIONNAIRE - PHQ9
SUM OF ALL RESPONSES TO PHQ QUESTIONS 1-9: 6
5. POOR APPETITE OR OVEREATING: NOT AT ALL

## 2022-06-09 NOTE — NURSING NOTE
Patient was given a Hep A vaccine that was  by 1 week so I did call the patient and inform her of what happened and that we would need to give her another shot that is not . The patient was ok about the situation and had no concerns with this. I did tell the patient that she is able to come in any time next week that works for her and we would get the Hep A shot done for her. She expressed understanding to this and was informed that this would not cause any negative side effects for her. I cancelled the order in Epic for this and will put a new one in through a nurse only encounter once the patient comes in for it.

## 2022-06-09 NOTE — PROGRESS NOTES
Assessment & Plan   Problem List Items Addressed This Visit        Digestive    Other cirrhosis of liver (H)       Endocrine    Mixed hyperlipidemia    Type 2 diabetes mellitus without complication, with long-term current use of insulin (H)    Relevant Medications    blood glucose (NO BRAND SPECIFIED) test strip    blood glucose (NO BRAND SPECIFIED) lancets standard    linagliptin (TRADJENTA) 5 MG TABS tablet    Other Relevant Orders    ALBUMIN RANDOM URINE QUANTITATIVE (BFP) (Completed)    FOOT EXAM  NO CHARGE [51584.114] (Completed)    HEMOGLOBIN A1C (BFP) (Completed)    VENOUS COLLECTION (Completed)    Comprehensive Metobolic Panel (BFP) (Completed)    Lipid Panel (BFP) (Completed)       Circulatory    Essential hypertension, benign - Primary    Relevant Medications    amLODIPine (NORVASC) 5 MG tablet       Other    Elevated anti-tissue transglutaminase (tTG) IgA level      Other Visit Diagnoses     Chronic seasonal allergic rhinitis        Relevant Medications    budesonide (RINOCORT AQUA) 32 MCG/ACT nasal spray    Major depression in remission (H)        Relevant Medications    venlafaxine (EFFEXOR XR) 150 MG 24 hr capsule    Encounter for vitamin deficiency screening        Relevant Orders    VITAMIN D DEFICIENCY SCREENING ([facility]) (Completed)    Encounter for vaccination               1. Type 2 diabetes mellitus without complication, with long-term current use of insulin (H)  Controlled, check labs, and refilled medications.  - ALBUMIN RANDOM URINE QUANTITATIVE (BFP)  - FOOT EXAM  NO CHARGE [82401.114]  - blood glucose (NO BRAND SPECIFIED) test strip; Use to test blood sugar one times daily or as directed. To accompany: Blood Glucose Monitor [employer] One Touch Verio test strips  Dispense: 100 strip; Refill: 3  - blood glucose (NO BRAND SPECIFIED) lancets standard; Use to test blood sugar 1 times daily or as directed.  Dispense: 100 lancet; Refill: 3  - linagliptin (TRADJENTA) 5 MG TABS tablet; Take 1 tablet (5  mg) by mouth daily  Dispense: 90 tablet; Refill: 1  - HEMOGLOBIN A1C (BFP)  - VENOUS COLLECTION  - Comprehensive Metobolic Panel (BFP)  - Lipid Panel (BFP)    2. Chronic seasonal allergic rhinitis  Refilled.   - budesonide (RINOCORT AQUA) 32 MCG/ACT nasal spray; Spray 2 sprays into both nostrils daily  Dispense: 25.29 mL; Refill: 11    3. Major depression in remission (H)  Symptoms are controlled on medications, refilled.  - venlafaxine (EFFEXOR XR) 150 MG 24 hr capsule; Take 1 capsule (150 mg) by mouth daily  Dispense: 90 capsule; Refill: 1    4. Essential hypertension, benign  High today. Add amlodipine, watch for leg swelling  - amLODIPine (NORVASC) 5 MG tablet; Take 1 tablet (5 mg) by mouth daily  Dispense: 90 tablet; Refill: 0    5. Mixed hyperlipidemia      6. Encounter for vitamin deficiency screening  Check today. She is willing to sign waiver.  - VITAMIN D DEFICIENCY SCREENING ([facility])    7. Other cirrhosis of liver (H)  Followed by GI.    8. Encounter for vaccination      9. Elevated anti-tissue transglutaminase (tTG) IgA level  followup future apt to discuss this further.    Addendum: I called her with rheumatology referral         BMI:   Estimated body mass index is 50.19 kg/m  as calculated from the following:    Height as of this encounter: 1.524 m (5').    Weight as of this encounter: 116.6 kg (257 lb).         FUTURE APPOINTMENTS:       - Follow-up visit in 1 month blood pressure check.    No follow-ups on file.    Yuridia Simmons MD  [facility]    Subjective     Nursing Notes:   Lou Cortez  6/9/2022 10:10 AM  Signed  Chief Complaint   Patient presents with     Recheck Medication     Fasting med check      Diabetes     Diabetes check         Pre-visit Screening:  Immunizations:  up to date  Colonoscopy:  is up to date  Mammogram: is up to date  Asthma Action Test/Plan:  NA  PHQ9:  Done today  GAD7:  Done today  Questioned patient about current smoking habits Pt. has never  smoked.  Ok to leave detailed message on voice mail for today's visit only Yes, phone # [phone]          Ava Posadas, CARLENE  2022  1:51 PM  Signed  Patient was given a Hep A vaccine that was  by 1 week so I did call the patient and inform her of what happened and that we would need to give her another shot that is not . The patient was ok about the situation and had no concerns with this. I did tell the patient that she is able to come in any time next week that works for her and we would get the Hep A shot done for her. She expressed understanding to this and was informed that this would not cause any negative side effects for her. I cancelled the order in Epic for this and will put a new one in through a nurse only encounter once the patient comes in for it.        Alma Kraft is a 71 year old female who presents to clinic today for the following health issues   HPI     Here for diabetes recheck.    Diabetes--blood sugars--had a bad day yesterday so it was 122. 2 ays prior it was 118. Then 85. Is doing well on the medications    Blood pressure is high today. Doesn't check it at home when she saw GI blood pressure was very high.  Doesn't see cardiology.    Depression/anxiety--is on medications, this helps and needs a refill.    Saw GI for her liver diease. She wants her to have the A+B shot.     Wants vitamin d. No history vitamin d def. She would like to know what it is. Is willing to pay if ins doesn't cover.    GI also has elevated IGA level and was told to see her doctor. Unclear why this was checked.    Skin lesion on scalp that doesn't go away. Has a dermatologist. Wears a cpap at night. Moves around a lot and it slides.            Review of Systems   Constitutional, HEENT, cardiovascular, pulmonary, gi and gu systems are negative, except as otherwise noted.      Objective    BP (!) 164/100 (BP Location: Right arm, Patient Position: Sitting, Cuff Size: Adult Large)   Pulse 74    Temp 97.2  F (36.2  C) (Temporal)   Ht 1.524 m (5')   Wt 116.6 kg (257 lb)   LMP 05/12/2001   SpO2 99%   BMI 50.19 kg/m    Body mass index is 50.19 kg/m .  Physical Exam   GENERAL: healthy, alert and no distress  RESP: lungs clear to auscultation - no rales, rhonchi or wheezes  CV: regular rate and rhythm, normal S1 S2, no S3 or S4, no murmur, click or rub, no peripheral edema and peripheral pulses strong  ABDOMEN: soft, nontender, no hepatosplenomegaly, no masses and bowel sounds normal  MS: no gross musculoskeletal defects noted, no edema  NEURO: Normal strength and tone, mentation intact and speech normal  PSYCH: mentation appears normal, affect normal/bright    Results for orders placed or performed in visit on 06/09/22   ALBUMIN RANDOM URINE QUANTITATIVE (BFP)     Status: None   Result Value Ref Range    Albumin mg/L 10 30    Creatinine Urine mg/dL 100 300 mg/dL    Albumin Urine mg/g Cr <30 30 MG/G Creatinine   HEMOGLOBIN A1C (BFP)     Status: Abnormal   Result Value Ref Range    Hemoglobin A1C POCT 7.5 (A) 4.0 - 7.0 %   Comprehensive Metobolic Panel (BFP)     Status: Abnormal   Result Value Ref Range    Carbon Dioxide 30.8 20 - 32 mmol/L    Creatinine 0.56 (A) 0.60 - 1.30 mg/dL    Glucose 140 (A) 60 - 99 mg/dL    Sodium 140.2 135 - 146 mmol/L    Potassium 3.88 3.5 - 5.3 mmol/L    Chloride 102.0 98 - 110 mmol/L    Protein Total 7.2 6.1 - 8.1 g/dL    Albumin 3.4 (A) 3.6 - 5.1 g/dL    Alkaline Phosphatase 127 33 - 130 U/L    ALT 25 0 - 32 U/L    AST 32 0 - 35 U/L    Bilirubin Total 1.2 0.2 - 1.2 mg/dL    Urea Nitrogen 13 7 - 25 mg/dL    Calcium 9.3 8.6 - 10.3 mg/dL    BUN/Creatinine Ratio 23.6 (A) 6 - 22    Globulin Calculated 3.8 (A) 1.9 - 3.7    A/G Ratio 0.9 (A) 1 - 2.5   Lipid Panel (BFP)     Status: None   Result Value Ref Range    Cholesterol 147 0 - 199 mg/dL    Triglycerides 64 0 - 149 mg/dL    HDL Cholesterol 62 40 - 150 mg/dL    LDL Cholesterol Direct 72 0 - 130 mg/dL    Cholesterol/HDL Ratio 2 0 -  5   VITAMIN D DEFICIENCY SCREENING ([facility])     Status: Abnormal   Result Value Ref Range    Vitamin D 25-OH Total 27 (L) 30 - 100 ng/mL

## 2022-06-09 NOTE — PROGRESS NOTES
SUBJECTIVE/OBJECTIVE:                Alma Kraft is a 71 year old female seen for a follow-up visit for Medication Management Services.  She was referred to me from Dr. Yuridia Simmons.     Chief Complaint: Follow up from [facility] visit on 03/24/22.      diabetes:  Current Medications:  Current Outpatient Medications   Medication     amLODIPine (NORVASC) 5 MG tablet     amoxicillin (AMOXIL) 500 MG capsule     ASPIRIN 81 MG OR TABS     atorvastatin (LIPITOR) 10 MG tablet     blood glucose (NO BRAND SPECIFIED) lancets standard     blood glucose (NO BRAND SPECIFIED) test strip     blood glucose monitoring (ONE TOUCH DELICA) lancets     Blood Glucose Monitoring Suppl (ONE TOUCH ULTRA 2) W/DEVICE KIT     budesonide (RINOCORT AQUA) 32 MCG/ACT nasal spray     carvedilol (COREG) 6.25 MG tablet     celecoxib (CELEBREX) 200 MG capsule     cyclobenzaprine (FLEXERIL) 5 MG tablet     glipiZIDE (GLUCOTROL XL) 10 MG 24 hr tablet     insulin glargine (BASAGLAR KWIKPEN) 100 UNIT/ML pen     insulin pen needle (BD ROSE U/F) 32G X 4 MM miscellaneous     linagliptin (TRADJENTA) 5 MG TABS tablet     losartan-hydrochlorothiazide (HYZAAR) 100-25 MG tablet     metFORMIN (GLUCOPHAGE-XR) 500 MG 24 hr tablet     Multiple Vitamins-Minerals (CENTRUM SILVER) per tablet     nortriptyline (PAMELOR) 10 MG capsule     omeprazole (PRILOSEC) 20 MG DR capsule     ORDER FOR DME     potassium chloride ER (MICRO-K) 10 MEQ CR capsule     venlafaxine (EFFEXOR XR) 150 MG 24 hr capsule     No current facility-administered medications for this visit.     We discussed the benefits and risks of each medication.  Patient Questions/Concerns:    Labs:  Hemoglobin A1C POCT   Date Value Ref Range Status   06/09/2022 7.5 (A) 4.0 - 7.0 % Final   03/21/2022 6.8 4.0 - 7.0 % Final   12/01/2021 6.8 4.0 - 7.0 % Final     Additional Information:  Patient has been struggling with diet lately.     ASSESSMENT/PLAN:                hypertension:  Assessment: Not well controlled.  Amlodipine was added on today. Discussed benefits and side effects of medications.   Status: Not well controlled  Drug Therapy Problems:  1) Additional therapy needed  Plan:  1) Added amlodipine 5mg every day to regimen today.    Diabetes:  Assessment: A1c has increased, but fasting numbers still within range. Recommended taking some post-prandial readings to see if we could get a better idea of what her levels are at. May consider mealtime insulin in future if finding we have increased post-prandial levels. I do not want to change her long acting insulin at this time due to a couple of BGs in the 70s-80s. States has had a couple of difficult weeks diet wise, and is aware that food is where she turns when in stressful situations. She states she binges on any treats she has in the house. She tries to not keep treats in the house, but states lately it has been happening.   Status: Worsening control  Drug Therapy Problems:  1) Diet changes  2) Possible addition of mealtime insulin in the future dependent on post-prandial readings.  Plan:  1) Continue with current regimen at this time. Will reevaluate at next visit to see if any improvement with diet change and will look at post prandial readings if available.      I spent 30 minutes with this patient today.  All changes were made via collaborative practice agreement with Yuridia Simmons. A copy of the visit note was provided to the patient's primary care pro    Romina Caro, PharmD  Clinical Pharmacist  [facility]  General Clinic Phone: [phone]  Direct Office Phone: [phone]

## 2022-06-09 NOTE — Clinical Note
I suspect patients elevated A1c is mainly due to diet. States she has had a few rough weeks with binge eating. Please see chart. I do not want to make med changes today, but will evaluate again at next visit if still elevated.

## 2022-06-09 NOTE — LETTER
Berenice 10, 2022      Jeny CANADA Abena  [address]  [facility] [address]        Dear ,    We are writing to inform you of your test results.    Your labs:  Lipids were good  Urine protein was normal  Kidney and liver function were good  Vitamin d was too low. Make sure you are taking otc vitamin d 2000 units/day.    Resulted Orders   ALBUMIN RANDOM URINE QUANTITATIVE ([facility])   Result Value Ref Range    Albumin mg/L 10 30    Creatinine Urine mg/dL 100 300 mg/dL    Albumin Urine mg/g Cr <30 30 MG/G Creatinine   HEMOGLOBIN A1C ([facility])   Result Value Ref Range    Hemoglobin A1C POCT 7.5 (A) 4.0 - 7.0 %   Comprehensive Metobolic Panel ([facility])   Result Value Ref Range    Carbon Dioxide 30.8 20 - 32 mmol/L    Creatinine 0.56 (A) 0.60 - 1.30 mg/dL      Comment:      ran twice     Glucose 140 (A) 60 - 99 mg/dL    Sodium 140.2 135 - 146 mmol/L    Potassium 3.88 3.5 - 5.3 mmol/L    Chloride 102.0 98 - 110 mmol/L    Protein Total 7.2 6.1 - 8.1 g/dL    Albumin 3.4 (A) 3.6 - 5.1 g/dL    Alkaline Phosphatase 127 33 - 130 U/L    ALT 25 0 - 32 U/L    AST 32 0 - 35 U/L      Comment:      ran twice     Bilirubin Total 1.2 0.2 - 1.2 mg/dL    Urea Nitrogen 13 7 - 25 mg/dL    Calcium 9.3 8.6 - 10.3 mg/dL    BUN/Creatinine Ratio 23.6 (A) 6 - 22    Globulin Calculated 3.8 (A) 1.9 - 3.7    A/G Ratio 0.9 (A) 1 - 2.5   Lipid Panel ([facility])   Result Value Ref Range    Cholesterol 147 0 - 199 mg/dL    Triglycerides 64 0 - 149 mg/dL    HDL Cholesterol 62 40 - 150 mg/dL    LDL Cholesterol Direct 72 0 - 130 mg/dL    Cholesterol/HDL Ratio 2 0 - 5   VITAMIN D DEFICIENCY SCREENING ([facility])   Result Value Ref Range    Vitamin D 25-OH Total 27 (L) 30 - 100 ng/mL      Comment:      Vitamin D Status         25-OH Vitamin D:     Deficiency:                    <20 ng/mL  Insufficiency:             20 - 29 ng/mL  Optimal:                 > or = 30 ng/mL     For 25-OH Vitamin D testing on patients on   D2-supplementation and patients for whom quantitation    of D2 and D3 fractions is required, the QuestAssureD(TM)  25-OH VIT D, (D2,D3), LC/MS/MS is recommended: order   code 06103 (patients >2yrs).  See Note 1     Note 1     For additional information, please refer to   [email or web address]   (This link is being provided for informational/  educational purposes only.)         If you have any questions or concerns, please call the clinic at the number listed above.       Sincerely,      Yuridia Simmons MD

## 2022-06-09 NOTE — NURSING NOTE
Chief Complaint   Patient presents with     Recheck Medication     Fasting med check      Diabetes     Diabetes check         Pre-visit Screening:  Immunizations:  up to date  Colonoscopy:  is up to date  Mammogram: is up to date  Asthma Action Test/Plan:  NA  PHQ9:  Done today  GAD7:  Done today  Questioned patient about current smoking habits Pt. has never smoked.  Ok to leave detailed message on voice mail for today's visit only Yes, phone # [phone]

## 2022-06-10 LAB — VITAMIN D, 25-OH, TOTAL - QUEST: 27 NG/ML (ref 30–100)

## 2022-06-20 DIAGNOSIS — E11.9 TYPE 2 DIABETES MELLITUS WITHOUT COMPLICATION, WITH LONG-TERM CURRENT USE OF INSULIN (H): Primary | ICD-10-CM

## 2022-06-20 DIAGNOSIS — Z79.4 TYPE 2 DIABETES MELLITUS WITHOUT COMPLICATION, WITH LONG-TERM CURRENT USE OF INSULIN (H): Primary | ICD-10-CM

## 2022-06-20 NOTE — TELEPHONE ENCOUNTER
Rose called stating that in order for her lancets to be covered by Medicare Part B-- the brand that she uses has to be in the comments. She uses Once Touch Delica.    Rose is requesting a new script. Routing to kit Lindsey.      Pending Prescriptions:                       Disp   Refills    blood glucose (NO BRAND SPECIFIED) lancet*100 la*3            Sig: Use to test blood sugar 1 times daily or as           directed.  ONE TOUCH DELICA    Signed Prescriptions:                        Disp   Refills    blood glucose (NO BRAND SPECIFIED) lancets*100 ea*3        Sig: Use to test blood sugar one times daily or as           directed.  Authorizing Provider: TERRA DAVIS

## 2022-06-20 NOTE — TELEPHONE ENCOUNTER
Alma Kraft is requesting a refill of:    Pending Prescriptions:                       Disp   Refills    blood glucose (NO BRAND SPECIFIED) lancet*100 ea*3            Sig: Use to test blood sugar one times daily or as           directed.    Please close encounter if RX was sent. Thanks, Geneva

## 2022-06-26 DIAGNOSIS — R52 PAIN: ICD-10-CM

## 2022-06-27 RX ORDER — CELECOXIB 200 MG/1
200 CAPSULE ORAL DAILY
Qty: 30 CAPSULE | Refills: 0 | Status: SHIPPED | OUTPATIENT
Start: 2022-06-27 | End: 2022-07-25

## 2022-06-27 NOTE — TELEPHONE ENCOUNTER
Received incoming refill request for  Pending Prescriptions:                       Disp   Refills    celecoxib (CELEBREX) 200 MG capsule [Phar*30 cap*0            Sig: Take 1 capsule (200 mg) by mouth daily.    Patient has been seen for their med check appt, routing to Dr. Simmons for approval.

## 2022-06-28 NOTE — PROGRESS NOTES
Assessment & Plan   Problem List Items Addressed This Visit        Circulatory    Essential hypertension, benign - Primary    Relevant Medications    amLODIPine (NORVASC) 10 MG tablet      Other Visit Diagnoses     Encounter for vaccination             1. Essential hypertension, benign  Blood pressure is still high. Increase norvasc to 10 mg/day, watch at home and bring in blood pressure readings.   - amLODIPine (NORVASC) 10 MG tablet; Take 1 tablet (10 mg) by mouth daily  Dispense: 90 tablet; Refill: 0    2. Encounter for vaccination             BMI:   Estimated body mass index is 49.84 kg/m  as calculated from the following:    Height as of this encounter: 1.524 m (5').    Weight as of this encounter: 115.8 kg (255 lb 3.2 oz).         FUTURE APPOINTMENTS:       - Follow-up visit in 3 weeks.    No follow-ups on file.    Yuridia Simmons MD  [facility]    Subjective     Nursing Notes:   Latha Watts [facility]  6/29/2022 11:37 AM  Signed  Chief Complaint   Patient presents with     Recheck Medication     Recheck BP      Pre-visit Screening:  Immunizations:  up to date  Colonoscopy:  is up to date  Mammogram: is up to date  Asthma Action Test/Plan:  NA  PHQ9:  NA  GAD7:  NA  Questioned patient about current smoking habits Pt. has never smoked.  Ok to leave detailed message on voice mail for today's visit only Yes, phone # [phone]           Alma Kraft is a 71 year old female who presents to clinic today for the following health issues  HPI     Here for high blood pressure.  We added amlodipine last visit.  She is maxed out on her other blood pressure medications.        Review of Systems   Constitutional, HEENT, cardiovascular, pulmonary, gi and gu systems are negative, except as otherwise noted.      Objective    BP (!) 152/78 (BP Location: Left arm, Patient Position: Sitting, Cuff Size: Adult Large)   Pulse 81   Temp 97.3  F (36.3  C) (Temporal)   Ht 1.524 m (5')   Wt 115.8 kg (255 lb  3.2 oz)   LMP 05/12/2001   SpO2 98%   BMI 49.84 kg/m    Body mass index is 49.84 kg/m .  Physical Exam   GENERAL: healthy, alert and no distress  MS: no gross musculoskeletal defects noted, no edema  NEURO: Normal strength and tone, mentation intact and speech normal  PSYCH: mentation appears normal, affect normal/bright

## 2022-06-29 ENCOUNTER — OFFICE VISIT (OUTPATIENT)
Dept: FAMILY MEDICINE | Facility: CLINIC | Age: 72
End: 2022-06-29

## 2022-06-29 VITALS
TEMPERATURE: 97.3 F | BODY MASS INDEX: 50.1 KG/M2 | HEIGHT: 60 IN | WEIGHT: 255.2 LBS | OXYGEN SATURATION: 98 % | DIASTOLIC BLOOD PRESSURE: 78 MMHG | SYSTOLIC BLOOD PRESSURE: 152 MMHG | HEART RATE: 81 BPM

## 2022-06-29 DIAGNOSIS — I10 ESSENTIAL HYPERTENSION, BENIGN: Primary | ICD-10-CM

## 2022-06-29 DIAGNOSIS — Z23 ENCOUNTER FOR VACCINATION: ICD-10-CM

## 2022-06-29 PROCEDURE — 99213 OFFICE O/P EST LOW 20 MIN: CPT | Mod: 25 | Performed by: FAMILY MEDICINE

## 2022-06-29 PROCEDURE — 90471 IMMUNIZATION ADMIN: CPT | Performed by: FAMILY MEDICINE

## 2022-06-29 PROCEDURE — 90632 HEPA VACCINE ADULT IM: CPT | Performed by: FAMILY MEDICINE

## 2022-06-29 RX ORDER — AMLODIPINE BESYLATE 10 MG/1
10 TABLET ORAL DAILY
Qty: 90 TABLET | Refills: 0 | Status: SHIPPED | OUTPATIENT
Start: 2022-06-29 | End: 2022-09-15

## 2022-06-29 NOTE — NURSING NOTE
Chief Complaint   Patient presents with     Recheck Medication     Recheck BP      Pre-visit Screening:  Immunizations:  up to date  Colonoscopy:  is up to date  Mammogram: is up to date  Asthma Action Test/Plan:  NA  PHQ9:  NA  GAD7:  NA  Questioned patient about current smoking habits Pt. has never smoked.  Ok to leave detailed message on voice mail for today's visit only Yes, phone # [phone]

## 2022-07-18 NOTE — PROGRESS NOTES
Assessment & Plan   Problem List Items Addressed This Visit        Circulatory    Essential hypertension, benign - Primary    Relevant Orders    Adult Cardiology Eval  Referral         1. Essential hypertension, benign  Continue same medications. She is maxed out on all of them for her blood pressure. I will refer her to cardiology for addition management.  - Adult Cardiology Eval  Referral; Future           BMI:   Estimated body mass index is 49.84 kg/m  as calculated from the following:    Height as of 6/29/22: 1.524 m (5').    Weight as of 6/29/22: 115.8 kg (255 lb 3.2 oz).         FUTURE APPOINTMENTS:       - Follow-up visit with cardiology    No follow-ups on file.    Yuridia Simmons MD  [facility]    Subjective     Nursing Notes:   Latha Watts CMA  7/20/2022  3:38 PM  Signed  Chief Complaint   Patient presents with     RECHECK     Recheck BP     Pre-visit Screening:  Immunizations:  up to date  Colonoscopy:  is up to date  Mammogram: is up to date  Asthma Action Test/Plan:  NA  PHQ9:  NA  GAD7:  NA  Questioned patient about current smoking habits Pt. has never smoked.  Ok to leave detailed message on voice mail for today's visit only Yes, phone # [phone]           Alma Kraft is a 71 year old female who presents to clinic today for the following health issues   HPI     Was told to followup on her blood pressure. I changed her medications and it's still high. At home it's still 120-130s.   Had a very busy day today and was rushing around before she came in today.  She is frustrated that her blood pressure is still high.      Review of Systems   Constitutional, HEENT, cardiovascular, pulmonary, gi and gu systems are negative, except as otherwise noted.      Objective    BP (!) 154/78 (BP Location: Right arm, Patient Position: Sitting, Cuff Size: Adult Large)   Pulse 84   Temp 97.7  F (36.5  C) (Temporal)   LMP 05/12/2001   SpO2 98%   There is no height or  weight on file to calculate BMI.  Physical Exam   GENERAL: healthy, alert and no distress  MS: no gross musculoskeletal defects noted, no edema  NEURO: Normal strength and tone, mentation intact and speech normal  PSYCH: mentation appears normal, affect normal/bright

## 2022-07-20 ENCOUNTER — OFFICE VISIT (OUTPATIENT)
Dept: FAMILY MEDICINE | Facility: CLINIC | Age: 72
End: 2022-07-20

## 2022-07-20 VITALS
SYSTOLIC BLOOD PRESSURE: 154 MMHG | TEMPERATURE: 97.7 F | HEART RATE: 84 BPM | OXYGEN SATURATION: 98 % | DIASTOLIC BLOOD PRESSURE: 78 MMHG

## 2022-07-20 DIAGNOSIS — I10 ESSENTIAL HYPERTENSION, BENIGN: Primary | ICD-10-CM

## 2022-07-20 PROCEDURE — 90746 HEPB VACCINE 3 DOSE ADULT IM: CPT | Performed by: FAMILY MEDICINE

## 2022-07-20 PROCEDURE — 99213 OFFICE O/P EST LOW 20 MIN: CPT | Mod: 25 | Performed by: FAMILY MEDICINE

## 2022-07-20 PROCEDURE — G0010 ADMIN HEPATITIS B VACCINE: HCPCS | Performed by: FAMILY MEDICINE

## 2022-07-20 NOTE — NURSING NOTE
Chief Complaint   Patient presents with     RECHECK     Recheck BP     Pre-visit Screening:  Immunizations:  up to date  Colonoscopy:  is up to date  Mammogram: is up to date  Asthma Action Test/Plan:  NA  PHQ9:  NA  GAD7:  NA  Questioned patient about current smoking habits Pt. has never smoked.  Ok to leave detailed message on voice mail for today's visit only Yes, phone # [phone]

## 2022-07-24 DIAGNOSIS — R52 PAIN: ICD-10-CM

## 2022-07-24 NOTE — TELEPHONE ENCOUNTER
Received incoming refill request for  Pending Prescriptions:                       Disp   Refills    celecoxib (CELEBREX) 200 MG capsule [Phar*30 cap*0            Sig: Take 1 capsule (200 mg) by mouth daily.    Patient has been seen recently, routing to Dr. Simmons for approval or denial.

## 2022-07-25 RX ORDER — CELECOXIB 200 MG/1
200 CAPSULE ORAL DAILY
Qty: 30 CAPSULE | Refills: 0 | Status: SHIPPED | OUTPATIENT
Start: 2022-07-25 | End: 2022-08-24

## 2022-08-30 ENCOUNTER — OFFICE VISIT (OUTPATIENT)
Dept: CARDIOLOGY | Facility: CLINIC | Age: 72
End: 2022-08-30
Payer: MEDICARE

## 2022-08-30 VITALS
DIASTOLIC BLOOD PRESSURE: 70 MMHG | HEIGHT: 60 IN | OXYGEN SATURATION: 97 % | HEART RATE: 75 BPM | SYSTOLIC BLOOD PRESSURE: 138 MMHG | WEIGHT: 262.9 LBS | BODY MASS INDEX: 51.61 KG/M2

## 2022-08-30 DIAGNOSIS — I10 ESSENTIAL HYPERTENSION, BENIGN: ICD-10-CM

## 2022-08-30 DIAGNOSIS — R01.1 HEART MURMUR: Primary | ICD-10-CM

## 2022-08-30 DIAGNOSIS — I10 ESSENTIAL HYPERTENSION: ICD-10-CM

## 2022-08-30 PROCEDURE — 99204 OFFICE O/P NEW MOD 45 MIN: CPT | Performed by: INTERNAL MEDICINE

## 2022-08-30 PROCEDURE — 93000 ELECTROCARDIOGRAM COMPLETE: CPT | Performed by: INTERNAL MEDICINE

## 2022-08-30 RX ORDER — CARVEDILOL 12.5 MG/1
TABLET ORAL
Qty: 180 TABLET | Refills: 3 | Status: SHIPPED | OUTPATIENT
Start: 2022-08-30 | End: 2022-11-22

## 2022-08-30 NOTE — LETTER
8/30/2022    Yuridia Simmons MD  [address] [facility] [address]    RE: Alma Kraft       Dear Colleague,     I had the pleasure of seeing Alma Kraft in the [facility].  HISTORY:    Alma Kraft is a pleasant 71-year-old female with a history of longstanding hypertension which has been difficult to control.  She was asked to see cardiology today for this reason.  She also has a history of hyperlipidemia, type 2 diabetes, morbid obesity, obstructive sleep apnea using CPAP, and cirrhosis of the liver.    Senait reports that she has a bad right shoulder causing frequent pain for which she uses Celebrex.  She does not sleep well at night primarily because of shoulder pain.  She does use her CPAP each night.  She does not use alcohol and she uses no other nonsteroidal agents, occasionally using Tylenol.  She is not a smoker.  She does have some issues with edema for which she successfully uses compression stockings.    The patient denies exertional chest arm neck shoulder or jaw discomfort.  She has never had any problems with PND or orthopnea or any sense of palpitations.  She denies syncope or near syncope, strokelike symptoms, orthostasis, or symptoms of claudication.    Today's ECG shows sinus rhythm with poor R wave progression, likely a normal variant but cannot exclude anterior infarct.    Examination today remarkable for 2/6 harsh systolic ejection murmur radiating to the carotids, left greater than right, consistent with aortic sclerosis or stenosis.    Currently Senait is using amlodipine 10 mg daily, carvedilol 6.25 mg twice daily, and losartan hydrochlorothiazide 100-25 on a daily basis.  She reports that she does not check her blood pressure at home but her office blood pressure today is 138/70.      ASSESSMENT/PLAN:    1.  Hypertension.  There are not any issues to suggest this is anything other than essential hypertension.  She has no other medical problems  or symptoms to suggest otherwise.  She has a normal previous TSH.  There are several ongoing stimuli that are keeping her blood pressure high including ongoing use of Celebrex and her sleep apnea as well as her obesity.  Today I have increased her carvedilol to 12.5 mg twice daily.  Her heart rate and blood pressure will tolerate this.  This medication can be doubled again to 25 mg twice daily and if blood pressure and heart rate are still adequate a dose of 50 mg twice daily would be appropriate and would likely keep her blood pressure under substantially better control.  If blood pressures not well controlled with carvedilol changes, the next change that I would recommend would be to switch her to olmesartan 40 mg daily along with either spironolactone or chlorthalidone 25 mg daily.  Meanwhile I would recommend that she start checking her blood pressure several times per week after making sure that her blood  pressure cuff is accurate by bring it with her to her next office visit.  I will leave it to her primary care physician to manage her blood pressure but would be happy to become more involved if the above plan is unsuccessful.  2.  Aortic valve disease.  Patient has a fairly loud aortic murmur consistent with either aortic sclerosis or stenosis.  An echocardiogram will be arranged to evaluate further.  As we see those results will decide how frequently follow-up as needed.    Thank you for inviting me to participate in the care of your patient.  Please don't hesitate to call if I can be of further assistance.  45 minutes were spent today reviewing the chart and other records, interviewing and examining the patient, and documenting our visit.    Chart documentation was completed, in part, with [employer] voice-recognition software. Even though reviewed, some grammatical, spelling, and word errors may remain.       Orders Placed This Encounter   Procedures     EKG 12-lead complete w/read - Clinics (performed today)      Echocardiogram Complete     Orders Placed This Encounter   Medications     carvedilol (COREG) 12.5 MG tablet     Sig: Take 1 tablet (12.5 mg) by mouth 2 times daily (with meals.)     Dispense:  180 tablet     Refill:  3     Medications Discontinued During This Encounter   Medication Reason     carvedilol (COREG) 6.25 MG tablet        10 year ASCVD risk: The 10-year ASCVD risk score (Owen CABA Jr., et al., 2013) is: 29.5%    Values used to calculate the score:      Age: 71 years      Sex: Female      Is Non- : No      Diabetic: Yes      Tobacco smoker: No      Systolic Blood Pressure: 138 mmHg      Is BP treated: Yes      HDL Cholesterol: 62 mg/dL      Total Cholesterol: 147 mg/dL    Encounter Diagnoses   Name Primary?     Essential hypertension, benign      Essential hypertension      Heart murmur Yes       CURRENT MEDICATIONS:  Current Outpatient Medications   Medication Sig Dispense Refill     amLODIPine (NORVASC) 10 MG tablet Take 1 tablet (10 mg) by mouth daily 90 tablet 0     amoxicillin (AMOXIL) 500 MG capsule Take 4 capsules by mouth 1 hour prior to dental appointment       ASPIRIN 81 MG OR TABS 1 tab po QD (Once per day) 100 3     atorvastatin (LIPITOR) 10 MG tablet Take 1 tablet (10 mg) by mouth daily 90 tablet 1     blood glucose (NO BRAND SPECIFIED) lancets standard Use to test blood sugar one times daily or as directed. 100 each 3     blood glucose (NO BRAND SPECIFIED) lancets standard Use to test blood sugar 1 times daily or as directed.ONE TOUCH DELICA 100 lancet 3     blood glucose (NO BRAND SPECIFIED) test strip Use to test blood sugar one times daily or as directed. To accompany: {Blood Glucose Monitor Brands One Touch Verio test strips 100 strip 3     blood glucose monitoring (ONE TOUCH DELICA) lancets Use to test blood sugars 1 times daily or as directed. 100 each 3     Blood Glucose Monitoring Suppl (ONE TOUCH ULTRA 2) W/DEVICE KIT Use to test blood sugars 2 times daily  or as directed. 1 kit 0     budesonide (RINOCORT AQUA) 32 MCG/ACT nasal spray Spray 2 sprays into both nostrils daily 25.29 mL 11     carvedilol (COREG) 12.5 MG tablet Take 1 tablet (12.5 mg) by mouth 2 times daily (with meals.) 180 tablet 3     celecoxib (CELEBREX) 200 MG capsule Take 1 capsule (200 mg) by mouth daily. 30 capsule 0     cyclobenzaprine (FLEXERIL) 5 MG tablet Take 1 tablet (5 mg) by mouth 3 times daily as needed for muscle spasms 30 tablet 0     glipiZIDE (GLUCOTROL XL) 10 MG 24 hr tablet Take 1 tablet (10 mg) by mouth 2 times daily 180 tablet 1     insulin glargine (BASAGLAR KWIKPEN) 100 UNIT/ML pen Inject 66 Units Subcutaneous daily 75 mL 1     insulin pen needle (BD ROSE U/F) 32G X 4 MM miscellaneous Use 1 pen needles daily or as directed. 100 each 3     linagliptin (TRADJENTA) 5 MG TABS tablet Take 1 tablet (5 mg) by mouth daily 90 tablet 1     losartan-hydrochlorothiazide (HYZAAR) 100-25 MG tablet Take 1 tablet by mouth daily 90 tablet 1     metFORMIN (GLUCOPHAGE-XR) 500 MG 24 hr tablet Take 4 tablets (2,000 mg) by mouth daily (with dinner) 360 tablet 1     Multiple Vitamins-Minerals (CENTRUM SILVER) per tablet 1 tablet 4 times weekly 100 tablet      nortriptyline (PAMELOR) 10 MG capsule Take 1 capsule by mouth At Bedtime. 90 capsule 1     omeprazole (PRILOSEC) 20 MG DR capsule Take 1 capsule (20 mg) by mouth daily 90 capsule 1     ORDER FOR DME Equipment being ordered: Mediven plus compression stockings    69557  20-30 compression 3 Device 0     potassium chloride ER (MICRO-K) 10 MEQ CR capsule Take 1 capsule (10 mEq) by mouth 4 times daily 360 capsule 1     venlafaxine (EFFEXOR XR) 150 MG 24 hr capsule Take 1 capsule (150 mg) by mouth daily 90 capsule 1       ALLERGIES     Allergies   Allergen Reactions     Demerol Nausea and Vomiting     Erythromycin      GI upset       PAST MEDICAL HISTORY:  Past Medical History:   Diagnosis Date     Arthritis     hands, Right shoulder     Diabetes (H)       Hypertension      Sleep apnea     Uses a CPAP       PAST SURGICAL HISTORY:  Past Surgical History:   Procedure Laterality Date     ------------OTHER-------------  9/5/2013    L hand, cyst excision     ------------OTHER------------- Right 03/14/2014    shoulder manipulation     ESOPHAGOSCOPY, GASTROSCOPY, DUODENOSCOPY (EGD), COMBINED N/A 1/24/2022    Procedure: ESOPHAGOGASTRODUODENOSCOPY;  Surgeon: Xavi Heller MD;  Location: [facility] OR     HC INCISION TENDON SHEATH FINGER Left 09/05/2013    left thumb trigger finger     HC KNEE SCOPE, DIAGNOSTIC  4/2005    Arthroscopy, Knee Left     HC REMOVE TONSILS/ADENOIDS,<12 Y/O  1953    T & A <11y.o.     TEST NOT FOUND  6/27/07    R heel/ inocencio's deformity     ZZC APPENDECTOMY  1956     ZZC EYE EXAM ESTABLISHED PT  4/20/11    No retinopathy     ZZC TOTAL KNEE ARTHROPLASTY  2/06    Knee Replacement, Total Right     ZZC TOTAL KNEE ARTHROPLASTY  3/5/07    Knee Replacement, Total  Left     ZZC VAGINAL HYSTERECTOMY  8/01    Hysterectomy, Vaginal & BSO     ZZHC COLONOSCOPY W/WO BRUSH/WASH  6/03       FAMILY HISTORY:  Family History   Problem Relation Age of Onset     Cerebrovascular Disease Mother      Deep Vein Thrombosis Mother      Cancer Father         prostate     Gastrointestinal Disease Father      Breast Cancer Sister      Breast Cancer Sister      Prostate Cancer Brother      Prostate Cancer Brother      Heart Disease Maternal Grandmother      Cerebrovascular Disease Maternal Grandfather      Heart Disease Paternal Grandfather      Gastrointestinal Disease Other         Niece with GERD/hiatal hernia     Alzheimer Disease No family hx of      Diabetes No family hx of        SOCIAL HISTORY:  Social History     Socioeconomic History     Marital status:      Spouse name: Blas     Number of children: 3     Years of education: 16   Occupational History     Occupation: Processor     Employer: [employer]   Tobacco Use     Smoking status: Never Smoker     Smokeless  tobacco: Never Used   Substance and Sexual Activity     Alcohol use: Yes     Comment: very rarely     Drug use: No     Sexual activity: Never     Partners: Male     Comment: Hysterectomy   Other Topics Concern     Exercise No     Seat Belt Yes     Self-Exams Yes   Social History Narrative        Functional abiltity:      Hearing imparment:No      Acitvities of daily living:Normal      Risk of falls:No      Home safety of concern:No        Do you exercise?     NO   Times/week: 0    History of abusive relationships in past:   No    History of abusive relationships currently:    No    Do you feel emotionally and physically safe in your environment?     Yes    Do you own a gun?  No      Is the gun kept in a safe place:   NOT APPLICABLE    Do you wear a seatbelt regularly?     Yes    Do you use sun screen?     Yes           Review of Systems:  Skin:        Eyes:       ENT:       Respiratory:  Positive for shortness of breath;dyspnea on exertion  Cardiovascular:    Positive for;edema  Gastroenterology:      Genitourinary:       Musculoskeletal:       Neurologic:       Psychiatric:       Heme/Lymph/Imm:       Endocrine:         Physical Exam:  Vitals: /70 (BP Location: Right arm, Patient Position: Sitting, Cuff Size: Adult Regular)   Pulse 75   Ht 1.524 m (5')   Wt 119.3 kg (262 lb 14.4 oz)   LMP 05/12/2001   SpO2 97%   BMI 51.34 kg/m      Constitutional:  cooperative, alert and oriented, well developed, well nourished, in no acute distress morbidly obese      Skin:  warm and dry to the touch, no apparent skin lesions or masses noted        Head:  normocephalic, no masses or lesions        Eyes:  pupils equal and round, conjunctivae and lids unremarkable, sclera white, no xanthalasma, EOMS intact, no nystagmus        ENT:  no pallor or cyanosis   Masked    Neck:  carotid pulses are full and equal bilaterally, JVP normal, no carotid bruit transmitted murmur      Chest:  normal breath sounds, clear to  auscultation, normal A-P diameter, normal symmetry, normal respiratory excursion, no use of accessory muscles        Cardiac: regular rhythm;normal S1 and S2;no S3 or S4;apical impulse not displaced   distant heart sounds   systolic ejection murmur;grade 2;RUSB          Abdomen:  abdomen soft;BS normoactive obese      Vascular: pulses full and equal                                      Extremities and Muscular Skeletal:        bilateral LE edema;trace     Neurological:  no gross motor deficits        Psych:  affect appropriate, oriented to time, person and place     Recent Lab Results:  LIPID RESULTS:  Lab Results   Component Value Date    CHOL 147 06/09/2022    HDL 62 06/09/2022    LDL 72 06/09/2022    LDL 87 02/21/2019    TRIG 64 06/09/2022    CHOLHDLRATIO 2 06/09/2022       LIVER ENZYME RESULTS:  Lab Results   Component Value Date    AST 33 10/05/2013    ALT 32 (H) 02/21/2019       CBC RESULTS:  Lab Results   Component Value Date    WBC 5.3 01/10/2022    RBC 4.29 01/10/2022    HGB 13.3 01/10/2022    HCT 41.1 01/10/2022    MCV 95.9 01/10/2022    MCH 31.0 01/10/2022    MCHC 32.4 01/10/2022    RDW 12.9 08/22/2018     01/10/2022     02/11/2018       BMP RESULTS:  Lab Results   Component Value Date    .2 06/09/2022    POTASSIUM 3.88 06/09/2022    CHLORIDE 102.0 06/09/2022    CO2 30.8 06/09/2022    ANIONGAP 5 02/11/2018     (A) 06/09/2022    BUN 13 06/09/2022    BUN 23.6 (A) 06/09/2022    CR 0.56 (A) 06/09/2022    GFRESTIMATED 99 11/21/2018    GFRESTBLACK >90 02/11/2018    TONI 9.3 06/09/2022        A1C RESULTS:  Lab Results   Component Value Date    A1C 7.5 (A) 06/09/2022     INR RESULTS:  Lab Results   Component Value Date    INR 1.84 (H) 03/08/2007    INR 2.17 (H) 03/07/2007       Lan Espinoza MD, FACC    CC  Yuridia Simmons MD  [address] [place] [address]      Thank you for allowing me to participate in the care of your patient.      Sincerely,     Lan Alicea  MD Alexis     [facility]

## 2022-08-30 NOTE — PROGRESS NOTES
HISTORY:    Alma Kraft is a pleasant 71-year-old female with a history of longstanding hypertension which has been difficult to control.  She was asked to see cardiology today for this reason.  She also has a history of hyperlipidemia, type 2 diabetes, morbid obesity, obstructive sleep apnea using CPAP, and cirrhosis of the liver.    Senait reports that she has a bad right shoulder causing frequent pain for which she uses Celebrex.  She does not sleep well at night primarily because of shoulder pain.  She does use her CPAP each night.  She does not use alcohol and she uses no other nonsteroidal agents, occasionally using Tylenol.  She is not a smoker.  She does have some issues with edema for which she successfully uses compression stockings.    The patient denies exertional chest arm neck shoulder or jaw discomfort.  She has never had any problems with PND or orthopnea or any sense of palpitations.  She denies syncope or near syncope, strokelike symptoms, orthostasis, or symptoms of claudication.    Today's ECG shows sinus rhythm with poor R wave progression, likely a normal variant but cannot exclude anterior infarct.    Examination today remarkable for 2/6 harsh systolic ejection murmur radiating to the carotids, left greater than right, consistent with aortic sclerosis or stenosis.    Currently Senait is using amlodipine 10 mg daily, carvedilol 6.25 mg twice daily, and losartan hydrochlorothiazide 100-25 on a daily basis.  She reports that she does not check her blood pressure at home but her office blood pressure today is 138/70.      ASSESSMENT/PLAN:    1.  Hypertension.  There are not any issues to suggest this is anything other than essential hypertension.  She has no other medical problems or symptoms to suggest otherwise.  She has a normal previous TSH.  There are several ongoing stimuli that are keeping her blood pressure high including ongoing use of Celebrex and her sleep apnea as well as her  obesity.  Today I have increased her carvedilol to 12.5 mg twice daily.  Her heart rate and blood pressure will tolerate this.  This medication can be doubled again to 25 mg twice daily and if blood pressure and heart rate are still adequate a dose of 50 mg twice daily would be appropriate and would likely keep her blood pressure under substantially better control.  If blood pressures not well controlled with carvedilol changes, the next change that I would recommend would be to switch her to olmesartan 40 mg daily along with either spironolactone or chlorthalidone 25 mg daily.  Meanwhile I would recommend that she start checking her blood pressure several times per week after making sure that her blood  pressure cuff is accurate by bring it with her to her next office visit.  I will leave it to her primary care physician to manage her blood pressure but would be happy to become more involved if the above plan is unsuccessful.  2.  Aortic valve disease.  Patient has a fairly loud aortic murmur consistent with either aortic sclerosis or stenosis.  An echocardiogram will be arranged to evaluate further.  As we see those results will decide how frequently follow-up as needed.    Thank you for inviting me to participate in the care of your patient.  Please don't hesitate to call if I can be of further assistance.  45 minutes were spent today reviewing the chart and other records, interviewing and examining the patient, and documenting our visit.    Chart documentation was completed, in part, with [employer] voice-recognition software. Even though reviewed, some grammatical, spelling, and word errors may remain.       Orders Placed This Encounter   Procedures     EKG 12-lead complete w/read - Clinics (performed today)     Echocardiogram Complete     Orders Placed This Encounter   Medications     carvedilol (COREG) 12.5 MG tablet     Sig: Take 1 tablet (12.5 mg) by mouth 2 times daily (with meals.)     Dispense:  180 tablet      Refill:  3     Medications Discontinued During This Encounter   Medication Reason     carvedilol (COREG) 6.25 MG tablet        10 year ASCVD risk: The 10-year ASCVD risk score (Owen CABA Jr., et al., 2013) is: 29.5%    Values used to calculate the score:      Age: 71 years      Sex: Female      Is Non- : No      Diabetic: Yes      Tobacco smoker: No      Systolic Blood Pressure: 138 mmHg      Is BP treated: Yes      HDL Cholesterol: 62 mg/dL      Total Cholesterol: 147 mg/dL    Encounter Diagnoses   Name Primary?     Essential hypertension, benign      Essential hypertension      Heart murmur Yes       CURRENT MEDICATIONS:  Current Outpatient Medications   Medication Sig Dispense Refill     amLODIPine (NORVASC) 10 MG tablet Take 1 tablet (10 mg) by mouth daily 90 tablet 0     amoxicillin (AMOXIL) 500 MG capsule Take 4 capsules by mouth 1 hour prior to dental appointment       ASPIRIN 81 MG OR TABS 1 tab po QD (Once per day) 100 3     atorvastatin (LIPITOR) 10 MG tablet Take 1 tablet (10 mg) by mouth daily 90 tablet 1     blood glucose (NO BRAND SPECIFIED) lancets standard Use to test blood sugar one times daily or as directed. 100 each 3     blood glucose (NO BRAND SPECIFIED) lancets standard Use to test blood sugar 1 times daily or as directed.ONE TOUCH DELICA 100 lancet 3     blood glucose (NO BRAND SPECIFIED) test strip Use to test blood sugar one times daily or as directed. To accompany: {Blood Glucose Monitor Brands One Touch Verio test strips 100 strip 3     blood glucose monitoring (ONE TOUCH DELICA) lancets Use to test blood sugars 1 times daily or as directed. 100 each 3     Blood Glucose Monitoring Suppl (ONE TOUCH ULTRA 2) W/DEVICE KIT Use to test blood sugars 2 times daily or as directed. 1 kit 0     budesonide (RINOCORT AQUA) 32 MCG/ACT nasal spray Spray 2 sprays into both nostrils daily 25.29 mL 11     carvedilol (COREG) 12.5 MG tablet Take 1 tablet (12.5 mg) by mouth 2 times  daily (with meals.) 180 tablet 3     celecoxib (CELEBREX) 200 MG capsule Take 1 capsule (200 mg) by mouth daily. 30 capsule 0     cyclobenzaprine (FLEXERIL) 5 MG tablet Take 1 tablet (5 mg) by mouth 3 times daily as needed for muscle spasms 30 tablet 0     glipiZIDE (GLUCOTROL XL) 10 MG 24 hr tablet Take 1 tablet (10 mg) by mouth 2 times daily 180 tablet 1     insulin glargine (BASAGLAR KWIKPEN) 100 UNIT/ML pen Inject 66 Units Subcutaneous daily 75 mL 1     insulin pen needle (BD ROSE U/F) 32G X 4 MM miscellaneous Use 1 pen needles daily or as directed. 100 each 3     linagliptin (TRADJENTA) 5 MG TABS tablet Take 1 tablet (5 mg) by mouth daily 90 tablet 1     losartan-hydrochlorothiazide (HYZAAR) 100-25 MG tablet Take 1 tablet by mouth daily 90 tablet 1     metFORMIN (GLUCOPHAGE-XR) 500 MG 24 hr tablet Take 4 tablets (2,000 mg) by mouth daily (with dinner) 360 tablet 1     Multiple Vitamins-Minerals (CENTRUM SILVER) per tablet 1 tablet 4 times weekly 100 tablet      nortriptyline (PAMELOR) 10 MG capsule Take 1 capsule by mouth At Bedtime. 90 capsule 1     omeprazole (PRILOSEC) 20 MG DR capsule Take 1 capsule (20 mg) by mouth daily 90 capsule 1     ORDER FOR DME Equipment being ordered: Mediven plus compression stockings    05451  20-30 compression 3 Device 0     potassium chloride ER (MICRO-K) 10 MEQ CR capsule Take 1 capsule (10 mEq) by mouth 4 times daily 360 capsule 1     venlafaxine (EFFEXOR XR) 150 MG 24 hr capsule Take 1 capsule (150 mg) by mouth daily 90 capsule 1       ALLERGIES     Allergies   Allergen Reactions     Demerol Nausea and Vomiting     Erythromycin      GI upset       PAST MEDICAL HISTORY:  Past Medical History:   Diagnosis Date     Arthritis     hands, Right shoulder     Diabetes (H)      Hypertension      Sleep apnea     Uses a CPAP       PAST SURGICAL HISTORY:  Past Surgical History:   Procedure Laterality Date     ------------OTHER-------------  9/5/2013    L hand, cyst excision      ------------OTHER------------- Right 03/14/2014    shoulder manipulation     ESOPHAGOSCOPY, GASTROSCOPY, DUODENOSCOPY (EGD), COMBINED N/A 1/24/2022    Procedure: ESOPHAGOGASTRODUODENOSCOPY;  Surgeon: Xavi Heller MD;  Location: [facility] OR     HC INCISION TENDON SHEATH FINGER Left 09/05/2013    left thumb trigger finger     HC KNEE SCOPE, DIAGNOSTIC  4/2005    Arthroscopy, Knee Left     HC REMOVE TONSILS/ADENOIDS,<12 Y/O  1953    T & A <11y.o.     TEST NOT FOUND  6/27/07    R heel/ inocencio's deformity     ZZC APPENDECTOMY  1956     ZZC EYE EXAM ESTABLISHED PT  4/20/11    No retinopathy     ZZC TOTAL KNEE ARTHROPLASTY  2/06    Knee Replacement, Total Right     ZZC TOTAL KNEE ARTHROPLASTY  3/5/07    Knee Replacement, Total  Left     ZZC VAGINAL HYSTERECTOMY  8/01    Hysterectomy, Vaginal & BSO     ZZHC COLONOSCOPY W/WO BRUSH/WASH  6/03       FAMILY HISTORY:  Family History   Problem Relation Age of Onset     Cerebrovascular Disease Mother      Deep Vein Thrombosis Mother      Cancer Father         prostate     Gastrointestinal Disease Father      Breast Cancer Sister      Breast Cancer Sister      Prostate Cancer Brother      Prostate Cancer Brother      Heart Disease Maternal Grandmother      Cerebrovascular Disease Maternal Grandfather      Heart Disease Paternal Grandfather      Gastrointestinal Disease Other         Niece with GERD/hiatal hernia     Alzheimer Disease No family hx of      Diabetes No family hx of        SOCIAL HISTORY:  Social History     Socioeconomic History     Marital status:      Spouse name: Blas     Number of children: 3     Years of education: 16   Occupational History     Occupation: Processor     Employer: [employer]   Tobacco Use     Smoking status: Never Smoker     Smokeless tobacco: Never Used   Substance and Sexual Activity     Alcohol use: Yes     Comment: very rarely     Drug use: No     Sexual activity: Never     Partners: Male     Comment: Hysterectomy   Other Topics  Concern     Exercise No     Seat Belt Yes     Self-Exams Yes   Social History Narrative        Functional abiltity:      Hearing imparment:No      Acitvities of daily living:Normal      Risk of falls:No      Home safety of concern:No        Do you exercise?     NO   Times/week: 0    History of abusive relationships in past:   No    History of abusive relationships currently:    No    Do you feel emotionally and physically safe in your environment?     Yes    Do you own a gun?  No      Is the gun kept in a safe place:   NOT APPLICABLE    Do you wear a seatbelt regularly?     Yes    Do you use sun screen?     Yes           Review of Systems:  Skin:        Eyes:       ENT:       Respiratory:  Positive for shortness of breath;dyspnea on exertion  Cardiovascular:    Positive for;edema  Gastroenterology:      Genitourinary:       Musculoskeletal:       Neurologic:       Psychiatric:       Heme/Lymph/Imm:       Endocrine:         Physical Exam:  Vitals: /70 (BP Location: Right arm, Patient Position: Sitting, Cuff Size: Adult Regular)   Pulse 75   Ht 1.524 m (5')   Wt 119.3 kg (262 lb 14.4 oz)   LMP 05/12/2001   SpO2 97%   BMI 51.34 kg/m      Constitutional:  cooperative, alert and oriented, well developed, well nourished, in no acute distress morbidly obese      Skin:  warm and dry to the touch, no apparent skin lesions or masses noted        Head:  normocephalic, no masses or lesions        Eyes:  pupils equal and round, conjunctivae and lids unremarkable, sclera white, no xanthalasma, EOMS intact, no nystagmus        ENT:  no pallor or cyanosis   Masked    Neck:  carotid pulses are full and equal bilaterally, JVP normal, no carotid bruit transmitted murmur      Chest:  normal breath sounds, clear to auscultation, normal A-P diameter, normal symmetry, normal respiratory excursion, no use of accessory muscles        Cardiac: regular rhythm;normal S1 and S2;no S3 or S4;apical impulse not displaced   distant  heart sounds   systolic ejection murmur;grade 2;RUSB          Abdomen:  abdomen soft;BS normoactive obese      Vascular: pulses full and equal                                      Extremities and Muscular Skeletal:        bilateral LE edema;trace     Neurological:  no gross motor deficits        Psych:  affect appropriate, oriented to time, person and place     Recent Lab Results:  LIPID RESULTS:  Lab Results   Component Value Date    CHOL 147 06/09/2022    HDL 62 06/09/2022    LDL 72 06/09/2022    LDL 87 02/21/2019    TRIG 64 06/09/2022    CHOLHDLRATIO 2 06/09/2022       LIVER ENZYME RESULTS:  Lab Results   Component Value Date    AST 33 10/05/2013    ALT 32 (H) 02/21/2019       CBC RESULTS:  Lab Results   Component Value Date    WBC 5.3 01/10/2022    RBC 4.29 01/10/2022    HGB 13.3 01/10/2022    HCT 41.1 01/10/2022    MCV 95.9 01/10/2022    MCH 31.0 01/10/2022    MCHC 32.4 01/10/2022    RDW 12.9 08/22/2018     01/10/2022     02/11/2018       BMP RESULTS:  Lab Results   Component Value Date    .2 06/09/2022    POTASSIUM 3.88 06/09/2022    CHLORIDE 102.0 06/09/2022    CO2 30.8 06/09/2022    ANIONGAP 5 02/11/2018     (A) 06/09/2022    BUN 13 06/09/2022    BUN 23.6 (A) 06/09/2022    CR 0.56 (A) 06/09/2022    GFRESTIMATED 99 11/21/2018    GFRESTBLACK >90 02/11/2018    TONI 9.3 06/09/2022        A1C RESULTS:  Lab Results   Component Value Date    A1C 7.5 (A) 06/09/2022       INR RESULTS:  Lab Results   Component Value Date    INR 1.84 (H) 03/08/2007    INR 2.17 (H) 03/07/2007         Lan Espinoza MD, FACC    CC  Yuridia Simmons MD  [address] [place] [address]

## 2022-09-15 ENCOUNTER — OFFICE VISIT (OUTPATIENT)
Dept: FAMILY MEDICINE | Facility: CLINIC | Age: 72
End: 2022-09-15

## 2022-09-15 VITALS
OXYGEN SATURATION: 97 % | BODY MASS INDEX: 51.75 KG/M2 | HEART RATE: 75 BPM | SYSTOLIC BLOOD PRESSURE: 136 MMHG | DIASTOLIC BLOOD PRESSURE: 74 MMHG | WEIGHT: 263.6 LBS | HEIGHT: 60 IN | TEMPERATURE: 98.4 F

## 2022-09-15 DIAGNOSIS — I10 ESSENTIAL HYPERTENSION, BENIGN: ICD-10-CM

## 2022-09-15 DIAGNOSIS — B07.0 PLANTAR WARTS: ICD-10-CM

## 2022-09-15 DIAGNOSIS — R52 PAIN: ICD-10-CM

## 2022-09-15 DIAGNOSIS — G44.049 CHRONIC PAROXYSMAL HEMICRANIA, NOT INTRACTABLE: ICD-10-CM

## 2022-09-15 DIAGNOSIS — Z79.4 TYPE 2 DIABETES MELLITUS WITHOUT COMPLICATION, WITH LONG-TERM CURRENT USE OF INSULIN (H): Primary | ICD-10-CM

## 2022-09-15 DIAGNOSIS — I10 ESSENTIAL HYPERTENSION: ICD-10-CM

## 2022-09-15 DIAGNOSIS — E78.2 MIXED HYPERLIPIDEMIA: ICD-10-CM

## 2022-09-15 DIAGNOSIS — E11.9 TYPE 2 DIABETES MELLITUS WITHOUT COMPLICATION, WITH LONG-TERM CURRENT USE OF INSULIN (H): Primary | ICD-10-CM

## 2022-09-15 LAB
BUN SERPL-MCNC: 18 MG/DL (ref 7–25)
BUN/CREATININE RATIO: 27.7 (ref 6–22)
CALCIUM SERPL-MCNC: 8.7 MG/DL (ref 8.6–10.3)
CHLORIDE SERPLBLD-SCNC: 103.2 MMOL/L (ref 98–110)
CO2 SERPL-SCNC: 29.4 MMOL/L (ref 20–32)
CREAT SERPL-MCNC: 0.65 MG/DL (ref 0.6–1.3)
GLUCOSE SERPL-MCNC: 153 MG/DL (ref 60–99)
HBA1C MFR BLD: 7.6 % (ref 4–7)
POTASSIUM SERPL-SCNC: 3.92 MMOL/L (ref 3.5–5.3)
SODIUM SERPL-SCNC: 140.9 MMOL/L (ref 135–146)

## 2022-09-15 PROCEDURE — 99214 OFFICE O/P EST MOD 30 MIN: CPT | Mod: 25 | Performed by: FAMILY MEDICINE

## 2022-09-15 PROCEDURE — 17110 DESTRUCTION B9 LES UP TO 14: CPT | Performed by: FAMILY MEDICINE

## 2022-09-15 PROCEDURE — 80048 BASIC METABOLIC PNL TOTAL CA: CPT | Performed by: FAMILY MEDICINE

## 2022-09-15 PROCEDURE — 36415 COLL VENOUS BLD VENIPUNCTURE: CPT | Performed by: FAMILY MEDICINE

## 2022-09-15 PROCEDURE — 83036 HEMOGLOBIN GLYCOSYLATED A1C: CPT | Performed by: FAMILY MEDICINE

## 2022-09-15 RX ORDER — METFORMIN HCL 500 MG
2000 TABLET, EXTENDED RELEASE 24 HR ORAL
Qty: 360 TABLET | Refills: 1 | Status: SHIPPED | OUTPATIENT
Start: 2022-09-15 | End: 2023-03-27

## 2022-09-15 RX ORDER — AMLODIPINE BESYLATE 10 MG/1
10 TABLET ORAL DAILY
Qty: 90 TABLET | Refills: 1 | Status: SHIPPED | OUTPATIENT
Start: 2022-09-15 | End: 2023-03-27

## 2022-09-15 RX ORDER — NORTRIPTYLINE HCL 10 MG
CAPSULE ORAL
Qty: 90 CAPSULE | Refills: 1 | Status: SHIPPED | OUTPATIENT
Start: 2022-09-15 | End: 2023-03-07

## 2022-09-15 RX ORDER — CELECOXIB 200 MG/1
200 CAPSULE ORAL DAILY
Qty: 90 CAPSULE | Refills: 1 | Status: SHIPPED | OUTPATIENT
Start: 2022-09-15 | End: 2023-03-27

## 2022-09-15 RX ORDER — GLIPIZIDE 10 MG/1
10 TABLET, FILM COATED, EXTENDED RELEASE ORAL 2 TIMES DAILY
Qty: 180 TABLET | Refills: 1 | Status: SHIPPED | OUTPATIENT
Start: 2022-09-15 | End: 2023-03-27

## 2022-09-15 RX ORDER — INSULIN GLARGINE 100 [IU]/ML
66 INJECTION, SOLUTION SUBCUTANEOUS DAILY
Qty: 75 ML | Refills: 1 | Status: SHIPPED | OUTPATIENT
Start: 2022-09-15 | End: 2023-03-27

## 2022-09-15 RX ORDER — POTASSIUM CHLORIDE 750 MG/1
10 CAPSULE, EXTENDED RELEASE ORAL 4 TIMES DAILY
Qty: 360 CAPSULE | Refills: 1 | Status: SHIPPED | OUTPATIENT
Start: 2022-09-15 | End: 2023-04-17

## 2022-09-15 RX ORDER — ATORVASTATIN CALCIUM 10 MG/1
10 TABLET, FILM COATED ORAL DAILY
Qty: 90 TABLET | Refills: 1 | Status: SHIPPED | OUTPATIENT
Start: 2022-09-15 | End: 2023-03-27

## 2022-09-15 RX ORDER — LOSARTAN POTASSIUM AND HYDROCHLOROTHIAZIDE 25; 100 MG/1; MG/1
1 TABLET ORAL DAILY
Qty: 90 TABLET | Refills: 1 | Status: SHIPPED | OUTPATIENT
Start: 2022-09-15 | End: 2023-03-27

## 2022-09-15 NOTE — PROGRESS NOTES
Assessment & Plan   Problem List Items Addressed This Visit        Endocrine    Mixed hyperlipidemia    Relevant Medications    atorvastatin (LIPITOR) 10 MG tablet    Type 2 diabetes mellitus without complication, with long-term current use of insulin (H) - Primary    Relevant Medications    glipiZIDE (GLUCOTROL XL) 10 MG 24 hr tablet    insulin glargine (BASAGLAR KWIKPEN) 100 UNIT/ML pen    metFORMIN (GLUCOPHAGE XR) 500 MG 24 hr tablet    Other Relevant Orders    VENOUS COLLECTION (Completed)    HEMOGLOBIN A1C (BFP) (Completed)    Basic Metabolic Panel (BFP)       Circulatory    Essential hypertension, benign    Relevant Medications    amLODIPine (NORVASC) 10 MG tablet    losartan-hydrochlorothiazide (HYZAAR) 100-25 MG tablet      Other Visit Diagnoses     Pain        Relevant Medications    celecoxib (CELEBREX) 200 MG capsule    Essential hypertension        Relevant Medications    losartan-hydrochlorothiazide (HYZAAR) 100-25 MG tablet    potassium chloride ER (MICRO-K) 10 MEQ CR capsule    Chronic paroxysmal hemicrania, not intractable        Relevant Medications    amLODIPine (NORVASC) 10 MG tablet    celecoxib (CELEBREX) 200 MG capsule    nortriptyline (PAMELOR) 10 MG capsule    Plantar warts        Relevant Medications    celecoxib (CELEBREX) 200 MG capsule    Other Relevant Orders    DESTRUCT BENIGN LESION, UP TO 14 (Completed)           1. Type 2 diabetes mellitus without complication, with long-term current use of insulin (H)  Controlled but she will schedule again with Romina to get this under better control.  - VENOUS COLLECTION  - HEMOGLOBIN A1C (BFP)  - glipiZIDE (GLUCOTROL XL) 10 MG 24 hr tablet; Take 1 tablet (10 mg) by mouth 2 times daily  Dispense: 180 tablet; Refill: 1  - insulin glargine (BASAGLAR KWIKPEN) 100 UNIT/ML pen; Inject 66 Units Subcutaneous daily  Dispense: 75 mL; Refill: 1  - metFORMIN (GLUCOPHAGE XR) 500 MG 24 hr tablet; Take 4 tablets (2,000 mg) by mouth daily (with dinner)  Dispense:  360 tablet; Refill: 1  - Basic Metabolic Panel (BFP)    2. Essential hypertension, benign  Controlled, check labs, refilled medications.  - amLODIPine (NORVASC) 10 MG tablet; Take 1 tablet (10 mg) by mouth daily  Dispense: 90 tablet; Refill: 1    3. Mixed hyperlipidemia  Refilled.  - atorvastatin (LIPITOR) 10 MG tablet; Take 1 tablet (10 mg) by mouth daily  Dispense: 90 tablet; Refill: 1    4. Pain  This is controlling her symptoms, refilled.  - celecoxib (CELEBREX) 200 MG capsule; Take 1 capsule (200 mg) by mouth daily  Dispense: 90 capsule; Refill: 1    5. Essential hypertension    - losartan-hydrochlorothiazide (HYZAAR) 100-25 MG tablet; Take 1 tablet by mouth daily  Dispense: 90 tablet; Refill: 1  - potassium chloride ER (MICRO-K) 10 MEQ CR capsule; Take 1 capsule (10 mEq) by mouth 4 times daily  Dispense: 360 capsule; Refill: 1    6. Chronic paroxysmal hemicrania, not intractable  Refilled.  - nortriptyline (PAMELOR) 10 MG capsule; Take 1 capsule by mouth At Bedtime.  Dispense: 90 capsule; Refill: 1    7. Plantar warts  Liquid nitrogen  - DESTRUCT BENIGN LESION, UP TO 14         BMI:   Estimated body mass index is 51.48 kg/m  as calculated from the following:    Height as of this encounter: 1.524 m (5').    Weight as of this encounter: 119.6 kg (263 lb 9.6 oz).         FUTURE APPOINTMENTS:       - Follow-up visit in 6 months.    No follow-ups on file.    Yuridia Simmons MD  [facility]    Subjective     Nursing Notes:   Latha Watts CMA  9/15/2022 10:39 AM  Signed  Chief Complaint   Patient presents with     Recheck Medication     Fasting today, refill medications      Pre-visit Screening:  Immunizations:  up to date  Colonoscopy:  is up to date  Mammogram: is up to date  Asthma Action Test/Plan:  NA  PHQ9:  NA  GAD7:  NA  Questioned patient about current smoking habits Pt. has never smoked.  Ok to leave detailed message on voice mail for today's visit only Yes, phone # [phone]            Alma Kraft is a 72 year old female who presents to clinic today for the following health issues   HPI       Here to followup on her diabetes. She is doing well.  Is seeing cardiology, she has echo tomorrow for her heart murmur. She said she has always had a heart murmur. He gives her the carvedilol.  Blood pressures--at home--controlled.  No problems or side effects on the medications.  Diabetes--she is doing well on the medications. Blood sugars varu. 102-150. Needs an apt with diabetes ed. orly seen her in awhile.  Sees Romina and will set up apt.  celebrex for the shoulders.  This is controlling her symptoms.    She will be seeing rheumatology for her elevated IGA. Sees him/her in October.     Had a wart on the bottom of the foot. Can't feel it anymore.   Left foot              Review of Systems   Constitutional, HEENT, cardiovascular, pulmonary, gi and gu systems are negative, except as otherwise noted.      Objective    /74 (BP Location: Left arm, Patient Position: Sitting, Cuff Size: Adult Large)   Pulse 75   Temp 98.4  F (36.9  C) (Temporal)   Ht 1.524 m (5')   Wt 119.6 kg (263 lb 9.6 oz)   LMP 05/12/2001   SpO2 97%   BMI 51.48 kg/m    Body mass index is 51.48 kg/m .  Physical Exam   GENERAL: healthy, alert and no distress  RESP: lungs clear to auscultation - no rales, rhonchi or wheezes  CV: regular rate and rhythm, normal S1 S2, no S3 or S4, no murmur, click or rub, no peripheral edema and peripheral pulses strong  MS: no gross musculoskeletal defects noted, no edema  NEURO: Normal strength and tone, mentation intact and speech normal  PSYCH: mentation appears normal, affect normal/bright  Bottom of left foot tiny plantar wart, liquid nitrogen applied    Results for orders placed or performed in visit on 09/15/22   HEMOGLOBIN A1C (BFP)     Status: Abnormal   Result Value Ref Range    Hemoglobin A1C 7.6 (A) 4.0 - 7.0 %

## 2022-09-15 NOTE — NURSING NOTE
Chief Complaint   Patient presents with     Recheck Medication     Fasting today, refill medications      Pre-visit Screening:  Immunizations:  up to date  Colonoscopy:  is up to date  Mammogram: is up to date  Asthma Action Test/Plan:  NA  PHQ9:  NA  GAD7:  NA  Questioned patient about current smoking habits Pt. has never smoked.  Ok to leave detailed message on voice mail for today's visit only Yes, phone # [phone]

## 2022-09-15 NOTE — LETTER
September 15, 2022      Jeny DREAD bAena  [address]  [place] [address]        Dear elio,    We are writing to inform you of your test results.    Your kidney function test was ok. One number was slightly high, this is not concerning.    Resulted Orders   HEMOGLOBIN A1C (BFP)   Result Value Ref Range    Hemoglobin A1C 7.6 (A) 4.0 - 7.0 %   Basic Metabolic Panel (BFP)   Result Value Ref Range    Carbon Dioxide 29.4 20 - 32 mmol/L    Creatinine 0.65 0.60 - 1.30 mg/dL    Glucose 153 (A) 60 - 99 mg/dL    Sodium 140.9 135 - 146 mmol/L    Potassium 3.92 3.5 - 5.3 mmol/L    Chloride 103.2 98 - 110 mmol/L    Urea Nitrogen 18 7 - 25 mg/dL    Calcium 8.7 8.6 - 10.3 mg/dL    BUN/Creatinine Ratio 27.7 (A) 6 - 22       If you have any questions or concerns, please call the clinic at the number listed above.       Sincerely,      Yuridia Simmons MD

## 2022-09-16 ENCOUNTER — HOSPITAL ENCOUNTER (OUTPATIENT)
Dept: CARDIOLOGY | Facility: CLINIC | Age: 72
Discharge: HOME | End: 2022-09-16
Attending: INTERNAL MEDICINE | Admitting: INTERNAL MEDICINE
Payer: MEDICARE

## 2022-09-16 DIAGNOSIS — I10 ESSENTIAL HYPERTENSION, BENIGN: ICD-10-CM

## 2022-09-16 DIAGNOSIS — R01.1 HEART MURMUR: ICD-10-CM

## 2022-09-16 LAB — LVEF ECHO: NORMAL

## 2022-09-16 PROCEDURE — 93306 TTE W/DOPPLER COMPLETE: CPT | Mod: 26 | Performed by: INTERNAL MEDICINE

## 2022-09-16 PROCEDURE — 93306 TTE W/DOPPLER COMPLETE: CPT

## 2022-09-22 ENCOUNTER — TELEPHONE (OUTPATIENT)
Dept: CARDIOLOGY | Facility: CLINIC | Age: 72
End: 2022-09-22

## 2022-09-22 ENCOUNTER — OFFICE VISIT (OUTPATIENT)
Dept: FAMILY MEDICINE | Facility: CLINIC | Age: 72
End: 2022-09-22

## 2022-09-22 DIAGNOSIS — E11.9 TYPE 2 DIABETES MELLITUS WITHOUT COMPLICATION, WITH LONG-TERM CURRENT USE OF INSULIN (H): Primary | ICD-10-CM

## 2022-09-22 DIAGNOSIS — I10 ESSENTIAL HYPERTENSION, BENIGN: ICD-10-CM

## 2022-09-22 DIAGNOSIS — R01.1 HEART MURMUR: Primary | ICD-10-CM

## 2022-09-22 DIAGNOSIS — Z79.4 TYPE 2 DIABETES MELLITUS WITHOUT COMPLICATION, WITH LONG-TERM CURRENT USE OF INSULIN (H): Primary | ICD-10-CM

## 2022-09-22 NOTE — TELEPHONE ENCOUNTER
----- Message from Lan Espinoza MD sent at 9/22/2022  9:49 AM CDT -----  Moderate aortic stenosis, should have 1 year follow up echo.  Since she was asked to see cardiology for management of her hypertension, we probably should manage it ourselves.  I outlined the steps that should be taken (for pmd) but lets have her start following with our [facility] to institute those changes. Next visit 1-2 months.

## 2022-09-22 NOTE — Clinical Note
Discussed initiating GLP-1 RA. Filled out patient assistance paperwork to attempt to get full coverage of Ozempic.

## 2022-09-22 NOTE — PROGRESS NOTES
SUBJECTIVE/OBJECTIVE:                Alma Kraft is a 72 year old female seen for a follow-up visit for Medication Management Services.  She was referred to me from Dr. Simmons.     Chief Complaint: Follow up from [facility] visit on 03/24/2022.      Current Medications:  Current Outpatient Medications   Medication     amLODIPine (NORVASC) 10 MG tablet     amoxicillin (AMOXIL) 500 MG capsule     ASPIRIN 81 MG OR TABS     atorvastatin (LIPITOR) 10 MG tablet     blood glucose (NO BRAND SPECIFIED) lancets standard     blood glucose (NO BRAND SPECIFIED) lancets standard     blood glucose (NO BRAND SPECIFIED) test strip     blood glucose monitoring (ONE TOUCH DELICA) lancets     Blood Glucose Monitoring Suppl (ONE TOUCH ULTRA 2) W/DEVICE KIT     budesonide (RINOCORT AQUA) 32 MCG/ACT nasal spray     carvedilol (COREG) 12.5 MG tablet     celecoxib (CELEBREX) 200 MG capsule     cyclobenzaprine (FLEXERIL) 5 MG tablet     glipiZIDE (GLUCOTROL XL) 10 MG 24 hr tablet     insulin glargine (BASAGLAR KWIKPEN) 100 UNIT/ML pen     linagliptin (TRADJENTA) 5 MG TABS tablet     losartan-hydrochlorothiazide (HYZAAR) 100-25 MG tablet     metFORMIN (GLUCOPHAGE XR) 500 MG 24 hr tablet     Multiple Vitamins-Minerals (CENTRUM SILVER) per tablet     nortriptyline (PAMELOR) 10 MG capsule     ORDER FOR DME     potassium chloride ER (MICRO-K) 10 MEQ CR capsule     [facility] 32G X 4 MM miscellaneous     venlafaxine (EFFEXOR XR) 150 MG 24 hr capsule     No current facility-administered medications for this visit.     Patient Questions/Concerns:  Diabetes control has remained stable, however not quite at goal. Would like to evaluate if any changes could be made to regimen.  Labs:  Lab Results   Component Value Date    A1C 7.6 09/15/2022    A1C 7.5 06/09/2022    A1C 6.8 03/21/2022    A1C 6.8 12/01/2021    A1C 7.9 08/26/2021       ASSESSMENT/PLAN:                Diabetes:  Assessment: Patients A1c has remained stable at 7.6.      Discussed addition of GLP-1 to current regimen to help achieve goal A1c of less than 7.0. This also could help with weight management.    Patient should qualify for patient assistance program. Filled out [employer] PAP paperwork with patient, and she will bring in financial statement to complete the application.    Discussed the possible need to decrease insulin dosing when Ozempic is initiated. Asked patient to return to clinic to  and discuss how to decrease insulin when Ozempic arrives.    Status: Diabetes stable.     Drug Therapy Problems:  1) Additional therapy needed.  2)GLP-1 Cost prohibitive  Plan:  1) Apply to [employer] Patient Assistance Program to get coverage for Ozempic.      I spent 45 minutes with this patient today.  All changes were made via collaborative practice agreement with Yuridia Simmons. A copy of the visit note was provided to the patient's primary care pro    Romina Caro, PharmD  Clinical Pharmacist  [facility]  General Clinic Phone: [phone]  Direct Office Phone: [phone]

## 2022-09-22 NOTE — TELEPHONE ENCOUNTER
Patient notified of results of echo and will  Follow up with an XENA in 1-2 months and echo in 1 year. Patient verbalized understanding.

## 2022-11-16 ENCOUNTER — TRANSFERRED RECORDS (OUTPATIENT)
Dept: FAMILY MEDICINE | Facility: CLINIC | Age: 72
End: 2022-11-16

## 2022-11-21 NOTE — PROGRESS NOTES
HISTORY OF PRESENT ILLNESS:    This is a 72 year old female who follows with Dr Espinoza at [facility]  Her past medical history includes:  Hypertension, sleep apnea, hyperlipidemia, type II diabetes, morbid obesity, aortic stenosis, cirrhosis of the liver, and peripheral edema.    Ms Kraft has longstanding hypertension which has been difficult to control. A Cindy NUC (2018) showed no ischemia/infarction  LVEF 81%    She has significant sleep disruption due to shoulder pain and sleep apnea  She does wear a CPAP and takes Celebrex for her pain  Due to ongoing hypertension, her Coreg was increased a few months ago. An ECHO was arranged to assess for a cardiac murmur.    Her ECHO (9/16/22) showed LVEF 60-65% without regional wall motion abnormalities, normal RV function, moderate aortic stenosis, could not exclude bicuspid aortic valve  (mean gradient 31 mmHg, Louie 1.3 cm2, V max 3.7 m/sec), mild ascending aorta dilatation, RVSP 32 mmHg    Our visit today is for further review    Ms Kraft overall feels well   She states that she has lived in a house which has been full of stress and she has coped by eating.  We talked about ways to increase her physical activity as she has a treadmill.  She gets winded when she goes up the stairs, but this is a chronic problem for her. She denies any chest pain, palpitations, orthopnea, or lightheadedness.  She does have some mild ankle swelling that improves when she wears her compression socks.  She has not been checking her blood pressure at home, but has a way to do so.      VITAL SIGNS:  BP: 134/66  Pulse:  81  Weight: 266 lbs (BMI: 51)    IMPRESSION AND PLAN:    Hypertension:  -on Coreg 12.5 mg BID, Amlodipine 10 mg, losartan-hydrochlorothiazide 100-25 mg  -BP borderline controlled  -will increase Coreg to 25 mg BID  -return in 1 month for BP check with RN  She will call with any intolerance    Moderate Aortic Stenosis  -mean gradient 31 mmHg, LOUIE 1.3 cm2  -repeat  ECHO (9/2023)    Hyperlipidemia:  -on Atorvastatin 10 mg  -due for lipids (PMD)    Insulin Dependent Diabetes  -Hgb A1C 7.6     Morbid Obesity  -encouraged calorie restriction, increasing physical activity    Treated Sleep Apnea:  -follows closely with sleep clinic    Mild Pulmonary Hypertension    The total time for the visit today was 30 minutes which includes patient visit, reviewing of records, discussion, and placing of orders of the outpatient coordination of cardiovascular care as described.  The level of medical decision making during this visit was of moderate complexity.  Thank you for allowing me to participate in their care.    Orders Placed This Encounter   Procedures     Follow-Up with Cardiology Nurse       Orders Placed This Encounter   Medications     DISCONTD: carvedilol (COREG) 25 MG tablet     Sig: Take 1 tablet (25 mg) by mouth 2 times daily (with meals) Take 1 tablet (12.5 mg) by mouth 2 times daily (with meals.)     Dispense:  180 tablet     Refill:  3     carvedilol (COREG) 25 MG tablet     Sig: Take 1 tablet (25 mg) by mouth 2 times daily (with meals)     Dispense:  180 tablet     Refill:  3       Medications Discontinued During This Encounter   Medication Reason     carvedilol (COREG) 12.5 MG tablet      carvedilol (COREG) 25 MG tablet          Encounter Diagnoses   Name Primary?     Essential hypertension, benign      Essential hypertension        CURRENT MEDICATIONS:  Current Outpatient Medications   Medication Sig Dispense Refill     amLODIPine (NORVASC) 10 MG tablet Take 1 tablet (10 mg) by mouth daily 90 tablet 1     amoxicillin (AMOXIL) 500 MG capsule Take 4 capsules by mouth 1 hour prior to dental appointment       ASPIRIN 81 MG OR TABS 1 tab po QD (Once per day) 100 3     atorvastatin (LIPITOR) 10 MG tablet Take 1 tablet (10 mg) by mouth daily 90 tablet 1     blood glucose (NO BRAND SPECIFIED) lancets standard Use to test blood sugar one times daily or as directed. 100 each 3      blood glucose (NO BRAND SPECIFIED) lancets standard Use to test blood sugar 1 times daily or as directed.ONE TOUCH DELICA 100 lancet 3     blood glucose (NO BRAND SPECIFIED) test strip Use to test blood sugar one times daily or as directed. To accompany: {Blood Glucose Monitor [employer]ands One Touch Verio test strips 100 strip 3     blood glucose monitoring (ONE TOUCH DELICA) lancets Use to test blood sugars 1 times daily or as directed. 100 each 3     Blood Glucose Monitoring Suppl (ONE TOUCH ULTRA 2) W/DEVICE KIT Use to test blood sugars 2 times daily or as directed. 1 kit 0     budesonide (RINOCORT AQUA) 32 MCG/ACT nasal spray Spray 2 sprays into both nostrils daily 25.29 mL 11     carvedilol (COREG) 25 MG tablet Take 1 tablet (25 mg) by mouth 2 times daily (with meals) 180 tablet 3     celecoxib (CELEBREX) 200 MG capsule Take 1 capsule (200 mg) by mouth daily 90 capsule 1     cyclobenzaprine (FLEXERIL) 5 MG tablet Take 1 tablet (5 mg) by mouth 3 times daily as needed for muscle spasms 30 tablet 0     glipiZIDE (GLUCOTROL XL) 10 MG 24 hr tablet Take 1 tablet (10 mg) by mouth 2 times daily 180 tablet 1     insulin glargine (BASAGLAR KWIKPEN) 100 UNIT/ML pen Inject 66 Units Subcutaneous daily 75 mL 1     linagliptin (TRADJENTA) 5 MG TABS tablet Take 1 tablet (5 mg) by mouth daily 90 tablet 1     losartan-hydrochlorothiazide (HYZAAR) 100-25 MG tablet Take 1 tablet by mouth daily 90 tablet 1     metFORMIN (GLUCOPHAGE XR) 500 MG 24 hr tablet Take 4 tablets (2,000 mg) by mouth daily (with dinner) 360 tablet 1     Multiple Vitamins-Minerals (CENTRUM SILVER) per tablet 1 tablet 4 times weekly 100 tablet      nortriptyline (PAMELOR) 10 MG capsule Take 1 capsule by mouth At Bedtime. 90 capsule 1     ORDER FOR DME Equipment being ordered: Spoonity plus compression stockings    96970  20-30 compression 3 Device 0     potassium chloride ER (MICRO-K) 10 MEQ CR capsule Take 1 capsule (10 mEq) by mouth 4 times daily 360 capsule 1      SCOTTYUASUAD SAFEPACK PEN NEEDLE 32G X 4 MM miscellaneous Use 1 pen needles daily or as directed. 100 each 0     venlafaxine (EFFEXOR XR) 150 MG 24 hr capsule Take 1 capsule (150 mg) by mouth daily 90 capsule 1       ALLERGIES     Allergies   Allergen Reactions     Demerol Nausea and Vomiting     Erythromycin      GI upset       PAST MEDICAL HISTORY:  Past Medical History:   Diagnosis Date     Arthritis     hands, Right shoulder     Diabetes (H)      Hypertension      Sleep apnea     Uses a CPAP       PAST SURGICAL HISTORY:  Past Surgical History:   Procedure Laterality Date     ------------OTHER-------------  9/5/2013    L hand, cyst excision     ------------OTHER------------- Right 03/14/2014    shoulder manipulation     ESOPHAGOSCOPY, GASTROSCOPY, DUODENOSCOPY (EGD), COMBINED N/A 1/24/2022    Procedure: ESOPHAGOGASTRODUODENOSCOPY;  Surgeon: Xavi Heller MD;  Location: [facility] OR     HC INCISION TENDON SHEATH FINGER Left 09/05/2013    left thumb trigger finger     HC KNEE SCOPE, DIAGNOSTIC  4/2005    Arthroscopy, Knee Left     HC REMOVE TONSILS/ADENOIDS,<12 Y/O  1953    T & A <11y.o.     TEST NOT FOUND  6/27/07    R heel/ inocencio's deformity     Z APPENDECTOMY  1956     Z EYE EXAM ESTABLISHED PT  4/20/11    No retinopathy     Z TOTAL KNEE ARTHROPLASTY  2/06    Knee Replacement, Total Right     ZZC TOTAL KNEE ARTHROPLASTY  3/5/07    Knee Replacement, Total  Left     ZZC VAGINAL HYSTERECTOMY  8/01    Hysterectomy, Vaginal & BSO     ZZHC COLONOSCOPY W/WO BRUSH/WASH  6/03       FAMILY HISTORY:  Family History   Problem Relation Age of Onset     Cerebrovascular Disease Mother      Deep Vein Thrombosis Mother      Cancer Father         prostate     Gastrointestinal Disease Father      Breast Cancer Sister      Breast Cancer Sister      Prostate Cancer Brother      Prostate Cancer Brother      Heart Disease Maternal Grandmother      Cerebrovascular Disease Maternal Grandfather      Heart Disease Paternal Grandfather       Gastrointestinal Disease Other         Niece with GERD/hiatal hernia     Alzheimer Disease No family hx of      Diabetes No family hx of        SOCIAL HISTORY:  Social History     Socioeconomic History     Marital status:      Spouse name: Blas     Number of children: 3     Years of education: 16     Highest education level: None   Occupational History     Occupation: Processor     Employer: [employer]   Tobacco Use     Smoking status: Never     Smokeless tobacco: Never   Substance and Sexual Activity     Alcohol use: Not Currently     Drug use: No     Sexual activity: Never     Partners: Male     Comment: Hysterectomy   Other Topics Concern     Exercise No     Seat Belt Yes     Self-Exams Yes   Social History Narrative        Functional abiltity:      Hearing imparment:No      Acitvities of daily living:Normal      Risk of falls:No      Home safety of concern:No        Do you exercise?     NO   Times/week: 0    History of abusive relationships in past:   No    History of abusive relationships currently:    No    Do you feel emotionally and physically safe in your environment?     Yes    Do you own a gun?  No      Is the gun kept in a safe place:   NOT APPLICABLE    Do you wear a seatbelt regularly?     Yes    Do you use sun screen?     Yes           Review of Systems:  Skin:          Eyes:         ENT:         Respiratory:  Positive for sleep apnea;CPAP;dyspnea on exertion     Cardiovascular:    Positive for;fatigue;edema    Gastroenterology:        Genitourinary:         Musculoskeletal:         Neurologic:         Psychiatric:         Heme/Lymph/Imm:         Endocrine:           Physical Exam:  Vitals: /66 (BP Location: Right arm, Patient Position: Sitting, Cuff Size: Adult Large)   Pulse 81   Ht 1.524 m (5')   Wt 120.7 kg (266 lb)   LMP 05/12/2001   SpO2 98%   BMI 51.95 kg/m      Constitutional:  cooperative morbidly obese      Skin:  warm and dry to the touch          Head:  normocephalic         Eyes:  pupils equal and round        Lymph:      ENT:  no pallor or cyanosis   Masked    Neck:    transmitted murmur      Respiratory:  clear to auscultation;normal respiratory excursion         Cardiac: regular rhythm;normal S1 and S2;no S3 or S4       systolic ejection murmur;grade 2;RUSB        pulses full and equal                                        GI:  abdomen soft obese      Extremities and Muscular Skeletal:      bilateral LE edema;trace;L greater than R          Neurological:  no gross motor deficits        Psych:  affect appropriate, oriented to time, person and place          CC  Lan Espinoza MD  [address] [place] [id number]

## 2022-11-22 ENCOUNTER — OFFICE VISIT (OUTPATIENT)
Dept: CARDIOLOGY | Facility: CLINIC | Age: 72
End: 2022-11-22
Attending: INTERNAL MEDICINE
Payer: MEDICARE

## 2022-11-22 VITALS
SYSTOLIC BLOOD PRESSURE: 134 MMHG | DIASTOLIC BLOOD PRESSURE: 66 MMHG | WEIGHT: 266 LBS | HEIGHT: 60 IN | BODY MASS INDEX: 52.22 KG/M2 | HEART RATE: 81 BPM | OXYGEN SATURATION: 98 %

## 2022-11-22 DIAGNOSIS — I10 ESSENTIAL HYPERTENSION: ICD-10-CM

## 2022-11-22 DIAGNOSIS — I10 ESSENTIAL HYPERTENSION, BENIGN: ICD-10-CM

## 2022-11-22 PROCEDURE — 99214 OFFICE O/P EST MOD 30 MIN: CPT | Performed by: NURSE PRACTITIONER

## 2022-11-22 RX ORDER — CARVEDILOL 25 MG/1
25 TABLET ORAL 2 TIMES DAILY WITH MEALS
Qty: 180 TABLET | Refills: 3 | Status: SHIPPED | OUTPATIENT
Start: 2022-11-22 | End: 2023-10-17

## 2022-11-22 RX ORDER — CARVEDILOL 25 MG/1
25 TABLET ORAL 2 TIMES DAILY WITH MEALS
Qty: 180 TABLET | Refills: 3 | Status: SHIPPED | OUTPATIENT
Start: 2022-11-22 | End: 2022-11-22

## 2022-11-22 NOTE — PATIENT INSTRUCTIONS
Take Coreg 25 mg twice a day (new pill)  Return in 1 month for BP check with the nurse  Call with any intolerance    It was a pleasure seeing you today     Please do not hesitate to call my nurse team with any questions or concerns:  [phone]    Scheduling number:  [phone]    ELLIS Prajapati, CNP

## 2022-11-22 NOTE — LETTER
11/22/2022    Yuridia Simmons MD  [address] [facility] [address]    RE: Alma Kraft       Dear Colleague,     I had the pleasure of seeing Alma Kraft in the [facility].  HISTORY OF PRESENT ILLNESS:    This is a 72 year old female who follows with Dr Espinoza at [facility]  Her past medical history includes:  Hypertension, sleep apnea, hyperlipidemia, type II diabetes, morbid obesity, aortic stenosis, cirrhosis of the liver, and peripheral edema.    Ms Kraft has longstanding hypertension which has been difficult to control. A Cindy NUC (2018) showed no ischemia/infarction  LVEF 81%    She has significant sleep disruption due to shoulder pain and sleep apnea  She does wear a CPAP and takes Celebrex for her pain  Due to ongoing hypertension, her Coreg was increased a few months ago. An ECHO was arranged to assess for a cardiac murmur.    Her ECHO (9/16/22) showed LVEF 60-65% without regional wall motion abnormalities, normal RV function, moderate aortic stenosis, could not exclude bicuspid aortic valve  (mean gradient 31 mmHg, Mann 1.3 cm2, V max 3.7 m/sec), mild ascending aorta dilatation, RVSP 32 mmHg    Our visit today is for further review    Ms Kraft overall feels well   She states that she has lived in a house which has been full of stress and she has coped by eating.  We talked about ways to increase her physical activity as she has a treadmill.  She gets winded when she goes up the stairs, but this is a chronic problem for her. She denies any chest pain, palpitations, orthopnea, or lightheadedness.  She does have some mild ankle swelling that improves when she wears her compression socks.  She has not been checking her blood pressure at home, but has a way to do so.      VITAL SIGNS:  BP: 134/66  Pulse:  81  Weight: 266 lbs (BMI: 51)    IMPRESSION AND PLAN:    Hypertension:  -on Coreg 12.5 mg BID, Amlodipine 10 mg, losartan-hydrochlorothiazide 100-25 mg  -BP  borderline controlled  -will increase Coreg to 25 mg BID  -return in 1 month for BP check with RN  She will call with any intolerance    Moderate Aortic Stenosis  -mean gradient 31 mmHg, LOUIE 1.3 cm2  -repeat ECHO (9/2023)    Hyperlipidemia:  -on Atorvastatin 10 mg  -due for lipids (PMD)    Insulin Dependent Diabetes  -Hgb A1C 7.6     Morbid Obesity  -encouraged calorie restriction, increasing physical activity    Treated Sleep Apnea:  -follows closely with sleep clinic    Mild Pulmonary Hypertension    The total time for the visit today was 30 minutes which includes patient visit, reviewing of records, discussion, and placing of orders of the outpatient coordination of cardiovascular care as described.  The level of medical decision making during this visit was of moderate complexity.  Thank you for allowing me to participate in their care.    Orders Placed This Encounter   Procedures     Follow-Up with Cardiology Nurse       Orders Placed This Encounter   Medications     DISCONTD: carvedilol (COREG) 25 MG tablet     Sig: Take 1 tablet (25 mg) by mouth 2 times daily (with meals) Take 1 tablet (12.5 mg) by mouth 2 times daily (with meals.)     Dispense:  180 tablet     Refill:  3     carvedilol (COREG) 25 MG tablet     Sig: Take 1 tablet (25 mg) by mouth 2 times daily (with meals)     Dispense:  180 tablet     Refill:  3       Medications Discontinued During This Encounter   Medication Reason     carvedilol (COREG) 12.5 MG tablet      carvedilol (COREG) 25 MG tablet          Encounter Diagnoses   Name Primary?     Essential hypertension, benign      Essential hypertension        CURRENT MEDICATIONS:  Current Outpatient Medications   Medication Sig Dispense Refill     amLODIPine (NORVASC) 10 MG tablet Take 1 tablet (10 mg) by mouth daily 90 tablet 1     amoxicillin (AMOXIL) 500 MG capsule Take 4 capsules by mouth 1 hour prior to dental appointment       ASPIRIN 81 MG OR TABS 1 tab po QD (Once per day) 100 3      atorvastatin (LIPITOR) 10 MG tablet Take 1 tablet (10 mg) by mouth daily 90 tablet 1     blood glucose (NO BRAND SPECIFIED) lancets standard Use to test blood sugar one times daily or as directed. 100 each 3     blood glucose (NO BRAND SPECIFIED) lancets standard Use to test blood sugar 1 times daily or as directed.ONE TOUCH DELICA 100 lancet 3     blood glucose (NO BRAND SPECIFIED) test strip Use to test blood sugar one times daily or as directed. To accompany: {Blood Glucose Monitor Brands One Touch Verio test strips 100 strip 3     blood glucose monitoring (ONE TOUCH DELICA) lancets Use to test blood sugars 1 times daily or as directed. 100 each 3     Blood Glucose Monitoring Suppl (ONE TOUCH ULTRA 2) W/DEVICE KIT Use to test blood sugars 2 times daily or as directed. 1 kit 0     budesonide (RINOCORT AQUA) 32 MCG/ACT nasal spray Spray 2 sprays into both nostrils daily 25.29 mL 11     carvedilol (COREG) 25 MG tablet Take 1 tablet (25 mg) by mouth 2 times daily (with meals) 180 tablet 3     celecoxib (CELEBREX) 200 MG capsule Take 1 capsule (200 mg) by mouth daily 90 capsule 1     cyclobenzaprine (FLEXERIL) 5 MG tablet Take 1 tablet (5 mg) by mouth 3 times daily as needed for muscle spasms 30 tablet 0     glipiZIDE (GLUCOTROL XL) 10 MG 24 hr tablet Take 1 tablet (10 mg) by mouth 2 times daily 180 tablet 1     insulin glargine (BASAGLAR KWIKPEN) 100 UNIT/ML pen Inject 66 Units Subcutaneous daily 75 mL 1     linagliptin (TRADJENTA) 5 MG TABS tablet Take 1 tablet (5 mg) by mouth daily 90 tablet 1     losartan-hydrochlorothiazide (HYZAAR) 100-25 MG tablet Take 1 tablet by mouth daily 90 tablet 1     metFORMIN (GLUCOPHAGE XR) 500 MG 24 hr tablet Take 4 tablets (2,000 mg) by mouth daily (with dinner) 360 tablet 1     Multiple Vitamins-Minerals (CENTRUM SILVER) per tablet 1 tablet 4 times weekly 100 tablet      nortriptyline (PAMELOR) 10 MG capsule Take 1 capsule by mouth At Bedtime. 90 capsule 1     ORDER FOR DME  Equipment being ordered: Mediven plus compression stockings    [id number]  20-30 compression 3 Device 0     potassium chloride ER (MICRO-K) 10 MEQ CR capsule Take 1 capsule (10 mEq) by mouth 4 times daily 360 capsule 1     ULTIGUARD SAFEPACK PEN NEEDLE 32G X 4 MM miscellaneous Use 1 pen needles daily or as directed. 100 each 0     venlafaxine (EFFEXOR XR) 150 MG 24 hr capsule Take 1 capsule (150 mg) by mouth daily 90 capsule 1       ALLERGIES     Allergies   Allergen Reactions     Demerol Nausea and Vomiting     Erythromycin      GI upset       PAST MEDICAL HISTORY:  Past Medical History:   Diagnosis Date     Arthritis     hands, Right shoulder     Diabetes (H)      Hypertension      Sleep apnea     Uses a CPAP       PAST SURGICAL HISTORY:  Past Surgical History:   Procedure Laterality Date     ------------OTHER-------------  9/5/2013    L hand, cyst excision     ------------OTHER------------- Right 03/14/2014    shoulder manipulation     ESOPHAGOSCOPY, GASTROSCOPY, DUODENOSCOPY (EGD), COMBINED N/A 1/24/2022    Procedure: ESOPHAGOGASTRODUODENOSCOPY;  Surgeon: Xavi Heller MD;  Location: [facility] OR     HC INCISION TENDON SHEATH FINGER Left 09/05/2013    left thumb trigger finger     HC KNEE SCOPE, DIAGNOSTIC  4/2005    Arthroscopy, Knee Left     HC REMOVE TONSILS/ADENOIDS,<11 Y/O  1953    T & A <11y.o.     TEST NOT FOUND  6/27/07    R heel/ inocencio's deformity     [facility] APPENDECTOMY  1956     [facility] EYE EXAM ESTABLISHED PT  4/20/11    No retinopathy     [facility] TOTAL KNEE ARTHROPLASTY  2/06    Knee Replacement, Total Right     [facility] TOTAL KNEE ARTHROPLASTY  3/5/07    Knee Replacement, Total  Left     [facility] VAGINAL HYSTERECTOMY  8/01    Hysterectomy, Vaginal & BSO     [facility] COLONOSCOPY W/WO BRUSH/WASH  6/03       FAMILY HISTORY:  Family History   Problem Relation Age of Onset     Cerebrovascular Disease Mother      Deep Vein Thrombosis Mother      Cancer Father         prostate     Gastrointestinal Disease Father      Breast Cancer Sister       Breast Cancer Sister      Prostate Cancer Brother      Prostate Cancer Brother      Heart Disease Maternal Grandmother      Cerebrovascular Disease Maternal Grandfather      Heart Disease Paternal Grandfather      Gastrointestinal Disease Other         Niece with GERD/hiatal hernia     Alzheimer Disease No family hx of      Diabetes No family hx of        SOCIAL HISTORY:  Social History     Socioeconomic History     Marital status:      Spouse name: Blas     Number of children: 3     Years of education: 16     Highest education level: None   Occupational History     Occupation: Processor     Employer: [employer]   Tobacco Use     Smoking status: Never     Smokeless tobacco: Never   Substance and Sexual Activity     Alcohol use: Not Currently     Drug use: No     Sexual activity: Never     Partners: Male     Comment: Hysterectomy   Other Topics Concern     Exercise No     Seat Belt Yes     Self-Exams Yes   Social History Narrative        Functional abiltity:      Hearing imparment:No      Acitvities of daily living:Normal      Risk of falls:No      Home safety of concern:No        Do you exercise?     NO   Times/week: 0    History of abusive relationships in past:   No    History of abusive relationships currently:    No    Do you feel emotionally and physically safe in your environment?     Yes    Do you own a gun?  No      Is the gun kept in a safe place:   NOT APPLICABLE    Do you wear a seatbelt regularly?     Yes    Do you use sun screen?     Yes           Review of Systems:  Skin:          Eyes:         ENT:         Respiratory:  Positive for sleep apnea;CPAP;dyspnea on exertion     Cardiovascular:    Positive for;fatigue;edema    Gastroenterology:        Genitourinary:         Musculoskeletal:         Neurologic:         Psychiatric:         Heme/Lymph/Imm:         Endocrine:           Physical Exam:  Vitals: /66 (BP Location: Right arm, Patient Position: Sitting, Cuff Size: Adult Large)    Pulse 81   Ht 1.524 m (5')   Wt 120.7 kg (266 lb)   LMP 05/12/2001   SpO2 98%   BMI 51.95 kg/m      Constitutional:  cooperative morbidly obese      Skin:  warm and dry to the touch          Head:  normocephalic        Eyes:  pupils equal and round        Lymph:      ENT:  no pallor or cyanosis   Masked    Neck:    transmitted murmur      Respiratory:  clear to auscultation;normal respiratory excursion         Cardiac: regular rhythm;normal S1 and S2;no S3 or S4       systolic ejection murmur;grade 2;RUSB        pulses full and equal                                        GI:  abdomen soft obese      Extremities and Muscular Skeletal:      bilateral LE edema;trace;L greater than R          Neurological:  no gross motor deficits        Psych:  affect appropriate, oriented to time, person and place          CC  Lan Espinoza MD  [address] [place] [id number]    Thank you for allowing me to participate in the care of your patient.      Sincerely,     [facility] [place] [facility]

## 2022-12-01 ENCOUNTER — TRANSFERRED RECORDS (OUTPATIENT)
Dept: FAMILY MEDICINE | Facility: CLINIC | Age: 72
End: 2022-12-01

## 2022-12-02 ENCOUNTER — TRANSFERRED RECORDS (OUTPATIENT)
Dept: FAMILY MEDICINE | Facility: CLINIC | Age: 72
End: 2022-12-02

## 2022-12-12 ENCOUNTER — TRANSFERRED RECORDS (OUTPATIENT)
Dept: FAMILY MEDICINE | Facility: CLINIC | Age: 72
End: 2022-12-12

## 2022-12-13 ENCOUNTER — TRANSFERRED RECORDS (OUTPATIENT)
Dept: FAMILY MEDICINE | Facility: CLINIC | Age: 72
End: 2022-12-13

## 2022-12-14 ENCOUNTER — OFFICE VISIT (OUTPATIENT)
Dept: FAMILY MEDICINE | Facility: CLINIC | Age: 72
End: 2022-12-14

## 2022-12-14 VITALS
TEMPERATURE: 98 F | SYSTOLIC BLOOD PRESSURE: 136 MMHG | BODY MASS INDEX: 52.22 KG/M2 | OXYGEN SATURATION: 96 % | DIASTOLIC BLOOD PRESSURE: 80 MMHG | HEART RATE: 74 BPM | WEIGHT: 266 LBS | HEIGHT: 60 IN

## 2022-12-14 DIAGNOSIS — F32.5 MAJOR DEPRESSION IN REMISSION (H): ICD-10-CM

## 2022-12-14 DIAGNOSIS — Z79.4 TYPE 2 DIABETES MELLITUS WITHOUT COMPLICATION, WITH LONG-TERM CURRENT USE OF INSULIN (H): ICD-10-CM

## 2022-12-14 DIAGNOSIS — Z00.00 INITIAL MEDICARE ANNUAL WELLNESS VISIT: Primary | ICD-10-CM

## 2022-12-14 DIAGNOSIS — I10 ESSENTIAL HYPERTENSION, BENIGN: ICD-10-CM

## 2022-12-14 DIAGNOSIS — I35.0 NONRHEUMATIC AORTIC VALVE STENOSIS: ICD-10-CM

## 2022-12-14 DIAGNOSIS — E11.9 TYPE 2 DIABETES MELLITUS WITHOUT COMPLICATION, WITH LONG-TERM CURRENT USE OF INSULIN (H): ICD-10-CM

## 2022-12-14 LAB — HBA1C MFR BLD: 7.8 % (ref 4–7)

## 2022-12-14 PROCEDURE — 36415 COLL VENOUS BLD VENIPUNCTURE: CPT | Performed by: FAMILY MEDICINE

## 2022-12-14 PROCEDURE — 99214 OFFICE O/P EST MOD 30 MIN: CPT | Mod: 25 | Performed by: FAMILY MEDICINE

## 2022-12-14 PROCEDURE — G0438 PPPS, INITIAL VISIT: HCPCS | Performed by: FAMILY MEDICINE

## 2022-12-14 PROCEDURE — 83036 HEMOGLOBIN GLYCOSYLATED A1C: CPT | Performed by: FAMILY MEDICINE

## 2022-12-14 RX ORDER — VENLAFAXINE HYDROCHLORIDE 150 MG/1
150 CAPSULE, EXTENDED RELEASE ORAL DAILY
Qty: 90 CAPSULE | Refills: 0 | Status: SHIPPED | OUTPATIENT
Start: 2022-12-14 | End: 2023-03-07

## 2022-12-14 ASSESSMENT — ANXIETY QUESTIONNAIRES
3. WORRYING TOO MUCH ABOUT DIFFERENT THINGS: NOT AT ALL
1. FEELING NERVOUS, ANXIOUS, OR ON EDGE: SEVERAL DAYS
7. FEELING AFRAID AS IF SOMETHING AWFUL MIGHT HAPPEN: NOT AT ALL
IF YOU CHECKED OFF ANY PROBLEMS ON THIS QUESTIONNAIRE, HOW DIFFICULT HAVE THESE PROBLEMS MADE IT FOR YOU TO DO YOUR WORK, TAKE CARE OF THINGS AT HOME, OR GET ALONG WITH OTHER PEOPLE: NOT DIFFICULT AT ALL
2. NOT BEING ABLE TO STOP OR CONTROL WORRYING: NOT AT ALL
GAD7 TOTAL SCORE: 1
GAD7 TOTAL SCORE: 1
5. BEING SO RESTLESS THAT IT IS HARD TO SIT STILL: NOT AT ALL
6. BECOMING EASILY ANNOYED OR IRRITABLE: NOT AT ALL

## 2022-12-14 ASSESSMENT — PATIENT HEALTH QUESTIONNAIRE - PHQ9
SUM OF ALL RESPONSES TO PHQ QUESTIONS 1-9: 4
5. POOR APPETITE OR OVEREATING: NOT AT ALL

## 2022-12-14 NOTE — LETTER
December 14, 2022      Jeny Kraft  [address]  [place] [address]        Dear ,    We are writing to inform you of your test results.      Your hemoglobin a1c is a little high. Please keep working with Romina and see me back and we can recheck this again in 3 months.  Resulted Orders   HEMOGLOBIN A1C (BFP)   Result Value Ref Range    Hemoglobin A1C 7.8 (A) 4.0 - 7.0 %       If you have any questions or concerns, please call the clinic at the number listed above.       Sincerely,      Yuridia Simmons MD

## 2022-12-14 NOTE — PROGRESS NOTES
Assessment & Plan   Problem List Items Addressed This Visit        Endocrine    Mixed hyperlipidemia    Type 2 diabetes mellitus without complication, with long-term current use of insulin (H)    Relevant Orders    HEMOGLOBIN A1C (BFP)    VENOUS COLLECTION (Completed)       Circulatory    Essential hypertension, benign   Other Visit Diagnoses     Initial Medicare annual wellness visit    -  Primary    Major depression in remission (H)        Relevant Medications    venlafaxine (EFFEXOR XR) 150 MG 24 hr capsule           1. Initial Medicare annual wellness visit  completed    2. Essential hypertension, benign  Controlled. Recheck in 3 months.    3. Major depression in remission (H)  Symptoms are mostly controlled, refilled.  - venlafaxine (EFFEXOR XR) 150 MG 24 hr capsule; Take 1 capsule (150 mg) by mouth daily  Dispense: 90 capsule; Refill: 0    4. Nonrheumatic aortic valve stenosis  Followed by cardiology      5. Type 2 diabetes mellitus without complication, with long-term current use of insulin (H)  High but in range. I advised her to continue to work with [employer] and recheck in  3months.     BMI:   Estimated body mass index is 51.95 kg/m  as calculated from the following:    Height as of this encounter: 1.524 m (5').    Weight as of this encounter: 120.7 kg (266 lb).         FUTURE APPOINTMENTS:       - Follow-up visit in 3 months.    No follow-ups on file.    Yuridia Simmons MD  [facility]    Subjective     Nursing Notes:   Latha Watts [facility]  12/14/2022 11:13 AM  Signed  Chief Complaint   Patient presents with     Recheck Medication     Fasting today, refill medications     Pre-visit Screening:  Immunizations:  up to date  Colonoscopy:  is up to date  Mammogram: is up to date  Asthma Action Test/Plan:  NA  PHQ9:  NA  GAD7:  NA  Questioned patient about current smoking habits Pt. has never smoked.  Ok to leave detailed message on voice mail for today's visit only Yes, phone #  [phone]           Alma Kraft is a 72 year old female who presents to clinic today for the following health issues     HPI     Here to followup on her effexor, this is for depression/anxiety, this is controlling the symptoms would like a refill. Having more trouble around the holidays. Not quite getting enough sleep.    She is also working with Romina on her diabetes, wants a hgba1c done today.        Review of Systems   Constitutional, HEENT, cardiovascular, pulmonary, gi and gu systems are negative, except as otherwise noted.      Objective    /80 (BP Location: Left arm, Patient Position: Sitting, Cuff Size: Adult Large)   Pulse 74   Temp 98  F (36.7  C) (Temporal)   Ht 1.524 m (5')   Wt 120.7 kg (266 lb)   LMP 05/12/2001   SpO2 96%   BMI 51.95 kg/m    Body mass index is 51.95 kg/m .  Physical Exam   GENERAL: healthy, alert and no distress  MS: no gross musculoskeletal defects noted, no edema  NEURO: Normal strength and tone, mentation intact and speech normal  PSYCH: mentation appears normal, affect normal/bright    Results for orders placed or performed in visit on 12/14/22   HEMOGLOBIN A1C (BFP)     Status: Abnormal   Result Value Ref Range    Hemoglobin A1C 7.8 (A) 4.0 - 7.0 %

## 2022-12-14 NOTE — PROGRESS NOTES
Alma Kraft is a 72 year old female who presents for Medicare Annual Wellness Visit.    Current providers caring for this patient include:  Patient Care Team:  Yuridia Simmons MD as PCP - General (Family Medicine)  Doe Packer as MTM [place] (Pharmacist Ambulatory Care)  Yuridia Simmons MD as Assigned PCP  Janeth Caro [facility] as Assigned MTM Pharmacist  Berenice Correa APRN CNP as Assigned Heart and Vascular Provider    Complete Medical and Social history reviewed with patient, outlined below.    Patient Active Problem List   Diagnosis     Essential hypertension, benign     Mixed hyperlipidemia     Obesity, morbid, BMI 40.0-49.9 (H)     Gastroesophageal reflux disease without esophagitis     Allergic rhinitis     Obstructive sleep apnea     Family history of malignant neoplasm of breast     ACP (advance care planning)     Type 2 diabetes mellitus without complication, with long-term current use of insulin (H)     Adjustment disorder with mixed anxiety and depressed mood     Acne     Health Care Home     Other cirrhosis of liver (H)     Elevated AFP     History of colonic polyps     Elevated anti-tissue transglutaminase (tTG) IgA level       Past Medical History:   Diagnosis Date     Arthritis     hands, Right shoulder     Diabetes (H)      Hypertension      Sleep apnea     Uses a CPAP       Past Surgical History:   Procedure Laterality Date     ------------OTHER-------------  9/5/2013    L hand, cyst excision     ------------OTHER------------- Right 03/14/2014    shoulder manipulation     ESOPHAGOSCOPY, GASTROSCOPY, DUODENOSCOPY (EGD), COMBINED N/A 1/24/2022    Procedure: ESOPHAGOGASTRODUODENOSCOPY;  Surgeon: Xavi Heller MD;  Location: [facility] OR     HC INCISION TENDON SHEATH FINGER Left 09/05/2013    left thumb trigger finger     HC KNEE SCOPE, DIAGNOSTIC  4/2005    Arthroscopy, Knee Left     HC REMOVE TONSILS/ADENOIDS,<13 Y/O  1953    T & A <11y.o.     TEST NOT FOUND  6/27/07    R heel/ rickey  deformity     [facility] APPENDECTOMY  1956     [facility] EYE EXAM ESTABLISHED PT  4/20/11    No retinopathy     [facility] TOTAL KNEE ARTHROPLASTY  2/06    Knee Replacement, Total Right     [facility] TOTAL KNEE ARTHROPLASTY  3/5/07    Knee Replacement, Total  Left     [facility] VAGINAL HYSTERECTOMY  8/01    Hysterectomy, Vaginal & BSO     [facility] COLONOSCOPY W/WO BRUSH/WASH  6/03       Family History   Problem Relation Age of Onset     Cerebrovascular Disease Mother      Deep Vein Thrombosis Mother      Cancer Father         prostate     Gastrointestinal Disease Father      Breast Cancer Sister      Breast Cancer Sister      Prostate Cancer Brother      Prostate Cancer Brother      Heart Disease Maternal Grandmother      Cerebrovascular Disease Maternal Grandfather      Heart Disease Paternal Grandfather      Gastrointestinal Disease Other         Niece with GERD/hiatal hernia     Alzheimer Disease No family hx of      Diabetes No family hx of        Social History     Tobacco Use     Smoking status: Never     Smokeless tobacco: Never   Substance Use Topics     Alcohol use: Not Currently       Diet: diabetic  Physical Activity: generally inactive, hips and knees are painful  Depression Screen:    Over the past 2 weeks, patient has felt down, depressed, or hopeless:  No    Over the past 2 weeks, patient has felt little interest or pleasure in doing things: No    Functional ability/Safety screen:  Up and go test (able to get up and walk longer than 30 seconds): Passed  Patient needs assistance with: nothing  Patient's home has the following possible safety concerns: none identified  Patient has concerns about her hearing:  No  Cognitive Screen  Patient repeats three objects (ball, flag, tree)      Clock drawing test:   NORMAL  Recalls three objects after 3 minutes (ball,flag,tree):                              recalls 3 objects (3 points)    Physical Exam:  /80 (BP Location: Left arm, Patient Position: Sitting, Cuff Size: Adult Large)   Pulse  74   Temp 98  F (36.7  C) (Temporal)   Ht 1.524 m (5')   Wt 120.7 kg (266 lb)   LMP 05/12/2001   SpO2 96%   BMI 51.95 kg/m     Body mass index is 51.95 kg/m .        Health Maintenance   Topic Date Due     MEDICARE ANNUAL WELLNESS VISIT  08/26/2022     HEPATITIS B IMMUNIZATION (3 of 3 - Risk 3-dose series) 11/20/2022     A1C  03/15/2023     MAMMO SCREENING  05/31/2023     LIPID  06/09/2023     MICROALBUMIN  06/09/2023     DIABETIC FOOT EXAM  06/09/2023     PHQ-9  06/09/2023     BMP  09/15/2023     COLORECTAL CANCER SCREENING  11/06/2023     EYE EXAM  12/01/2023     FALL RISK ASSESSMENT  12/14/2023     DEXA  12/30/2023     ADVANCE CARE PLANNING  05/27/2026     DTAP/TDAP/TD IMMUNIZATION (4 - Td or Tdap) 05/23/2028     HEPATITIS C SCREENING  Completed     DEPRESSION ACTION PLAN  Completed     INFLUENZA VACCINE  Completed     Pneumococcal Vaccine: 65+ Years  Completed     ZOSTER IMMUNIZATION  Completed     COVID-19 Vaccine  Completed     IPV IMMUNIZATION  Aged Out     MENINGITIS IMMUNIZATION  Aged Out           End of Life Planning:   Patient currently has an advanced directive: No.  I have verified the patient's ablity to prepare an advanced directive/make health care decisions.  Literature was provided to assist patient in preparing an advanced directive.    Education/Counseling:   Based on review of the above information, the following items were addressed:      Obesity - appropriate counseling on diet, exercise, etc.    Appropriate preventive services were discussed with this patient, including applicable screening as appropriate for cardiovascular disease, diabetes, osteopenia/osteoporosis, and glaucoma.  As appropriate for age/gender, discussed screening for colorectal cancer, prostate cancer, breast cancer, and cervical cancer.   Checklist reviewing preventive services available has been given to the patient.

## 2022-12-14 NOTE — NURSING NOTE
Chief Complaint   Patient presents with     Recheck Medication     Fasting today, refill medications     Pre-visit Screening:  Immunizations:  up to date  Colonoscopy:  is up to date  Mammogram: is up to date  Asthma Action Test/Plan:  NA  PHQ9:  NA  GAD7:  NA  Questioned patient about current smoking habits Pt. has never smoked.  Ok to leave detailed message on voice mail for today's visit only Yes, phone # [phone]

## 2022-12-17 DIAGNOSIS — F32.5 MAJOR DEPRESSION IN REMISSION (H): ICD-10-CM

## 2022-12-20 RX ORDER — VENLAFAXINE HYDROCHLORIDE 150 MG/1
CAPSULE, EXTENDED RELEASE ORAL
Qty: 15 CAPSULE | Refills: 0 | COMMUNITY
Start: 2022-12-20

## 2022-12-22 ENCOUNTER — DOCUMENTATION ONLY (OUTPATIENT)
Dept: CARDIOLOGY | Facility: CLINIC | Age: 72
End: 2022-12-22

## 2022-12-22 ENCOUNTER — ALLIED HEALTH/NURSE VISIT (OUTPATIENT)
Dept: CARDIOLOGY | Facility: CLINIC | Age: 72
End: 2022-12-22
Attending: NURSE PRACTITIONER
Payer: MEDICARE

## 2022-12-22 DIAGNOSIS — I10 ESSENTIAL HYPERTENSION, BENIGN: ICD-10-CM

## 2022-12-22 PROCEDURE — 99207 PR NO CHARGE LOS: CPT

## 2022-12-22 NOTE — PROGRESS NOTES
ALL[facility] BLOOD PRESSURE CHECK     Last office visit: 11/22/22    Previous blood pressure: 136/80 mm Hg  Previous heart rate: 74 bpm      Time of visit: 115 pm    Morning medications were taken at: 1130 am     Today's blood pressure: 132/84 mm Hg  Today's heart rate: 70 bpm     Home monitor blood pressure:  mmHg  Home monitor heart rate:  bpm      Additional Comments: Pt does not take bp at home very often, the last time she took bp at home was after her last visit in November.  Had a diabetic check on 121/14/22, bp was 136/80, pulse 74      Results routed to: Nurse chris Prajapati      Ordering Provider: Berenice Correa  In clinic Provider: Dr Ferguson

## 2022-12-27 NOTE — PROGRESS NOTES
Left detailed message to continue current medications and call back number if any questions or concerns.  Tiffanie Lockhart RN on 12/27/2022 at 10:59 AM

## 2023-01-19 ENCOUNTER — OFFICE VISIT (OUTPATIENT)
Dept: FAMILY MEDICINE | Facility: CLINIC | Age: 73
End: 2023-01-19

## 2023-01-19 DIAGNOSIS — E11.9 TYPE 2 DIABETES MELLITUS WITHOUT COMPLICATION, WITH LONG-TERM CURRENT USE OF INSULIN (H): Primary | ICD-10-CM

## 2023-01-19 DIAGNOSIS — Z79.4 TYPE 2 DIABETES MELLITUS WITHOUT COMPLICATION, WITH LONG-TERM CURRENT USE OF INSULIN (H): Primary | ICD-10-CM

## 2023-01-19 RX ORDER — SEMAGLUTIDE 1.34 MG/ML
0.25 INJECTION, SOLUTION SUBCUTANEOUS
Qty: 1.5 ML | Refills: 0 | COMMUNITY
Start: 2023-01-19 | End: 2023-07-14

## 2023-01-19 NOTE — PROGRESS NOTES
SUBJECTIVE/OBJECTIVE:                Alma Kraft is a 72 year old female seen for a follow-up visit for Medication Management Services.  She was referred to me from Dr. Yuridia Simmons.     Chief Complaint: Follow up from [facility] visit on 09.22.22.     Diabetes:  Current Medications:  Current Outpatient Medications   Medication     amLODIPine (NORVASC) 10 MG tablet     amoxicillin (AMOXIL) 500 MG capsule     ASPIRIN 81 MG OR TABS     atorvastatin (LIPITOR) 10 MG tablet     blood glucose (NO BRAND SPECIFIED) lancets standard     blood glucose (NO BRAND SPECIFIED) lancets standard     blood glucose (NO BRAND SPECIFIED) test strip     blood glucose monitoring (ONE TOUCH DELICA) lancets     Blood Glucose Monitoring Suppl (ONE TOUCH ULTRA 2) W/DEVICE KIT     budesonide (RINOCORT AQUA) 32 MCG/ACT nasal spray     carvedilol (COREG) 25 MG tablet     celecoxib (CELEBREX) 200 MG capsule     cyclobenzaprine (FLEXERIL) 5 MG tablet     glipiZIDE (GLUCOTROL XL) 10 MG 24 hr tablet     insulin glargine (BASAGLAR KWIKPEN) 100 UNIT/ML pen     linagliptin (TRADJENTA) 5 MG TABS tablet     losartan-hydrochlorothiazide (HYZAAR) 100-25 MG tablet     metFORMIN (GLUCOPHAGE XR) 500 MG 24 hr tablet     Multiple Vitamins-Minerals (CENTRUM SILVER) per tablet     nortriptyline (PAMELOR) 10 MG capsule     ORDER FOR DME     potassium chloride ER (MICRO-K) 10 MEQ CR capsule     ULTIGUARD SAFEPACK PEN NEEDLE 32G X 4 MM miscellaneous     venlafaxine (EFFEXOR XR) 150 MG 24 hr capsule     No current facility-administered medications for this visit.     We discussed the benefits and risks of each medication.  Patient Questions/Concerns:  Ozempic start  Labs:  Lab Results   Component Value Date    A1C 7.8 12/14/2022    A1C 7.6 09/15/2022    A1C 7.5 06/09/2022    A1C 6.8 03/21/2022    A1C 6.8 12/01/2021         ASSESSMENT/PLAN:                Diabetes:  Assessment: Patient is here to  Ozempic ordered through PAP. Also completed paperwork while  here for this years application for [employer] PAP.    Patient notes that she had hypoglycemic episode which does not correlate with diet or exercise where BG dipped down to 54. Patient was able to correct, however after experiencing this, she recalls feeling similar a few times within the last month. These episodes have always occurred between 3:00-6:00am. Patient is on both insulin as well as sulfonylurea which could be contributing to these lows.    Patients typical fasting levels are in the mid-70s to 90s.    Recommended dose decrease of insulin is 20% when starting Ozempic. Will decrease basaglar dose by 10 units, and patient has been instructed to watch for any signs and symptoms of hypoglycemia. She will also be testing her fasting blood sugars daily. If seeing any lows, she has been instructed to reach out immediately and we will reduce the insulin dose further.      Status: Diabetes control has been slipping    Drug Therapy Problems:  1) Initiate Ozempic  2) Discontinue Tradjenta  3) Decrease insulin dose  Plan:  1) Initiate Ozempic at 0.25mg weekly. Check in with patient in 3 weeks to evaluate taper. Patient will follow up with any low blood sugar reading if they occur prior to 3 weeks  2) Discontinue Tradjenta  3) Decrease insulin dose to 56 units daily.       I spent 30 minutes with this patient today.  All changes were made via collaborative practice agreement with Yuridia Simmons. A copy of the visit note was provided to the patient's primary care provider.    Romina Caro, PharmD  Clinical Pharmacist  [facility]  General Clinic Phone: [phone]  Direct Office Phone: [phone]

## 2023-03-27 ENCOUNTER — OFFICE VISIT (OUTPATIENT)
Dept: FAMILY MEDICINE | Facility: CLINIC | Age: 73
End: 2023-03-27

## 2023-03-27 VITALS
HEART RATE: 70 BPM | SYSTOLIC BLOOD PRESSURE: 130 MMHG | OXYGEN SATURATION: 98 % | HEIGHT: 60 IN | WEIGHT: 262.2 LBS | TEMPERATURE: 97.9 F | BODY MASS INDEX: 51.48 KG/M2 | DIASTOLIC BLOOD PRESSURE: 80 MMHG

## 2023-03-27 DIAGNOSIS — E11.9 TYPE 2 DIABETES MELLITUS WITHOUT COMPLICATION, WITH LONG-TERM CURRENT USE OF INSULIN (H): ICD-10-CM

## 2023-03-27 DIAGNOSIS — I10 ESSENTIAL HYPERTENSION: ICD-10-CM

## 2023-03-27 DIAGNOSIS — G44.049 CHRONIC PAROXYSMAL HEMICRANIA, NOT INTRACTABLE: ICD-10-CM

## 2023-03-27 DIAGNOSIS — E78.2 MIXED HYPERLIPIDEMIA: ICD-10-CM

## 2023-03-27 DIAGNOSIS — F32.5 MAJOR DEPRESSION IN REMISSION (H): ICD-10-CM

## 2023-03-27 DIAGNOSIS — I10 ESSENTIAL HYPERTENSION, BENIGN: ICD-10-CM

## 2023-03-27 DIAGNOSIS — Z79.4 TYPE 2 DIABETES MELLITUS WITHOUT COMPLICATION, WITH LONG-TERM CURRENT USE OF INSULIN (H): ICD-10-CM

## 2023-03-27 DIAGNOSIS — R52 PAIN: ICD-10-CM

## 2023-03-27 LAB
ALBUMIN SERPL-MCNC: 3.4 G/DL (ref 3.6–5.1)
ALBUMIN/GLOB SERPL: 0.3 {RATIO}
ALP SERPL-CCNC: 111 U/L (ref 33–130)
ALT 1742-6: 16 U/L (ref 0–32)
AST 1920-8: 31 U/L (ref 0–35)
BILIRUB SERPL-MCNC: 1 MG/DL (ref 0.2–1.2)
BUN SERPL-MCNC: 15 MG/DL (ref 7–25)
BUN/CREATININE RATIO: 20.5 (ref 6–22)
CALCIUM SERPL-MCNC: 8.9 MG/DL (ref 8.6–10.3)
CHLORIDE SERPLBLD-SCNC: 102.7 MMOL/L (ref 98–110)
CO2 SERPL-SCNC: 26.4 MMOL/L (ref 20–32)
CREAT SERPL-MCNC: 0.73 MG/DL (ref 0.6–1.3)
GLOBULIN, CALCULATED - QUEST: 3.7
GLUCOSE SERPL-MCNC: 170 MG/DL (ref 60–99)
HBA1C MFR BLD: 7.5 % (ref 4–7)
POTASSIUM SERPL-SCNC: 3.95 MMOL/L (ref 3.5–5.3)
PROT SERPL-MCNC: 7.1 G/DL (ref 6.1–8.1)
SODIUM SERPL-SCNC: 138.1 MMOL/L (ref 135–146)

## 2023-03-27 PROCEDURE — 36415 COLL VENOUS BLD VENIPUNCTURE: CPT | Performed by: FAMILY MEDICINE

## 2023-03-27 PROCEDURE — 99213 OFFICE O/P EST LOW 20 MIN: CPT | Mod: 25 | Performed by: FAMILY MEDICINE

## 2023-03-27 PROCEDURE — 83036 HEMOGLOBIN GLYCOSYLATED A1C: CPT | Performed by: FAMILY MEDICINE

## 2023-03-27 PROCEDURE — 80053 COMPREHEN METABOLIC PANEL: CPT | Performed by: FAMILY MEDICINE

## 2023-03-27 PROCEDURE — G0010 ADMIN HEPATITIS B VACCINE: HCPCS | Performed by: FAMILY MEDICINE

## 2023-03-27 PROCEDURE — 90746 HEPB VACCINE 3 DOSE ADULT IM: CPT | Performed by: FAMILY MEDICINE

## 2023-03-27 RX ORDER — METFORMIN HCL 500 MG
2000 TABLET, EXTENDED RELEASE 24 HR ORAL
Qty: 360 TABLET | Refills: 1 | Status: SHIPPED | OUTPATIENT
Start: 2023-03-27 | End: 2023-07-14

## 2023-03-27 RX ORDER — LOSARTAN POTASSIUM AND HYDROCHLOROTHIAZIDE 25; 100 MG/1; MG/1
1 TABLET ORAL DAILY
Qty: 90 TABLET | Refills: 1 | Status: SHIPPED | OUTPATIENT
Start: 2023-03-27 | End: 2023-07-14

## 2023-03-27 RX ORDER — VENLAFAXINE HYDROCHLORIDE 150 MG/1
150 CAPSULE, EXTENDED RELEASE ORAL DAILY
Qty: 90 CAPSULE | Refills: 1 | Status: SHIPPED | OUTPATIENT
Start: 2023-03-27 | End: 2023-07-14

## 2023-03-27 RX ORDER — AMLODIPINE BESYLATE 10 MG/1
10 TABLET ORAL DAILY
Qty: 90 TABLET | Refills: 1 | Status: SHIPPED | OUTPATIENT
Start: 2023-03-27 | End: 2023-07-14

## 2023-03-27 RX ORDER — GLIPIZIDE 10 MG/1
10 TABLET, FILM COATED, EXTENDED RELEASE ORAL 2 TIMES DAILY
Qty: 180 TABLET | Refills: 1 | Status: SHIPPED | OUTPATIENT
Start: 2023-03-27 | End: 2023-07-14

## 2023-03-27 RX ORDER — INSULIN GLARGINE 100 [IU]/ML
66 INJECTION, SOLUTION SUBCUTANEOUS DAILY
Qty: 75 ML | Refills: 1 | Status: SHIPPED | OUTPATIENT
Start: 2023-03-27 | End: 2023-07-14

## 2023-03-27 RX ORDER — CELECOXIB 200 MG/1
200 CAPSULE ORAL DAILY
Qty: 90 CAPSULE | Refills: 1 | Status: SHIPPED | OUTPATIENT
Start: 2023-03-27 | End: 2023-07-14

## 2023-03-27 RX ORDER — NORTRIPTYLINE HCL 10 MG
10 CAPSULE ORAL AT BEDTIME
Qty: 90 CAPSULE | Refills: 1 | Status: SHIPPED | OUTPATIENT
Start: 2023-03-27 | End: 2023-07-14

## 2023-03-27 RX ORDER — ATORVASTATIN CALCIUM 10 MG/1
10 TABLET, FILM COATED ORAL DAILY
Qty: 90 TABLET | Refills: 1 | Status: SHIPPED | OUTPATIENT
Start: 2023-03-27 | End: 2023-07-14

## 2023-03-27 NOTE — PROGRESS NOTES
Assessment & Plan   Problem List Items Addressed This Visit        Endocrine    Mixed hyperlipidemia    Relevant Medications    atorvastatin (LIPITOR) 10 MG tablet    Type 2 diabetes mellitus without complication, with long-term current use of insulin (H)    Relevant Medications    glipiZIDE (GLUCOTROL XL) 10 MG 24 hr tablet    insulin glargine (BASAGLAR KWIKPEN) 100 UNIT/ML pen    metFORMIN (GLUCOPHAGE XR) 500 MG 24 hr tablet    Other Relevant Orders    HEMOGLOBIN A1C (BFP) (Completed)    VENOUS COLLECTION (Completed)    Comprehensive Metobolic Panel (BFP)       Circulatory    Essential hypertension, benign--followed by cardiology    Relevant Medications    amLODIPine (NORVASC) 10 MG tablet    losartan-hydrochlorothiazide (HYZAAR) 100-25 MG tablet   Other Visit Diagnoses     Pain        Relevant Medications    celecoxib (CELEBREX) 200 MG capsule    Essential hypertension        Relevant Medications    losartan-hydrochlorothiazide (HYZAAR) 100-25 MG tablet    Major depression in remission (H)        Relevant Medications    venlafaxine (EFFEXOR XR) 150 MG 24 hr capsule    nortriptyline (PAMELOR) 10 MG capsule    Chronic paroxysmal hemicrania, not intractable        Relevant Medications    amLODIPine (NORVASC) 10 MG tablet    celecoxib (CELEBREX) 200 MG capsule    venlafaxine (EFFEXOR XR) 150 MG 24 hr capsule    nortriptyline (PAMELOR) 10 MG capsule         1. Essential hypertension, benign  Controlled, refilled.  - amLODIPine (NORVASC) 10 MG tablet; Take 1 tablet (10 mg) by mouth daily  Dispense: 90 tablet; Refill: 1    2. Mixed hyperlipidemia  Refilled.   - atorvastatin (LIPITOR) 10 MG tablet; Take 1 tablet (10 mg) by mouth daily  Dispense: 90 tablet; Refill: 1    3. Pain  Refilled, controlling symptoms.  - celecoxib (CELEBREX) 200 MG capsule; Take 1 capsule (200 mg) by mouth daily  Dispense: 90 capsule; Refill: 1    4. Type 2 diabetes mellitus without complication, with long-term current use of insulin (H)  Check  labs. Controlled. Refilled.  - HEMOGLOBIN A1C (BFP)  - VENOUS COLLECTION  - glipiZIDE (GLUCOTROL XL) 10 MG 24 hr tablet; Take 1 tablet (10 mg) by mouth 2 times daily  Dispense: 180 tablet; Refill: 1  - insulin glargine (BASAGLAR KWIKPEN) 100 UNIT/ML pen; Inject 66 Units Subcutaneous daily  Dispense: 75 mL; Refill: 1  - metFORMIN (GLUCOPHAGE XR) 500 MG 24 hr tablet; Take 4 tablets (2,000 mg) by mouth daily (with dinner)  Dispense: 360 tablet; Refill: 1  - Comprehensive Metobolic Panel (BFP)    5. Essential hypertension  Refilled.   - losartan-hydrochlorothiazide (HYZAAR) 100-25 MG tablet; Take 1 tablet by mouth daily  Dispense: 90 tablet; Refill: 1    6. Major depression in remission (H)  Controlled, refilled.  - venlafaxine (EFFEXOR XR) 150 MG 24 hr capsule; Take 1 capsule (150 mg) by mouth daily  Dispense: 90 capsule; Refill: 1    7. Chronic paroxysmal hemicrania, not intractable  Refilled.  - nortriptyline (PAMELOR) 10 MG capsule; Take 1 capsule (10 mg) by mouth At Bedtime  Dispense: 90 capsule; Refill: 1           BMI:   Estimated body mass index is 51.21 kg/m  as calculated from the following:    Height as of this encounter: 1.524 m (5').    Weight as of this encounter: 118.9 kg (262 lb 3.2 oz).         FUTURE APPOINTMENTS:       - Follow-up visit in 6 months.    No follow-ups on file.    Yuridia Simmons MD  [facility] [place]    Subjective     Nursing Notes:   Latha Watts CMA  3/27/2023 10:54 AM  Signed  Chief Complaint   Patient presents with     Recheck Medication     Fasting today, refill medications              Alma Kraft is a 72 year old female who presents to clinic today for the following health issues     HPI     Here to followup on her diabetes. She is doing well. Working with Romina. She has her now on ozempic but went to a lower dose due to the nausea.  Mostly . Today a little higher.  Blood pressures at home--not checking.   Cholesterol--is doing well on the  medications.  Is also doing well on nortiptylene and celebrex.  Needs her last hep b shot. Would like this today.  Pharmacist.           Review of Systems   Constitutional, HEENT, cardiovascular, pulmonary, gi and gu systems are negative, except as otherwise noted.      Objective    /80 (BP Location: Left arm, Patient Position: Sitting, Cuff Size: Adult Large)   Pulse 70   Temp 97.9  F (36.6  C) (Temporal)   Ht 1.524 m (5')   Wt 118.9 kg (262 lb 3.2 oz)   LMP 05/08/2001   SpO2 98%   BMI 51.21 kg/m    Body mass index is 51.21 kg/m .  Physical Exam   GENERAL: healthy, alert and no distress  RESP: lungs clear to auscultation - no rales, rhonchi or wheezes  CV: regular rate and rhythm, normal S1 S2, no S3 or S4, no murmur, click or rub, no peripheral edema and peripheral pulses strong  MS: no gross musculoskeletal defects noted, no edema  NEURO: Normal strength and tone, mentation intact and speech normal  PSYCH: mentation appears normal, affect normal/bright    Results for orders placed or performed in visit on 03/27/23   HEMOGLOBIN A1C (BFP)     Status: Abnormal   Result Value Ref Range    Hemoglobin A1C 7.5 (A) 4.0 - 7.0 %

## 2023-03-27 NOTE — NURSING NOTE
Chief Complaint   Patient presents with     Recheck Medication     Fasting today, refill medications

## 2023-03-27 NOTE — LETTER
March 27, 2023      Jeny Gutierrezruben  [address]  [place] [address]        Dear ,    We are writing to inform you of your test results.    Your kidney and liver function were good    Resulted Orders   HEMOGLOBIN A1C (BFP)   Result Value Ref Range    Hemoglobin A1C 7.5 (A) 4.0 - 7.0 %   Comprehensive Metobolic Panel (BFP)   Result Value Ref Range    Carbon Dioxide 26.4 20 - 32 mmol/L    Creatinine 0.73 0.60 - 1.30 mg/dL    Glucose 170 (A) 60 - 99 mg/dL    Sodium 138.1 135 - 146 mmol/L    Potassium 3.95 3.5 - 5.3 mmol/L    Chloride 102.7 98 - 110 mmol/L    Protein Total 7.1 6.1 - 8.1 g/dL    Albumin 3.4 (A) 3.6 - 5.1 g/dL    Alkaline Phosphatase 111 33 - 130 U/L    ALT 16 0 - 32 U/L    AST 31 0 - 35 U/L    Bilirubin Total 1.0 0.2 - 1.2 mg/dL    Urea Nitrogen 15 7 - 25 mg/dL    Calcium 8.9 8.6 - 10.3 mg/dL    BUN/Creatinine Ratio 20.5 6 - 22    Globulin Calculated 3.7     A/G Ratio 0.3        If you have any questions or concerns, please call the clinic at the number listed above.       Sincerely,      Yuridia Simmons MD

## 2023-03-31 ENCOUNTER — TRANSFERRED RECORDS (OUTPATIENT)
Dept: FAMILY MEDICINE | Facility: CLINIC | Age: 73
End: 2023-03-31

## 2023-04-06 ENCOUNTER — OFFICE VISIT (OUTPATIENT)
Dept: FAMILY MEDICINE | Facility: CLINIC | Age: 73
End: 2023-04-06

## 2023-04-06 DIAGNOSIS — Z79.4 TYPE 2 DIABETES MELLITUS WITHOUT COMPLICATION, WITH LONG-TERM CURRENT USE OF INSULIN (H): Primary | ICD-10-CM

## 2023-04-06 DIAGNOSIS — E11.9 TYPE 2 DIABETES MELLITUS WITHOUT COMPLICATION, WITH LONG-TERM CURRENT USE OF INSULIN (H): Primary | ICD-10-CM

## 2023-04-06 NOTE — PROGRESS NOTES
SUBJECTIVE/OBJECTIVE:                Alma Kraft is a 72 year old female seen for a follow-up visit for Medication Management Services.  She was referred to me from Dr. Yuridia Simmons.     Chief Complaint: Follow up from [facility] visit on 01/19/2023.      Diabetes:  Current Medications:  Current Outpatient Medications   Medication     amLODIPine (NORVASC) 10 MG tablet     amoxicillin (AMOXIL) 500 MG capsule     ASPIRIN 81 MG OR TABS     atorvastatin (LIPITOR) 10 MG tablet     blood glucose (NO BRAND SPECIFIED) lancets standard     blood glucose (NO BRAND SPECIFIED) lancets standard     blood glucose (NO BRAND SPECIFIED) test strip     blood glucose monitoring (ONE TOUCH DELICA) lancets     Blood Glucose Monitoring Suppl (ONE TOUCH ULTRA 2) W/DEVICE KIT     budesonide (RINOCORT AQUA) 32 MCG/ACT nasal spray     carvedilol (COREG) 25 MG tablet     celecoxib (CELEBREX) 200 MG capsule     cyclobenzaprine (FLEXERIL) 5 MG tablet     glipiZIDE (GLUCOTROL XL) 10 MG 24 hr tablet     insulin glargine (BASAGLAR KWIKPEN) 100 UNIT/ML pen     losartan-hydrochlorothiazide (HYZAAR) 100-25 MG tablet     metFORMIN (GLUCOPHAGE XR) 500 MG 24 hr tablet     Multiple Vitamins-Minerals (CENTRUM SILVER) per tablet     nortriptyline (PAMELOR) 10 MG capsule     ORDER FOR DME     potassium chloride ER (MICRO-K) 10 MEQ CR capsule     semaglutide (OZEMPIC, 0.25 OR 0.5 MG/DOSE,) 2 MG/1.5ML SOPN pen     ULTIGUARD SAFEPACK PEN NEEDLE 32G X 4 MM miscellaneous     venlafaxine (EFFEXOR XR) 150 MG 24 hr capsule     No current facility-administered medications for this visit.       We discussed the benefits and risks of each medication.  Patient Questions/Concerns:  Ozempic Taper  Labs:  Lab Results   Component Value Date    A1C 7.5 03/27/2023    A1C 7.8 12/14/2022    A1C 7.6 09/15/2022    A1C 7.5 06/09/2022    A1C 6.8 03/21/2022         ASSESSMENT/PLAN:                Diabetes:  Assessment: Patient started Ozempic approximately one month ago. Had  nausea when starting, and now notes bits of night time nausea, but tolerable. She states that this continues to improve.    Fasting BG readings have been averaging in the 90s. She does note that there was one occurrence where it dipped below 70, and one instance where after eating extra fruit in the evening the fasting reading was 155.    Patient is ready to taper up on the Ozempic to 0.5mg. I agree with this and recommend when increasing the dose, decreasing the basaglar by 20%. I have asked Jeny to check in in 2 weeks or if any lows prior to us checking in. We will taper insulin at that time if needed.  Status: Diabetes control improved  Drug Therapy Problems:  1) Ozempic taper warranted.  Plan:  1) Increase Ozempic to 0.5mg weekly.  2) Decrease insulin doing to 46 units (20% decrease) daily.  3) Continue glipizide 10mg XL.  4) Continue metformin ER 500mg 4 tablets daily.      I spent 1 hour with this patient today.  All changes were made via collaborative practice agreement with Yuridia Simmons. A copy of the visit note was provided to the patient's primary care provider.    Romina Caro, PharmD  Clinical Pharmacist  [facility]  General [facility] Phone: [phone]  Direct Office Phone: [phone]

## 2023-04-06 NOTE — Clinical Note
Tapering Ozempic dose. Decreasing insulin at this time. Will follow up in two weeks to determine if insulin taper needs to be adjusted.

## 2023-04-17 ENCOUNTER — TRANSFERRED RECORDS (OUTPATIENT)
Dept: FAMILY MEDICINE | Facility: CLINIC | Age: 73
End: 2023-04-17

## 2023-05-17 ENCOUNTER — TRANSFERRED RECORDS (OUTPATIENT)
Dept: FAMILY MEDICINE | Facility: CLINIC | Age: 73
End: 2023-05-17

## 2023-06-01 ENCOUNTER — HOSPITAL ENCOUNTER (OUTPATIENT)
Dept: MAMMOGRAPHY | Facility: CLINIC | Age: 73
Discharge: HOME | End: 2023-06-01
Attending: FAMILY MEDICINE | Admitting: FAMILY MEDICINE
Payer: MEDICARE

## 2023-06-01 DIAGNOSIS — Z12.31 VISIT FOR SCREENING MAMMOGRAM: ICD-10-CM

## 2023-06-01 PROCEDURE — 77067 SCR MAMMO BI INCL CAD: CPT

## 2023-06-09 ENCOUNTER — TRANSFERRED RECORDS (OUTPATIENT)
Dept: FAMILY MEDICINE | Facility: CLINIC | Age: 73
End: 2023-06-09

## 2023-06-15 ENCOUNTER — OFFICE VISIT (OUTPATIENT)
Dept: FAMILY MEDICINE | Facility: CLINIC | Age: 73
End: 2023-06-15

## 2023-06-15 VITALS — WEIGHT: 257.5 LBS | BODY MASS INDEX: 50.29 KG/M2

## 2023-06-15 DIAGNOSIS — Z79.4 TYPE 2 DIABETES MELLITUS WITHOUT COMPLICATION, WITH LONG-TERM CURRENT USE OF INSULIN (H): Primary | ICD-10-CM

## 2023-06-15 DIAGNOSIS — E11.9 TYPE 2 DIABETES MELLITUS WITHOUT COMPLICATION, WITH LONG-TERM CURRENT USE OF INSULIN (H): Primary | ICD-10-CM

## 2023-06-15 NOTE — PROGRESS NOTES
SUBJECTIVE/OBJECTIVE:                Alma Kraft is a 72 year old female seen for a follow-up visit for Medication Management Services.  She was referred to me from Dr. Yuridia Simmons.     Chief Complaint: Follow up from [facility] visit on 04.06.2023.      Diabetes:  Current Medications:  Current Outpatient Medications   Medication     amLODIPine (NORVASC) 10 MG tablet     amoxicillin (AMOXIL) 500 MG capsule     ASPIRIN 81 MG OR TABS     atorvastatin (LIPITOR) 10 MG tablet     blood glucose (NO BRAND SPECIFIED) lancets standard     blood glucose (NO BRAND SPECIFIED) lancets standard     blood glucose (NO BRAND SPECIFIED) test strip     blood glucose monitoring (ONE TOUCH DELICA) lancets     Blood Glucose Monitoring Suppl (ONE TOUCH ULTRA 2) W/DEVICE KIT     budesonide (RINOCORT AQUA) 32 MCG/ACT nasal spray     carvedilol (COREG) 25 MG tablet     celecoxib (CELEBREX) 200 MG capsule     cyclobenzaprine (FLEXERIL) 5 MG tablet     glipiZIDE (GLUCOTROL XL) 10 MG 24 hr tablet     insulin glargine (BASAGLAR KWIKPEN) 100 UNIT/ML pen     losartan-hydrochlorothiazide (HYZAAR) 100-25 MG tablet     metFORMIN (GLUCOPHAGE XR) 500 MG 24 hr tablet     Multiple Vitamins-Minerals (CENTRUM SILVER) per tablet     nortriptyline (PAMELOR) 10 MG capsule     ORDER FOR DME     potassium chloride ER (MICRO-K) 10 MEQ CR capsule     semaglutide (OZEMPIC, 0.25 OR 0.5 MG/DOSE,) 2 MG/1.5ML SOPN pen     ULTIGUARD SAFEPACK PEN NEEDLE 32G X 4 MM miscellaneous     venlafaxine (EFFEXOR XR) 150 MG 24 hr capsule     No current facility-administered medications for this visit.     We discussed the benefits and risks of each medication.  Patient Questions/Concerns:  Discuss Ozempic dose  Labs:  Lab Results   Component Value Date    A1C 7.5 03/27/2023    A1C 7.8 12/14/2022    A1C 7.6 09/15/2022    A1C 7.5 06/09/2022    A1C 6.8 03/21/2022       ASSESSMENT/PLAN:                Diabetes:  Assessment: Diabetes control has improved since Ozempic  initiation.    Ozempic has caused some nausea since dose increase. Completes injections on Saturdays. Last injection did not cause nausea, however the injection the week prior caused nausea nearly the entire week. I would like for patient to keep at 0.5mg until nausea resolves. I do feel that patient would benefit from increase to 1mg with taper down in insulin at that time when nausea is better controlled.    Patient oes note decrease in appetite and has lost 5 lbs since last visit.    Insulin dosing is currently at 46 units daily.    Most recent blood glucose readings:  06- 119  06- 107  06- cortisone injection.  06- 173  06- 129    Patient does note 3 weeks of night sweats.   Not consistent, but did occur nightly for one of the weeks. Was concerned this was due to Ozempic. Discussed not in the drug literature.    Status: Diabetes control has improved    Drug Therapy Problems:  1) Ozempic dose not optimized.    Plan:  1) Watch to see if nausea subsides with 0.5mg dose. Will follow up with patient in 3 weeks. If improvement, will increase to 1mg weekly at this time.      I spent 1 hour with this patient today.  All changes were made via collaborative practice agreement with Yuridia Simmons. A copy of the visit note was provided to the patient's primary care provider.    Romina Caro, PharmD  Clinical Pharmacist  [facility] Phone: [phone]  Direct Office Phone: [phone]

## 2023-06-15 NOTE — Clinical Note
Please see note. Patient doing well on Ozempic. Would like to increase dose if nausea with 0.5mg subsides.

## 2023-06-26 ENCOUNTER — TRANSFERRED RECORDS (OUTPATIENT)
Dept: FAMILY MEDICINE | Facility: CLINIC | Age: 73
End: 2023-06-26

## 2023-07-14 ENCOUNTER — OFFICE VISIT (OUTPATIENT)
Dept: FAMILY MEDICINE | Facility: CLINIC | Age: 73
End: 2023-07-14

## 2023-07-14 VITALS
TEMPERATURE: 97.2 F | SYSTOLIC BLOOD PRESSURE: 138 MMHG | DIASTOLIC BLOOD PRESSURE: 76 MMHG | WEIGHT: 256 LBS | HEART RATE: 73 BPM | OXYGEN SATURATION: 99 % | BODY MASS INDEX: 50.26 KG/M2 | HEIGHT: 60 IN

## 2023-07-14 DIAGNOSIS — G44.049 CHRONIC PAROXYSMAL HEMICRANIA, NOT INTRACTABLE: ICD-10-CM

## 2023-07-14 DIAGNOSIS — E11.9 TYPE 2 DIABETES MELLITUS WITHOUT COMPLICATION, WITH LONG-TERM CURRENT USE OF INSULIN (H): ICD-10-CM

## 2023-07-14 DIAGNOSIS — F32.5 MAJOR DEPRESSION IN REMISSION (H): ICD-10-CM

## 2023-07-14 DIAGNOSIS — Z12.11 ENCOUNTER FOR SCREENING COLONOSCOPY: Primary | ICD-10-CM

## 2023-07-14 DIAGNOSIS — E78.2 MIXED HYPERLIPIDEMIA: ICD-10-CM

## 2023-07-14 DIAGNOSIS — I10 ESSENTIAL HYPERTENSION, BENIGN: ICD-10-CM

## 2023-07-14 DIAGNOSIS — R52 PAIN: ICD-10-CM

## 2023-07-14 DIAGNOSIS — Z79.4 TYPE 2 DIABETES MELLITUS WITHOUT COMPLICATION, WITH LONG-TERM CURRENT USE OF INSULIN (H): ICD-10-CM

## 2023-07-14 DIAGNOSIS — I10 ESSENTIAL HYPERTENSION: ICD-10-CM

## 2023-07-14 LAB
ALBUMIN SERPL-MCNC: 3.4 G/DL (ref 3.6–5.1)
ALBUMIN/GLOB SERPL: 1 {RATIO} (ref 1–2.5)
ALP SERPL-CCNC: 117 U/L (ref 33–130)
ALT 1742-6: 19 U/L (ref 0–32)
AST 1920-8: 36 U/L (ref 0–35)
BILIRUB SERPL-MCNC: 1.1 MG/DL (ref 0.2–1.2)
BUN SERPL-MCNC: 11 MG/DL (ref 7–25)
BUN/CREATININE RATIO: 17.7 (ref 6–32)
CALCIUM SERPL-MCNC: 9.2 MG/DL (ref 8.6–10.3)
CHLORIDE SERPLBLD-SCNC: 100.8 MMOL/L (ref 98–110)
CHOLEST SERPL-MCNC: 121 MG/DL (ref 0–199)
CHOLEST/HDLC SERPL: 2 {RATIO} (ref 0–5)
CO2 SERPL-SCNC: 30.1 MMOL/L (ref 20–32)
CREAT SERPL-MCNC: 0.62 MG/DL (ref 0.6–1.3)
GLOBULIN, CALCULATED - QUEST: 3.3 (ref 1.9–3.7)
GLUCOSE SERPL-MCNC: 130 MG/DL (ref 60–99)
HBA1C MFR BLD: 7.1 % (ref 4–7)
HDLC SERPL-MCNC: 51 MG/DL (ref 40–150)
LDLC SERPL CALC-MCNC: 55 MG/DL (ref 0–130)
POTASSIUM SERPL-SCNC: 3.86 MMOL/L (ref 3.5–5.3)
PROT SERPL-MCNC: 6.7 G/DL (ref 6.1–8.1)
SODIUM SERPL-SCNC: 137.9 MMOL/L (ref 135–146)
TRIGL SERPL-MCNC: 74 MG/DL (ref 0–149)

## 2023-07-14 PROCEDURE — 99213 OFFICE O/P EST LOW 20 MIN: CPT | Performed by: FAMILY MEDICINE

## 2023-07-14 PROCEDURE — 80061 LIPID PANEL: CPT | Performed by: FAMILY MEDICINE

## 2023-07-14 PROCEDURE — 83036 HEMOGLOBIN GLYCOSYLATED A1C: CPT | Performed by: FAMILY MEDICINE

## 2023-07-14 PROCEDURE — 80053 COMPREHEN METABOLIC PANEL: CPT | Performed by: FAMILY MEDICINE

## 2023-07-14 PROCEDURE — 36415 COLL VENOUS BLD VENIPUNCTURE: CPT | Performed by: FAMILY MEDICINE

## 2023-07-14 RX ORDER — SEMAGLUTIDE 1.34 MG/ML
0.25 INJECTION, SOLUTION SUBCUTANEOUS
Qty: 1.5 ML | Refills: 0 | Status: SHIPPED | OUTPATIENT
Start: 2023-07-14 | End: 2023-10-16

## 2023-07-14 RX ORDER — CELECOXIB 200 MG/1
200 CAPSULE ORAL DAILY
Qty: 90 CAPSULE | Refills: 1 | Status: SHIPPED | OUTPATIENT
Start: 2023-07-14 | End: 2023-10-16

## 2023-07-14 RX ORDER — GLIPIZIDE 10 MG/1
10 TABLET, FILM COATED, EXTENDED RELEASE ORAL 2 TIMES DAILY
Qty: 180 TABLET | Refills: 1 | Status: SHIPPED | OUTPATIENT
Start: 2023-07-14 | End: 2023-10-16

## 2023-07-14 RX ORDER — METFORMIN HCL 500 MG
2000 TABLET, EXTENDED RELEASE 24 HR ORAL
Qty: 360 TABLET | Refills: 1 | Status: SHIPPED | OUTPATIENT
Start: 2023-07-14 | End: 2023-10-16

## 2023-07-14 RX ORDER — NORTRIPTYLINE HCL 10 MG
10 CAPSULE ORAL AT BEDTIME
Qty: 90 CAPSULE | Refills: 1 | Status: SHIPPED | OUTPATIENT
Start: 2023-07-14 | End: 2023-10-16

## 2023-07-14 RX ORDER — AMLODIPINE BESYLATE 10 MG/1
10 TABLET ORAL DAILY
Qty: 90 TABLET | Refills: 1 | Status: SHIPPED | OUTPATIENT
Start: 2023-07-14 | End: 2023-10-16

## 2023-07-14 RX ORDER — ATORVASTATIN CALCIUM 10 MG/1
10 TABLET, FILM COATED ORAL DAILY
Qty: 90 TABLET | Refills: 1 | Status: SHIPPED | OUTPATIENT
Start: 2023-07-14 | End: 2023-10-16

## 2023-07-14 RX ORDER — LOSARTAN POTASSIUM AND HYDROCHLOROTHIAZIDE 25; 100 MG/1; MG/1
1 TABLET ORAL DAILY
Qty: 90 TABLET | Refills: 1 | Status: SHIPPED | OUTPATIENT
Start: 2023-07-14 | End: 2023-10-16

## 2023-07-14 RX ORDER — INSULIN GLARGINE 100 [IU]/ML
66 INJECTION, SOLUTION SUBCUTANEOUS DAILY
Qty: 75 ML | Refills: 1 | Status: SHIPPED | OUTPATIENT
Start: 2023-07-14 | End: 2023-10-16

## 2023-07-14 RX ORDER — POTASSIUM CHLORIDE 750 MG/1
10 CAPSULE, EXTENDED RELEASE ORAL 4 TIMES DAILY
Qty: 360 CAPSULE | Refills: 1 | Status: SHIPPED | OUTPATIENT
Start: 2023-07-14 | End: 2023-10-16

## 2023-07-14 RX ORDER — VENLAFAXINE HYDROCHLORIDE 150 MG/1
150 CAPSULE, EXTENDED RELEASE ORAL DAILY
Qty: 90 CAPSULE | Refills: 1 | Status: SHIPPED | OUTPATIENT
Start: 2023-07-14 | End: 2023-10-16

## 2023-07-14 NOTE — NURSING NOTE
Chief Complaint   Patient presents with     Recheck Medication     Fasting med check         Pre-visit Screening:  Immunizations:  up to date  Colonoscopy:  is up to date  Mammogram: is up to date  Asthma Action Test/Plan:  MELLISSA  PHQ9:  NA  GAD7:  NA  Questioned patient about current smoking habits Pt. has never smoked.  Ok to leave detailed message on voice mail for today's visit only Yes, phone # [phone]         Pt is a 49yo female. AAOx4. Presenting with c/o of chronic right arm pain that has been worsening. Per pt neurologist recommended going to the ED after getting an outpatient MRI this week. Pt stated pain has been ongoing for two years after passing of her son. Pain has recently become worse approximately 4 months ago. Pt breathing is unlabored. No c/o of sob.  Lung sounds clear. Pt expressed right side feeling weaker than left. However, pt demonstrated strength in all four extremities. No complaints of nausea and vomitting, diarrhea and Pt is a 51yo female. AAOx4. Presenting with c/o of chronic right arm pain that has been worsening. Per pt neurologist recommended going to the ED after getting an outpatient MRI this week. Pt stated pain has been ongoing for two years after passing of her son. Pain has recently become worse approximately 4 months ago. Pt breathing is unlabored. No c/o of sob.  Lung sounds clear. Pt expressed right side feeling weaker than left. However, pt demonstrated strength in all four extremities. No complaints of nausea and vomiting or diarrhea, abdomen soft and nontender, pt denies hematuria or dysuria.

## 2023-07-14 NOTE — LETTER
July 17, 2023      Jeny Shahrobertruben  [address]  [place] [address]        Dear ,    We are writing to inform you of your test results.    Your labs look ok: kidney function, lipids. One liver function was  a little high. We will watch this.    Resulted Orders   HEMOGLOBIN A1C (BFP)   Result Value Ref Range    Hemoglobin A1C 7.1 (A) 4.0 - 7.0 %   Comprehensive Metobolic Panel (BFP)   Result Value Ref Range    Carbon Dioxide 30.1 20 - 32 mmol/L    Creatinine 0.62 0.60 - 1.30 mg/dL    Glucose 130 (A) 60 - 99 mg/dL    Sodium 137.9 135 - 146 mmol/L    Potassium 3.86 3.5 - 5.3 mmol/L    Chloride 100.8 98 - 110 mmol/L    Protein Total 6.7 6.1 - 8.1 g/dL    Albumin 3.4 (A) 3.6 - 5.1 g/dL    Alkaline Phosphatase 117 33 - 130 U/L    ALT 19 0 - 32 U/L    AST 36 (A) 0 - 35 U/L    Bilirubin Total 1.1 0.2 - 1.2 mg/dL    Urea Nitrogen 11 7 - 25 mg/dL    Calcium 9.2 8.6 - 10.3 mg/dL    BUN/Creatinine Ratio 17.7 6 - 32    Globulin Calculated 3.3 1.9 - 3.7    A/G Ratio 1.0 1 - 2.5   Lipid Panel (BFP)   Result Value Ref Range    Cholesterol 121 0 - 199 mg/dL    Triglycerides 74 0 - 149 mg/dL    HDL Cholesterol 51 40 - 150 mg/dL    LDL Cholesterol Direct 55 0 - 130 mg/dL    Cholesterol/HDL Ratio 2 0 - 5       If you have any questions or concerns, please call the clinic at the number listed above.       Sincerely,      Yuridia Simmons MD

## 2023-07-14 NOTE — PROGRESS NOTES
Assessment & Plan   Problem List Items Addressed This Visit        Endocrine    Mixed hyperlipidemia    Relevant Medications    atorvastatin (LIPITOR) 10 MG tablet    Type 2 diabetes mellitus without complication, with long-term current use of insulin (H)    Relevant Medications    blood glucose (NO BRAND SPECIFIED) lancets standard    blood glucose (NO BRAND SPECIFIED) test strip    glipiZIDE (GLUCOTROL XL) 10 MG 24 hr tablet    insulin glargine (BASAGLAR KWIKPEN) 100 UNIT/ML pen    metFORMIN (GLUCOPHAGE XR) 500 MG 24 hr tablet    semaglutide (OZEMPIC, 0.25 OR 0.5 MG/DOSE,) 2 MG/1.5ML SOPN pen    blood glucose (ONE TOUCH DELICA) lancing device    Other Relevant Orders    HEMOGLOBIN A1C (BFP) (Completed)    VENOUS COLLECTION (Completed)    FOOT EXAM  NO CHARGE [88661.114] (Completed)    Comprehensive Metobolic Panel (BFP) (Completed)    Lipid Panel (BFP) (Completed)    ALBUMIN RANDOM URINE QUANTITATIVE (BFP)       Circulatory    Essential hypertension, benign--followed by cardiology    Relevant Medications    amLODIPine (NORVASC) 10 MG tablet    losartan-hydrochlorothiazide (HYZAAR) 100-25 MG tablet   Other Visit Diagnoses     Encounter for screening colonoscopy    -  Primary    Relevant Orders    Colonoscopy Screening  Referral    Pain        Relevant Medications    celecoxib (CELEBREX) 200 MG capsule    Essential hypertension        Relevant Medications    losartan-hydrochlorothiazide (HYZAAR) 100-25 MG tablet    potassium chloride ER (MICRO-K) 10 MEQ CR capsule    Chronic paroxysmal hemicrania, not intractable        Relevant Medications    amLODIPine (NORVASC) 10 MG tablet    celecoxib (CELEBREX) 200 MG capsule    nortriptyline (PAMELOR) 10 MG capsule    venlafaxine (EFFEXOR XR) 150 MG 24 hr capsule    Major depression in remission (H)        Relevant Medications    nortriptyline (PAMELOR) 10 MG capsule    venlafaxine (EFFEXOR XR) 150 MG 24 hr capsule         1. Essential hypertension,  benign  Controlled on medications. Refilled.  - amLODIPine (NORVASC) 10 MG tablet; Take 1 tablet (10 mg) by mouth daily  Dispense: 90 tablet; Refill: 1    2. Type 2 diabetes mellitus without complication, with long-term current use of insulin (H)  Controlled, refilled current dose. She is having some side effects however, she will meet with Romina and possibly have an adjustment with the medications.  - HEMOGLOBIN A1C (BFP)  - VENOUS COLLECTION  - FOOT EXAM  NO CHARGE [80227.114]  - blood glucose (NO BRAND SPECIFIED) lancets standard; Use to test blood sugar one times daily or as directed.  Dispense: 100 each; Refill: 3  - blood glucose (NO BRAND SPECIFIED) test strip; Use to test blood sugar one times daily or as directed. To accompany: {Blood Glucose Monitor [employer]ands One Touch Verio test strips  Dispense: 100 strip; Refill: 3  - glipiZIDE (GLUCOTROL XL) 10 MG 24 hr tablet; Take 1 tablet (10 mg) by mouth 2 times daily  Dispense: 180 tablet; Refill: 1  - insulin glargine (BASAGLAR KWIKPEN) 100 UNIT/ML pen; Inject 66 Units Subcutaneous daily  Dispense: 75 mL; Refill: 1  - metFORMIN (GLUCOPHAGE XR) 500 MG 24 hr tablet; Take 4 tablets (2,000 mg) by mouth daily (with dinner)  Dispense: 360 tablet; Refill: 1  - semaglutide (OZEMPIC, 0.25 OR 0.5 MG/DOSE,) 2 MG/1.5ML SOPN pen; Inject 0.25 mg Subcutaneous every 7 days Taper to 0.5mg as tolerated.  Dispense: 1.5 mL; Refill: 0  - Comprehensive Metobolic Panel (BFP)  - Lipid Panel (BFP)  - ALBUMIN RANDOM URINE QUANTITATIVE (BFP); Standing  - blood glucose (ONE TOUCH DELICA) lancing device; Device to be used with lancets. One touch Delica  Dispense: 100 each; Refill: 3    3. Mixed hyperlipidemia  Refilled medications.  - atorvastatin (LIPITOR) 10 MG tablet; Take 1 tablet (10 mg) by mouth daily  Dispense: 90 tablet; Refill: 1    4. Pain  Refilled.  - celecoxib (CELEBREX) 200 MG capsule; Take 1 capsule (200 mg) by mouth daily  Dispense: 90 capsule; Refill: 1    5. Essential  hypertension  Controlled, refilled.  - losartan-hydrochlorothiazide (HYZAAR) 100-25 MG tablet; Take 1 tablet by mouth daily  Dispense: 90 tablet; Refill: 1  - potassium chloride ER (MICRO-K) 10 MEQ CR capsule; Take 1 capsule (10 mEq) by mouth 4 times daily  Dispense: 360 capsule; Refill: 1    6. Chronic paroxysmal hemicrania, not intractable  Refilled.  - nortriptyline (PAMELOR) 10 MG capsule; Take 1 capsule (10 mg) by mouth At Bedtime  Dispense: 90 capsule; Refill: 1    7. Major depression in remission (H)  Refilled.  - venlafaxine (EFFEXOR XR) 150 MG 24 hr capsule; Take 1 capsule (150 mg) by mouth daily  Dispense: 90 capsule; Refill: 1    8. Encounter for screening colonoscopy    - Colonoscopy Screening  Referral           BMI:   Estimated body mass index is 50 kg/m  as calculated from the following:    Height as of this encounter: 1.524 m (5').    Weight as of this encounter: 116.1 kg (256 lb).         FUTURE APPOINTMENTS:       - Follow-up visit in 6 months.    No follow-ups on file.    Yuridia Simmons MD  [facility]    Subjective     Nursing Notes:   Lou Cortez  7/14/2023 10:59 AM  Signed  Chief Complaint   Patient presents with     Recheck Medication     Fasting med check         Pre-visit Screening:  Immunizations:  up to date  Colonoscopy:  is up to date  Mammogram: is up to date  Asthma Action Test/Plan:  NA  PHQ9:  NA  GAD7:  NA  Questioned patient about current smoking habits Pt. has never smoked.  Ok to leave detailed message on voice mail for today's visit only Yes, phone # [phone]             Alma Kraft is a 72 year old female who presents to clinic today for the following health issues     HPI     Here to followup on her medications.  Diabetes--followed by Romina. Is having nausea and vomiting. Also has had some night sweats. Started this in feb. Vomiting is better but still has the nausea. The nausea is on day 3 and 4. Then it gets better. Is considering  stopping it. She will discuss this with Romina.  Blood pressure--is ok, taking medications daily. Doesn't ever remember to check this at home.  Has lost about 6 pounds.         Review of Systems   Constitutional, HEENT, cardiovascular, pulmonary, gi and gu systems are negative, except as otherwise noted.      Objective    /76 (BP Location: Left arm, Patient Position: Sitting, Cuff Size: Adult Large)   Pulse 73   Temp 97.2  F (36.2  C) (Temporal)   Ht 1.524 m (5')   Wt 116.1 kg (256 lb)   LMP 05/08/2001   SpO2 99%   BMI 50.00 kg/m    Body mass index is 50 kg/m .  Physical Exam   GENERAL: healthy, alert and no distress  RESP: lungs clear to auscultation - no rales, rhonchi or wheezes  CV: regular rate and rhythm, normal S1 S2, no S3 or S4,+ systolic murmur  MS: no gross musculoskeletal defects noted, no edema  NEURO: Normal strength and tone, mentation intact and speech normal  PSYCH: mentation appears normal, affect normal/bright    Results for orders placed or performed in visit on 07/14/23   HEMOGLOBIN A1C (BFP)     Status: Abnormal   Result Value Ref Range    Hemoglobin A1C 7.1 (A) 4.0 - 7.0 %   Comprehensive Metobolic Panel (BFP)     Status: Abnormal   Result Value Ref Range    Carbon Dioxide 30.1 20 - 32 mmol/L    Creatinine 0.62 0.60 - 1.30 mg/dL    Glucose 130 (A) 60 - 99 mg/dL    Sodium 137.9 135 - 146 mmol/L    Potassium 3.86 3.5 - 5.3 mmol/L    Chloride 100.8 98 - 110 mmol/L    Protein Total 6.7 6.1 - 8.1 g/dL    Albumin 3.4 (A) 3.6 - 5.1 g/dL    Alkaline Phosphatase 117 33 - 130 U/L    ALT 19 0 - 32 U/L    AST 36 (A) 0 - 35 U/L    Bilirubin Total 1.1 0.2 - 1.2 mg/dL    Urea Nitrogen 11 7 - 25 mg/dL    Calcium 9.2 8.6 - 10.3 mg/dL    BUN/Creatinine Ratio 17.7 6 - 32    Globulin Calculated 3.3 1.9 - 3.7    A/G Ratio 1.0 1 - 2.5   Lipid Panel (BFP)     Status: None   Result Value Ref Range    Cholesterol 121 0 - 199 mg/dL    Triglycerides 74 0 - 149 mg/dL    HDL Cholesterol 51 40 - 150 mg/dL     LDL Cholesterol Direct 55 0 - 130 mg/dL    Cholesterol/HDL Ratio 2 0 - 5

## 2023-07-17 RX ORDER — LANCING DEVICE/LANCETS
KIT MISCELLANEOUS
Qty: 100 EACH | Refills: 3 | Status: SHIPPED | OUTPATIENT
Start: 2023-07-17 | End: 2023-10-16

## 2023-07-20 ENCOUNTER — TELEPHONE (OUTPATIENT)
Dept: FAMILY MEDICINE | Facility: CLINIC | Age: 73
End: 2023-07-20

## 2023-07-20 NOTE — TELEPHONE ENCOUNTER
Patient has experienced lower fasting blood glucose levels recently. Most recent levels were 67,73,78. Patient will decrease insulin to 42 units daily from 46 units daily.     She will reach out early next week if still experiencing low sugars.    Romina Caro, PharmD  Clinical Pharmacist  [facility]  [facility] Phone: [phone]  Direct Office Phone: [phone]

## 2023-07-25 ENCOUNTER — TELEPHONE (OUTPATIENT)
Dept: FAMILY MEDICINE | Facility: CLINIC | Age: 73
End: 2023-07-25

## 2023-07-25 DIAGNOSIS — Z79.4 TYPE 2 DIABETES MELLITUS WITHOUT COMPLICATION, WITH LONG-TERM CURRENT USE OF INSULIN (H): ICD-10-CM

## 2023-07-25 DIAGNOSIS — E11.9 TYPE 2 DIABETES MELLITUS WITHOUT COMPLICATION, WITH LONG-TERM CURRENT USE OF INSULIN (H): ICD-10-CM

## 2023-07-25 RX ORDER — FLURBIPROFEN SODIUM 0.3 MG/ML
SOLUTION/ DROPS OPHTHALMIC
Qty: 100 EACH | Refills: 0 | COMMUNITY
Start: 2023-07-25

## 2023-07-25 NOTE — TELEPHONE ENCOUNTER
Alma Kraft is requesting a refill of:    Refused Prescriptions:                       Disp   Refills    SINGH SAFEPACK PEN NEEDLE 32G X 4 MM m*100 ea*0        Sig: Use 1 pen needle daily or as directed.  Refused By: RAVEN JENKINS  Reason for Refusal: Duplicate    Refills sent yesterday

## 2023-07-25 NOTE — TELEPHONE ENCOUNTER
KATH mcintyre,  is on Patient Assistance program through the . Should contact Romina for refills

## 2023-07-28 ENCOUNTER — HOSPITAL ENCOUNTER (OUTPATIENT)
Facility: CLINIC | Age: 73
End: 2023-07-28
Attending: INTERNAL MEDICINE | Admitting: INTERNAL MEDICINE
Payer: MEDICARE

## 2023-08-10 ENCOUNTER — TELEPHONE (OUTPATIENT)
Dept: FAMILY MEDICINE | Facility: CLINIC | Age: 73
End: 2023-08-10

## 2023-08-10 NOTE — TELEPHONE ENCOUNTER
Jeny reached out with concern of vomiting and diarrhea, contributed to the Ozempic. She would like to discontinue at this time. I agree with this plan and recommend patient increase insulin back to 46 units daily. If fasting BG levels increase with this change, I recommend patient be seen to determine further tapering.    Romina Caro, PharmD  Clinical Pharmacist  [facility] Phone: [phone]  Direct Office Phone: [phone]

## 2023-08-24 ENCOUNTER — TRANSFERRED RECORDS (OUTPATIENT)
Dept: FAMILY MEDICINE | Facility: CLINIC | Age: 73
End: 2023-08-24

## 2023-09-22 ENCOUNTER — HOSPITAL ENCOUNTER (OUTPATIENT)
Dept: CARDIOLOGY | Facility: CLINIC | Age: 73
Discharge: HOME | End: 2023-09-22
Attending: INTERNAL MEDICINE | Admitting: INTERNAL MEDICINE
Payer: MEDICARE

## 2023-09-22 DIAGNOSIS — I10 ESSENTIAL HYPERTENSION, BENIGN: ICD-10-CM

## 2023-09-22 DIAGNOSIS — R01.1 HEART MURMUR: ICD-10-CM

## 2023-09-22 LAB — LVEF ECHO: NORMAL

## 2023-09-22 PROCEDURE — 93306 TTE W/DOPPLER COMPLETE: CPT

## 2023-09-22 PROCEDURE — 93306 TTE W/DOPPLER COMPLETE: CPT | Mod: 26 | Performed by: INTERNAL MEDICINE

## 2023-09-22 NOTE — RESULT ENCOUNTER NOTE
Patient notified of echo results compared to last year no significant change and  will review at upcoming OV 10-17-23.  Patient verbalized understanding.

## 2023-10-11 NOTE — PROGRESS NOTES
Assessment & Plan   Problem List Items Addressed This Visit          Endocrine    Mixed hyperlipidemia    Relevant Medications    atorvastatin (LIPITOR) 10 MG tablet    Type 2 diabetes mellitus without complication, with long-term current use of insulin (H)    Relevant Medications    blood glucose (ONE TOUCH DELICA) lancing device    glipiZIDE (GLUCOTROL XL) 10 MG 24 hr tablet    insulin glargine (BASAGLAR KWIKPEN) 100 UNIT/ML pen    metFORMIN (GLUCOPHAGE XR) 500 MG 24 hr tablet    Other Relevant Orders    HEMOGLOBIN A1C (BFP) (Completed)    VENOUS COLLECTION (Completed)    ALBUMIN RANDOM URINE QUANTITATIVE (BFP)    Comprehensive Metobolic Panel (BFP)       Circulatory    Essential hypertension, benign--followed by cardiology    Relevant Medications    amLODIPine (NORVASC) 10 MG tablet    losartan-hydrochlorothiazide (HYZAAR) 100-25 MG tablet     Other Visit Diagnoses       Shortness of breath on exertion    -  Primary    Relevant Orders    XR Chest 2 Views    Essential hypertension        Relevant Medications    potassium chloride ER (MICRO-K) 10 MEQ CR capsule    losartan-hydrochlorothiazide (HYZAAR) 100-25 MG tablet    Pain        Relevant Medications    celecoxib (CELEBREX) 200 MG capsule    Chronic paroxysmal hemicrania, not intractable        Relevant Medications    amLODIPine (NORVASC) 10 MG tablet    celecoxib (CELEBREX) 200 MG capsule    nortriptyline (PAMELOR) 10 MG capsule    venlafaxine (EFFEXOR XR) 150 MG 24 hr capsule    Major depression in remission (H24)        Relevant Medications    nortriptyline (PAMELOR) 10 MG capsule    venlafaxine (EFFEXOR XR) 150 MG 24 hr capsule           1. Essential hypertension, benign  Controlled on medications. Check labs. Refilled.  - amLODIPine (NORVASC) 10 MG tablet; Take 1 tablet (10 mg) by mouth daily  Dispense: 90 tablet; Refill: 1    2. Type 2 diabetes mellitus without complication, with long-term current use of insulin (H)  High. She has now gone off ozempic, and  isn't back up to her usual dose of insulin. She has an apt tomorrow with Romina and will continue to work with her on this.  - HEMOGLOBIN A1C (BFP)  - VENOUS COLLECTION  - blood glucose (ONE TOUCH DELICA) lancing device; Device to be used with lancets. One touch Delica  Dispense: 100 each; Refill: 3  - glipiZIDE (GLUCOTROL XL) 10 MG 24 hr tablet; Take 1 tablet (10 mg) by mouth 2 times daily  Dispense: 180 tablet; Refill: 1  - insulin glargine (BASAGLAR KWIKPEN) 100 UNIT/ML pen; Inject 66 Units Subcutaneous daily  Dispense: 75 mL; Refill: 1  - metFORMIN (GLUCOPHAGE XR) 500 MG 24 hr tablet; Take 4 tablets (2,000 mg) by mouth daily (with dinner)  Dispense: 360 tablet; Refill: 1  - ALBUMIN RANDOM URINE QUANTITATIVE (BFP)  - Comprehensive Metobolic Panel (BFP)    3. Essential hypertension  Controlled.  - potassium chloride ER (MICRO-K) 10 MEQ CR capsule; Take 1 capsule (10 mEq) by mouth 4 times daily  Dispense: 360 capsule; Refill: 1  - losartan-hydrochlorothiazide (HYZAAR) 100-25 MG tablet; Take 1 tablet by mouth daily  Dispense: 90 tablet; Refill: 1    4. Mixed hyperlipidemia  Refilled.  - atorvastatin (LIPITOR) 10 MG tablet; Take 1 tablet (10 mg) by mouth daily  Dispense: 90 tablet; Refill: 1    5. Pain  Refilled.  - celecoxib (CELEBREX) 200 MG capsule; Take 1 capsule (200 mg) by mouth daily  Dispense: 90 capsule; Refill: 1    6. Chronic paroxysmal hemicrania, not intractable  Refilled.  - nortriptyline (PAMELOR) 10 MG capsule; Take 1 capsule (10 mg) by mouth at bedtime  Dispense: 90 capsule; Refill: 1    7. Major depression in remission (H24)  Refilled, symptoms are controlled.  - venlafaxine (EFFEXOR XR) 150 MG 24 hr capsule; Take 1 capsule (150 mg) by mouth daily  Dispense: 90 capsule; Refill: 1    8. Shortness of breath on exertion  Chest xray done, overread pending. She will be seeing the cardiologist tomorrow--discuss this and her leg swelling with them tomorrow.  - XR Chest 2 Views            BMI:   Estimated  body mass index is 49.41 kg/m  as calculated from the following:    Height as of this encounter: 1.524 m (5').    Weight as of this encounter: 114.8 kg (253 lb).         FUTURE APPOINTMENTS:       - Follow-up visit in 6 months.    No follow-ups on file.    Yuridia Simmons MD  [facility]    Subjective     Nursing Notes:   Latha [facility]  10/16/2023 10:59 AM  Signed  Chief Complaint   Patient presents with    Recheck Medication     Fasting today, refill medications     Pre-visit Screening:  Immunizations:  up to date  Colonoscopy:  is due and to be scheduled by patient for later completion  Mammogram: is up to date  Asthma Action Test/Plan:  NA  PHQ9:  NA  GAD7:  NA  Questioned patient about current smoking habits Pt. has never smoked.  Ok to leave detailed message on voice mail for today's visit only Yes, phone # [phone]       Alma Kraft is a 73 year old female who presents to clinic today for the following health issues   HPI     Here to followup on her diabetes. Blood sugars lower 100s and 90s.  Is off ozempic now, sweats have gone away now. 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  Cholesterol--she is doing well on medications, due for a refills.  Mood--symptoms are controlled on medications. Due for refills.  Sleep--due for refills.    Now feeling leg swelling and breathlessness, especially if she walks up stairs. Knows that part of this is her weight and inactivity.  Will see cardiology tomorrow. Just had an echo yesterday.      Review of Systems   Constitutional, HEENT, cardiovascular, pulmonary, gi and gu systems are negative, except as otherwise noted.      Objective    /80 (BP Location: Right arm, Patient Position: Sitting, Cuff Size: Adult Large)   Pulse 81   Temp 98.1  F (36.7  C) (Temporal)   Ht 1.524 m (5')   Wt 114.8 kg (253 lb)   LMP 05/08/2001   SpO2 96%   BMI 49.41 kg/m    Body mass index is 49.41 kg/m .  Physical Exam   GENERAL: healthy,  alert and no distress  RESP: lungs clear to auscultation - no rales, rhonchi or wheezes  CV: regular rate and rhythm, normal S1 S2, no S3 or S4, no murmur, click or rub, no peripheral edema and peripheral pulses strong  MS: no gross musculoskeletal defects noted, no edema  NEURO: Normal strength and tone, mentation intact and speech normal  PSYCH: mentation appears normal, affect normal/bright  Trace bilateral pedal edema    Results for orders placed or performed in visit on 10/16/23   HEMOGLOBIN A1C (BFP)     Status: Abnormal   Result Value Ref Range    Hemoglobin A1C 8.0 (A) 4.0 - 7.0 %

## 2023-10-16 ENCOUNTER — OFFICE VISIT (OUTPATIENT)
Dept: FAMILY MEDICINE | Facility: CLINIC | Age: 73
End: 2023-10-16

## 2023-10-16 VITALS
SYSTOLIC BLOOD PRESSURE: 132 MMHG | OXYGEN SATURATION: 96 % | HEIGHT: 60 IN | HEART RATE: 81 BPM | TEMPERATURE: 98.1 F | BODY MASS INDEX: 49.67 KG/M2 | WEIGHT: 253 LBS | DIASTOLIC BLOOD PRESSURE: 80 MMHG

## 2023-10-16 DIAGNOSIS — I10 ESSENTIAL HYPERTENSION, BENIGN: ICD-10-CM

## 2023-10-16 DIAGNOSIS — G44.049 CHRONIC PAROXYSMAL HEMICRANIA, NOT INTRACTABLE: ICD-10-CM

## 2023-10-16 DIAGNOSIS — Z79.4 TYPE 2 DIABETES MELLITUS WITHOUT COMPLICATION, WITH LONG-TERM CURRENT USE OF INSULIN (H): ICD-10-CM

## 2023-10-16 DIAGNOSIS — E11.9 TYPE 2 DIABETES MELLITUS WITHOUT COMPLICATION, WITH LONG-TERM CURRENT USE OF INSULIN (H): ICD-10-CM

## 2023-10-16 DIAGNOSIS — R06.02 SHORTNESS OF BREATH ON EXERTION: Primary | ICD-10-CM

## 2023-10-16 DIAGNOSIS — E78.2 MIXED HYPERLIPIDEMIA: ICD-10-CM

## 2023-10-16 DIAGNOSIS — F32.5 MAJOR DEPRESSION IN REMISSION (H): ICD-10-CM

## 2023-10-16 DIAGNOSIS — R52 PAIN: ICD-10-CM

## 2023-10-16 DIAGNOSIS — I10 ESSENTIAL HYPERTENSION: ICD-10-CM

## 2023-10-16 LAB
ALBUMIN SERPL-MCNC: 3.5 G/DL (ref 3.6–5.1)
ALBUMIN/GLOB SERPL: 1 {RATIO} (ref 1–2.5)
ALP SERPL-CCNC: 136 U/L (ref 33–130)
ALT 1742-6: 18 U/L (ref 0–32)
AST 1920-8: 30 U/L (ref 0–35)
BILIRUB SERPL-MCNC: 1.2 MG/DL (ref 0.2–1.2)
BUN SERPL-MCNC: 14 MG/DL (ref 7–25)
BUN/CREATININE RATIO: 19.4 (ref 6–32)
CALCIUM SERPL-MCNC: 8.9 MG/DL (ref 8.6–10.3)
CHLORIDE SERPLBLD-SCNC: 103.8 MMOL/L (ref 98–110)
CO2 SERPL-SCNC: 27.6 MMOL/L (ref 20–32)
CREAT SERPL-MCNC: 0.72 MG/DL (ref 0.6–1.3)
GLOBULIN, CALCULATED - QUEST: 3.6 (ref 1.9–3.7)
GLUCOSE SERPL-MCNC: 115 MG/DL (ref 60–99)
HBA1C MFR BLD: 8 % (ref 4–7)
POTASSIUM SERPL-SCNC: 3.77 MMOL/L (ref 3.5–5.3)
PROT SERPL-MCNC: 7.1 G/DL (ref 6.1–8.1)
SODIUM SERPL-SCNC: 139.4 MMOL/L (ref 135–146)

## 2023-10-16 PROCEDURE — 36415 COLL VENOUS BLD VENIPUNCTURE: CPT | Performed by: FAMILY MEDICINE

## 2023-10-16 PROCEDURE — 71046 X-RAY EXAM CHEST 2 VIEWS: CPT | Performed by: FAMILY MEDICINE

## 2023-10-16 PROCEDURE — 80053 COMPREHEN METABOLIC PANEL: CPT | Performed by: FAMILY MEDICINE

## 2023-10-16 PROCEDURE — 99214 OFFICE O/P EST MOD 30 MIN: CPT | Performed by: FAMILY MEDICINE

## 2023-10-16 PROCEDURE — 83036 HEMOGLOBIN GLYCOSYLATED A1C: CPT | Performed by: FAMILY MEDICINE

## 2023-10-16 RX ORDER — LOSARTAN POTASSIUM AND HYDROCHLOROTHIAZIDE 25; 100 MG/1; MG/1
1 TABLET ORAL DAILY
Qty: 90 TABLET | Refills: 1 | Status: SHIPPED | OUTPATIENT
Start: 2023-10-16 | End: 2024-02-05

## 2023-10-16 RX ORDER — CELECOXIB 200 MG/1
200 CAPSULE ORAL DAILY
Qty: 90 CAPSULE | Refills: 1 | Status: SHIPPED | OUTPATIENT
Start: 2023-10-16 | End: 2024-02-05

## 2023-10-16 RX ORDER — POTASSIUM CHLORIDE 750 MG/1
10 CAPSULE, EXTENDED RELEASE ORAL 4 TIMES DAILY
Qty: 360 CAPSULE | Refills: 1 | Status: SHIPPED | OUTPATIENT
Start: 2023-10-16 | End: 2024-02-05

## 2023-10-16 RX ORDER — NORTRIPTYLINE HCL 10 MG
10 CAPSULE ORAL AT BEDTIME
Qty: 90 CAPSULE | Refills: 1 | Status: SHIPPED | OUTPATIENT
Start: 2023-10-16 | End: 2024-02-05

## 2023-10-16 RX ORDER — ATORVASTATIN CALCIUM 10 MG/1
10 TABLET, FILM COATED ORAL DAILY
Qty: 90 TABLET | Refills: 1 | Status: SHIPPED | OUTPATIENT
Start: 2023-10-16 | End: 2024-02-05

## 2023-10-16 RX ORDER — VENLAFAXINE HYDROCHLORIDE 150 MG/1
150 CAPSULE, EXTENDED RELEASE ORAL DAILY
Qty: 90 CAPSULE | Refills: 1 | Status: SHIPPED | OUTPATIENT
Start: 2023-10-16 | End: 2024-02-05

## 2023-10-16 RX ORDER — LANCING DEVICE/LANCETS
KIT MISCELLANEOUS
Qty: 100 EACH | Refills: 3 | Status: SHIPPED | OUTPATIENT
Start: 2023-10-16

## 2023-10-16 RX ORDER — INSULIN GLARGINE 100 [IU]/ML
66 INJECTION, SOLUTION SUBCUTANEOUS DAILY
Qty: 75 ML | Refills: 1 | Status: SHIPPED | OUTPATIENT
Start: 2023-10-16 | End: 2024-02-05

## 2023-10-16 RX ORDER — GLIPIZIDE 10 MG/1
10 TABLET, FILM COATED, EXTENDED RELEASE ORAL 2 TIMES DAILY
Qty: 180 TABLET | Refills: 1 | Status: SHIPPED | OUTPATIENT
Start: 2023-10-16 | End: 2024-02-05

## 2023-10-16 RX ORDER — AMLODIPINE BESYLATE 10 MG/1
10 TABLET ORAL DAILY
Qty: 90 TABLET | Refills: 1 | Status: SHIPPED | OUTPATIENT
Start: 2023-10-16 | End: 2024-02-05

## 2023-10-16 RX ORDER — METFORMIN HCL 500 MG
2000 TABLET, EXTENDED RELEASE 24 HR ORAL
Qty: 360 TABLET | Refills: 1 | Status: SHIPPED | OUTPATIENT
Start: 2023-10-16 | End: 2024-02-05

## 2023-10-16 NOTE — NURSING NOTE
Chief Complaint   Patient presents with    Recheck Medication     Fasting today, refill medications     Pre-visit Screening:  Immunizations:  up to date  Colonoscopy:  is due and to be scheduled by patient for later completion  Mammogram: is up to date  Asthma Action Test/Plan:  NA  PHQ9:  NA  GAD7:  NA  Questioned patient about current smoking habits Pt. has never smoked.  Ok to leave detailed message on voice mail for today's visit only Yes, phone # [phone]

## 2023-10-16 NOTE — LETTER
October 16, 2023      Jeny Shahrobertruben  [address]  [place] [address]        Dear ,    We are writing to inform you of your test results.  Your kidney and liver functions were ok. A couple of numbers were a little off. We will recheck these at the next lab draw.      Resulted Orders   HEMOGLOBIN A1C (BFP)   Result Value Ref Range    Hemoglobin A1C 8.0 (A) 4.0 - 7.0 %   Comprehensive Metobolic Panel (BFP)   Result Value Ref Range    Carbon Dioxide 27.6 20 - 32 mmol/L    Creatinine 0.72 0.60 - 1.30 mg/dL    Glucose 115 (A) 60 - 99 mg/dL    Sodium 139.4 135 - 146 mmol/L    Potassium 3.77 3.5 - 5.3 mmol/L    Chloride 103.8 98 - 110 mmol/L    Protein Total 7.1 6.1 - 8.1 g/dL    Albumin 3.5 (A) 3.6 - 5.1 g/dL    Alkaline Phosphatase 136 (A) 33 - 130 U/L    ALT 18 0 - 32 U/L    AST 30 0 - 35 U/L    Bilirubin Total 1.2 0.2 - 1.2 mg/dL    Urea Nitrogen 14 7 - 25 mg/dL    Calcium 8.9 8.6 - 10.3 mg/dL    BUN/Creatinine Ratio 19.4 6 - 32    Globulin Calculated 3.6 1.9 - 3.7    A/G Ratio 1.0 1 - 2.5       If you have any questions or concerns, please call the clinic at the number listed above.       Sincerely,      Yuridia Simmons MD

## 2023-10-17 ENCOUNTER — LAB (OUTPATIENT)
Dept: LAB | Facility: CLINIC | Age: 73
End: 2023-10-17
Payer: MEDICARE

## 2023-10-17 ENCOUNTER — OFFICE VISIT (OUTPATIENT)
Dept: CARDIOLOGY | Facility: CLINIC | Age: 73
End: 2023-10-17
Attending: INTERNAL MEDICINE
Payer: MEDICARE

## 2023-10-17 VITALS
HEART RATE: 72 BPM | SYSTOLIC BLOOD PRESSURE: 132 MMHG | DIASTOLIC BLOOD PRESSURE: 74 MMHG | WEIGHT: 263.6 LBS | HEIGHT: 60 IN | BODY MASS INDEX: 51.75 KG/M2 | OXYGEN SATURATION: 98 %

## 2023-10-17 DIAGNOSIS — I10 ESSENTIAL HYPERTENSION: ICD-10-CM

## 2023-10-17 DIAGNOSIS — Z13.6 SCREENING FOR CARDIOVASCULAR CONDITION: Primary | ICD-10-CM

## 2023-10-17 DIAGNOSIS — R60.0 LOCALIZED EDEMA: ICD-10-CM

## 2023-10-17 DIAGNOSIS — I50.30 HEART FAILURE WITH PRESERVED EJECTION FRACTION, NYHA CLASS I (H): ICD-10-CM

## 2023-10-17 LAB
ANION GAP SERPL CALCULATED.3IONS-SCNC: 10 MMOL/L (ref 7–15)
BUN SERPL-MCNC: 13.5 MG/DL (ref 8–23)
CALCIUM SERPL-MCNC: 8.9 MG/DL (ref 8.8–10.2)
CHLORIDE SERPL-SCNC: 101 MMOL/L (ref 98–107)
CREAT SERPL-MCNC: 0.58 MG/DL (ref 0.51–0.95)
DEPRECATED HCO3 PLAS-SCNC: 26 MMOL/L (ref 22–29)
EGFRCR SERPLBLD CKD-EPI 2021: >90 ML/MIN/1.73M2
GLUCOSE SERPL-MCNC: 195 MG/DL (ref 70–99)
NT-PROBNP SERPL-MCNC: 160 PG/ML (ref 0–900)
POTASSIUM SERPL-SCNC: 3.7 MMOL/L (ref 3.4–5.3)
SODIUM SERPL-SCNC: 137 MMOL/L (ref 135–145)

## 2023-10-17 PROCEDURE — 80048 BASIC METABOLIC PNL TOTAL CA: CPT | Performed by: INTERNAL MEDICINE

## 2023-10-17 PROCEDURE — 99215 OFFICE O/P EST HI 40 MIN: CPT | Performed by: INTERNAL MEDICINE

## 2023-10-17 PROCEDURE — 93005 ELECTROCARDIOGRAM TRACING: CPT | Performed by: INTERNAL MEDICINE

## 2023-10-17 PROCEDURE — 83880 ASSAY OF NATRIURETIC PEPTIDE: CPT | Performed by: INTERNAL MEDICINE

## 2023-10-17 PROCEDURE — 36415 COLL VENOUS BLD VENIPUNCTURE: CPT | Performed by: INTERNAL MEDICINE

## 2023-10-17 RX ORDER — CARVEDILOL 25 MG/1
25 TABLET ORAL 2 TIMES DAILY WITH MEALS
Qty: 180 TABLET | Refills: 3 | Status: SHIPPED | OUTPATIENT
Start: 2023-10-17

## 2023-10-17 RX ORDER — FUROSEMIDE 20 MG
20 TABLET ORAL DAILY
Qty: 90 TABLET | Refills: 3 | Status: SHIPPED | OUTPATIENT
Start: 2023-10-17 | End: 2024-05-14

## 2023-10-17 NOTE — PROGRESS NOTES
HISTORY:    Alma Kraft is a pleasant 73-year-old female with a longstanding history of hypertension that has been difficult to control as well as a history of type 2 diabetes, morbid obesity, obstructive sleep apnea using CPAP, and liver cirrhosis.  She also has moderate aortic stenosis.    Today Jeny reports that she has noticed an increase in the amount of peripheral edema as well as a sense of heaviness in her legs and shortness of breath with walking in recent months.  She thinks this is substantially worse than at the time of our last visit a year ago.  She has not been experiencing PND or orthopnea and reports that she uses her CPAP machine every night for the full night.  She also denies symptoms of exertional chest arm neck shoulder or jaw discomfort, syncope or near syncope, strokelike symptoms, or symptoms of claudication.    Tracy had an echocardiogram repeated yesterday to follow-up on the aortic stenosis discovered at our first visit a year ago.  It shows that her gradient across the aortic valve increased slightly from 27 to 33 mmHg but the calculated valve area was unchanged at 1.3 cm .  She has normal LV function, mild LVH with E to E' ratio suggesting evaded LV filling pressures.    Chest x-ray done yesterday suggested left ventricular enlargement (although this is disproven by the very accurate recent echocardiogram showing normal LV size).  Aortic calcification is noted, not surprising with aortic stenosis and pulmonary vascular congestion was also noted.      ASSESSMENT/PLAN:    1.  Hypertension.  Well-controlled on current medications which include amlodipine 10 mg daily, carvedilol 25 mg twice daily, and losartan hydrochlorothiazide 100-25 daily.  Continue current medications.  2.  Peripheral edema.  The patient has more edema today on exam that she had last year and she complains of feeling heaviness of her legs.  Lasix 20 mg will be started and was discussed with her.  We will  follow-up labs in about 2 weeks.  I also discussed nonpharmacological management mechanisms including regular exercise, limiting sodium intake, wearing compression stockings, keeping legs elevated, and weight loss.  She may be a candidate for lymphedema clinic at some point if we cannot control with diuretics  3.  HFpEF.  This is a new diagnosis for her.  She is more short of breath, has pulmonary vascular congestion on CXR, and echocardiogram changes consistent with elevated left ventricular filling pressures.  She does not have PND or orthopnea but she does have an increasing amount of peripheral edema.  proBNP ordered for today.  The Lasix being started for peripheral edema is the best first step toward managing her HFpEF.  2-month follow-up visit will be planned.  She is diabetic and would be a candidate for initiation of Jardiance as well.  I would suggest this for her primary care physician who manages her diabetes.  4.  Aortic stenosis.  This was also discussed with the patient.  There has been minimal progression of her aortic stenosis.  We will continue to monitor this on an annual basis.  She asked about what we would do as this gets worse and I explained AVR versus TAVR.    Thank you for inviting me to participate in the care of your patient.  Please don't hesitate to call if I can be of further assistance.  55 minutes were spent today reviewing the chart and other records, interviewing and examining the patient, and documenting our visit.    Chart documentation was completed, in part, with [employer] voice-recognition software. Even though reviewed, some grammatical, spelling, and word errors may remain.       Orders Placed This Encounter   Procedures    Basic metabolic panel    N terminal pro BNP outpatient    Follow-Up with Cardiology    EKG 12-lead complete w/read - Clinics (performed today)     Orders Placed This Encounter   Medications    carvedilol (COREG) 25 MG tablet     Sig: Take 1 tablet (25 mg) by  mouth 2 times daily (with meals)     Dispense:  180 tablet     Refill:  3    furosemide (LASIX) 20 MG tablet     Sig: Take 1 tablet (20 mg) by mouth daily     Dispense:  90 tablet     Refill:  3     Medications Discontinued During This Encounter   Medication Reason    carvedilol (COREG) 25 MG tablet Reorder (No AVS)       10 year ASCVD risk: The ASCVD Risk score (Joselo RICHARDSON, et al., 2019) failed to calculate for the following reasons:    The valid total cholesterol range is 130 to 320 mg/dL    Encounter Diagnoses   Name Primary?    Essential hypertension     Screening for cardiovascular condition Yes    Localized edema     Heart failure with preserved ejection fraction, NYHA class I (H)        CURRENT MEDICATIONS:  Current Outpatient Medications   Medication Sig Dispense Refill    amLODIPine (NORVASC) 10 MG tablet Take 1 tablet (10 mg) by mouth daily 90 tablet 1    amoxicillin (AMOXIL) 500 MG capsule Take 4 capsules by mouth 1 hour prior to dental appointment      ASPIRIN 81 MG OR TABS 1 tab po QD (Once per day) 100 3    atorvastatin (LIPITOR) 10 MG tablet Take 1 tablet (10 mg) by mouth daily 90 tablet 1    blood glucose (NO BRAND SPECIFIED) lancets standard Use to test blood sugar one times daily or as directed. 100 each 3    blood glucose (NO BRAND SPECIFIED) lancets standard Use to test blood sugar 1 times daily or as directed.ONE TOUCH DELICA 100 lancet 3    blood glucose (NO BRAND SPECIFIED) test strip Use to test blood sugar one times daily or as directed. To accompany: {Blood Glucose Monitor Brands One Touch Verio test strips 100 strip 3    blood glucose (ONE TOUCH DELICA) lancing device Device to be used with lancets. One touch Delica 100 each 3    blood glucose monitoring (ONE TOUCH DELICA) lancets Use to test blood sugars 1 times daily or as directed. 100 each 3    Blood Glucose Monitoring Suppl (ONE TOUCH ULTRA 2) W/DEVICE KIT Use to test blood sugars 2 times daily or as directed. 1 kit 0    budesonide  (RINOCORT AQUA) 32 MCG/ACT nasal spray Spray 2 sprays into both nostrils daily 25.29 mL 11    carvedilol (COREG) 25 MG tablet Take 1 tablet (25 mg) by mouth 2 times daily (with meals) 180 tablet 3    celecoxib (CELEBREX) 200 MG capsule Take 1 capsule (200 mg) by mouth daily 90 capsule 1    cyclobenzaprine (FLEXERIL) 5 MG tablet Take 1 tablet (5 mg) by mouth 3 times daily as needed for muscle spasms 30 tablet 0    furosemide (LASIX) 20 MG tablet Take 1 tablet (20 mg) by mouth daily 90 tablet 3    glipiZIDE (GLUCOTROL XL) 10 MG 24 hr tablet Take 1 tablet (10 mg) by mouth 2 times daily 180 tablet 1    insulin glargine (BASAGLAR KWIKPEN) 100 UNIT/ML pen Inject 66 Units Subcutaneous daily 75 mL 1    losartan-hydrochlorothiazide (HYZAAR) 100-25 MG tablet Take 1 tablet by mouth daily 90 tablet 1    metFORMIN (GLUCOPHAGE XR) 500 MG 24 hr tablet Take 4 tablets (2,000 mg) by mouth daily (with dinner) 360 tablet 1    Multiple Vitamins-Minerals (CENTRUM SILVER) per tablet 1 tablet 4 times weekly 100 tablet     nortriptyline (PAMELOR) 10 MG capsule Take 1 capsule (10 mg) by mouth at bedtime 90 capsule 1    ORDER FOR DME Equipment being ordered: Mediven plus compression stockings    75236  20-30 compression 3 Device 0    potassium chloride ER (MICRO-K) 10 MEQ CR capsule Take 1 capsule (10 mEq) by mouth 4 times daily 360 capsule 1    venlafaxine (EFFEXOR XR) 150 MG 24 hr capsule Take 1 capsule (150 mg) by mouth daily 90 capsule 1    ULTIGUARD SAFEPACK PEN NEEDLE 32G X 4 MM miscellaneous Use 1 pen needle daily or as directed. 100 each 1       ALLERGIES     Allergies   Allergen Reactions    Demerol Nausea and Vomiting    Erythromycin      GI upset       PAST MEDICAL HISTORY:  Past Medical History:   Diagnosis Date    Arthritis     hands, Right shoulder    Diabetes (H)     Hypertension     Sleep apnea     Uses a CPAP       PAST SURGICAL HISTORY:  Past Surgical History:   Procedure Laterality Date    ------------OTHER-------------   09/05/2013    L hand, cyst excision    ------------OTHER------------- Right 03/14/2014    shoulder manipulation    ESOPHAGOSCOPY, GASTROSCOPY, DUODENOSCOPY (EGD), COMBINED N/A 01/24/2022    Procedure: ESOPHAGOGASTRODUODENOSCOPY;  Surgeon: Xavi Heller MD;  Location: [facility] OR    HC INCISION TENDON SHEATH FINGER Left 09/05/2013    left thumb trigger finger    HC KNEE SCOPE, DIAGNOSTIC  04/2005    Arthroscopy, Knee Left    HC REMOVE TONSILS/ADENOIDS,<13 Y/O  1953    T & A <11y.o.    TEST NOT FOUND  06/27/2007    R heel/ inocencio's deformity    ZZC APPENDECTOMY  1956    ZZC TOTAL KNEE ARTHROPLASTY  02/2006    Knee Replacement, Total Right    ZZC TOTAL KNEE ARTHROPLASTY  03/05/2007    Knee Replacement, Total  Left    ZZC VAGINAL HYSTERECTOMY  08/2001    Hysterectomy, Vaginal & BSO       FAMILY HISTORY:  Family History   Problem Relation Age of Onset    Cerebrovascular Disease Mother     Deep Vein Thrombosis Mother     Cancer Father         prostate    Gastrointestinal Disease Father     Breast Cancer Sister     Breast Cancer Sister     Prostate Cancer Brother     Prostate Cancer Brother     Heart Disease Maternal Grandmother     Cerebrovascular Disease Maternal Grandfather     Heart Disease Paternal Grandfather     Gastrointestinal Disease Other         Niece with GERD/hiatal hernia       SOCIAL HISTORY:  Social History     Socioeconomic History    Marital status:      Spouse name: Blas    Number of children: 3    Years of education: 16    Highest education level: None   Occupational History    Occupation: Processor     Employer: [employer]   Tobacco Use    Smoking status: Never     Passive exposure: Never    Smokeless tobacco: Never   Substance and Sexual Activity    Alcohol use: Not Currently    Drug use: No    Sexual activity: Never     Partners: Male     Comment: Hysterectomy   Other Topics Concern    Exercise No    Seat Belt Yes    Self-Exams Yes   Social History Narrative        Functional abiltity:       Hearing imparment:No      Acitvities of daily living:Normal      Risk of falls:No      Home safety of concern:No        Do you exercise?     NO   Times/week: 0    History of abusive relationships in past:   No    History of abusive relationships currently:    No    Do you feel emotionally and physically safe in your environment?     Yes    Do you own a gun?  No      Is the gun kept in a safe place:   NOT APPLICABLE    Do you wear a seatbelt regularly?     Yes    Do you use sun screen?     Yes           Review of Systems:  Skin:        Eyes:       ENT:       Respiratory:  Positive for sleep apnea;CPAP;dyspnea on exertion;shortness of breath  Cardiovascular:    Positive for;fatigue;edema  Gastroenterology:      Genitourinary:       Musculoskeletal:       Neurologic:       Psychiatric:       Heme/Lymph/Imm:       Endocrine:         Physical Exam:  Vitals: /74 (BP Location: Right arm, Patient Position: Sitting, Cuff Size: Adult Regular)   Pulse 72   Ht 1.524 m (5')   Wt 119.6 kg (263 lb 9.6 oz)   LMP 05/08/2001   SpO2 98%   BMI 51.48 kg/m      Constitutional:  cooperative, alert and oriented, well developed, well nourished, in no acute distress morbidly obese      Skin:  warm and dry to the touch, no apparent skin lesions or masses noted        Head:  normocephalic, no masses or lesions        Eyes:  pupils equal and round, conjunctivae and lids unremarkable, sclera white, no xanthalasma, EOMS intact, no nystagmus        ENT:  no pallor or cyanosis, dentition good        Neck:  carotid pulses are full and equal bilaterally, JVP normal, no carotid bruit transmitted murmur Transmitted murmur right greater than left    Chest:  normal breath sounds, clear to auscultation, normal A-P diameter, normal symmetry, normal respiratory excursion, no use of accessory muscles        Cardiac: regular rhythm;normal S1 and S2;no S3 or S4       systolic ejection murmur;grade 2;RUSB;radiation to the carotid          Abdomen:   abdomen soft;BS normoactive        Vascular: pulses full and equal                                      Extremities and Muscular Skeletal:        bilateral LE edema;1+;2+;pitting     Neurological:  no gross motor deficits        Psych:  affect appropriate, oriented to time, person and place     Recent Lab Results:  LIPID RESULTS:  Lab Results   Component Value Date    CHOL 121 07/14/2023    HDL 51 07/14/2023    LDL 55 07/14/2023    LDL 87 02/21/2019    TRIG 74 07/14/2023    CHOLHDLRATIO 2 07/14/2023       LIVER ENZYME RESULTS:  Lab Results   Component Value Date    AST 33 10/05/2013    ALT 32 (H) 02/21/2019       CBC RESULTS:  Lab Results   Component Value Date    WBC 5.3 01/10/2022    RBC 4.29 01/10/2022    HGB 13.3 01/10/2022    HCT 41.1 01/10/2022    MCV 95.9 01/10/2022    MCH 31.0 01/10/2022    MCHC 32.4 01/10/2022    RDW 12.9 08/22/2018     01/10/2022     02/11/2018       BMP RESULTS:  Lab Results   Component Value Date     10/17/2023    .4 10/16/2023    POTASSIUM 3.7 10/17/2023    POTASSIUM 3.77 10/16/2023    CHLORIDE 101 10/17/2023    CHLORIDE 103.8 10/16/2023    CO2 26 10/17/2023    CO2 27.6 10/16/2023    ANIONGAP 10 10/17/2023    ANIONGAP 5 02/11/2018     (H) 10/17/2023     (A) 10/16/2023    BUN 13.5 10/17/2023    BUN 14 10/16/2023    BUN 19.4 10/16/2023    CR 0.58 10/17/2023    CR 0.72 10/16/2023    GFRESTIMATED >90 10/17/2023    GFRESTIMATED 99 11/21/2018    GFRESTBLACK >90 02/11/2018    TONI 8.9 10/17/2023    TONI 8.9 10/16/2023        A1C RESULTS:  Lab Results   Component Value Date    A1C 8.0 (A) 10/16/2023       INR RESULTS:  Lab Results   Component Value Date    INR 1.84 (H) 03/08/2007    INR 2.17 (H) 03/07/2007         Lan Espinoza MD, FACC    CC  Lan Espinoza MD  [address] [place] [id number]

## 2023-10-17 NOTE — LETTER
10/17/2023    Yuridia Simmons MD  [address] [facility] [address]    RE: Alma Kraft       Dear Colleague,     I had the pleasure of seeing Alma Kraft in the [facility].  HISTORY:    Alma Kraft is a pleasant 73-year-old female with a longstanding history of hypertension that has been difficult to control as well as a history of type 2 diabetes, morbid obesity, obstructive sleep apnea using CPAP, and liver cirrhosis.  She also has moderate aortic stenosis.    Today Jeny reports that she has noticed an increase in the amount of peripheral edema as well as a sense of heaviness in her legs and shortness of breath with walking in recent months.  She thinks this is substantially worse than at the time of our last visit a year ago.  She has not been experiencing PND or orthopnea and reports that she uses her CPAP machine every night for the full night.  She also denies symptoms of exertional chest arm neck shoulder or jaw discomfort, syncope or near syncope, strokelike symptoms, or symptoms of claudication.    Trcay had an echocardiogram repeated yesterday to follow-up on the aortic stenosis discovered at our first visit a year ago.  It shows that her gradient across the aortic valve increased slightly from 27 to 33 mmHg but the calculated valve area was unchanged at 1.3 cm .  She has normal LV function, mild LVH with E to E' ratio suggesting evaded LV filling pressures.    Chest x-ray done yesterday suggested left ventricular enlargement (although this is disproven by the very accurate recent echocardiogram showing normal LV size).  Aortic calcification is noted, not surprising with aortic stenosis and pulmonary vascular congestion was also noted.      ASSESSMENT/PLAN:    1.  Hypertension.  Well-controlled on current medications which include amlodipine 10 mg daily, carvedilol 25 mg twice daily, and losartan hydrochlorothiazide 100-25 daily.  Continue current  medications.  2.  Peripheral edema.  The patient has more edema today on exam that she had last year and she complains of feeling heaviness of her legs.  Lasix 20 mg will be started and was discussed with her.  We will follow-up labs in about 2 weeks.  I also discussed nonpharmacological management mechanisms including regular exercise, limiting sodium intake, wearing compression stockings, keeping legs elevated, and weight loss.  She may be a candidate for lymphedema clinic at some point if we cannot control with diuretics  3.  HFpEF.  This is a new diagnosis for her.  She is more short of breath, has pulmonary vascular congestion on CXR, and echocardiogram changes consistent with elevated left ventricular filling pressures.  She does not have PND or orthopnea but she does have an increasing amount of peripheral edema.  proBNP ordered for today.  The Lasix being started for peripheral edema is the best first step toward managing her HFpEF.  2-month follow-up visit will be planned.  She is diabetic and would be a candidate for initiation of Jardiance as well.  I would suggest this for her primary care physician who manages her diabetes.  4.  Aortic stenosis.  This was also discussed with the patient.  There has been minimal progression of her aortic stenosis.  We will continue to monitor this on an annual basis.  She asked about what we would do as this gets worse and I explained AVR versus TAVR.    Thank you for inviting me to participate in the care of your patient.  Please don't hesitate to call if I can be of further assistance.  55 minutes were spent today reviewing the chart and other records, interviewing and examining the patient, and documenting our visit.    Chart documentation was completed, in part, with [employer] voice-recognition software. Even though reviewed, some grammatical, spelling, and word errors may remain.       Orders Placed This Encounter   Procedures    Basic metabolic panel    N terminal pro  BNP outpatient    Follow-Up with Cardiology    EKG 12-lead complete w/read - Clinics (performed today)     Orders Placed This Encounter   Medications    carvedilol (COREG) 25 MG tablet     Sig: Take 1 tablet (25 mg) by mouth 2 times daily (with meals)     Dispense:  180 tablet     Refill:  3    furosemide (LASIX) 20 MG tablet     Sig: Take 1 tablet (20 mg) by mouth daily     Dispense:  90 tablet     Refill:  3     Medications Discontinued During This Encounter   Medication Reason    carvedilol (COREG) 25 MG tablet Reorder (No AVS)       10 year ASCVD risk: The ASCVD Risk score (Joselo RICHARDSON, et al., 2019) failed to calculate for the following reasons:    The valid total cholesterol range is 130 to 320 mg/dL    Encounter Diagnoses   Name Primary?    Essential hypertension     Screening for cardiovascular condition Yes    Localized edema     Heart failure with preserved ejection fraction, NYHA class I (H)        CURRENT MEDICATIONS:  Current Outpatient Medications   Medication Sig Dispense Refill    amLODIPine (NORVASC) 10 MG tablet Take 1 tablet (10 mg) by mouth daily 90 tablet 1    amoxicillin (AMOXIL) 500 MG capsule Take 4 capsules by mouth 1 hour prior to dental appointment      ASPIRIN 81 MG OR TABS 1 tab po QD (Once per day) 100 3    atorvastatin (LIPITOR) 10 MG tablet Take 1 tablet (10 mg) by mouth daily 90 tablet 1    blood glucose (NO BRAND SPECIFIED) lancets standard Use to test blood sugar one times daily or as directed. 100 each 3    blood glucose (NO BRAND SPECIFIED) lancets standard Use to test blood sugar 1 times daily or as directed.ONE TOUCH DELICA 100 lancet 3    blood glucose (NO BRAND SPECIFIED) test strip Use to test blood sugar one times daily or as directed. To accompany: {Blood Glucose Monitor Brands One Touch Verio test strips 100 strip 3    blood glucose (ONE TOUCH DELICA) lancing device Device to be used with lancets. One touch Delica 100 each 3    blood glucose monitoring (ONE TOUCH DELICA)  lancets Use to test blood sugars 1 times daily or as directed. 100 each 3    Blood Glucose Monitoring Suppl (ONE TOUCH ULTRA 2) W/DEVICE KIT Use to test blood sugars 2 times daily or as directed. 1 kit 0    budesonide (RINOCORT AQUA) 32 MCG/ACT nasal spray Spray 2 sprays into both nostrils daily 25.29 mL 11    carvedilol (COREG) 25 MG tablet Take 1 tablet (25 mg) by mouth 2 times daily (with meals) 180 tablet 3    celecoxib (CELEBREX) 200 MG capsule Take 1 capsule (200 mg) by mouth daily 90 capsule 1    cyclobenzaprine (FLEXERIL) 5 MG tablet Take 1 tablet (5 mg) by mouth 3 times daily as needed for muscle spasms 30 tablet 0    furosemide (LASIX) 20 MG tablet Take 1 tablet (20 mg) by mouth daily 90 tablet 3    glipiZIDE (GLUCOTROL XL) 10 MG 24 hr tablet Take 1 tablet (10 mg) by mouth 2 times daily 180 tablet 1    insulin glargine (BASAGLAR KWIKPEN) 100 UNIT/ML pen Inject 66 Units Subcutaneous daily 75 mL 1    losartan-hydrochlorothiazide (HYZAAR) 100-25 MG tablet Take 1 tablet by mouth daily 90 tablet 1    metFORMIN (GLUCOPHAGE XR) 500 MG 24 hr tablet Take 4 tablets (2,000 mg) by mouth daily (with dinner) 360 tablet 1    Multiple Vitamins-Minerals (CENTRUM SILVER) per tablet 1 tablet 4 times weekly 100 tablet     nortriptyline (PAMELOR) 10 MG capsule Take 1 capsule (10 mg) by mouth at bedtime 90 capsule 1    ORDER FOR DME Equipment being ordered: Mediven plus compression stockings    39100  20-30 compression 3 Device 0    potassium chloride ER (MICRO-K) 10 MEQ CR capsule Take 1 capsule (10 mEq) by mouth 4 times daily 360 capsule 1    venlafaxine (EFFEXOR XR) 150 MG 24 hr capsule Take 1 capsule (150 mg) by mouth daily 90 capsule 1    ULTIGUARD SAFEPACK PEN NEEDLE 32G X 4 MM miscellaneous Use 1 pen needle daily or as directed. 100 each 1       ALLERGIES     Allergies   Allergen Reactions    Demerol Nausea and Vomiting    Erythromycin      GI upset       PAST MEDICAL HISTORY:  Past Medical History:   Diagnosis Date     Arthritis     hands, Right shoulder    Diabetes (H)     Hypertension     Sleep apnea     Uses a CPAP       PAST SURGICAL HISTORY:  Past Surgical History:   Procedure Laterality Date    ------------OTHER-------------  09/05/2013    L hand, cyst excision    ------------OTHER------------- Right 03/14/2014    shoulder manipulation    ESOPHAGOSCOPY, GASTROSCOPY, DUODENOSCOPY (EGD), COMBINED N/A 01/24/2022    Procedure: ESOPHAGOGASTRODUODENOSCOPY;  Surgeon: Xavi Heller MD;  Location: [facility] OR    HC INCISION TENDON SHEATH FINGER Left 09/05/2013    left thumb trigger finger    HC KNEE SCOPE, DIAGNOSTIC  04/2005    Arthroscopy, Knee Left    HC REMOVE TONSILS/ADENOIDS,<13 Y/O  1953    T & A <11y.o.    TEST NOT FOUND  06/27/2007    R heel/ inocencio's deformity    ZZC APPENDECTOMY  1956    ZZC TOTAL KNEE ARTHROPLASTY  02/2006    Knee Replacement, Total Right    ZZC TOTAL KNEE ARTHROPLASTY  03/05/2007    Knee Replacement, Total  Left    ZZC VAGINAL HYSTERECTOMY  08/2001    Hysterectomy, Vaginal & BSO       FAMILY HISTORY:  Family History   Problem Relation Age of Onset    Cerebrovascular Disease Mother     Deep Vein Thrombosis Mother     Cancer Father         prostate    Gastrointestinal Disease Father     Breast Cancer Sister     Breast Cancer Sister     Prostate Cancer Brother     Prostate Cancer Brother     Heart Disease Maternal Grandmother     Cerebrovascular Disease Maternal Grandfather     Heart Disease Paternal Grandfather     Gastrointestinal Disease Other         Niece with GERD/hiatal hernia       SOCIAL HISTORY:  Social History     Socioeconomic History    Marital status:      Spouse name: Blas    Number of children: 3    Years of education: 16    Highest education level: None   Occupational History    Occupation: Processor     Employer: [employer]   Tobacco Use    Smoking status: Never     Passive exposure: Never    Smokeless tobacco: Never   Substance and Sexual Activity    Alcohol use: Not Currently     Drug use: No    Sexual activity: Never     Partners: Male     Comment: Hysterectomy   Other Topics Concern    Exercise No    Seat Belt Yes    Self-Exams Yes   Social History Narrative        Functional abiltity:      Hearing imparment:No      Acitvities of daily living:Normal      Risk of falls:No      Home safety of concern:No        Do you exercise?     NO   Times/week: 0    History of abusive relationships in past:   No    History of abusive relationships currently:    No    Do you feel emotionally and physically safe in your environment?     Yes    Do you own a gun?  No      Is the gun kept in a safe place:   NOT APPLICABLE    Do you wear a seatbelt regularly?     Yes    Do you use sun screen?     Yes           Review of Systems:  Skin:        Eyes:       ENT:       Respiratory:  Positive for sleep apnea;CPAP;dyspnea on exertion;shortness of breath  Cardiovascular:    Positive for;fatigue;edema  Gastroenterology:      Genitourinary:       Musculoskeletal:       Neurologic:       Psychiatric:       Heme/Lymph/Imm:       Endocrine:         Physical Exam:  Vitals: /74 (BP Location: Right arm, Patient Position: Sitting, Cuff Size: Adult Regular)   Pulse 72   Ht 1.524 m (5')   Wt 119.6 kg (263 lb 9.6 oz)   LMP 05/08/2001   SpO2 98%   BMI 51.48 kg/m      Constitutional:  cooperative, alert and oriented, well developed, well nourished, in no acute distress morbidly obese      Skin:  warm and dry to the touch, no apparent skin lesions or masses noted        Head:  normocephalic, no masses or lesions        Eyes:  pupils equal and round, conjunctivae and lids unremarkable, sclera white, no xanthalasma, EOMS intact, no nystagmus        ENT:  no pallor or cyanosis, dentition good        Neck:  carotid pulses are full and equal bilaterally, JVP normal, no carotid bruit transmitted murmur Transmitted murmur right greater than left    Chest:  normal breath sounds, clear to auscultation, normal A-P diameter, normal  symmetry, normal respiratory excursion, no use of accessory muscles        Cardiac: regular rhythm;normal S1 and S2;no S3 or S4       systolic ejection murmur;grade 2;RUSB;radiation to the carotid          Abdomen:  abdomen soft;BS normoactive        Vascular: pulses full and equal                                      Extremities and Muscular Skeletal:        bilateral LE edema;1+;2+;pitting     Neurological:  no gross motor deficits        Psych:  affect appropriate, oriented to time, person and place     Recent Lab Results:  LIPID RESULTS:  Lab Results   Component Value Date    CHOL 121 07/14/2023    HDL 51 07/14/2023    LDL 55 07/14/2023    LDL 87 02/21/2019    TRIG 74 07/14/2023    CHOLHDLRATIO 2 07/14/2023       LIVER ENZYME RESULTS:  Lab Results   Component Value Date    AST 33 10/05/2013    ALT 32 (H) 02/21/2019       CBC RESULTS:  Lab Results   Component Value Date    WBC 5.3 01/10/2022    RBC 4.29 01/10/2022    HGB 13.3 01/10/2022    HCT 41.1 01/10/2022    MCV 95.9 01/10/2022    MCH 31.0 01/10/2022    MCHC 32.4 01/10/2022    RDW 12.9 08/22/2018     01/10/2022     02/11/2018       BMP RESULTS:  Lab Results   Component Value Date     10/17/2023    .4 10/16/2023    POTASSIUM 3.7 10/17/2023    POTASSIUM 3.77 10/16/2023    CHLORIDE 101 10/17/2023    CHLORIDE 103.8 10/16/2023    CO2 26 10/17/2023    CO2 27.6 10/16/2023    ANIONGAP 10 10/17/2023    ANIONGAP 5 02/11/2018     (H) 10/17/2023     (A) 10/16/2023    BUN 13.5 10/17/2023    BUN 14 10/16/2023    BUN 19.4 10/16/2023    CR 0.58 10/17/2023    CR 0.72 10/16/2023    GFRESTIMATED >90 10/17/2023    GFRESTIMATED 99 11/21/2018    GFRESTBLACK >90 02/11/2018    TONI 8.9 10/17/2023    TONI 8.9 10/16/2023        A1C RESULTS:  Lab Results   Component Value Date    A1C 8.0 (A) 10/16/2023       INR RESULTS:  Lab Results   Component Value Date    INR 1.84 (H) 03/08/2007    INR 2.17 (H) 03/07/2007         Lan Espinoza MD,  FACC    CC  Lan Espinoza MD  [address] [place] [id number]                    Thank you for allowing me to participate in the care of your patient.      Sincerely,     Lan Espinoza MD     [facility]  cc:   Lan Espinoza MD  [address] [place] [id number]

## 2023-10-19 ENCOUNTER — OFFICE VISIT (OUTPATIENT)
Dept: FAMILY MEDICINE | Facility: CLINIC | Age: 73
End: 2023-10-19

## 2023-10-19 DIAGNOSIS — E11.9 TYPE 2 DIABETES MELLITUS WITHOUT COMPLICATION, WITH LONG-TERM CURRENT USE OF INSULIN (H): Primary | ICD-10-CM

## 2023-10-19 DIAGNOSIS — Z79.4 TYPE 2 DIABETES MELLITUS WITHOUT COMPLICATION, WITH LONG-TERM CURRENT USE OF INSULIN (H): Primary | ICD-10-CM

## 2023-10-19 RX ORDER — LANCETS 33 GAUGE
1 EACH MISCELLANEOUS DAILY
Qty: 100 EACH | Refills: 3 | Status: SHIPPED | OUTPATIENT
Start: 2023-10-19

## 2023-10-19 NOTE — Clinical Note
Please see note. Plan to reinitiate Tradjenta at this time. May need to look at short acting insulin in the future.

## 2023-10-23 NOTE — PROGRESS NOTES
SUBJECTIVE/OBJECTIVE:                Alma Kraft is a 73 year old female seen for a follow-up visit for [facility] Services.  She was referred to me from Dr. Yuridia Simmons.     Chief Complaint: Follow up from [facility] visit on 06/15/2023.      Diabetes:  Current Medications:  Current Outpatient Medications   Medication    OneTouch Delica Lancets 33G MISC    amLODIPine (NORVASC) 10 MG tablet    amoxicillin (AMOXIL) 500 MG capsule    ASPIRIN 81 MG OR TABS    atorvastatin (LIPITOR) 10 MG tablet    blood glucose (ONE TOUCH DELICA) lancing device    blood glucose monitoring (ONE TOUCH DELICA) lancets    Blood Glucose Monitoring Suppl (ONE TOUCH ULTRA 2) W/DEVICE KIT    budesonide (RINOCORT AQUA) 32 MCG/ACT nasal spray    carvedilol (COREG) 25 MG tablet    celecoxib (CELEBREX) 200 MG capsule    cyclobenzaprine (FLEXERIL) 5 MG tablet    furosemide (LASIX) 20 MG tablet    glipiZIDE (GLUCOTROL XL) 10 MG 24 hr tablet    insulin glargine (BASAGLAR KWIKPEN) 100 UNIT/ML pen    losartan-hydrochlorothiazide (HYZAAR) 100-25 MG tablet    metFORMIN (GLUCOPHAGE XR) 500 MG 24 hr tablet    Multiple Vitamins-Minerals (CENTRUM SILVER) per tablet    nortriptyline (PAMELOR) 10 MG capsule    ORDER FOR DME    potassium chloride ER (MICRO-K) 10 MEQ CR capsule    ULTIGUARD SAFEPACK PEN NEEDLE 32G X 4 MM miscellaneous    venlafaxine (EFFEXOR XR) 150 MG 24 hr capsule     No current facility-administered medications for this visit.       We discussed the benefits and risks of each medication.  Patient Questions/Concerns:  Diabetes control since discontinuation of Ozempic  Labs:  Lab Results   Component Value Date    A1C 8.0 10/16/2023    A1C 7.1 07/14/2023    A1C 7.5 03/27/2023    A1C 7.8 12/14/2022    A1C 7.6 09/15/2022       ASSESSMENT/PLAN:                Diabetes:  Assessment: Patient recently was in for office visit and A1c had increased from 7.1 to 8.0. Patient had been on Ozempic as part of her diabetes regimen, but due to  diarrhea and vomiting, patient has discontinued Ozempic. When Ozempic was initiated, patient had been taking Tradjenta 5mg daily. This was well tolerated.    Patient takes fasting blood sugar levels daily, which have consistently been within range, low 100s.     I would recommend that we reinitiate Tradjenta at this time. Patient was on PAP plan for Tradjenta. She will fill out the paperwork needed for continuation and we will resubmit order when received.    Status: Diabetes control slipping since discontinuation of GLP-1.    Drug Therapy Problems:  1) Additional diabetes medication warranted  Plan:  1) Reinitiate Tradjenta 5mg daily when paperwork is brought in for PAP.      I spent 1 hour with this patient today.  All changes were made via collaborative practice agreement with Yuridia Simmons. A copy of the visit note was provided to the patient's primary care provider.    Romina Caro, PharmD  Clinical Pharmacist  [facility] Phone: [phone]  Direct Office Phone: [phone]

## 2023-10-27 DIAGNOSIS — R60.0 LOCALIZED EDEMA: Primary | ICD-10-CM

## 2023-10-27 DIAGNOSIS — I50.30 HEART FAILURE WITH PRESERVED EJECTION FRACTION, NYHA CLASS I (H): ICD-10-CM

## 2023-10-31 ENCOUNTER — LAB (OUTPATIENT)
Dept: LAB | Facility: CLINIC | Age: 73
End: 2023-10-31
Payer: MEDICARE

## 2023-10-31 DIAGNOSIS — R60.0 LOCALIZED EDEMA: ICD-10-CM

## 2023-10-31 DIAGNOSIS — I50.30 HEART FAILURE WITH PRESERVED EJECTION FRACTION, NYHA CLASS I (H): ICD-10-CM

## 2023-10-31 LAB
ANION GAP SERPL CALCULATED.3IONS-SCNC: 13 MMOL/L (ref 7–15)
BUN SERPL-MCNC: 19.3 MG/DL (ref 8–23)
CALCIUM SERPL-MCNC: 9.1 MG/DL (ref 8.8–10.2)
CHLORIDE SERPL-SCNC: 96 MMOL/L (ref 98–107)
CREAT SERPL-MCNC: 0.64 MG/DL (ref 0.51–0.95)
DEPRECATED HCO3 PLAS-SCNC: 26 MMOL/L (ref 22–29)
EGFRCR SERPLBLD CKD-EPI 2021: >90 ML/MIN/1.73M2
GLUCOSE SERPL-MCNC: 368 MG/DL (ref 70–99)
POTASSIUM SERPL-SCNC: 3.8 MMOL/L (ref 3.4–5.3)
SODIUM SERPL-SCNC: 135 MMOL/L (ref 135–145)

## 2023-10-31 PROCEDURE — 36415 COLL VENOUS BLD VENIPUNCTURE: CPT | Performed by: INTERNAL MEDICINE

## 2023-10-31 PROCEDURE — 80048 BASIC METABOLIC PNL TOTAL CA: CPT | Performed by: INTERNAL MEDICINE

## 2023-11-03 ENCOUNTER — TELEPHONE (OUTPATIENT)
Dept: CARDIOLOGY | Facility: CLINIC | Age: 73
End: 2023-11-03
Payer: MEDICARE

## 2023-11-03 NOTE — TELEPHONE ENCOUNTER
Patient notified of results and is seeing a Pharm D and will mention Jardiance as a possibility.  Patient verbalized understanding.  Tiffanie Lockhart RN on 11/3/2023 at 11:14 AM

## 2023-11-03 NOTE — TELEPHONE ENCOUNTER
----- Message from Lan Espinoza MD sent at 11/3/2023 10:32 AM CDT -----  Values look good except very high glucose.  I'm seeing her in a couple of months and if her PMD has not considered Jardiance I will review.

## 2023-11-14 NOTE — PROGRESS NOTES
Assessment & Plan   Problem List Items Addressed This Visit    None  Visit Diagnoses       Rash    -  Primary    Relevant Medications    doxycycline hyclate (VIBRA-TABS) 100 MG tablet    Cellulitis of left lower extremity        Relevant Medications    doxycycline hyclate (VIBRA-TABS) 100 MG tablet           1. Rash    - doxycycline hyclate (VIBRA-TABS) 100 MG tablet; Take 1 tablet (100 mg) by mouth 2 times daily for 10 days  Dispense: 20 tablet; Refill: 0    2. Cellulitis of left lower extremity  Cellulitis vs dermatitis. Continue hydrocortisone cream, doxycycline, watch closely, elevate leg, recheck if not better in 2 days, sooner if worse.  - doxycycline hyclate (VIBRA-TABS) 100 MG tablet; Take 1 tablet (100 mg) by mouth 2 times daily for 10 days  Dispense: 20 tablet; Refill: 0            BMI:   Estimated body mass index is 51.36 kg/m  as calculated from the following:    Height as of this encounter: 1.524 m (5').    Weight as of this encounter: 119.3 kg (263 lb).         FUTURE APPOINTMENTS:       - Follow-up visit as needed.    No follow-ups on file.    Yuridia Simmons MD  [facility]    Subjective     Nursing Notes:   Latha Watts CMA  11/15/2023 10:54 AM  Signed  Chief Complaint   Patient presents with    Derm Problem     Red rash on lower left leg near her ankle, very itchy and sensitive, she took antibiotic in September for dental work and thinks this may be a yeast infection form the antibiotic      Pre-visit Screening:  Immunizations:  up to date  Colonoscopy:  is up to date  Mammogram: is up to date  Asthma Action Test/Plan:  NA  PHQ9:  NA  GAD7:  NA  Questioned patient about current smoking habits Pt. has never smoked.  Ok to leave detailed message on voice mail for today's visit only Yes, phone # [phone]       Alma Kraft is a 73 year old female who presents to clinic today for the following health issues   HPI     When she takes an abx, gets a yeast infection, usually  it's under her busom and and in groin areas. Recently on abx from the dentist. Usually it clears up with cortisone 10 or lamisil. Now has a rash on the left lower ankle, tried both creams and it's not getting better. This started Friday. It's a little bigger since then.     Scalp itching. Daughter uses nizoral shampoo.     Diabetes--her glucose was high, has an apt with Romina tomorrow.        Review of Systems   Constitutional, HEENT, cardiovascular, pulmonary, gi and gu systems are negative, except as otherwise noted.      Objective    /78 (BP Location: Right arm, Patient Position: Sitting, Cuff Size: Adult Large)   Pulse 74   Temp 97.6  F (36.4  C) (Temporal)   Ht 1.524 m (5')   Wt 119.3 kg (263 lb)   LMP 05/08/2001   SpO2 97%   BMI 51.36 kg/m    Body mass index is 51.36 kg/m .  Physical Exam   GENERAL: healthy, alert and no distress  MS: no gross musculoskeletal defects noted, no edema  NEURO: Normal strength and tone, mentation intact and speech normal  PSYCH: mentation appears normal, affect normal/bright  Left lateral ankle several cm diameter rash, some excoriation, increased warmth

## 2023-11-15 ENCOUNTER — OFFICE VISIT (OUTPATIENT)
Dept: FAMILY MEDICINE | Facility: CLINIC | Age: 73
End: 2023-11-15

## 2023-11-15 VITALS
HEART RATE: 74 BPM | TEMPERATURE: 97.6 F | DIASTOLIC BLOOD PRESSURE: 78 MMHG | SYSTOLIC BLOOD PRESSURE: 128 MMHG | HEIGHT: 60 IN | BODY MASS INDEX: 51.63 KG/M2 | WEIGHT: 263 LBS | OXYGEN SATURATION: 97 %

## 2023-11-15 DIAGNOSIS — R21 RASH: Primary | ICD-10-CM

## 2023-11-15 DIAGNOSIS — L03.116 CELLULITIS OF LEFT LOWER EXTREMITY: ICD-10-CM

## 2023-11-15 PROCEDURE — 99214 OFFICE O/P EST MOD 30 MIN: CPT | Performed by: FAMILY MEDICINE

## 2023-11-15 RX ORDER — DOXYCYCLINE HYCLATE 100 MG
100 TABLET ORAL 2 TIMES DAILY
Qty: 20 TABLET | Refills: 0 | Status: SHIPPED | OUTPATIENT
Start: 2023-11-15 | End: 2023-11-25

## 2023-11-15 NOTE — NURSING NOTE
Chief Complaint   Patient presents with    Derm Problem     Red rash on lower left leg near her ankle, very itchy and sensitive, she took antibiotic in September for dental work and thinks this may be a yeast infection form the antibiotic      Pre-visit Screening:  Immunizations:  up to date  Colonoscopy:  is up to date  Mammogram: is up to date  Asthma Action Test/Plan:  NA  PHQ9:  NA  GAD7:  NA  Questioned patient about current smoking habits Pt. has never smoked.  Ok to leave detailed message on voice mail for today's visit only Yes, phone # [phone]

## 2023-11-16 ENCOUNTER — OFFICE VISIT (OUTPATIENT)
Dept: FAMILY MEDICINE | Facility: CLINIC | Age: 73
End: 2023-11-16

## 2023-11-16 DIAGNOSIS — Z79.4 TYPE 2 DIABETES MELLITUS WITHOUT COMPLICATION, WITH LONG-TERM CURRENT USE OF INSULIN (H): Primary | ICD-10-CM

## 2023-11-16 DIAGNOSIS — E11.9 TYPE 2 DIABETES MELLITUS WITHOUT COMPLICATION, WITH LONG-TERM CURRENT USE OF INSULIN (H): Primary | ICD-10-CM

## 2023-11-16 NOTE — Clinical Note
Please see note. Adding Jardiance to regimen. Will monitor as insulin likely will need to be adjusted.

## 2023-11-16 NOTE — PROGRESS NOTES
SUBJECTIVE / OBJECTIVE:                                                Alma Kraft is a 73 year old female seen for an initial visit for Medication Therapy Management.  She was referred to me by Dr. Yuridia Simmons.     REASON FOR MTM REFERRAL: medication management servicces     PATIENT CHIEF COMPLAINT/CONCERN: diabetes control    PAST MEDICAL HISTORY: Reviewed in chart    MEDICAL CONDITIONS REVIEWED:    diabetes mellitus-type 2    Current Outpatient Medications   Medication Sig Dispense Refill    amLODIPine (NORVASC) 10 MG tablet Take 1 tablet (10 mg) by mouth daily 90 tablet 1    amoxicillin (AMOXIL) 500 MG capsule Take 4 capsules by mouth 1 hour prior to dental appointment      ASPIRIN 81 MG OR TABS 1 tab po QD (Once per day) 100 3    atorvastatin (LIPITOR) 10 MG tablet Take 1 tablet (10 mg) by mouth daily 90 tablet 1    blood glucose (ONE TOUCH DELICA) lancing device Device to be used with lancets. One touch Delica 100 each 3    blood glucose monitoring (ONE TOUCH DELICA) lancets Use to test blood sugars 1 times daily or as directed. 100 each 3    Blood Glucose Monitoring Suppl (ONE TOUCH ULTRA 2) W/DEVICE KIT Use to test blood sugars 2 times daily or as directed. 1 kit 0    budesonide (RINOCORT AQUA) 32 MCG/ACT nasal spray Spray 2 sprays into both nostrils daily 25.29 mL 11    carvedilol (COREG) 25 MG tablet Take 1 tablet (25 mg) by mouth 2 times daily (with meals) 180 tablet 3    celecoxib (CELEBREX) 200 MG capsule Take 1 capsule (200 mg) by mouth daily 90 capsule 1    cyclobenzaprine (FLEXERIL) 5 MG tablet Take 1 tablet (5 mg) by mouth 3 times daily as needed for muscle spasms 30 tablet 0    doxycycline hyclate (VIBRA-TABS) 100 MG tablet Take 1 tablet (100 mg) by mouth 2 times daily for 10 days 20 tablet 0    furosemide (LASIX) 20 MG tablet Take 1 tablet (20 mg) by mouth daily 90 tablet 3    glipiZIDE (GLUCOTROL XL) 10 MG 24 hr tablet Take 1 tablet (10 mg) by mouth 2 times daily 180 tablet 1    insulin  glargine (BASAGLAR KWIKPEN) 100 UNIT/ML pen Inject 66 Units Subcutaneous daily 75 mL 1    losartan-hydrochlorothiazide (HYZAAR) 100-25 MG tablet Take 1 tablet by mouth daily 90 tablet 1    metFORMIN (GLUCOPHAGE XR) 500 MG 24 hr tablet Take 4 tablets (2,000 mg) by mouth daily (with dinner) 360 tablet 1    Multiple Vitamins-Minerals (CENTRUM SILVER) per tablet 1 tablet 4 times weekly 100 tablet     nortriptyline (PAMELOR) 10 MG capsule Take 1 capsule (10 mg) by mouth at bedtime 90 capsule 1    OneTouch Delica Lancets 33G MISC 1 Lancet daily 100 each 3    ORDER FOR DME Equipment being ordered: Mediven plus compression stockings    36059  20-30 compression 3 Device 0    potassium chloride ER (MICRO-K) 10 MEQ CR capsule Take 1 capsule (10 mEq) by mouth 4 times daily 360 capsule 1    ULTIGUARD SAFEPACK PEN NEEDLE 32G X 4 MM miscellaneous Use 1 pen needle daily or as directed. 100 each 1    venlafaxine (EFFEXOR XR) 150 MG 24 hr capsule Take 1 capsule (150 mg) by mouth daily 90 capsule 1       Current labs include:  BP Readings from Last 3 Encounters:   11/15/23 128/78   10/17/23 132/74   10/16/23 132/80       LMP 05/08/2001     Most Recent Immunizations   Administered Date(s) Administered    COVID-19 Bivalent 12+ (Pfizer) 09/16/2022    COVID-19 Vaccine ([employer]) 12/16/2021    Hepatitis A (ADULT 19+) 06/29/2022    Hepatitis B, Adult 03/27/2023    Influenza (IIV3) PF 09/25/2012    Influenza Vaccine 65+ (Fluzone HD) 10/17/2023    Influenza Vaccine >6 months (Alfuria,Fluzone) 10/14/2019    Pneumo Conj 13-V (2010&after) 04/29/2015    Pneumococcal 23 valent 07/26/2017    TD,PF 7+ (Tenivac) 11/09/1999    TDAP Vaccine (Adacel) 03/18/2008    TDAP Vaccine (Boostrix) 05/23/2018    Zoster recombinant adjuvanted (SHINGRIX) 03/12/2019    Zoster vaccine, live 09/24/2014       ASSESSMENT / PLAN:                                                       Diabetes:  Assessment: Patient was seen by cardiology and recommended to start Jardiance  after a non-fasting BG of 368. Fasting glucose from last month was 115.    Patient had been on Ozempic with better diabetes control, but due to side effects has discontinued this.    She recently reapplied for the [employer] cares program and will restart Tradjenta 5mg daily when she receives. Patient likely would also be eligible for Jardiance through [employer] cares.     Discussed dosing, side effects, lab monitoring, and patient assistance program with patient.    Discussed with patient that we will want to watch BG closely when initiating and may need to adjust insulin.    Sent in application for [employer].    Status: Diabetes not well controlled.    Drug Therapy Problems:  1) Patient likely would benefit from addition of SGLT-2.  Plan:  1) Enroll patient in BI cares and initiate Jardiance 10mg daily.  2) Will adjust insulin as needed with addition of Jardiance.    I spent 1 hour with this patient today.  All changes were made via collaborative practice agreement with Yuridia Simmons. A copy of the visit note was provided to the patient's primary care provider.    Romina Caro, PharmD  Medication Therapy Management Pharmacist  [facility]  Office Phone: [phone]

## 2023-11-21 ENCOUNTER — TRANSFERRED RECORDS (OUTPATIENT)
Dept: FAMILY MEDICINE | Facility: CLINIC | Age: 73
End: 2023-11-21

## 2023-12-01 ENCOUNTER — TRANSFERRED RECORDS (OUTPATIENT)
Dept: FAMILY MEDICINE | Facility: CLINIC | Age: 73
End: 2023-12-01

## 2023-12-04 ENCOUNTER — TRANSFERRED RECORDS (OUTPATIENT)
Dept: FAMILY MEDICINE | Facility: CLINIC | Age: 73
End: 2023-12-04

## 2023-12-07 ENCOUNTER — TRANSFERRED RECORDS (OUTPATIENT)
Dept: FAMILY MEDICINE | Facility: CLINIC | Age: 73
End: 2023-12-07

## 2023-12-14 ENCOUNTER — TRANSFERRED RECORDS (OUTPATIENT)
Dept: FAMILY MEDICINE | Facility: CLINIC | Age: 73
End: 2023-12-14

## 2024-01-03 ENCOUNTER — OFFICE VISIT (OUTPATIENT)
Dept: FAMILY MEDICINE | Facility: CLINIC | Age: 74
End: 2024-01-03

## 2024-01-03 DIAGNOSIS — U07.1 INFECTION DUE TO 2019 NOVEL CORONAVIRUS: Primary | ICD-10-CM

## 2024-01-03 PROCEDURE — 99213 OFFICE O/P EST LOW 20 MIN: CPT | Mod: 95 | Performed by: PHYSICIAN ASSISTANT

## 2024-01-03 RX ORDER — METRONIDAZOLE 7.5 MG/G
GEL TOPICAL
COMMUNITY
Start: 2023-11-29

## 2024-01-03 NOTE — NURSING NOTE
Chief Complaint   Patient presents with    Covid Concern     DOXIMTIY  Covid pos 1/2/24  Sx started 1/31/24  Felt weak, slightly off-balance, dull headache which is worse when laying flat, achey joints         Pre-visit Screening:  Immunizations:  up to date  Colonoscopy:  is due and to be scheduled  Mammogram: is up to date  Asthma Action Test/Plan:  NA  PHQ9:  NA  GAD7:  NA  Questioned patient about current smoking habits Pt. has never smoked.  Ok to leave detailed message on voice mail for today's visit only Yes, phone # [phone]

## 2024-01-03 NOTE — PROGRESS NOTES
This visit was completed via telemedicine using S[employer] including  audio and video. Patient consents to being seen virtually and understands this will be billed as an office visit.     Provider physical location: [facility]  Patient physical location: Home    Patient and I completed history, ROS, and HPI via virtual encounter with video/sound.       There was no physical examination done due to telemedicine appointment.     CC: Covid-19    History:  Pt developed symptoms starting 4 days ago including general weakness, dull headache (especially with laying down), feels mildly off balance, joint pains, mild cough. Denies any sore throat, SOB, fever, sweats, change of chills, mild nausea. Has been getting fluids, healthy calories.      PMH, MEDICATIONS, ALLERGIES, SOCIAL AND FAMILY HISTORY in EPIC and reviewed by me personally.      ROS negative other than the symptoms noted above in the HPI.      Examination   LMP 05/08/2001        Constitutional: Sitting comfortably, in no acute distress.  Psychiatric: mentation appears normal and affect normal/bright      A/P    ICD-10-CM    1. Infection due to 2019 novel coronavirus  U07.1 nirmatrelvir and ritonavir (PAXLOVID) 300 mg/100 mg therapy pack          DISCUSSION:  Symptoms, home test consistent with Covid-19 infection. Explained to patient that this is highly contagious viral infection. Explained to patient that average risk patients can best be treated with conservative therapies:  Already takes Celebrex, but could add Tylenol every 4-6 hours to help with pain and inflammation.  Could use Coricidin HBP if cough worsens.   Push fluids, and should try to find simple foods to help gain calories.     However, there is now oral antiviral medicines that have been approved for use in higher risk individuals that have been shown to prevent hospitalization. Pt is higher risk due to:  Diabetes, HTN, hyperlipidemia, Obesity.     Agreed to prescribe  "Paxlovid to take twice daily with food for 5 days. Warned of side effects including upset stomach, muscle pains, diarrhea, and to contact me if noted.   Pt has been found to have normal kidney function 10/2023    Explained to pt that several medications have been found to interact with Paxlovid and need to be altered while taking Paxlovid. Recommended:  Stop atorvastatin. Monitor BP to ensure no hypotension- may need to decrease or temp hold amlodipine if low.     Go to ER if experiencing severe chest pain, severe shortness of breath, extreme fatigue, or cannot keep fever <103 degrees with Tylenol/ibuprofen.     Did advise patient that they should quarantine for 5 days per the new CDC guidelines, followed by 5 days of strict mask use around others. Should also quarantine until no fever for at least 24 hours, and all symptoms are significantly improved. Any recent close contacts should also quarantine for 4-5 days after exposure to patient, and ideally have negative covid-19 test prior to stopping quarantine.      Did warn of \"Rebound Covid\" after Paxlovid and to contact us if noted.     Contact me in 1 week if not significantly better, or sooner with worsening.       follow up visit: As needed    Time of visit: 20 minutes    Kiarra Goncalves PA-C  [facility]      "

## 2024-01-23 ENCOUNTER — OFFICE VISIT (OUTPATIENT)
Dept: CARDIOLOGY | Facility: CLINIC | Age: 74
End: 2024-01-23
Attending: INTERNAL MEDICINE
Payer: MEDICARE

## 2024-01-23 VITALS
SYSTOLIC BLOOD PRESSURE: 126 MMHG | BODY MASS INDEX: 47.43 KG/M2 | HEART RATE: 67 BPM | HEIGHT: 60 IN | DIASTOLIC BLOOD PRESSURE: 68 MMHG | WEIGHT: 241.6 LBS | OXYGEN SATURATION: 97 %

## 2024-01-23 DIAGNOSIS — I50.30 HEART FAILURE WITH PRESERVED EJECTION FRACTION, NYHA CLASS I (H): ICD-10-CM

## 2024-01-23 DIAGNOSIS — I10 ESSENTIAL HYPERTENSION: Primary | ICD-10-CM

## 2024-01-23 DIAGNOSIS — R60.0 LOCALIZED EDEMA: ICD-10-CM

## 2024-01-23 DIAGNOSIS — I35.0 NONRHEUMATIC AORTIC VALVE STENOSIS: ICD-10-CM

## 2024-01-23 PROCEDURE — 99214 OFFICE O/P EST MOD 30 MIN: CPT | Performed by: INTERNAL MEDICINE

## 2024-01-23 NOTE — PROGRESS NOTES
HISTORY:    Alma Kraft is a very pleasant 73-year-old female with a longstanding history of difficult to control hypertension as well as type 2 diabetes, morbid obesity, obstructive sleep apnea using CPAP, and liver cirrhosis.  In addition she has a moderate degree of aortic stenosis.    I last saw Jeny in mid October for increased peripheral edema and presumed HFpEF.  She complained of increased shortness of breath and had increased pulmonary vascular congestion by chest x-ray, and her echocardiogram had shown evidence of elevated LVEDP.  proBNP was drawn at that visit and was normal.  At that time her blood pressure was well-controlled but she was using amlodipine which of course could contribute to her peripheral edema.  Given her suspected HFpEF and peripheral edema we elected to initiate furosemide 20 mg daily.  Follow-up labs showed normal potassium and creatinine.  She is here today for follow-up.    Today Jeny reports that she feels that she is doing much better.  Her edema has improved considerably and her breathing is back to baseline, still some shortness of breath with activity but much better than when I last saw her.  Her blood pressure has also remained well-controlled.  Her only complaint is that she is very thirsty and needs to drink fluids much of the day.  She finds it hard to drink just water and has been drinking more orange juice.  Of note, her weight is down 22 pounds compared to the October visit.      ASSESSMENT/PLAN:    1.  Hypertension this remains very well-controlled with blood pressures in the 120s systolic.  She is currently using amlodipine 10 mg daily, carvedilol 25 mg twice daily, and losartan hydrochlorothiazide 10-25 mg daily.  Continue current medications.  2.  HFpEF.  She is lost 22 pounds since her last visit, primarily fluid weight.  Her edema is much better and her breathing is better.  She would still be a good candidate for initiation of Jardiance but I will leave it  to her primary care physician who is managing her diabetes  3.  Aortic stenosis moderate in severity.  We will recheck at our next visit in 6 months.  On her last echo her mean gradient was 33 mmHg and aortic valve area is 1.4 cm  with a DI of 0.39.  4.  Peripheral edema.  Much improved.  She is wearing compression stockings today with just a trace of palpable edema.    Thank you for inviting me to participate in the care of your patient.  Please don't hesitate to call if I can be of further assistance.  30 minutes were spent today reviewing the chart and other records, interviewing and examining the patient, and documenting our visit.    Chart documentation was completed, in part, with [employer] voice-recognition software. Even though reviewed, some grammatical, spelling, and word errors may remain.       Orders Placed This Encounter   Procedures    Follow-Up with Cardiology [facility]     No orders of the defined types were placed in this encounter.    There are no discontinued medications.    10 year ASCVD risk: The ASCVD Risk score (Joselo RICHARDSON, et al., 2019) failed to calculate for the following reasons:    The valid total cholesterol range is 130 to 320 mg/dL    Encounter Diagnoses   Name Primary?    Heart failure with preserved ejection fraction, NYHA class I (H)     Essential hypertension Yes    Localized edema        CURRENT MEDICATIONS:  Current Outpatient Medications   Medication Sig Dispense Refill    amLODIPine (NORVASC) 10 MG tablet Take 1 tablet (10 mg) by mouth daily 90 tablet 1    amoxicillin (AMOXIL) 500 MG capsule Take 4 capsules by mouth 1 hour prior to dental appointment      ASPIRIN 81 MG OR TABS 1 tab po QD (Once per day) 100 3    atorvastatin (LIPITOR) 10 MG tablet Take 1 tablet (10 mg) by mouth daily 90 tablet 1    blood glucose (ONE TOUCH DELICA) lancing device Device to be used with lancets. One touch Delica 100 each 3    blood glucose monitoring (ONE TOUCH DELICA) lancets Use to test blood sugars 1  times daily or as directed. 100 each 3    Blood Glucose Monitoring Suppl (ONE TOUCH ULTRA 2) W/DEVICE KIT Use to test blood sugars 2 times daily or as directed. 1 kit 0    budesonide (RINOCORT AQUA) 32 MCG/ACT nasal spray Spray 2 sprays into both nostrils daily 25.29 mL 11    carvedilol (COREG) 25 MG tablet Take 1 tablet (25 mg) by mouth 2 times daily (with meals) 180 tablet 3    celecoxib (CELEBREX) 200 MG capsule Take 1 capsule (200 mg) by mouth daily 90 capsule 1    cyclobenzaprine (FLEXERIL) 5 MG tablet Take 1 tablet (5 mg) by mouth 3 times daily as needed for muscle spasms 30 tablet 0    furosemide (LASIX) 20 MG tablet Take 1 tablet (20 mg) by mouth daily 90 tablet 3    glipiZIDE (GLUCOTROL XL) 10 MG 24 hr tablet Take 1 tablet (10 mg) by mouth 2 times daily 180 tablet 1    insulin glargine (BASAGLAR KWIKPEN) 100 UNIT/ML pen Inject 66 Units Subcutaneous daily 75 mL 1    losartan-hydrochlorothiazide (HYZAAR) 100-25 MG tablet Take 1 tablet by mouth daily 90 tablet 1    metFORMIN (GLUCOPHAGE XR) 500 MG 24 hr tablet Take 4 tablets (2,000 mg) by mouth daily (with dinner) 360 tablet 1    metroNIDAZOLE (METROGEL) 0.75 % external gel apply twice a day topically to the central face.*      Multiple Vitamins-Minerals (CENTRUM SILVER) per tablet 1 tablet 4 times weekly 100 tablet     nortriptyline (PAMELOR) 10 MG capsule Take 1 capsule (10 mg) by mouth at bedtime 90 capsule 1    OneTouch Delica Lancets 33G MISC 1 Lancet daily 100 each 3    ORDER FOR DME Equipment being ordered: Mediven plus compression stockings    44301  20-30 compression 3 Device 0    potassium chloride ER (MICRO-K) 10 MEQ CR capsule Take 1 capsule (10 mEq) by mouth 4 times daily 360 capsule 1    venlafaxine (EFFEXOR XR) 150 MG 24 hr capsule Take 1 capsule (150 mg) by mouth daily 90 capsule 1    ULTIGUARD SAFEPACK PEN NEEDLE 32G X 4 MM miscellaneous Use 1 pen needle daily or as directed. 100 each 1       ALLERGIES     Allergies   Allergen Reactions     Demerol Nausea and Vomiting    Erythromycin      GI upset       PAST MEDICAL HISTORY:  Past Medical History:   Diagnosis Date    Arthritis     hands, Right shoulder    Diabetes (H)     Hypertension     Sleep apnea     Uses a CPAP       PAST SURGICAL HISTORY:  Past Surgical History:   Procedure Laterality Date    ------------OTHER-------------  09/05/2013    L hand, cyst excision    ------------OTHER------------- Right 03/14/2014    shoulder manipulation    ESOPHAGOSCOPY, GASTROSCOPY, DUODENOSCOPY (EGD), COMBINED N/A 01/24/2022    Procedure: ESOPHAGOGASTRODUODENOSCOPY;  Surgeon: Xavi Heller MD;  Location: [facility] OR    HC INCISION TENDON SHEATH FINGER Left 09/05/2013    left thumb trigger finger    HC KNEE SCOPE, DIAGNOSTIC  04/2005    Arthroscopy, Knee Left    HC REMOVE TONSILS/ADENOIDS,<13 Y/O  1953    T & A <11y.o.    TEST NOT FOUND  06/27/2007    R heel/ inocencio's deformity    ZZC APPENDECTOMY  1956    ZZC TOTAL KNEE ARTHROPLASTY  02/2006    Knee Replacement, Total Right    ZZC TOTAL KNEE ARTHROPLASTY  03/05/2007    Knee Replacement, Total  Left    ZZC VAGINAL HYSTERECTOMY  08/2001    Hysterectomy, Vaginal & BSO       FAMILY HISTORY:  Family History   Problem Relation Age of Onset    Cerebrovascular Disease Mother     Deep Vein Thrombosis Mother     Cancer Father         prostate    Gastrointestinal Disease Father     Breast Cancer Sister     Breast Cancer Sister     Prostate Cancer Brother     Prostate Cancer Brother     Heart Disease Maternal Grandmother     Cerebrovascular Disease Maternal Grandfather     Heart Disease Paternal Grandfather     Gastrointestinal Disease Other         Niece with GERD/hiatal hernia       SOCIAL HISTORY:  Social History     Socioeconomic History    Marital status:      Spouse name: Blas    Number of children: 3    Years of education: 16    Highest education level: None   Occupational History    Occupation: Processor     Employer: [employer]   Tobacco Use    Smoking status:  Never     Passive exposure: Never    Smokeless tobacco: Never   Substance and Sexual Activity    Alcohol use: Not Currently    Drug use: No    Sexual activity: Never     Partners: Male     Comment: Hysterectomy   Other Topics Concern    Exercise No    Seat Belt Yes    Self-Exams Yes   Social History Narrative        Functional abiltity:      Hearing imparment:No      Acitvities of daily living:Normal      Risk of falls:No      Home safety of concern:No        Do you exercise?     NO   Times/week: 0    History of abusive relationships in past:   No    History of abusive relationships currently:    No    Do you feel emotionally and physically safe in your environment?     Yes    Do you own a gun?  No      Is the gun kept in a safe place:   NOT APPLICABLE    Do you wear a seatbelt regularly?     Yes    Do you use sun screen?     Yes           Review of Systems:  Skin:  not assessed     Eyes:  Positive for glasses  ENT:  Negative    Respiratory:  Positive for cough  Cardiovascular:  Negative    Gastroenterology: not assessed    Genitourinary:  not assessed    Musculoskeletal:  not assessed    Neurologic:  not assessed    Psychiatric:  not assessed    Heme/Lymph/Imm:  not assessed    Endocrine:  not assessed      Physical Exam:  Vitals: /68 (BP Location: Right arm, Patient Position: Sitting, Cuff Size: Adult Large)   Pulse 67   Ht 1.524 m (5')   Wt 109.6 kg (241 lb 9.6 oz)   LMP 05/08/2001   SpO2 97%   BMI 47.18 kg/m      Constitutional:           Skin:           Head:           Eyes:           ENT:           Neck:           Chest:           Cardiac:                    Abdomen:           Vascular:                                        Extremities and Muscular Skeletal:              Neurological:           Psych:        Recent Lab Results:  LIPID RESULTS:  Lab Results   Component Value Date    CHOL 121 07/14/2023    HDL 51 07/14/2023    LDL 55 07/14/2023    LDL 87 02/21/2019    TRIG 74 07/14/2023     CHOLHDLRATIO 2 07/14/2023       LIVER ENZYME RESULTS:  Lab Results   Component Value Date    AST 33 10/05/2013    ALT 32 (H) 02/21/2019       CBC RESULTS:  Lab Results   Component Value Date    WBC 5.3 01/10/2022    RBC 4.29 01/10/2022    HGB 13.3 01/10/2022    HCT 41.1 01/10/2022    MCV 95.9 01/10/2022    MCH 31.0 01/10/2022    MCHC 32.4 01/10/2022    RDW 12.9 08/22/2018     01/10/2022     02/11/2018       BMP RESULTS:  Lab Results   Component Value Date     10/31/2023    .4 10/16/2023    POTASSIUM 3.8 10/31/2023    POTASSIUM 3.77 10/16/2023    CHLORIDE 96 (L) 10/31/2023    CHLORIDE 103.8 10/16/2023    CO2 26 10/31/2023    CO2 27.6 10/16/2023    ANIONGAP 13 10/31/2023    ANIONGAP 5 02/11/2018     (H) 10/31/2023     (A) 10/16/2023    BUN 19.3 10/31/2023    BUN 14 10/16/2023    BUN 19.4 10/16/2023    CR 0.64 10/31/2023    CR 0.72 10/16/2023    GFRESTIMATED >90 10/31/2023    GFRESTIMATED 99 11/21/2018    GFRESTBLACK >90 02/11/2018    TONI 9.1 10/31/2023    TONI 8.9 10/16/2023        A1C RESULTS:  Lab Results   Component Value Date    A1C 8.0 (A) 10/16/2023       INR RESULTS:  Lab Results   Component Value Date    INR 1.84 (H) 03/08/2007    INR 2.17 (H) 03/07/2007         Lan Espinoza MD, FACC    CC  Lan Espinoza MD  [address] [place] [id number]

## 2024-01-23 NOTE — LETTER
1/23/2024    Yuridia Simmons MD  [address] [facility] [address]    RE: Alma Kraft       Dear Colleague,     I had the pleasure of seeing Alma Kraft in the [facility].  HISTORY:    Alma Kraft is a very pleasant 73-year-old female with a longstanding history of difficult to control hypertension as well as type 2 diabetes, morbid obesity, obstructive sleep apnea using CPAP, and liver cirrhosis.  In addition she has a moderate degree of aortic stenosis.    I last saw Jeny in mid October for increased peripheral edema and presumed HFpEF.  She complained of increased shortness of breath and had increased pulmonary vascular congestion by chest x-ray, and her echocardiogram had shown evidence of elevated LVEDP.  proBNP was drawn at that visit and was normal.  At that time her blood pressure was well-controlled but she was using amlodipine which of course could contribute to her peripheral edema.  Given her suspected HFpEF and peripheral edema we elected to initiate furosemide 20 mg daily.  Follow-up labs showed normal potassium and creatinine.  She is here today for follow-up.    Today Jeny reports that she feels that she is doing much better.  Her edema has improved considerably and her breathing is back to baseline, still some shortness of breath with activity but much better than when I last saw her.  Her blood pressure has also remained well-controlled.  Her only complaint is that she is very thirsty and needs to drink fluids much of the day.  She finds it hard to drink just water and has been drinking more orange juice.  Of note, her weight is down 22 pounds compared to the October visit.      ASSESSMENT/PLAN:    1.  Hypertension this remains very well-controlled with blood pressures in the 120s systolic.  She is currently using amlodipine 10 mg daily, carvedilol 25 mg twice daily, and losartan hydrochlorothiazide 10-25 mg daily.  Continue current medications.  2.   HFpEF.  She is lost 22 pounds since her last visit, primarily fluid weight.  Her edema is much better and her breathing is better.  She would still be a good candidate for initiation of Jardiance but I will leave it to her primary care physician who is managing her diabetes  3.  Aortic stenosis moderate in severity.  We will recheck at our next visit in 6 months.  On her last echo her mean gradient was 33 mmHg and aortic valve area is 1.4 cm  with a DI of 0.39.  4.  Peripheral edema.  Much improved.  She is wearing compression stockings today with just a trace of palpable edema.    Thank you for inviting me to participate in the care of your patient.  Please don't hesitate to call if I can be of further assistance.  30 minutes were spent today reviewing the chart and other records, interviewing and examining the patient, and documenting our visit.    Chart documentation was completed, in part, with [employer] voice-recognition software. Even though reviewed, some grammatical, spelling, and word errors may remain.       Orders Placed This Encounter   Procedures    Follow-Up with Cardiology [facility]     No orders of the defined types were placed in this encounter.    There are no discontinued medications.    10 year ASCVD risk: The ASCVD Risk score (Joselo DK, et al., 2019) failed to calculate for the following reasons:    The valid total cholesterol range is 130 to 320 mg/dL    Encounter Diagnoses   Name Primary?    Heart failure with preserved ejection fraction, NYHA class I (H)     Essential hypertension Yes    Localized edema        CURRENT MEDICATIONS:  Current Outpatient Medications   Medication Sig Dispense Refill    amLODIPine (NORVASC) 10 MG tablet Take 1 tablet (10 mg) by mouth daily 90 tablet 1    amoxicillin (AMOXIL) 500 MG capsule Take 4 capsules by mouth 1 hour prior to dental appointment      ASPIRIN 81 MG OR TABS 1 tab po QD (Once per day) 100 3    atorvastatin (LIPITOR) 10 MG tablet Take 1 tablet (10 mg) by  mouth daily 90 tablet 1    blood glucose (ONE TOUCH DELICA) lancing device Device to be used with lancets. One touch Delica 100 each 3    blood glucose monitoring (ONE TOUCH DELICA) lancets Use to test blood sugars 1 times daily or as directed. 100 each 3    Blood Glucose Monitoring Suppl (ONE TOUCH ULTRA 2) W/DEVICE KIT Use to test blood sugars 2 times daily or as directed. 1 kit 0    budesonide (RINOCORT AQUA) 32 MCG/ACT nasal spray Spray 2 sprays into both nostrils daily 25.29 mL 11    carvedilol (COREG) 25 MG tablet Take 1 tablet (25 mg) by mouth 2 times daily (with meals) 180 tablet 3    celecoxib (CELEBREX) 200 MG capsule Take 1 capsule (200 mg) by mouth daily 90 capsule 1    cyclobenzaprine (FLEXERIL) 5 MG tablet Take 1 tablet (5 mg) by mouth 3 times daily as needed for muscle spasms 30 tablet 0    furosemide (LASIX) 20 MG tablet Take 1 tablet (20 mg) by mouth daily 90 tablet 3    glipiZIDE (GLUCOTROL XL) 10 MG 24 hr tablet Take 1 tablet (10 mg) by mouth 2 times daily 180 tablet 1    insulin glargine (BASAGLAR KWIKPEN) 100 UNIT/ML pen Inject 66 Units Subcutaneous daily 75 mL 1    losartan-hydrochlorothiazide (HYZAAR) 100-25 MG tablet Take 1 tablet by mouth daily 90 tablet 1    metFORMIN (GLUCOPHAGE XR) 500 MG 24 hr tablet Take 4 tablets (2,000 mg) by mouth daily (with dinner) 360 tablet 1    metroNIDAZOLE (METROGEL) 0.75 % external gel apply twice a day topically to the central face.*      Multiple Vitamins-Minerals (CENTRUM SILVER) per tablet 1 tablet 4 times weekly 100 tablet     nortriptyline (PAMELOR) 10 MG capsule Take 1 capsule (10 mg) by mouth at bedtime 90 capsule 1    OneTouch Delica Lancets 33G MISC 1 Lancet daily 100 each 3    ORDER FOR DME Equipment being ordered: Mediven plus compression stockings    89739  20-30 compression 3 Device 0    potassium chloride ER (MICRO-K) 10 MEQ CR capsule Take 1 capsule (10 mEq) by mouth 4 times daily 360 capsule 1    venlafaxine (EFFEXOR XR) 150 MG 24 hr capsule  Take 1 capsule (150 mg) by mouth daily 90 capsule 1    ULTIGUARD SAFEPACK PEN NEEDLE 32G X 4 MM miscellaneous Use 1 pen needle daily or as directed. 100 each 1       ALLERGIES     Allergies   Allergen Reactions    Demerol Nausea and Vomiting    Erythromycin      GI upset       PAST MEDICAL HISTORY:  Past Medical History:   Diagnosis Date    Arthritis     hands, Right shoulder    Diabetes (H)     Hypertension     Sleep apnea     Uses a CPAP       PAST SURGICAL HISTORY:  Past Surgical History:   Procedure Laterality Date    ------------OTHER-------------  09/05/2013    L hand, cyst excision    ------------OTHER------------- Right 03/14/2014    shoulder manipulation    ESOPHAGOSCOPY, GASTROSCOPY, DUODENOSCOPY (EGD), COMBINED N/A 01/24/2022    Procedure: ESOPHAGOGASTRODUODENOSCOPY;  Surgeon: Xavi Heller MD;  Location: [facility] OR    HC INCISION TENDON SHEATH FINGER Left 09/05/2013    left thumb trigger finger    HC KNEE SCOPE, DIAGNOSTIC  04/2005    Arthroscopy, Knee Left    HC REMOVE TONSILS/ADENOIDS,<11 Y/O  1953    T & A <11y.o.    TEST NOT FOUND  06/27/2007    R heel/ inocencio's deformity    ZZC APPENDECTOMY  1956    ZZ TOTAL KNEE ARTHROPLASTY  02/2006    Knee Replacement, Total Right    ZZC TOTAL KNEE ARTHROPLASTY  03/05/2007    Knee Replacement, Total  Left    ZZC VAGINAL HYSTERECTOMY  08/2001    Hysterectomy, Vaginal & BSO       FAMILY HISTORY:  Family History   Problem Relation Age of Onset    Cerebrovascular Disease Mother     Deep Vein Thrombosis Mother     Cancer Father         prostate    Gastrointestinal Disease Father     Breast Cancer Sister     Breast Cancer Sister     Prostate Cancer Brother     Prostate Cancer Brother     Heart Disease Maternal Grandmother     Cerebrovascular Disease Maternal Grandfather     Heart Disease Paternal Grandfather     Gastrointestinal Disease Other         Niece with GERD/hiatal hernia       SOCIAL HISTORY:  Social History     Socioeconomic History    Marital status:       Spouse name: Blas    Number of children: 3    Years of education: 16    Highest education level: None   Occupational History    Occupation: Processor     Employer: [employer]   Tobacco Use    Smoking status: Never     Passive exposure: Never    Smokeless tobacco: Never   Substance and Sexual Activity    Alcohol use: Not Currently    Drug use: No    Sexual activity: Never     Partners: Male     Comment: Hysterectomy   Other Topics Concern    Exercise No    Seat Belt Yes    Self-Exams Yes   Social History Narrative        Functional abiltity:      Hearing imparment:No      Acitvities of daily living:Normal      Risk of falls:No      Home safety of concern:No        Do you exercise?     NO   Times/week: 0    History of abusive relationships in past:   No    History of abusive relationships currently:    No    Do you feel emotionally and physically safe in your environment?     Yes    Do you own a gun?  No      Is the gun kept in a safe place:   NOT APPLICABLE    Do you wear a seatbelt regularly?     Yes    Do you use sun screen?     Yes           Review of Systems:  Skin:  not assessed     Eyes:  Positive for glasses  ENT:  Negative    Respiratory:  Positive for cough  Cardiovascular:  Negative    Gastroenterology: not assessed    Genitourinary:  not assessed    Musculoskeletal:  not assessed    Neurologic:  not assessed    Psychiatric:  not assessed    Heme/Lymph/Imm:  not assessed    Endocrine:  not assessed      Physical Exam:  Vitals: /68 (BP Location: Right arm, Patient Position: Sitting, Cuff Size: Adult Large)   Pulse 67   Ht 1.524 m (5')   Wt 109.6 kg (241 lb 9.6 oz)   LMP 05/08/2001   SpO2 97%   BMI 47.18 kg/m      Constitutional:           Skin:           Head:           Eyes:           ENT:           Neck:           Chest:           Cardiac:                    Abdomen:           Vascular:                                        Extremities and Muscular Skeletal:              Neurological:            Psych:        Recent Lab Results:  LIPID RESULTS:  Lab Results   Component Value Date    CHOL 121 07/14/2023    HDL 51 07/14/2023    LDL 55 07/14/2023    LDL 87 02/21/2019    TRIG 74 07/14/2023    CHOLHDLRATIO 2 07/14/2023       LIVER ENZYME RESULTS:  Lab Results   Component Value Date    AST 33 10/05/2013    ALT 32 (H) 02/21/2019       CBC RESULTS:  Lab Results   Component Value Date    WBC 5.3 01/10/2022    RBC 4.29 01/10/2022    HGB 13.3 01/10/2022    HCT 41.1 01/10/2022    MCV 95.9 01/10/2022    MCH 31.0 01/10/2022    MCHC 32.4 01/10/2022    RDW 12.9 08/22/2018     01/10/2022     02/11/2018       BMP RESULTS:  Lab Results   Component Value Date     10/31/2023    .4 10/16/2023    POTASSIUM 3.8 10/31/2023    POTASSIUM 3.77 10/16/2023    CHLORIDE 96 (L) 10/31/2023    CHLORIDE 103.8 10/16/2023    CO2 26 10/31/2023    CO2 27.6 10/16/2023    ANIONGAP 13 10/31/2023    ANIONGAP 5 02/11/2018     (H) 10/31/2023     (A) 10/16/2023    BUN 19.3 10/31/2023    BUN 14 10/16/2023    BUN 19.4 10/16/2023    CR 0.64 10/31/2023    CR 0.72 10/16/2023    GFRESTIMATED >90 10/31/2023    GFRESTIMATED 99 11/21/2018    GFRESTBLACK >90 02/11/2018    TONI 9.1 10/31/2023    TONI 8.9 10/16/2023        A1C RESULTS:  Lab Results   Component Value Date    A1C 8.0 (A) 10/16/2023       INR RESULTS:  Lab Results   Component Value Date    INR 1.84 (H) 03/08/2007    INR 2.17 (H) 03/07/2007         Lan Espinoza MD, FACC    CC  Lan Espinoza MD  [address] [place] [id number]      Thank you for allowing me to participate in the care of your patient.      Sincerely,     Lan Espinoza MD     [facility]

## 2024-01-26 ENCOUNTER — PATIENT OUTREACH (OUTPATIENT)
Dept: GASTROENTEROLOGY | Facility: CLINIC | Age: 74
End: 2024-01-26
Payer: MEDICARE

## 2024-01-29 ENCOUNTER — TELEPHONE (OUTPATIENT)
Dept: FAMILY MEDICINE | Facility: CLINIC | Age: 74
End: 2024-01-29

## 2024-01-29 NOTE — TELEPHONE ENCOUNTER
Alma Kraft called the clinic support line with the following:    Gets amoxicillin before the dentist for her knee surgery in 2006 and 2007. Starting to get yeast infections from the antibiotic. She is wondering if there is another antibiotic that she can take.    Advised that we do not treat with antibiotics anymore. Studies found that this is not all that helpful.     She is fine with this and will tell her Dentist that it is no longer recommended

## 2024-01-29 NOTE — PROGRESS NOTES
Assessment & Plan   Problem List Items Addressed This Visit          Endocrine    Mixed hyperlipidemia    Relevant Medications    atorvastatin (LIPITOR) 10 MG tablet    Other Relevant Orders    Comprehensive Metobolic Panel (BFP)    Type 2 diabetes mellitus without complication, with long-term current use of insulin (H)    Relevant Medications    glipiZIDE (GLUCOTROL XL) 10 MG 24 hr tablet    metFORMIN (GLUCOPHAGE XR) 500 MG 24 hr tablet    insulin glargine 100 UNIT/ML pen    Other Relevant Orders    HEMOGLOBIN A1C (BFP) (Completed)    VENOUS COLLECTION (Completed)    ALBUMIN RANDOM URINE QUANTITATIVE (BFP)    HEMOGRAM PLATELET DIFF (BFP) (Completed)       Circulatory    Essential hypertension, benign--followed by cardiology    Relevant Medications    amLODIPine (NORVASC) 10 MG tablet    losartan-hydrochlorothiazide (HYZAAR) 100-25 MG tablet    Heart murmur     Other Visit Diagnoses       Medicare annual wellness visit, subsequent    -  Primary    Pain        Relevant Medications    celecoxib (CELEBREX) 200 MG capsule    Essential hypertension        Relevant Medications    losartan-hydrochlorothiazide (HYZAAR) 100-25 MG tablet    potassium chloride ER (MICRO-K) 10 MEQ CR capsule    Chronic paroxysmal hemicrania, not intractable        Relevant Medications    amLODIPine (NORVASC) 10 MG tablet    celecoxib (CELEBREX) 200 MG capsule    nortriptyline (PAMELOR) 10 MG capsule    venlafaxine (EFFEXOR XR) 150 MG 24 hr capsule    Major depression in remission (H24)        Relevant Medications    nortriptyline (PAMELOR) 10 MG capsule    venlafaxine (EFFEXOR XR) 150 MG 24 hr capsule    Screening for osteoporosis        Relevant Orders    Dexa hip/pelvis/spine*    Radiology Referral           1. Medicare annual wellness visit, subsequent  Completed.    2. Essential hypertension, benign  Controlled on medications, refilled.  - amLODIPine (NORVASC) 10 MG tablet; Take 1 tablet (10 mg) by mouth daily  Dispense: 90 tablet; Refill:  1    3. Mixed hyperlipidemia  Check labs, refilled.  - atorvastatin (LIPITOR) 10 MG tablet; Take 1 tablet (10 mg) by mouth daily  Dispense: 90 tablet; Refill: 1  - Comprehensive Metobolic Panel (BFP)    4. Pain  Refilled.  - celecoxib (CELEBREX) 200 MG capsule; Take 1 capsule (200 mg) by mouth daily  Dispense: 90 capsule; Refill: 1    5. Type 2 diabetes mellitus without complication, with long-term current use of insulin (H)  High, continue working with Romina. Refilled current medications. She is on insulin  - HEMOGLOBIN A1C (BFP)  - VENOUS COLLECTION  - ALBUMIN RANDOM URINE QUANTITATIVE (BFP)  - glipiZIDE (GLUCOTROL XL) 10 MG 24 hr tablet; Take 1 tablet (10 mg) by mouth 2 times daily  Dispense: 180 tablet; Refill: 1  - metFORMIN (GLUCOPHAGE XR) 500 MG 24 hr tablet; Take 4 tablets (2,000 mg) by mouth daily (with dinner)  Dispense: 360 tablet; Refill: 1  - HEMOGRAM PLATELET DIFF (BFP)  - insulin glargine 100 UNIT/ML pen; Inject 46 Units Subcutaneous daily  Dispense: 75 mL; Refill: 1    6. Essential hypertension  Controlled, refilled.  - losartan-hydrochlorothiazide (HYZAAR) 100-25 MG tablet; Take 1 tablet by mouth daily  Dispense: 90 tablet; Refill: 1  - potassium chloride ER (MICRO-K) 10 MEQ CR capsule; Take 1 capsule (10 mEq) by mouth 4 times daily  Dispense: 360 capsule; Refill: 1    7. Chronic paroxysmal hemicrania, not intractable  Refilld.  - nortriptyline (PAMELOR) 10 MG capsule; Take 1 capsule (10 mg) by mouth at bedtime  Dispense: 90 capsule; Refill: 1    8. Major depression in remission (H24)  Controlled, refilled.  - venlafaxine (EFFEXOR XR) 150 MG 24 hr capsule; Take 1 capsule (150 mg) by mouth daily  Dispense: 90 capsule; Refill: 1    9. Screening for osteoporosis    - Dexa hip/pelvis/spine*  - Radiology Referral    10. Heart murmur  Followed by cardiololgy     11. Anemia due to other cause, not classified  Recheck with me Friday. No signs of bleed    12. Hyperkalemia  Stop potassium until further  direction from cardiology    13. Renal insufficiency  Is now on furosemide, this may have affected her labs. Instructed her to call cardiology for direction        BMI  Estimated body mass index is 48.04 kg/m  as calculated from the following:    Height as of this encounter: 1.524 m (5').    Weight as of this encounter: 111.6 kg (246 lb).         FUTURE APPOINTMENTS:       - Follow-up visit in 6 months.    No follow-ups on file.    Yuridia Simmons MD  [facility]    Subjective     Nursing Notes:   Lou Cortez  2/5/2024  9:42 AM  Signed  Chief Complaint   Patient presents with    Recheck Medication     Fasting med check    Wellness Visit         Pre-visit Screening:  Immunizations:  up to date  Colonoscopy:  is up to date  Mammogram: is up to date  Asthma Action Test/Plan:  NA  PHQ9:  Done today  GAD7:  Done today  Questioned patient about current smoking habits Pt. has never smoked.  Ok to leave detailed message on voice mail for today's visit only Yes, phone # [phone]         Alma Kraft is a 73 year old female who presents to clinic today for the following health issues   HPI     Here to followup on her medications, diabetes, etc.  Is back to seeing Romina. Working with her on her blood sugars. But got covid and was eating whatever she wanted. Also ran out of a couple of medications, is getting them through a company.  Hasn't started yet.   Followed by cardiology. Has a strong heart murmur.  Depression is ok.         Review of Systems   Constitutional, HEENT, cardiovascular, pulmonary, gi and gu systems are negative, except as otherwise noted.      Objective    /72 (BP Location: Left arm, Patient Position: Sitting, Cuff Size: Adult Large)   Pulse 74   Temp 97.2  F (36.2  C) (Temporal)   Ht 1.524 m (5')   Wt 111.6 kg (246 lb)   LMP 05/08/2001   SpO2 98%   BMI 48.04 kg/m    Body mass index is 48.04 kg/m .  Physical Exam   GENERAL: alert and no distress  RESP: lungs  clear to auscultation - no rales, rhonchi or wheezes  CV: regular rate and rhythm, normal S1 S2, no S3 or S4, harsh systolic murmur  MS: no gross musculoskeletal defects noted, no edema  NEURO: Normal strength and tone, mentation intact and speech normal  PSYCH: mentation appears normal, affect normal/bright    Results for orders placed or performed in visit on 02/05/24   HEMOGLOBIN A1C (BFP)     Status: Abnormal   Result Value Ref Range    Hemoglobin A1C 9.5 (A) 4.0 - 7.0 %   HEMOGRAM PLATELET DIFF (BFP)     Status: Abnormal   Result Value Ref Range    WBC 5.8 4.0 - 11 10*9/L    RBC Count 3.27 (A) 3.8 - 5.2 10*12/L    Hemoglobin 10.3 (A) 11.7 - 15.7 g/dL    Hematocrit 31.9 (A) 35.0 - 47.0 %    MCV 97.6 78 - 100 fL    MCH 31.5 26 - 33 pg    MCHC 32.3 31 - 36 g/dL    Platelet Count 222 150 - 375 10^9/L    % Granulocytes 68.3 %    % Lymphocytes 21.3 %    % Monocytes 10.4 %    Narrative    Ran twice.

## 2024-02-01 ENCOUNTER — OFFICE VISIT (OUTPATIENT)
Dept: FAMILY MEDICINE | Facility: CLINIC | Age: 74
End: 2024-02-01

## 2024-02-01 DIAGNOSIS — Z79.4 TYPE 2 DIABETES MELLITUS WITHOUT COMPLICATION, WITH LONG-TERM CURRENT USE OF INSULIN (H): Primary | ICD-10-CM

## 2024-02-01 DIAGNOSIS — E11.9 TYPE 2 DIABETES MELLITUS WITHOUT COMPLICATION, WITH LONG-TERM CURRENT USE OF INSULIN (H): Primary | ICD-10-CM

## 2024-02-01 NOTE — Clinical Note
Please see note. Patient was approved for PAP in November and they never sent the meds. Patient has not been feeling well with Covid so just notified us. Contacted PAP and they will be sending these this week.

## 2024-02-01 NOTE — PROGRESS NOTES
SUBJECTIVE/OBJECTIVE:                Alma Kraft is a 73 year old female seen for a follow-up visit for Medication Management Services.  She was referred to me from Dr. Yuridia Simmons.     Chief Complaint: Follow up from [facility] visit on 11/16/23.      Diabetes:  Current Medications:  Current Outpatient Medications   Medication    amLODIPine (NORVASC) 10 MG tablet    ASPIRIN 81 MG OR TABS    atorvastatin (LIPITOR) 10 MG tablet    blood glucose (ONE TOUCH DELICA) lancing device    blood glucose monitoring (ONE TOUCH DELICA) lancets    Blood Glucose Monitoring Suppl (ONE TOUCH ULTRA 2) W/DEVICE KIT    budesonide (RINOCORT AQUA) 32 MCG/ACT nasal spray    carvedilol (COREG) 25 MG tablet    celecoxib (CELEBREX) 200 MG capsule    cyclobenzaprine (FLEXERIL) 5 MG tablet    furosemide (LASIX) 20 MG tablet    glipiZIDE (GLUCOTROL XL) 10 MG 24 hr tablet    insulin glargine (BASAGLAR KWIKPEN) 100 UNIT/ML pen    losartan-hydrochlorothiazide (HYZAAR) 100-25 MG tablet    metFORMIN (GLUCOPHAGE XR) 500 MG 24 hr tablet    metroNIDAZOLE (METROGEL) 0.75 % external gel    Multiple Vitamins-Minerals (CENTRUM SILVER) per tablet    nortriptyline (PAMELOR) 10 MG capsule    OneTouch Delica Lancets 33G MISC    ORDER FOR DME    potassium chloride ER (MICRO-K) 10 MEQ CR capsule    ULTIGUARD SAFEPACK PEN NEEDLE 32G X 4 MM miscellaneous    venlafaxine (EFFEXOR XR) 150 MG 24 hr capsule     No current facility-administered medications for this visit.       We discussed the benefits and risks of each medication.  Labs:  Lab Results   Component Value Date    A1C 8.0 10/16/2023    A1C 7.1 07/14/2023    A1C 7.5 03/27/2023    A1C 7.8 12/14/2022    A1C 7.6 09/15/2022       ASSESSMENT/PLAN:                Diabetes:  Assessment: Patient had applied for the [employer] program back in November. She has not received the Tradjenta or the Jardiance.     We called  Cares while patient was in clinic today and they said she was approved was not certain why they  had not shipped.     They confirmed patients address and will get mailed. They told patient to expect delivery in 5-7 days.    She currently has been taking, 46 units of basaglar, glipizide XL 10mg daily, and 2000mg metformin daily.    States she did have Covid starting at the beginning of January and felt she was not eating as much at that time either.    Patient has lost approximately 22lbs in last 2 months as well. Contributes some of this addition of diuretic.    Status: Patient needing medication from PAP  Drug Therapy Problems:  1) Medication has not arrived from PAP  Plan:  1) Initiate Tradjenta and Jardiance when they arrive. Would like to stagger the start of these two medications. Will monitor BG and decrease insulin as needed when starting.      I spent 1 hour with this patient today.  All changes were made via collaborative practice agreement with Yuridia Simmons. A copy of the visit note was provided to the patient's primary care pro    Romina Caro, PharmD  Clinical Pharmacist  [facility]  General Clinic Phone: [phone]  Direct Office Phone: [phone]

## 2024-02-05 ENCOUNTER — OFFICE VISIT (OUTPATIENT)
Dept: FAMILY MEDICINE | Facility: CLINIC | Age: 74
End: 2024-02-05

## 2024-02-05 ENCOUNTER — TELEPHONE (OUTPATIENT)
Dept: CARDIOLOGY | Facility: CLINIC | Age: 74
End: 2024-02-05
Payer: MEDICARE

## 2024-02-05 VITALS
BODY MASS INDEX: 48.29 KG/M2 | OXYGEN SATURATION: 98 % | DIASTOLIC BLOOD PRESSURE: 72 MMHG | WEIGHT: 246 LBS | TEMPERATURE: 97.2 F | HEART RATE: 74 BPM | SYSTOLIC BLOOD PRESSURE: 124 MMHG | HEIGHT: 60 IN

## 2024-02-05 DIAGNOSIS — I10 ESSENTIAL HYPERTENSION: ICD-10-CM

## 2024-02-05 DIAGNOSIS — D64.89 ANEMIA DUE TO OTHER CAUSE, NOT CLASSIFIED: ICD-10-CM

## 2024-02-05 DIAGNOSIS — Z00.00 MEDICARE ANNUAL WELLNESS VISIT, SUBSEQUENT: Primary | ICD-10-CM

## 2024-02-05 DIAGNOSIS — E87.5 HYPERKALEMIA: ICD-10-CM

## 2024-02-05 DIAGNOSIS — E11.9 TYPE 2 DIABETES MELLITUS WITHOUT COMPLICATION, WITH LONG-TERM CURRENT USE OF INSULIN (H): ICD-10-CM

## 2024-02-05 DIAGNOSIS — Z79.4 TYPE 2 DIABETES MELLITUS WITHOUT COMPLICATION, WITH LONG-TERM CURRENT USE OF INSULIN (H): ICD-10-CM

## 2024-02-05 DIAGNOSIS — Z13.820 SCREENING FOR OSTEOPOROSIS: ICD-10-CM

## 2024-02-05 DIAGNOSIS — G44.049 CHRONIC PAROXYSMAL HEMICRANIA, NOT INTRACTABLE: ICD-10-CM

## 2024-02-05 DIAGNOSIS — I10 ESSENTIAL HYPERTENSION, BENIGN: ICD-10-CM

## 2024-02-05 DIAGNOSIS — E78.2 MIXED HYPERLIPIDEMIA: ICD-10-CM

## 2024-02-05 DIAGNOSIS — F32.5 MAJOR DEPRESSION IN REMISSION (H): ICD-10-CM

## 2024-02-05 DIAGNOSIS — N28.9 RENAL INSUFFICIENCY: ICD-10-CM

## 2024-02-05 DIAGNOSIS — R01.1 HEART MURMUR: ICD-10-CM

## 2024-02-05 DIAGNOSIS — R52 PAIN: ICD-10-CM

## 2024-02-05 LAB
% GRANULOCYTES: 68.3 %
ALBUMIN (URINE) MG/L: 30
ALBUMIN SERPL-MCNC: 3.3 G/DL (ref 3.6–5.1)
ALBUMIN URINE MG/G CR: <30 MG/G CREATININE
ALBUMIN/GLOB SERPL: 0.9 {RATIO}
ALP SERPL-CCNC: 98 U/L (ref 33–130)
ALT 1742-6: 14 U/L (ref 0–32)
AST 1920-8: 24 U/L (ref 0–35)
BILIRUB SERPL-MCNC: 0.8 MG/DL (ref 0.2–1.2)
BUN SERPL-MCNC: 25 MG/DL (ref 7–25)
BUN/CREATININE RATIO: 21.6 (ref 6–32)
CALCIUM SERPL-MCNC: 8.8 MG/DL (ref 8.6–10.3)
CHLORIDE SERPLBLD-SCNC: 101.5 MMOL/L (ref 98–110)
CO2 SERPL-SCNC: 27.8 MMOL/L (ref 20–32)
CREAT SERPL-MCNC: 1.16 MG/DL (ref 0.6–1.3)
CREATININE URINE MG/DL: 100 MG/DL
GFR SERPL CREATININE-BSD FRML MDRD: 49 ML/MIN/1.73M2
GLOBULIN, CALCULATED - QUEST: 3.8
GLUCOSE SERPL-MCNC: 196 MG/DL (ref 60–99)
HBA1C MFR BLD: 9.5 % (ref 4–7)
HCT VFR BLD AUTO: 31.9 % (ref 35–47)
HEMOGLOBIN: 10.3 G/DL (ref 11.7–15.7)
LYMPHOCYTES NFR BLD AUTO: 21.3 %
MCH RBC QN AUTO: 31.5 PG (ref 26–33)
MCHC RBC AUTO-ENTMCNC: 32.3 G/DL (ref 31–36)
MCV RBC AUTO: 97.6 FL (ref 78–100)
MONOCYTES NFR BLD AUTO: 10.4 %
PLATELET COUNT - QUEST: 222 10^9/L (ref 150–375)
POTASSIUM SERPL-SCNC: 5.5 MMOL/L (ref 3.5–5.3)
PROT SERPL-MCNC: 7.1 G/DL (ref 6.1–8.1)
RBC # BLD AUTO: 3.27 10*12/L (ref 3.8–5.2)
SODIUM SERPL-SCNC: 134.9 MMOL/L (ref 135–146)
WBC # BLD AUTO: 5.8 10*9/L (ref 4–11)

## 2024-02-05 PROCEDURE — 83036 HEMOGLOBIN GLYCOSYLATED A1C: CPT | Performed by: FAMILY MEDICINE

## 2024-02-05 PROCEDURE — G0439 PPPS, SUBSEQ VISIT: HCPCS | Performed by: FAMILY MEDICINE

## 2024-02-05 PROCEDURE — 36415 COLL VENOUS BLD VENIPUNCTURE: CPT | Performed by: FAMILY MEDICINE

## 2024-02-05 PROCEDURE — 80053 COMPREHEN METABOLIC PANEL: CPT | Performed by: FAMILY MEDICINE

## 2024-02-05 PROCEDURE — 85025 COMPLETE CBC W/AUTO DIFF WBC: CPT | Performed by: FAMILY MEDICINE

## 2024-02-05 PROCEDURE — 82570 ASSAY OF URINE CREATININE: CPT | Performed by: FAMILY MEDICINE

## 2024-02-05 PROCEDURE — 99214 OFFICE O/P EST MOD 30 MIN: CPT | Performed by: FAMILY MEDICINE

## 2024-02-05 PROCEDURE — 82043 UR ALBUMIN QUANTITATIVE: CPT | Performed by: FAMILY MEDICINE

## 2024-02-05 RX ORDER — AMLODIPINE BESYLATE 10 MG/1
10 TABLET ORAL DAILY
Qty: 90 TABLET | Refills: 1 | Status: SHIPPED | OUTPATIENT
Start: 2024-02-05 | End: 2024-08-05

## 2024-02-05 RX ORDER — INSULIN GLARGINE 100 [IU]/ML
46 INJECTION, SOLUTION SUBCUTANEOUS DAILY
Qty: 75 ML | Refills: 1 | Status: SHIPPED | OUTPATIENT
Start: 2024-02-05 | End: 2024-08-29

## 2024-02-05 RX ORDER — NORTRIPTYLINE HCL 10 MG
10 CAPSULE ORAL AT BEDTIME
Qty: 90 CAPSULE | Refills: 1 | Status: SHIPPED | OUTPATIENT
Start: 2024-02-05 | End: 2024-08-05

## 2024-02-05 RX ORDER — VENLAFAXINE HYDROCHLORIDE 150 MG/1
150 CAPSULE, EXTENDED RELEASE ORAL DAILY
Qty: 90 CAPSULE | Refills: 1 | Status: SHIPPED | OUTPATIENT
Start: 2024-02-05 | End: 2024-08-05

## 2024-02-05 RX ORDER — GLIPIZIDE 10 MG/1
10 TABLET, FILM COATED, EXTENDED RELEASE ORAL 2 TIMES DAILY
Qty: 180 TABLET | Refills: 1 | Status: SHIPPED | OUTPATIENT
Start: 2024-02-05 | End: 2024-08-05

## 2024-02-05 RX ORDER — INSULIN GLARGINE 100 [IU]/ML
66 INJECTION, SOLUTION SUBCUTANEOUS DAILY
Qty: 75 ML | Refills: 1 | Status: SHIPPED | OUTPATIENT
Start: 2024-02-05 | End: 2024-02-05

## 2024-02-05 RX ORDER — METFORMIN HCL 500 MG
2000 TABLET, EXTENDED RELEASE 24 HR ORAL
Qty: 360 TABLET | Refills: 1 | Status: SHIPPED | OUTPATIENT
Start: 2024-02-05 | End: 2024-04-11

## 2024-02-05 RX ORDER — POTASSIUM CHLORIDE 750 MG/1
10 CAPSULE, EXTENDED RELEASE ORAL 4 TIMES DAILY
Qty: 360 CAPSULE | Refills: 1 | Status: SHIPPED | OUTPATIENT
Start: 2024-02-05 | End: 2024-02-12

## 2024-02-05 RX ORDER — LOSARTAN POTASSIUM AND HYDROCHLOROTHIAZIDE 25; 100 MG/1; MG/1
1 TABLET ORAL DAILY
Qty: 90 TABLET | Refills: 1 | Status: SHIPPED | OUTPATIENT
Start: 2024-02-05 | End: 2024-08-05

## 2024-02-05 RX ORDER — CELECOXIB 200 MG/1
200 CAPSULE ORAL DAILY
Qty: 90 CAPSULE | Refills: 1 | Status: SHIPPED | OUTPATIENT
Start: 2024-02-05 | End: 2024-08-05

## 2024-02-05 RX ORDER — ATORVASTATIN CALCIUM 10 MG/1
10 TABLET, FILM COATED ORAL DAILY
Qty: 90 TABLET | Refills: 1 | Status: SHIPPED | OUTPATIENT
Start: 2024-02-05 | End: 2024-08-05

## 2024-02-05 ASSESSMENT — ANXIETY QUESTIONNAIRES
2. NOT BEING ABLE TO STOP OR CONTROL WORRYING: NOT AT ALL
GAD7 TOTAL SCORE: 0
3. WORRYING TOO MUCH ABOUT DIFFERENT THINGS: NOT AT ALL
6. BECOMING EASILY ANNOYED OR IRRITABLE: NOT AT ALL
IF YOU CHECKED OFF ANY PROBLEMS ON THIS QUESTIONNAIRE, HOW DIFFICULT HAVE THESE PROBLEMS MADE IT FOR YOU TO DO YOUR WORK, TAKE CARE OF THINGS AT HOME, OR GET ALONG WITH OTHER PEOPLE: NOT DIFFICULT AT ALL
7. FEELING AFRAID AS IF SOMETHING AWFUL MIGHT HAPPEN: NOT AT ALL
5. BEING SO RESTLESS THAT IT IS HARD TO SIT STILL: NOT AT ALL
1. FEELING NERVOUS, ANXIOUS, OR ON EDGE: NOT AT ALL
GAD7 TOTAL SCORE: 0

## 2024-02-05 ASSESSMENT — PATIENT HEALTH QUESTIONNAIRE - PHQ9
5. POOR APPETITE OR OVEREATING: NOT AT ALL
SUM OF ALL RESPONSES TO PHQ QUESTIONS 1-9: 5

## 2024-02-05 NOTE — LETTER
2024      Jeny Benitez  [address]  [facility] [address]        Dear ,    We are writing to inform you of your test results.    Your dexa scan shows osteopenia, low bone density. This can increase your risk of fracture. Be sure you are taking daily calcium and vitamin d, do regular weight bearing exercise. Recheck a dexa scan in 2 years.    Resulted Orders   Dexa hip/pelvis/spine*    Narrative    [address], [address]   [place] [address]   Phone: [phone]   [place]  : 1950 Req Phys: Yuridia Simmons MD  Patient name: DENNY BENITEZ Clinic: [facility]   Dept No: [id number]  BONE DENSITY Exam Date: 2024 Accession: [id number]  EXAM: BONE DENSITY  LOCATION: [facility] [place]  DATE: 2024  INDICATION: Postmenopausal Screening for osteoporosis.  DEMOGRAPHICS: Age- 73 years. Gender- Female.  COMPARISON: 2021.  TECHNIQUE: Dual-energy x-ray absorptiometry (DXA) performed with routine technique.  FINDINGS:  DXA RESULTS  -Lumbar Spine: L2-L3: BMD: 1.116 g/cm2. T-score: 0.5. Z-score: 3.0.  -RIGHT Hip Total: BMD: 0.871 g/cm2. T-score: -0.6. Z-score: 1.2.  -RIGHT Hip Femoral neck: BMD: 0.702 g/cm2. T-score: -1.3. Z-score: 0.7.  -LEFT Hip Total: BMD: 1.040 g/cm2. T-score: 0.8. Z-score: 2.6.  -LEFT Hip Femoral neck: BMD: 0.771 g/cm2. T-score: -0.7. Z-score: 1.4.  WHO T-SCORE CRITERIA  -Normal: T score at or above -1 SD  -Osteopenia: T score between -1 and -2.5 SD  -Osteoporosis: T score at or below -2.5 SD  The World Health Organization (WHO) criteria is applicable to perimenopausal females, postmenopausal females,   and men aged 50 years or older.  INTERVAL CHANGE  -There has been a 4.5% increase in lumbar spine BMD.  -There has been a 10.9% decrease in the right hip BMD.  -There has been a 2.6% increase in the left hip BMD.  FRACTURE RISK  -FRAX Results: The 10 year probability of major osteoporotic  fracture is 8.4%, and of hip fracture is 1.3%, based on   right femoral neck BMD.  RECOMMENDATIONS  Consider treatment if major osteoporotic fracture score is greater than or equal to 20%, or if the hip fracture score is   greater than or equal to 3%.  IMPRESSION: Low bone density (OSTEOPENIA). T score meets the WHO criteria for low bone density (osteopenia)   at one or more measured sites. The risk of osteoporotic fracture increases approximately two-fold for each standard   deviation decrease in T-score.   Page 1 of 2  DENNY BENITEZ : 1950 Exam: BONE DENSITY  Dictated By: CLEM WARE M.D.  Password protected electronic signature by: GUALBERTO   Trans: LK  Date Of Trans: 2024 11:22:00AM  Date report approved and signed by interpreting physician: 2024 4:57:00PM  Page 2 of 2       If you have any questions or concerns, please call the clinic at the number listed above.       Sincerely,      Yuridia Simmons MD

## 2024-02-05 NOTE — NURSING NOTE
Chief Complaint   Patient presents with    Recheck Medication     Fasting med check    Wellness Visit         Pre-visit Screening:  Immunizations:  up to date  Colonoscopy:  is up to date  Mammogram: is up to date  Asthma Action Test/Plan:  NA  PHQ9:  Done today  GAD7:  Done today  Questioned patient about current smoking habits Pt. has never smoked.  Ok to leave detailed message on voice mail for today's visit only Yes, phone # [phone]

## 2024-02-05 NOTE — TELEPHONE ENCOUNTER
Left detailed message for patient the labs drawn today 2-5-24 were ordered by Dr Simmons recommend patient call her office for lab review.  Tiffanie Lockhart, RN on 2/5/2024 at 3:23 PM        ----- Message from Gala Magallon sent at 2/5/2024  2:43 PM CST -----  Regarding: Dr Espinoza patient  Contact: [phone]  Hello,    This is a patient of Dr Espinoza'.  Patient states she had lab work done on 2/5/24 and was told to contact her cardiologist due to the lab findings.  Can someone please call patient at [phone] to discuss?    Thank you  Gala

## 2024-02-05 NOTE — PATIENT INSTRUCTIONS
Health Maintenance   Topic Date Due    HF ACTION PLAN  Never done    CBC  08/22/2019    ALT  02/21/2020    HEPATITIS A IMMUNIZATION (2 of 2 - Risk 2-dose series) 12/29/2022    MICROALBUMIN  06/09/2023    COLORECTAL CANCER SCREENING  11/06/2023    MEDICARE ANNUAL WELLNESS VISIT  12/14/2023    PHQ-9  12/14/2023    DEXA  12/30/2023    RSV VACCINE (Pregnancy & 60+) (1 - 1-dose 60+ series) 01/03/2025 (Originally 9/13/2009)    COVID-19 Vaccine (4 - 2023-24 season) 01/03/2027 (Originally 9/1/2023)    A1C  04/16/2024    BMP  04/30/2024    MAMMO SCREENING  06/01/2024    LIPID  07/14/2024    DIABETIC FOOT EXAM  07/14/2024    EYE EXAM  12/07/2024    FALL RISK ASSESSMENT  01/03/2025    ADVANCE CARE PLANNING  05/27/2026    DTAP/TDAP/TD IMMUNIZATION (3 - Td or Tdap) 05/23/2028    TSH W/FREE T4 REFLEX  Completed    HEPATITIS C SCREENING  Completed    DEPRESSION ACTION PLAN  Completed    INFLUENZA VACCINE  Completed    Pneumococcal Vaccine: 65+ Years  Completed    ZOSTER IMMUNIZATION  Completed    HEPATITIS B IMMUNIZATION  Completed    IPV IMMUNIZATION  Aged Out    HPV IMMUNIZATION  Aged Out    MENINGITIS IMMUNIZATION  Aged Out    RSV MONOCLONAL ANTIBODY  Aged Out

## 2024-02-05 NOTE — PROGRESS NOTES
Alma Kraft is a 73 year old female who presents for Medicare Annual Wellness Visit.    Current providers caring for this patient include:  Patient Care Team:  Yuridia Simmons MD as PCP - General (Family Medicine)  Yuridia Simmons MD as Assigned PCP  Janeth Caro [facility] as Assigned [place] Pharmacist  Lan Espinoza MD as MD (Cardiovascular Disease)  Lan Espinoza MD as Assigned Heart and Vascular Provider    Complete Medical and Social history reviewed with patient, outlined below.    Patient Active Problem List   Diagnosis    Essential hypertension, benign--followed by cardiology    Mixed hyperlipidemia    Obesity, morbid, BMI 40.0-49.9 (H)    Gastroesophageal reflux disease without esophagitis    Allergic rhinitis    Obstructive sleep apnea    Family history of malignant neoplasm of breast    ACP (advance care planning)    Type 2 diabetes mellitus without complication, with long-term current use of insulin (H)    Adjustment disorder with mixed anxiety and depressed mood    Acne    Health Care Home    Other cirrhosis of liver (H)    Elevated AFP    History of colonic polyps    Elevated anti-tissue transglutaminase (tTG) IgA level--followed by rheumatology    Nonrheumatic aortic valve stenosis--followed by cardiology    Heart failure with preserved ejection fraction, NYHA class III (H)       Past Medical History:   Diagnosis Date    Arthritis     hands, Right shoulder    Diabetes (H)     Hypertension     Sleep apnea     Uses a CPAP       Past Surgical History:   Procedure Laterality Date    ------------OTHER-------------  09/05/2013    L hand, cyst excision    ------------OTHER------------- Right 03/14/2014    shoulder manipulation    ESOPHAGOSCOPY, GASTROSCOPY, DUODENOSCOPY (EGD), COMBINED N/A 01/24/2022    Procedure: ESOPHAGOGASTRODUODENOSCOPY;  Surgeon: Xavi Heller MD;  Location: [facility] OR    HC INCISION TENDON SHEATH FINGER Left 09/05/2013    left thumb trigger finger    HC KNEE  SCOPE, DIAGNOSTIC  04/2005    Arthroscopy, Knee Left    HC REMOVE TONSILS/ADENOIDS,<13 Y/O  1953    T & A <11y.o.    TEST NOT FOUND  06/27/2007    R heel/ inocencio's deformity    ZZC APPENDECTOMY  1956    ZZC TOTAL KNEE ARTHROPLASTY  02/2006    Knee Replacement, Total Right    ZZC TOTAL KNEE ARTHROPLASTY  03/05/2007    Knee Replacement, Total  Left    ZZC VAGINAL HYSTERECTOMY  08/2001    Hysterectomy, Vaginal & BSO       Family History   Problem Relation Age of Onset    Cerebrovascular Disease Mother     Deep Vein Thrombosis Mother     Cancer Father         prostate    Gastrointestinal Disease Father     Breast Cancer Sister     Breast Cancer Sister     Prostate Cancer Brother     Prostate Cancer Brother     Heart Disease Maternal Grandmother     Cerebrovascular Disease Maternal Grandfather     Heart Disease Paternal Grandfather     Gastrointestinal Disease Other         Niece with GERD/hiatal hernia       Social History     Tobacco Use    Smoking status: Never     Passive exposure: Never    Smokeless tobacco: Never   Substance Use Topics    Alcohol use: Not Currently       Diet: excess fats, diabetic, minimal fruit/vegetable intake  Physical Activity: generally inactive  Depression Screen:    Over the past 2 weeks, patient has felt down, depressed, or hopeless:  No    Over the past 2 weeks, patient has felt little interest or pleasure in doing things: No    Functional ability/Safety screen:  Up and go test (able to get up and walk longer than 30 seconds): Passed  Patient needs assistance with: nothing and some chores due to right shoulder  Patient's home has the following possible safety concerns: none identified  Patient has concerns about her hearing:  No  Cognitive Screen  Patient repeats three objects (ball, flag, tree)      Clock drawing test:   NORMAL  Recalls three objects after 3 minutes (ball,flag,tree):                                                                                               recalls 2  objects (2 points)    Physical Exam:  /72 (BP Location: Left arm, Patient Position: Sitting, Cuff Size: Adult Large)   Pulse 74   Temp 97.2  F (36.2  C) (Temporal)   Ht 1.524 m (5')   Wt 111.6 kg (246 lb)   LMP 05/08/2001   SpO2 98%   BMI 48.04 kg/m     Body mass index is 48.04 kg/m .                     End of Life Planning:   Patient currently has an advanced directive: No.  I have verified the patient's ablity to prepare an advanced directive/make health care decisions.  Literature was provided to assist patient in preparing an advanced directive.    Education/Counseling:   Based on review of the above information, the following items were addressed:      Discussed healthy diet and regular exercise    Appropriate preventive services were discussed with this patient, including applicable screening as appropriate for cardiovascular disease, diabetes, osteopenia/osteoporosis, and glaucoma.  As appropriate for age/gender, discussed screening for colorectal cancer, prostate cancer, breast cancer, and cervical cancer.   Checklist reviewing preventive services available has been given to the patient.    Updated health maintenance, we will get hep A vaccine information from [facility], ordered dexa

## 2024-02-09 ENCOUNTER — OFFICE VISIT (OUTPATIENT)
Dept: FAMILY MEDICINE | Facility: CLINIC | Age: 74
End: 2024-02-09

## 2024-02-09 VITALS
WEIGHT: 246 LBS | OXYGEN SATURATION: 97 % | SYSTOLIC BLOOD PRESSURE: 126 MMHG | DIASTOLIC BLOOD PRESSURE: 74 MMHG | HEART RATE: 72 BPM | BODY MASS INDEX: 48.04 KG/M2 | TEMPERATURE: 97.6 F

## 2024-02-09 DIAGNOSIS — D64.9 ANEMIA, UNSPECIFIED TYPE: Primary | ICD-10-CM

## 2024-02-09 DIAGNOSIS — E87.5 HYPERKALEMIA: ICD-10-CM

## 2024-02-09 DIAGNOSIS — R68.2 DRY MOUTH: ICD-10-CM

## 2024-02-09 DIAGNOSIS — N28.9 RENAL INSUFFICIENCY: ICD-10-CM

## 2024-02-09 LAB
% GRANULOCYTES: 64 %
BUN SERPL-MCNC: 28 MG/DL (ref 7–25)
BUN/CREATININE RATIO: 24.1 (ref 6–32)
CALCIUM SERPL-MCNC: 8.8 MG/DL (ref 8.6–10.3)
CHLORIDE SERPLBLD-SCNC: 100 MMOL/L (ref 98–110)
CO2 SERPL-SCNC: 27.6 MMOL/L (ref 20–32)
CREAT SERPL-MCNC: 1.16 MG/DL (ref 0.6–1.3)
GLUCOSE SERPL-MCNC: 170 MG/DL (ref 60–99)
HCT VFR BLD AUTO: 31.6 % (ref 35–47)
HEMOGLOBIN: 10.2 G/DL (ref 11.7–15.7)
LYMPHOCYTES NFR BLD AUTO: 24.9 %
MCH RBC QN AUTO: 32.3 PG (ref 26–33)
MCHC RBC AUTO-ENTMCNC: 32.3 G/DL (ref 31–36)
MCV RBC AUTO: 100.1 FL (ref 78–100)
MONOCYTES NFR BLD AUTO: 11.1 %
PLATELET COUNT - QUEST: 181 10^9/L (ref 150–375)
POTASSIUM SERPL-SCNC: 4.78 MMOL/L (ref 3.5–5.3)
RBC # BLD AUTO: 3.16 10*12/L (ref 3.8–5.2)
SODIUM SERPL-SCNC: 136.4 MMOL/L (ref 135–146)
WBC # BLD AUTO: 4.7 10*9/L (ref 4–11)

## 2024-02-09 PROCEDURE — 85025 COMPLETE CBC W/AUTO DIFF WBC: CPT | Performed by: FAMILY MEDICINE

## 2024-02-09 PROCEDURE — 36415 COLL VENOUS BLD VENIPUNCTURE: CPT | Performed by: FAMILY MEDICINE

## 2024-02-09 PROCEDURE — 99214 OFFICE O/P EST MOD 30 MIN: CPT | Performed by: FAMILY MEDICINE

## 2024-02-09 PROCEDURE — 80048 BASIC METABOLIC PNL TOTAL CA: CPT | Performed by: FAMILY MEDICINE

## 2024-02-09 PROCEDURE — G2211 COMPLEX E/M VISIT ADD ON: HCPCS | Performed by: FAMILY MEDICINE

## 2024-02-09 NOTE — PROGRESS NOTES
Assessment & Plan   Problem List Items Addressed This Visit    None  Visit Diagnoses       Anemia, unspecified type    -  Primary    Relevant Orders    VENOUS COLLECTION (Completed)    HEMOGRAM PLATELET DIFF (BFP) (Completed)    Hyperkalemia        Relevant Orders    Basic Metabolic Panel (BFP)    Dry mouth               1. Anemia, unspecified type  Stable. No signs or sx of anemia. This may be due to her renal insufficiency. Recheck in a month.  - VENOUS COLLECTION  - HEMOGRAM PLATELET DIFF (BFP)    2. Hyperkalemia  She has stopped her  potassium supplement, recheck today.  - Basic Metabolic Panel (BFP)    3. Dry mouth  Worsening since starting furosemide, she will first discuss this with her cardiologist who prescribed this new medication. If not improving, offered her referral to ENT for evaluation.       4. Renal insufficiency  New, possibly due to new medication prescribed by cardiology. She called them earlier this week, they told her to talk to me since I ordered the labs. However, since they are the ones balancing treating her furosemide, heart failure, which is a new medication for her and her kidney function, I have rechecked this today and asked her to contact them to make necessary changes to her medications.    We will continue to manage her chronic medical conditions.           BMI  Estimated body mass index is 48.04 kg/m  as calculated from the following:    Height as of 2/5/24: 1.524 m (5').    Weight as of this encounter: 111.6 kg (246 lb).         FUTURE APPOINTMENTS:       - Follow-up visit in 1 months.    No follow-ups on file.    Yuridia Simmons MD  [facility]    Subjective     Nursing Notes:   Danika Santos MA  2/9/2024 10:39 AM  Signed  Chief Complaint   Patient presents with    Follow Up     Pt here for follow-up from her blood results that she got back from her blood draw on Monday    Pre-visit Screening:  Immunizations:  up to date  Colonoscopy:  is due and to be  scheduled by patient for later completion  Mammogram: is due and to be scheduled by patient for later completion  Asthma Action Test/Plan:  na  PHQ9:  na  GAD7:  na  Questioned patient about current smoking habits Pt. has never smoked.  Ok to leave detailed message on voice mail for today's visit only yes, phone # [phone]      Alma Kraft is a 73 year old female who presents to clinic today for the following health issues   HPI     Here to followup on her labs from last week. High potassium, followed by cardiology, recent change in her medications. Also worsening kidney function. She has history heart failure. She contacted them and thy told her to ask me since I ordered the lab.   Also had anemia. No signs/sx of bleeding. Needs a recheck on this today.  Is otherwise feeling well.    Also having dry mouth, worse with starting furosemide, but it's quite uncomfortable.        Review of Systems   Constitutional, HEENT, cardiovascular, pulmonary, gi and gu systems are negative, except as otherwise noted.      Objective    /74 (BP Location: Left arm, Patient Position: Sitting, Cuff Size: Adult Large)   Pulse 72   Temp 97.6  F (36.4  C) (Temporal)   Wt 111.6 kg (246 lb)   LMP 05/08/2001   SpO2 97%   BMI 48.04 kg/m    Body mass index is 48.04 kg/m .  Physical Exam   GENERAL: alert and no distress  RESP: lungs clear to auscultation - no rales, rhonchi or wheezes  CV: regular rate and rhythm, normal S1 S2, no S3 or S4, no murmur, click or rub, no peripheral edema  MS: no gross musculoskeletal defects noted, no edema  NEURO: Normal strength and tone, mentation intact and speech normal  PSYCH: mentation appears normal, affect normal/bright    Results for orders placed or performed in visit on 02/09/24   HEMOGRAM PLATELET DIFF (BFP)     Status: Abnormal   Result Value Ref Range    WBC 4.7 4.0 - 11 10*9/L    RBC Count 3.16 (A) 3.8 - 5.2 10*12/L    Hemoglobin 10.2 (A) 11.7 - 15.7 g/dL    Hematocrit 31.6 (A)  35.0 - 47.0 %    .1 (A) 78 - 100 fL    MCH 32.3 26 - 33 pg    MCHC 32.3 31 - 36 g/dL    Platelet Count 181 150 - 375 10^9/L    % Granulocytes 64.0 %    % Lymphocytes 24.9 %    % Monocytes 11.1 %

## 2024-02-09 NOTE — NURSING NOTE
Chief Complaint   Patient presents with    Follow Up     Pt here for follow-up from her blood results that she got back from her blood draw on Monday    Pre-visit Screening:  Immunizations:  up to date  Colonoscopy:  is due and to be scheduled by patient for later completion  Mammogram: is due and to be scheduled by patient for later completion  Asthma Action Test/Plan:  na  PHQ9:  na  GAD7:  na  Questioned patient about current smoking habits Pt. has never smoked.  Ok to leave detailed message on voice mail for today's visit only yes, phone # [phone]

## 2024-02-09 NOTE — LETTER
February 9, 2024      Jeny CANA[place] Abena  [address]  [facility] [address]        Dear ,    We are writing to inform you of your test results.    Your labs: kidney function is again abnormal but your potassium is ok. Be sure you talk to your cardiologist about this due to your change in medications.  Please see me back in a month for a recheck on your anemia.    Resulted Orders   HEMOGRAM PLATELET DIFF (BFP)   Result Value Ref Range    WBC 4.7 4.0 - 11 10*9/L    RBC Count 3.16 (A) 3.8 - 5.2 10*12/L    Hemoglobin 10.2 (A) 11.7 - 15.7 g/dL    Hematocrit 31.6 (A) 35.0 - 47.0 %    .1 (A) 78 - 100 fL    MCH 32.3 26 - 33 pg    MCHC 32.3 31 - 36 g/dL    Platelet Count 181 150 - 375 10^9/L    % Granulocytes 64.0 %    % Lymphocytes 24.9 %    % Monocytes 11.1 %   Basic Metabolic Panel (BFP)   Result Value Ref Range    Carbon Dioxide 27.6 20 - 32 mmol/L    Creatinine 1.16 0.60 - 1.30 mg/dL    Glucose 170 (A) 60 - 99 mg/dL    Sodium 136.4 135 - 146 mmol/L    Potassium 4.78 3.5 - 5.3 mmol/L    Chloride 100.0 98 - 110 mmol/L    Urea Nitrogen 28 (A) 7 - 25 mg/dL    Calcium 8.8 8.6 - 10.3 mg/dL    BUN/Creatinine Ratio 24.1 6 - 32       If you have any questions or concerns, please call the clinic at the number listed above.       Sincerely,      Yuridia Simmons MD

## 2024-02-14 ENCOUNTER — TELEPHONE (OUTPATIENT)
Dept: CARDIOLOGY | Facility: CLINIC | Age: 74
End: 2024-02-14

## 2024-02-15 NOTE — TELEPHONE ENCOUNTER
Patient is part of [facility] and does not meet inclusion criteria for management by CRC team.     Agustina Contreras RN on 2/15/2024 at 11:12 AM

## 2024-02-22 ENCOUNTER — TELEPHONE (OUTPATIENT)
Dept: CARDIOLOGY | Facility: CLINIC | Age: 74
End: 2024-02-22
Payer: MEDICARE

## 2024-02-22 NOTE — TELEPHONE ENCOUNTER
[facility] Call Center    Phone Message    May a detailed message be left on voicemail: yes     Reason for Call: Other: Patient states that she had abnormal kidney and potassium results with [facility]. Her PCP is wondering if a medication she is taking for her heart may be interfering with these results. Please call her back to discuss.       Action Taken: Other: cardiology    Travel Screening: Not Applicable  Thank you!  [facility]

## 2024-02-22 NOTE — TELEPHONE ENCOUNTER
Reviewed chart. Patient had labs done with Dr. Simmons's office on 2/9/24:    Component      Latest Ref Rng 2/9/2024  1:01 PM   Carbon Dioxide      20 - 32 mmol/L 27.6    Creatinine      0.60 - 1.30 mg/dL 1.16    Glucose      60 - 99 mg/dL 170 !    Sodium      135 - 146 mmol/L 136.4    Potassium      3.5 - 5.3 mmol/L 4.78    Chloride      98 - 110 mmol/L 100.0    Urea Nitrogen      7 - 25 mg/dL 28 !    Calcium      8.6 - 10.3 mg/dL 8.8    BUN/Creatinine Ratio      6 - 32  24.1       Legend:  ! Abnormal    Creatinine appears to be within normal range, but is a little bit higher than patient's last BMP with our office on 10/31/23:    Component      Latest Ref Rng 10/31/2023  3:29 PM   Sodium      135 - 145 mmol/L 135    Potassium      3.4 - 5.3 mmol/L 3.8    Chloride      98 - 107 mmol/L 96 (L)    Carbon Dioxide (CO2)      22 - 29 mmol/L 26    Anion Gap      7 - 15 mmol/L 13    Urea Nitrogen      8.0 - 23.0 mg/dL 19.3    Creatinine      0.51 - 0.95 mg/dL 0.64    GFR Estimate      >60 mL/min/1.73m2 >90    Calcium      8.8 - 10.2 mg/dL 9.1    Glucose      70 - 99 mg/dL 368 (H)       Legend:  (L) Low  (H) High    Dr. Simmons's comments on patient's recent labs from 2/9/24:        Will route to Dr. Espinoza for review.

## 2024-02-27 DIAGNOSIS — E11.9 TYPE 2 DIABETES MELLITUS WITHOUT COMPLICATION, WITH LONG-TERM CURRENT USE OF INSULIN (H): ICD-10-CM

## 2024-02-27 DIAGNOSIS — Z79.4 TYPE 2 DIABETES MELLITUS WITHOUT COMPLICATION, WITH LONG-TERM CURRENT USE OF INSULIN (H): ICD-10-CM

## 2024-02-27 RX ORDER — FLURBIPROFEN SODIUM 0.3 MG/ML
SOLUTION/ DROPS OPHTHALMIC
Qty: 100 EACH | Refills: 1 | Status: SHIPPED | OUTPATIENT
Start: 2024-02-27 | End: 2024-09-24

## 2024-02-27 NOTE — TELEPHONE ENCOUNTER
Patient notified of  review:  The lasix I started her on last fall may have causes some lab changes, but the lab changes are harmless and she needs the lasix, so no med changes are advise     Patient verbalized understanding.  Tiffanie Lockhart RN on 2/27/2024 at 2:57 PM

## 2024-02-27 NOTE — TELEPHONE ENCOUNTER
Pt needing pen needles.    Alma Kraft is requesting a refill of:    Pending Prescriptions:                       Disp   Refills    insulin pen needle (SINGH SAFEPACK PE*100 ea*1            Sig: Use 1 pen needle daily or as directed.

## 2024-03-06 ENCOUNTER — TRANSFERRED RECORDS (OUTPATIENT)
Dept: FAMILY MEDICINE | Facility: CLINIC | Age: 74
End: 2024-03-06

## 2024-03-12 ENCOUNTER — OFFICE VISIT (OUTPATIENT)
Dept: FAMILY MEDICINE | Facility: CLINIC | Age: 74
End: 2024-03-12

## 2024-03-12 VITALS
DIASTOLIC BLOOD PRESSURE: 76 MMHG | OXYGEN SATURATION: 96 % | BODY MASS INDEX: 48.43 KG/M2 | WEIGHT: 248 LBS | HEART RATE: 81 BPM | SYSTOLIC BLOOD PRESSURE: 126 MMHG | TEMPERATURE: 97.8 F

## 2024-03-12 DIAGNOSIS — E87.5 HYPERKALEMIA: ICD-10-CM

## 2024-03-12 DIAGNOSIS — D64.9 ANEMIA, UNSPECIFIED TYPE: Primary | ICD-10-CM

## 2024-03-12 DIAGNOSIS — Z79.4 TYPE 2 DIABETES MELLITUS WITHOUT COMPLICATION, WITH LONG-TERM CURRENT USE OF INSULIN (H): ICD-10-CM

## 2024-03-12 DIAGNOSIS — N28.9 RENAL INSUFFICIENCY: ICD-10-CM

## 2024-03-12 DIAGNOSIS — E11.9 TYPE 2 DIABETES MELLITUS WITHOUT COMPLICATION, WITH LONG-TERM CURRENT USE OF INSULIN (H): ICD-10-CM

## 2024-03-12 LAB
% GRANULOCYTES: 66.4 %
BUN SERPL-MCNC: 47 MG/DL (ref 7–25)
BUN/CREATININE RATIO: 28.3 (ref 6–32)
CALCIUM SERPL-MCNC: 9.1 MG/DL (ref 8.6–10.3)
CHLORIDE SERPLBLD-SCNC: 98.9 MMOL/L (ref 98–110)
CO2 SERPL-SCNC: 25.8 MMOL/L (ref 20–32)
CREAT SERPL-MCNC: 1.66 MG/DL (ref 0.6–1.3)
GFR SERPL CREATININE-BSD FRML MDRD: 32 ML/MIN/1.73M2
GLUCOSE SERPL-MCNC: 294 MG/DL (ref 60–99)
HCT VFR BLD AUTO: 33.1 % (ref 35–47)
HEMOGLOBIN: 10.3 G/DL (ref 11.7–15.7)
LYMPHOCYTES NFR BLD AUTO: 23.9 %
MCH RBC QN AUTO: 31.2 PG (ref 26–33)
MCHC RBC AUTO-ENTMCNC: 31.1 G/DL (ref 31–36)
MCV RBC AUTO: 100.4 FL (ref 78–100)
MONOCYTES NFR BLD AUTO: 9.7 %
PLATELET COUNT - QUEST: 172 10^9/L (ref 150–375)
POTASSIUM SERPL-SCNC: 5.22 MMOL/L (ref 3.5–5.3)
RBC # BLD AUTO: 3.3 10*12/L (ref 3.8–5.2)
SODIUM SERPL-SCNC: 135.7 MMOL/L (ref 135–146)
WBC # BLD AUTO: 6.3 10*9/L (ref 4–11)

## 2024-03-12 PROCEDURE — 85025 COMPLETE CBC W/AUTO DIFF WBC: CPT | Performed by: FAMILY MEDICINE

## 2024-03-12 PROCEDURE — 99214 OFFICE O/P EST MOD 30 MIN: CPT | Performed by: FAMILY MEDICINE

## 2024-03-12 PROCEDURE — 80048 BASIC METABOLIC PNL TOTAL CA: CPT | Performed by: FAMILY MEDICINE

## 2024-03-12 PROCEDURE — G2211 COMPLEX E/M VISIT ADD ON: HCPCS | Performed by: FAMILY MEDICINE

## 2024-03-12 PROCEDURE — 36415 COLL VENOUS BLD VENIPUNCTURE: CPT | Performed by: FAMILY MEDICINE

## 2024-03-12 RX ORDER — SPIRONOLACTONE 50 MG/1
50 TABLET, FILM COATED ORAL DAILY
COMMUNITY
Start: 2024-02-25 | End: 2024-05-14

## 2024-03-12 NOTE — PATIENT INSTRUCTIONS
[facility]   [facility]  [address]  [address]  [facility] [address]  [phone] -  Main Scheduling  [phone] -  Fax

## 2024-03-12 NOTE — NURSING NOTE
Chief Complaint   Patient presents with    RECHECK     Recheck anemia and kidney function     Pre-visit Screening:  Immunizations:  up to date  Colonoscopy:  is up to date  Mammogram: is up to date  Asthma Action Test/Plan:  NA  PHQ9:  NA  GAD7:  NA  Questioned patient about current smoking habits Pt. has never smoked.  Ok to leave detailed message on voice mail for today's visit only Yes, phone # [phone]    
Depressed

## 2024-03-21 ENCOUNTER — TELEPHONE (OUTPATIENT)
Dept: FAMILY MEDICINE | Facility: CLINIC | Age: 74
End: 2024-03-21

## 2024-03-21 NOTE — Clinical Note
Please see note. Patient has had hypoglycemia recently. Will decrease insulin by 4 units and patient will follow up early next week.

## 2024-03-21 NOTE — TELEPHONE ENCOUNTER
Jeny has been having low fasting numbers recently, this morning down to 60. I would like patient to decrease insulin to 30 units, from 34 units and follow up with me early next week.    Will continue to decrease if needed.    Romina Caro, PharmD  Clinical Pharmacist  [facility] Phone: [phone]  Direct Office Phone: [phone]

## 2024-03-27 ENCOUNTER — TRANSFERRED RECORDS (OUTPATIENT)
Dept: FAMILY MEDICINE | Facility: CLINIC | Age: 74
End: 2024-03-27

## 2024-04-10 ENCOUNTER — ALLIED HEALTH/NURSE VISIT (OUTPATIENT)
Dept: EDUCATION SERVICES | Facility: CLINIC | Age: 74
End: 2024-04-10
Attending: FAMILY MEDICINE
Payer: MEDICARE

## 2024-04-10 DIAGNOSIS — Z79.4 TYPE 2 DIABETES MELLITUS WITHOUT COMPLICATION, WITH LONG-TERM CURRENT USE OF INSULIN (H): ICD-10-CM

## 2024-04-10 DIAGNOSIS — E11.9 TYPE 2 DIABETES MELLITUS WITHOUT COMPLICATION, WITH LONG-TERM CURRENT USE OF INSULIN (H): ICD-10-CM

## 2024-04-10 PROCEDURE — G0108 DIAB MANAGE TRN  PER INDIV: HCPCS | Performed by: DIETITIAN, REGISTERED

## 2024-04-10 NOTE — PROGRESS NOTES
"Diabetes Self-Management Education & Support    Presents for: Individual review    Type of Service: In Person Visit      ASSESSMENT:  Patient presents for diabetes education review.  Previously seen in 2015.  Notes feeling overwhelmed with complicated health issues - liver and kidney issues, diabetes, poor sleep, mental health, etc.  Testing glucose fasting ~4-5x/week with results in the  range (occasionally has a low in the 50-60s).  Current diabetes medications include: Lantus/Basaglar 30 units, Tradjenta 5mg, Jardiance 10mg, Metformin 2000mg, Glipizide XL 20mg.  Works with pharmD.  Has chronic dry mouth that has led to consuming fruit juice and regular soda throughout the day.  Doesn't like \"diet\" drinks.    Patient's most recent   Lab Results   Component Value Date    A1C 9.5 02/05/2024     is not meeting goal of <7.0    Diabetes knowledge and skills assessment:   Patient is knowledgeable in diabetes management concepts related to: Monitoring and Taking Medication    Continue education with the following diabetes management concepts: Healthy Eating, Monitoring, Taking Medication, Problem Solving, Reducing Risks, and Healthy Coping    Based on learning assessment above, most appropriate setting for further diabetes education would be: Individual setting.      PLAN    Work on reducing/eliminating sugary drinks (juice, pop, etc).  Discussed alternatives (crystal light, etc).  Work on more consistent meal/snack schedule.  Aim for meals every 4-5 hours.    Use My Plate guide for portion control and diet balance.  Try testing blood sugar mid-afternoon or bedtime to capture post-meal readings.  Consider 2 week professional CGM to gather more glucose data.  May need short-acting insulin with meals.  Discuss with PharmD reducing Metformin to 1000mg daily due to falling GFR.    Topics to cover at upcoming visits: Healthy Eating, Monitoring, Taking Medication, and Problem Solving    Follow-up: per patient    See Care " "Plan for co-developed, patient-state behavior change goals.  AVS provided for patient today.    Education Materials Provided:  [employer] Understanding Diabetes Booklet, BG Log Sheet, and My Plate Planner      SUBJECTIVE/OBJECTIVE:  Presents for: Individual review  Accompanied by: Self  Diabetes education in the past 24mo: No  Focus of Visit: Patient Unsure  Diabetes type: Type 2  Disease course: Getting harder to manage  How confident are you filling out medical forms by yourself:: Not Assessed  Other concerns:: None  Cultural Influences/Ethnic Background:  Not  or       Diabetes Symptoms & Complications:  Disease course: Getting harder to manage  Complications assessed today?: No    Patient Problem List and Family Medical History reviewed for relevant medical history, current medical status, and diabetes risk factors.    Vitals:  LMP 05/08/2001   Estimated body mass index is 48.43 kg/m  as calculated from the following:    Height as of 2/5/24: 1.524 m (5').    Weight as of 3/12/24: 112.5 kg (248 lb).   Last 3 BP:   BP Readings from Last 3 Encounters:   03/12/24 126/76   02/09/24 126/74   02/05/24 124/72       History   Smoking Status    Never   Smokeless Tobacco    Never       Labs:  Lab Results   Component Value Date    A1C 9.5 02/05/2024     Lab Results   Component Value Date     03/12/2024     Lab Results   Component Value Date    LDL 55 07/14/2023    LDL 87 02/21/2019     HDL Cholesterol   Date Value Ref Range Status   07/14/2023 51 40 - 150 mg/dL Final   ]  GFR Estimate   Date Value Ref Range Status   03/12/2024 32 (A) >60 ml/min/1.73m2 Final     GFR Estimate If Black   Date Value Ref Range Status   02/11/2018 >90 >60 mL/min/1.7m2 Final     Comment:      GFR Calc     Lab Results   Component Value Date    CR 1.66 03/12/2024     No results found for: \"MICROALBUMIN\"    Healthy Eating:  Healthy Eating Assessed Today: Yes  Cultural/Episcopalian diet restrictions?: No  Do " you have any food allergies or intolerances?: No  Meal planning/habits: None  Who cooks/prepares meals for you?: Self, Son  Who purchases food in  your home?: Self, Son  Meals include: Dinner, Breakfast, Afternoon Snack, Evening Snack  Breakfast: 2-3 glasses OJ, cereal (cheerios), skim milk, equal OR 2 eggs, toast, juice  Dinner: 6-10pm: pbj sandwich OR hamburger OR pizza OR grilled cheese OR fried egg sandwich OR frozen entree  Snacks: cheese + crackers  Other: likes fruits  Beverages: Water, Juice, Soda    Being Active:  Being Active Assessed Today: No    Monitoring:  Monitoring Assessed Today: Yes  Did patient bring glucose meter to appointment? : No  Times checking blood sugar at home (number): 4  Times checking blood sugar at home (per): Week        Taking Medications:  Diabetes Medication(s)       Biguanides       metFORMIN (GLUCOPHAGE XR) 500 MG 24 hr tablet Take 4 tablets (2,000 mg) by mouth daily (with dinner)       Insulin       insulin glargine 100 UNIT/ML pen Inject 46 Units Subcutaneous daily       Sulfonylureas       glipiZIDE (GLUCOTROL XL) 10 MG 24 hr tablet Take 1 tablet (10 mg) by mouth 2 times daily            Taking Medication Assessed Today: Yes  Current Treatments: Insulin Injections, Oral Medication (taken by mouth)  Problems taking diabetes medications regularly?: No  Diabetes medication side effects?: No    Problem Solving:  Problem Solving Assessed Today: Yes  Is the patient at risk for hypoglycemia?: Yes  Hypoglycemia Frequency: Monthly              Reducing Risks:  Reducing Risks Assessed Today: No    Healthy Coping:  Healthy Coping Assessed Today: Yes  Emotional response to diabetes: Helplessness  Stage of change: CONTEMPLATION (Considering change and yet undecided)  Patient Activation Measure Survey Score:      6/23/2014     2:00 PM   KEV Score (Last Two)   KEV Raw Score 44   Activation Score 70.8   KEV Level 4         Care Plan and Education Provided:  Care Plan: Diabetes   Updates  made by Rossy Caruso RD since 4/10/2024 12:00 AM        Problem: HbA1C Not In Goal         Goal: Establish Regular Follow-Ups with PCP         Task: Discuss with PCP the recommended timing for patient's next follow up visit(s)    Responsible User: Rossy Caruso RD        Task: Discuss schedule for PCP visits with patient    Responsible User: Rossy Caruso RD        Goal: Get HbA1C Level in Goal         Task: Educate patient on diabetes education self-management topics    Responsible User: Rossy Caruso RD        Task: Educate patient on benefits of regular glucose monitoring    Responsible User: Rossy Caruso RD        Task: Refer patient to appropriate extended care team member, as needed (Medication Therapy Management, Behavioral Health, Physical Therapy, etc.)    Responsible User: Rossy Caruso RD        Task: Discuss diabetes treatment plan with patient    Responsible User: Rossy Caruso RD        Problem: Diabetes Self-Management Education Needed to Optimize Self-Care Behaviors         Goal: Understand diabetes pathophysiology and disease progression         Task: Provide education on diabetes pathophysiology and disease progression specfic to patient's diabetes type    Responsible User: Rossy Caruso RD        Goal: Healthy Eating - follow a healthy eating pattern for diabetes         Task: Provide education on portion control and consistency in amount, composition and timing of food intake Completed 4/10/2024   Responsible User: Rossy Caruso RD        Task: Provide education on managing carbohydrate intake (carbohydrate counting, plate planning method, etc.) Completed 4/10/2024   Responsible User: Rossy Caruso RD        Task: Provide education on weight management    Responsible User: Rossy Caruso RD        Task: Provide education on heart healthy eating    Responsible User: Rossy Caruso RD        Task: Provide education on eating out     Responsible User: Rossy Caruso RD        Task: Develop individualized healthy eating plan with patient    Responsible User: Rossy Caruso RD        Goal: Being Active - get regular physical activity, working up to at least 150 minutes per week         Task: Provide education on relationship of activity to glucose and precautions to take if at risk for low glucose    Responsible User: Rossy Caruso RD        Task: Discuss barriers to physical activity with patient    Responsible User: Rossy Caruso RD        Task: Develop physical activity plan with patient    Responsible User: Rossy Caruso RD        Task: Explore community resources including walking groups, assistance programs, and home videos    Responsible User: Rossy Caruso RD        Goal: Monitoring - monitor glucose and ketones as directed         Task: Provide education on blood glucose monitoring (purpose, proper technique, frequency, glucose targets, interpreting results, when to use glucose control solution, sharps disposal) Completed 4/10/2024   Responsible User: Rossy Caruso RD        Task: Provide education on continuous glucose monitoring (sensor placement, use of wendi or /reader, understanding glucose trends, alerts and alarms, differences between sensor glucose and blood glucose)    Responsible User: Rossy Caruso RD        Task: Provide education on ketone monitoring (when to monitor, frequency, etc.)    Responsible User: Rossy Caruso RD        Goal: Taking Medication - patient is consistently taking medications as directed         Task: Provide education on action of prescribed medication, including when to take and possible side effects    Responsible User: Rossy Caruso RD        Task: Provide education on insulin and injectable diabetes medications, including administration, storage, site selection and rotation for injection sites Completed 4/10/2024   Responsible User: Zuly  Rossy CANADA RD        Task: Discuss barriers to medication adherence with patient and provide management technique ideas as appropriate    Responsible User: Rossy Caruso RD        Task: Provide education on frequency and refill details of medications    Responsible User: Rossy Caruso RD        Goal: Problem Solving - know how to prevent and manage short-term diabetes complications         Task: Provide education on high blood glucose - causes, signs/symptoms, prevention and treatment Completed 4/10/2024   Responsible User: Rossy Caruso RD        Task: Provide education on low blood glucose - causes, signs/symptoms, prevention, treatment, carrying a carbohydrate source at all times, and medical identification    Responsible User: Rossy Caruso RD        Task: Provide education on safe travel with diabetes    Responsible User: Rossy Caruso RD        Task: Provide education on how to care for diabetes on sick days    Responsible User: Rossy Caruso RD        Task: Provide education on when to call a health care provider    Responsible User: Rossy Caruso RD        Goal: Reducing Risks - know how to prevent and treat long-term diabetes complications         Task: Provide education on major complications of diabetes, prevention, early diagnostic measures and treatment of complications    Responsible User: Rossy Caruso RD        Task: Provide education on recommended care for dental, eye and foot health    Responsible User: Rossy Caruso RD        Task: Provide education on Hemoglobin A1c - goals and relationship to blood glucose levels    Responsible User: Rossy Caruso RD        Task: Provide education on recommendations for heart health - lipid levels and goals, blood pressure and goals, and aspirin therapy, if indicated    Responsible User: Rossy Caruso RD        Task: Provide education on tobacco cessation    Responsible User: Rossy Caruso RD        Goal:  Healthy Coping - use available resources to cope with the challenges of managing diabetes         Task: Discuss recognizing feelings about having diabetes    Responsible User: Rossy Caruso RD        Task: Provide education on the benefits of making appropriate lifestyle changes Completed 4/10/2024   Responsible User: Rossy Caruso RD        Task: Provide education on benefits of utilizing support systems    Responsible User: Rossy Caruso RD        Task: Discuss methods for coping with stress Completed 4/10/2024   Responsible User: Rossy Caruso RD        Task: Provide education on when to seek professional counseling    Responsible User: Rossy Caruso RD Tami Carpenter, RDN LD [facility]      Time Spent: 60 minutes  Encounter Type: Individual    Any diabetes medication dose changes were made via the CDE Protocol per the patient's referring provider. A copy of this encounter was shared with the provider.

## 2024-04-10 NOTE — LETTER
"    4/10/2024         RE: Alma Kraft  [address]  [facility] [address]        Dear Colleague,    Thank you for referring your patient, Alma Kraft, to the [facility]. Please see a copy of my visit note below.    Diabetes Self-Management Education & Support    Presents for: Individual review    Type of Service: In Person Visit      ASSESSMENT:  Patient presents for diabetes education review.  Previously seen in 2015.  Notes feeling overwhelmed with complicated health issues - liver and kidney issues, diabetes, poor sleep, mental health, etc.  Testing glucose fasting ~4-5x/week with results in the  range (occasionally has a low in the 50-60s).  Current diabetes medications include: Lantus/Basaglar 30 units, Tradjenta 5mg, Jardiance 10mg, Metformin 2000mg, Glipizide XL 20mg.  Works with pharmD.  Has chronic dry mouth that has led to consuming fruit juice and regular soda throughout the day.  Doesn't like \"diet\" drinks.    Patient's most recent   Lab Results   Component Value Date    A1C 9.5 02/05/2024     is not meeting goal of <7.0    Diabetes knowledge and skills assessment:   Patient is knowledgeable in diabetes management concepts related to: Monitoring and Taking Medication    Continue education with the following diabetes management concepts: Healthy Eating, Monitoring, Taking Medication, Problem Solving, Reducing Risks, and Healthy Coping    Based on learning assessment above, most appropriate setting for further diabetes education would be: Individual setting.      PLAN    Work on reducing/eliminating sugary drinks (juice, pop, etc).  Discussed alternatives (crystal light, etc).  Work on more consistent meal/snack schedule.  Aim for meals every 4-5 hours.    Use My Plate guide for portion control and diet balance.  Try testing blood sugar mid-afternoon or bedtime to capture post-meal readings.  Consider 2 week professional CGM to gather more glucose data.  May " need short-acting insulin with meals.  Discuss with PharmD reducing Metformin to 1000mg daily due to falling GFR.    Topics to cover at upcoming visits: Healthy Eating, Monitoring, Taking Medication, and Problem Solving    Follow-up: per patient    See Care Plan for co-developed, patient-state behavior change goals.  AVS provided for patient today.    Education Materials Provided:  [facility] Understanding Diabetes Booklet, BG Log Sheet, and My Plate Planner      SUBJECTIVE/OBJECTIVE:  Presents for: Individual review  Accompanied by: Self  Diabetes education in the past 24mo: No  Focus of Visit: Patient Unsure  Diabetes type: Type 2  Disease course: Getting harder to manage  How confident are you filling out medical forms by yourself:: Not Assessed  Other concerns:: None  Cultural Influences/Ethnic Background:  Not  or       Diabetes Symptoms & Complications:  Disease course: Getting harder to manage  Complications assessed today?: No    Patient Problem List and Family Medical History reviewed for relevant medical history, current medical status, and diabetes risk factors.    Vitals:  LMP 05/08/2001   Estimated body mass index is 48.43 kg/m  as calculated from the following:    Height as of 2/5/24: 1.524 m (5').    Weight as of 3/12/24: 112.5 kg (248 lb).   Last 3 BP:   BP Readings from Last 3 Encounters:   03/12/24 126/76   02/09/24 126/74   02/05/24 124/72       History   Smoking Status     Never   Smokeless Tobacco     Never       Labs:  Lab Results   Component Value Date    A1C 9.5 02/05/2024     Lab Results   Component Value Date     03/12/2024     Lab Results   Component Value Date    LDL 55 07/14/2023    LDL 87 02/21/2019     HDL Cholesterol   Date Value Ref Range Status   07/14/2023 51 40 - 150 mg/dL Final   ]  GFR Estimate   Date Value Ref Range Status   03/12/2024 32 (A) >60 ml/min/1.73m2 Final     GFR Estimate If Black   Date Value Ref Range Status   02/11/2018 >90 >60  "mL/min/1.7m2 Final     Comment:      GFR Calc     Lab Results   Component Value Date    CR 1.66 03/12/2024     No results found for: \"MICROALBUMIN\"    Healthy Eating:  Healthy Eating Assessed Today: Yes  Cultural/Orthodox diet restrictions?: No  Do you have any food allergies or intolerances?: No  Meal planning/habits: None  Who cooks/prepares meals for you?: Self, Son  Who purchases food in  your home?: Self, Son  Meals include: Dinner, Breakfast, Afternoon Snack, Evening Snack  Breakfast: 2-3 glasses OJ, cereal (cheerios), skim milk, equal OR 2 eggs, toast, juice  Dinner: 6-10pm: pbj sandwich OR hamburger OR pizza OR grilled cheese OR fried egg sandwich OR frozen entree  Snacks: cheese + crackers  Other: likes fruits  Beverages: Water, Juice, Soda    Being Active:  Being Active Assessed Today: No    Monitoring:  Monitoring Assessed Today: Yes  Did patient bring glucose meter to appointment? : No  Times checking blood sugar at home (number): 4  Times checking blood sugar at home (per): Week        Taking Medications:  Diabetes Medication(s)       Biguanides       metFORMIN (GLUCOPHAGE XR) 500 MG 24 hr tablet Take 4 tablets (2,000 mg) by mouth daily (with dinner)       Insulin       insulin glargine 100 UNIT/ML pen Inject 46 Units Subcutaneous daily       Sulfonylureas       glipiZIDE (GLUCOTROL XL) 10 MG 24 hr tablet Take 1 tablet (10 mg) by mouth 2 times daily            Taking Medication Assessed Today: Yes  Current Treatments: Insulin Injections, Oral Medication (taken by mouth)  Problems taking diabetes medications regularly?: No  Diabetes medication side effects?: No    Problem Solving:  Problem Solving Assessed Today: Yes  Is the patient at risk for hypoglycemia?: Yes  Hypoglycemia Frequency: Monthly              Reducing Risks:  Reducing Risks Assessed Today: No    Healthy Coping:  Healthy Coping Assessed Today: Yes  Emotional response to diabetes: Helplessness  Stage of change: " CONTEMPLATION (Considering change and yet undecided)  Patient Activation Measure Survey Score:      6/23/2014     2:00 PM   KEV Score (Last Two)   KEV Raw Score 44   Activation Score 70.8   KEV Level 4         Care Plan and Education Provided:  Care Plan: Diabetes   Updates made by Rossy Caruso RD since 4/10/2024 12:00 AM        Problem: HbA1C Not In Goal         Goal: Establish Regular Follow-Ups with PCP         Task: Discuss with PCP the recommended timing for patient's next follow up visit(s)    Responsible User: Rossy Caruso RD        Task: Discuss schedule for PCP visits with patient    Responsible User: Rossy Caruso RD        Goal: Get HbA1C Level in Goal         Task: Educate patient on diabetes education self-management topics    Responsible User: Rossy Caruso RD        Task: Educate patient on benefits of regular glucose monitoring    Responsible User: Rossy Caruso RD        Task: Refer patient to appropriate extended care team member, as needed (Medication Therapy Management, Behavioral Health, Physical Therapy, etc.)    Responsible User: Rossy Caruso RD        Task: Discuss diabetes treatment plan with patient    Responsible User: Rossy Caruso RD        Problem: Diabetes Self-Management Education Needed to Optimize Self-Care Behaviors         Goal: Understand diabetes pathophysiology and disease progression         Task: Provide education on diabetes pathophysiology and disease progression specfic to patient's diabetes type    Responsible User: Rossy Caruso RD        Goal: Healthy Eating - follow a healthy eating pattern for diabetes         Task: Provide education on portion control and consistency in amount, composition and timing of food intake Completed 4/10/2024   Responsible User: Rossy Caruso RD        Task: Provide education on managing carbohydrate intake (carbohydrate counting, plate planning method, etc.) Completed 4/10/2024   Responsible  User: Rossy Caruso RD        Task: Provide education on weight management    Responsible User: Rossy Caruso RD        Task: Provide education on heart healthy eating    Responsible User: Rossy Caruso RD        Task: Provide education on eating out    Responsible User: Rossy Caruso RD        Task: Develop individualized healthy eating plan with patient    Responsible User: Rossy Caruso RD        Goal: Being Active - get regular physical activity, working up to at least 150 minutes per week         Task: Provide education on relationship of activity to glucose and precautions to take if at risk for low glucose    Responsible User: Rossy Caruso RD        Task: Discuss barriers to physical activity with patient    Responsible User: Rossy Caruso RD        Task: Develop physical activity plan with patient    Responsible User: Rossy Caruso RD        Task: Explore community resources including walking groups, assistance programs, and home videos    Responsible User: Rossy Caruso RD        Goal: Monitoring - monitor glucose and ketones as directed         Task: Provide education on blood glucose monitoring (purpose, proper technique, frequency, glucose targets, interpreting results, when to use glucose control solution, sharps disposal) Completed 4/10/2024   Responsible User: Rossy Caruso RD        Task: Provide education on continuous glucose monitoring (sensor placement, use of wendi or /reader, understanding glucose trends, alerts and alarms, differences between sensor glucose and blood glucose)    Responsible User: Rossy Caruso RD        Task: Provide education on ketone monitoring (when to monitor, frequency, etc.)    Responsible User: Rossy Caruso RD        Goal: Taking Medication - patient is consistently taking medications as directed         Task: Provide education on action of prescribed medication, including when to take and possible  side effects    Responsible User: Rossy Caruso RD        Task: Provide education on insulin and injectable diabetes medications, including administration, storage, site selection and rotation for injection sites Completed 4/10/2024   Responsible User: Rossy Caruso RD        Task: Discuss barriers to medication adherence with patient and provide management technique ideas as appropriate    Responsible User: Rossy Caruso RD        Task: Provide education on frequency and refill details of medications    Responsible User: Rossy Caruso RD        Goal: Problem Solving - know how to prevent and manage short-term diabetes complications         Task: Provide education on high blood glucose - causes, signs/symptoms, prevention and treatment Completed 4/10/2024   Responsible User: Rossy Caruso RD        Task: Provide education on low blood glucose - causes, signs/symptoms, prevention, treatment, carrying a carbohydrate source at all times, and medical identification    Responsible User: Rossy Caruso RD        Task: Provide education on safe travel with diabetes    Responsible User: Rossy Caruso RD        Task: Provide education on how to care for diabetes on sick days    Responsible User: Rossy Caruso RD        Task: Provide education on when to call a health care provider    Responsible User: Rossy Caruso RD        Goal: Reducing Risks - know how to prevent and treat long-term diabetes complications         Task: Provide education on major complications of diabetes, prevention, early diagnostic measures and treatment of complications    Responsible User: Rossy Caruso RD        Task: Provide education on recommended care for dental, eye and foot health    Responsible User: Rossy Caruso RD        Task: Provide education on Hemoglobin A1c - goals and relationship to blood glucose levels    Responsible User: Rossy Caruso RD        Task: Provide education on  recommendations for heart health - lipid levels and goals, blood pressure and goals, and aspirin therapy, if indicated    Responsible User: Rossy Caruso RD        Task: Provide education on tobacco cessation    Responsible User: Rossy Caruso RD        Goal: Healthy Coping - use available resources to cope with the challenges of managing diabetes         Task: Discuss recognizing feelings about having diabetes    Responsible User: Rossy Caruso RD        Task: Provide education on the benefits of making appropriate lifestyle changes Completed 4/10/2024   Responsible User: Rossy Caruso RD        Task: Provide education on benefits of utilizing support systems    Responsible User: Rossy Caruso RD        Task: Discuss methods for coping with stress Completed 4/10/2024   Responsible User: Rossy Caruso RD        Task: Provide education on when to seek professional counseling    Responsible User: Rosys Caruso RD Tami Carpenter, RDN LD [facility]      Time Spent: 60 minutes  Encounter Type: Individual    Any diabetes medication dose changes were made via the CDE Protocol per the patient's referring provider. A copy of this encounter was shared with the provider.

## 2024-04-10 NOTE — PATIENT INSTRUCTIONS
MY DIABETES TODAY:    1)  Goal A1C is under <7.0  Mine is:      Lab Results   Component Value Date    A1C 9.5 02/05/2024       2)  Goal LDL (bad cholesterol) under 100  (measured at least yearly)- I am currently at:   Lab Results   Component Value Date    LDL 55 07/14/2023    LDL 87 02/21/2019       3)  Goal blood pressure under 130/80- mine was Data Unavailable today    Care Plan:  Work on reducing/eliminating sugary drinks (juice, pop, etc).  Discussed alternatives (crystal light, etc).  Work on more consistent meal/snack schedule.  Aim for meals every 4-5 hours.    Use My Plate guide for portion control and diet balance.  Try testing blood sugar mid-afternoon or bedtime to capture post-meal readings.  Consider 2 week professional CGM to gather more glucose data.  May need short-acting insulin with meals.  Discuss with PharmD reducing Metformin to 1000mg daily due to falling GFR.    Follow up:  Call ([phone]), e-mail ([email or web address]), or send MyChart message with questions, concerns or if follow-up is needed.     Bring blood glucose meter and logbook with you to all doctor and follow-up appointments.     [facility] for the [facility] Area:  For Your Diabetes or Nutrition Education Appointments Call:  [phone]   For Diabetes or Nutrition Related Questions:   [phone]  Send MyChart Message   If you need a medication refill please contact your pharmacy. Please allow 3 business days for your refills to be completed.

## 2024-04-11 ENCOUNTER — TELEPHONE (OUTPATIENT)
Dept: EDUCATION SERVICES | Facility: CLINIC | Age: 74
End: 2024-04-11
Payer: MEDICARE

## 2024-04-11 DIAGNOSIS — Z79.4 TYPE 2 DIABETES MELLITUS WITHOUT COMPLICATION, WITH LONG-TERM CURRENT USE OF INSULIN (H): ICD-10-CM

## 2024-04-11 DIAGNOSIS — E11.9 TYPE 2 DIABETES MELLITUS WITHOUT COMPLICATION, WITH LONG-TERM CURRENT USE OF INSULIN (H): ICD-10-CM

## 2024-04-11 RX ORDER — METFORMIN HCL 500 MG
1000 TABLET, EXTENDED RELEASE 24 HR ORAL
Qty: 360 TABLET | Refills: 1 | Status: SHIPPED | OUTPATIENT
Start: 2024-04-11 | End: 2024-05-06

## 2024-04-11 NOTE — TELEPHONE ENCOUNTER
Patient informed to reduce Metformin dose to 1000mg daily.    Rossy Caruso RDN LD [facility]

## 2024-04-29 NOTE — PROGRESS NOTES
Assessment & Plan   Problem List Items Addressed This Visit          Endocrine    Type 2 diabetes mellitus without complication, with long-term current use of insulin (H) - Primary    Relevant Orders    Adult Diabetes Education  Referral - To a [facility] Location     Other Visit Diagnoses       Chronic seasonal allergic rhinitis        Relevant Medications    budesonide (RINOCORT AQUA) 32 MCG/ACT nasal spray    Other Relevant Orders    HEMOGLOBIN A1C (BFP) (Completed)    VENOUS COLLECTION (Completed)    Lightheadedness        Relevant Orders    HEMOGRAM PLATELET DIFF (BFP) (Completed)    Comprehensive Metobolic Panel (BFP)    Other Specialty Referral    Renal insufficiency        Relevant Orders    HEMOGRAM PLATELET DIFF (BFP) (Completed)    Comprehensive Metobolic Panel (BFP)    Other Specialty Referral    Adult Diabetes Education  Referral - To a [facility] Location           1. Chronic seasonal allergic rhinitis    - HEMOGLOBIN A1C (BFP)  - VENOUS COLLECTION  - budesonide (RINOCORT AQUA) 32 MCG/ACT nasal spray; Spray 2 sprays into both nostrils daily  Dispense: 25.29 mL; Refill: 11    2. Type 2 diabetes mellitus without complication, with long-term current use of insulin (H)  High, she is frustrated that it doesn't go down. Referral back to see diabetes education. Stop the metformin due to her abnormal kidney function.  - Adult Diabetes Education  Referral - To a [facility] Location; Future    3. Lightheadedness  History of aortic stenosis. She should followup with her cardiologist about this. Also discussed some medications can contribute to this. Stand up slowly. Check cbc.  - HEMOGRAM PLATELET DIFF (BFP)  - Comprehensive Metobolic Panel (BFP)  - Other Specialty Referral    4. Renal insufficiency  Recheck. She has already discussed with cardiology, it seemed to worsen after she made some medication changes. I will also refer to see kidney specialist.  - HEMOGRAM  PLATELET DIFF (BFP)  - Comprehensive Metobolic Panel (BFP)  - Other Specialty Referral  - Adult Diabetes Education [facility] Referral - To [facility] Location; Future            BMI  Estimated body mass index is 47.85 kg/m  as calculated from the following:    Height as of this encounter: 1.524 m (5').    Weight as of this encounter: 111.1 kg (245 lb).         FUTURE APPOINTMENTS:       - Follow-up visit in 3 months.  We manage her chronic medical care.    No follow-ups on file.    Yuridia Simmons MD  [facility]    Subjective     Nursing Notes:   Danika Santos MA  5/6/2024 10:20 AM  Signed  Chief Complaint   Patient presents with    Diabetes     Pt here for a 3 month diabetic check         Alma Kraft is a 73 year old female who presents to clinic today for the following health issues   HPI     Here for diabetes check. She is doing well on medications, doesn't need refills. Bs this morning was 102.  Has seen diabetes educator.  Only needs nasal spray refill, used to take this but needs a refill.    Notices sometimes feels occ lightheaded. Got up out of the car, felt lightheaded, every once in awhile.  Followed by cardiology. Low kidney fxn, hasn't seen nephrology.  Her kidney function seemed to worsen after she made some changes with cardiology and meds, but she said she has discussed this with cardiology.        Review of Systems   Constitutional, HEENT, cardiovascular, pulmonary, gi and gu systems are negative, except as otherwise noted.      Objective    /78 (BP Location: Right arm, Patient Position: Sitting, Cuff Size: Adult Large)   Pulse 67   Temp 97.3  F (36.3  C) (Temporal)   Ht 1.524 m (5')   Wt 111.1 kg (245 lb)   LMP 05/08/2001   SpO2 97%   BMI 47.85 kg/m    Body mass index is 47.85 kg/m .  Physical Exam   GENERAL: alert and no distress  RESP: lungs clear to auscultation - no rales, rhonchi or wheezes  CV: regular rate and rhythm, normal S1 S2, no S3 or  S4, no murmur, click or rub, no peripheral edema  MS: no gross musculoskeletal defects noted, no edema  NEURO: Normal strength and tone, mentation intact and speech normal  PSYCH: mentation appears normal, affect normal/bright    Results for orders placed or performed in visit on 05/06/24   HEMOGLOBIN A1C (BFP)     Status: Abnormal   Result Value Ref Range    Hemoglobin A1C 9.7 (A) 4 - 5.6 %   HEMOGRAM PLATELET DIFF (BFP)     Status: Abnormal   Result Value Ref Range    WBC 7.2 4.0 - 11 10*9/L    RBC Count 3.52 (A) 3.8 - 5.2 10*12/L    Hemoglobin 11.1 (A) 11.7 - 15.7 g/dL    Hematocrit 34.2 (A) 35.0 - 47.0 %    MCV 97.3 78 - 100 fL    MCH 31.5 26 - 33 pg    MCHC 32.5 31 - 36 g/dL    Platelet Count 185 150 - 375 10^9/L    % Granulocytes 63.5 %    % Lymphocytes 24.0 %    % Monocytes 12.5 %

## 2024-05-06 ENCOUNTER — OFFICE VISIT (OUTPATIENT)
Dept: FAMILY MEDICINE | Facility: CLINIC | Age: 74
End: 2024-05-06

## 2024-05-06 ENCOUNTER — TELEPHONE (OUTPATIENT)
Dept: FAMILY MEDICINE | Facility: CLINIC | Age: 74
End: 2024-05-06

## 2024-05-06 VITALS
HEART RATE: 67 BPM | TEMPERATURE: 97.3 F | WEIGHT: 245 LBS | BODY MASS INDEX: 48.1 KG/M2 | OXYGEN SATURATION: 97 % | SYSTOLIC BLOOD PRESSURE: 120 MMHG | DIASTOLIC BLOOD PRESSURE: 78 MMHG | HEIGHT: 60 IN

## 2024-05-06 DIAGNOSIS — Z79.4 TYPE 2 DIABETES MELLITUS WITHOUT COMPLICATION, WITH LONG-TERM CURRENT USE OF INSULIN (H): Primary | ICD-10-CM

## 2024-05-06 DIAGNOSIS — E11.9 TYPE 2 DIABETES MELLITUS WITHOUT COMPLICATION, WITH LONG-TERM CURRENT USE OF INSULIN (H): Primary | ICD-10-CM

## 2024-05-06 DIAGNOSIS — J30.2 CHRONIC SEASONAL ALLERGIC RHINITIS: ICD-10-CM

## 2024-05-06 DIAGNOSIS — R42 LIGHTHEADEDNESS: ICD-10-CM

## 2024-05-06 DIAGNOSIS — N28.9 RENAL INSUFFICIENCY: ICD-10-CM

## 2024-05-06 LAB
% GRANULOCYTES: 63.5 %
ALBUMIN SERPL-MCNC: 3.4 G/DL (ref 3.6–5.1)
ALBUMIN/GLOB SERPL: 0.9 {RATIO}
ALP SERPL-CCNC: 117 U/L (ref 33–130)
ALT 1742-6: 19 U/L (ref 0–32)
AST 1920-8: 21 U/L (ref 0–35)
BILIRUB SERPL-MCNC: 0.7 MG/DL (ref 0.2–1.2)
BUN SERPL-MCNC: 48 MG/DL (ref 7–25)
BUN/CREATININE RATIO: 24.1 (ref 6–32)
CALCIUM SERPL-MCNC: 9.3 MG/DL (ref 8.6–10.3)
CHLORIDE SERPLBLD-SCNC: 100.1 MMOL/L (ref 98–110)
CO2 SERPL-SCNC: 25.3 MMOL/L (ref 20–32)
CREAT SERPL-MCNC: 1.98 MG/DL (ref 0.6–1.3)
GFR SERPL CREATININE-BSD FRML MDRD: 26 ML/MIN/1.73M2
GLOBULIN, CALCULATED - QUEST: 4
GLUCOSE SERPL-MCNC: 210 MG/DL (ref 60–99)
HCT VFR BLD AUTO: 34.2 % (ref 35–47)
HEMOGLOBIN A1C: 9.7 % (ref 4–5.6)
HEMOGLOBIN: 11.1 G/DL (ref 11.7–15.7)
LYMPHOCYTES NFR BLD AUTO: 24 %
MCH RBC QN AUTO: 31.5 PG (ref 26–33)
MCHC RBC AUTO-ENTMCNC: 32.5 G/DL (ref 31–36)
MCV RBC AUTO: 97.3 FL (ref 78–100)
MONOCYTES NFR BLD AUTO: 12.5 %
PLATELET COUNT - QUEST: 185 10^9/L (ref 150–375)
POTASSIUM SERPL-SCNC: 5.8 MMOL/L (ref 3.5–5.3)
PROT SERPL-MCNC: 7.4 G/DL (ref 6.1–8.1)
RBC # BLD AUTO: 3.52 10*12/L (ref 3.8–5.2)
SODIUM SERPL-SCNC: 135 MMOL/L (ref 135–146)
WBC # BLD AUTO: 7.2 10*9/L (ref 4–11)

## 2024-05-06 PROCEDURE — G2211 COMPLEX E/M VISIT ADD ON: HCPCS | Performed by: FAMILY MEDICINE

## 2024-05-06 PROCEDURE — 36415 COLL VENOUS BLD VENIPUNCTURE: CPT | Performed by: FAMILY MEDICINE

## 2024-05-06 PROCEDURE — 99214 OFFICE O/P EST MOD 30 MIN: CPT | Performed by: FAMILY MEDICINE

## 2024-05-06 PROCEDURE — 85025 COMPLETE CBC W/AUTO DIFF WBC: CPT | Performed by: FAMILY MEDICINE

## 2024-05-06 PROCEDURE — 83036 HEMOGLOBIN GLYCOSYLATED A1C: CPT | Performed by: FAMILY MEDICINE

## 2024-05-06 PROCEDURE — 80053 COMPREHEN METABOLIC PANEL: CPT | Performed by: FAMILY MEDICINE

## 2024-05-07 ENCOUNTER — TELEPHONE (OUTPATIENT)
Dept: FAMILY MEDICINE | Facility: CLINIC | Age: 74
End: 2024-05-07

## 2024-05-07 DIAGNOSIS — N28.9 RENAL INSUFFICIENCY: ICD-10-CM

## 2024-05-07 LAB
BUN SERPL-MCNC: 45 MG/DL (ref 7–25)
BUN/CREATININE RATIO: 26.5 (ref 6–32)
CALCIUM SERPL-MCNC: 9.1 MG/DL (ref 8.6–10.3)
CHLORIDE SERPLBLD-SCNC: 98.1 MMOL/L (ref 98–110)
CO2 SERPL-SCNC: 28.3 MMOL/L (ref 20–32)
CREAT SERPL-MCNC: 1.7 MG/DL (ref 0.6–1.3)
GFR SERPL CREATININE-BSD FRML MDRD: 31 ML/MIN/1.73M2
GLUCOSE SERPL-MCNC: 383 MG/DL (ref 60–99)
POTASSIUM SERPL-SCNC: 5.47 MMOL/L (ref 3.5–5.3)
SODIUM SERPL-SCNC: 133.5 MMOL/L (ref 135–146)

## 2024-05-07 PROCEDURE — 80048 BASIC METABOLIC PNL TOTAL CA: CPT | Performed by: FAMILY MEDICINE

## 2024-05-07 PROCEDURE — 36415 COLL VENOUS BLD VENIPUNCTURE: CPT | Performed by: FAMILY MEDICINE

## 2024-05-08 ENCOUNTER — TELEPHONE (OUTPATIENT)
Dept: CARDIOLOGY | Facility: CLINIC | Age: 74
End: 2024-05-08
Payer: MEDICARE

## 2024-05-08 NOTE — TELEPHONE ENCOUNTER
Received message from pt stating her PCP is concerned about a medication Cardiology prescribed. Pt had recent abnormal labs.     Reviewed with pt. Labs on 5/6/24 showing potassium of 5.8 and Cr of 1.98. Concerns with furosemide prescribed by Cards. Furosemide started on 10/31/23.     Patient has Spironolactone 50 mg daily on medication list. This was not on our medication list at last OV. Pt states [facility] started this medication.     Pt is also on Losartan-Hydrochlorothiazide 100-25 mg daily.     5/6/24 PCP stopped Potassium supplement and Metformin.     Pt states she has labs scheduled with [facility] today to check on kidney function.     Reviewed with pt that [facility] should follow up on Spironolactone if they started this medication after seeing Cardiology. Pt already stopped potassium supplement and Metformin (costly to kidneys).   Pt will review w/ [facility] after labs today.     Wts stable at 246#. Edema is stable if wearing compression stockings.     OV w/ Cardiology already scheduled on 5/14/24 next week. Will review w/  if there are new recommendations in the meantime.     Jeanine VASQUEZ  [facility]

## 2024-05-10 ENCOUNTER — TRANSFERRED RECORDS (OUTPATIENT)
Dept: FAMILY MEDICINE | Facility: CLINIC | Age: 74
End: 2024-05-10

## 2024-05-10 DIAGNOSIS — N28.9 RENAL INSUFFICIENCY: ICD-10-CM

## 2024-05-10 LAB
BUN SERPL-MCNC: 45 MG/DL (ref 7–25)
BUN/CREATININE RATIO: 23.2 (ref 6–32)
CALCIUM SERPL-MCNC: 9 MG/DL (ref 8.6–10.3)
CHLORIDE SERPLBLD-SCNC: 96.7 MMOL/L (ref 98–110)
CO2 SERPL-SCNC: 29.8 MMOL/L (ref 20–32)
CREAT SERPL-MCNC: 1.94 MG/DL (ref 0.6–1.3)
GFR SERPL CREATININE-BSD FRML MDRD: 27 ML/MIN/1.73M2
GLUCOSE SERPL-MCNC: 325 MG/DL (ref 60–99)
POTASSIUM SERPL-SCNC: 5.7 MMOL/L (ref 3.5–5.3)
SODIUM SERPL-SCNC: 133.9 MMOL/L (ref 135–146)

## 2024-05-10 PROCEDURE — 36415 COLL VENOUS BLD VENIPUNCTURE: CPT | Performed by: FAMILY MEDICINE

## 2024-05-10 PROCEDURE — 80048 BASIC METABOLIC PNL TOTAL CA: CPT | Performed by: FAMILY MEDICINE

## 2024-05-10 NOTE — TELEPHONE ENCOUNTER
We don't need to do anything at this time, but please have accurate records along for next OV so we can figure this out (labs and dates of labs, meds, dates of stopping and starting meds).   Per Dr. Espinoza     Contacted patient and she will check with her Pharmacy to tet the best information from above request.  Patient verbalized understanding.  Tiffanie Lockhart RN on 5/10/2024 at 11:25 AM

## 2024-05-13 NOTE — PROGRESS NOTES
Assessment & Plan   Problem List Items Addressed This Visit    None  Visit Diagnoses       Renal insufficiency    -  Primary    Relevant Orders    VENOUS COLLECTION (Completed)    Basic Metabolic Panel (BFP)    Bilateral leg pain               1. Renal insufficiency  Recheck bmp, she has stopped a couple of her medications, she will discuss later today these results and her medications with cardiology. She will also see nephrology.  - VENOUS COLLECTION  - Basic Metabolic Panel (BFP)       2. Bilateral leg pain  She will first discuss possible claudication symptoms with cardiology at her apt later today. Otherwise followup with me regarding leg pain.        BMI  Estimated body mass index is 47.85 kg/m  as calculated from the following:    Height as of 5/6/24: 1.524 m (5').    Weight as of this encounter: 111.1 kg (245 lb).         FUTURE APPOINTMENTS:       - Follow-up visit with cardiology later today.  We manage her chronic medical care.    No follow-ups on file.    Yuridia Simmons MD  [facility]    Subjective     Nursing Notes:   [facility]  5/14/2024 11:36 AM  Signed  Chief Complaint   Patient presents with    RECHECK     Recheck kidney function, she has stopped lasix and spironolactone     Pre-visit Screening:  Immunizations:  up to date  Colonoscopy:  is due and to be scheduled by patient for later completion  Mammogram: is due and to be scheduled by patient for later completion  Asthma Action Test/Plan:  NA  PHQ9:  NA  GAD7:  NA  Questioned patient about current smoking habits Pt. Has never smoked.  Ok to leave detailed message on voice mail for today's visit only Yes, phone # [phone]       Alma Kraft is a 73 year old female who presents to clinic today for the following health issues   HPI     Here to followup on her renal insufficiency. She has had some changes since starting furosemide, this hasn't improved over the past couple of months. Also was on spironolactone  from GI. She isn't sure why. Now has stopped both of these meds due to high K.  She is feeling well. Except:    Is feeling very tired, legs hurt and pain with bending over.  Will talk to cardiology later today about this.   No injury. The pain is in the thighs when she walks. Can't walk around [place] because it's painful. Feels weak and like she would fall over, a burning sensation. Then gets back stiff and tight.        Review of Systems   Constitutional, HEENT, cardiovascular, pulmonary, gi and gu systems are negative, except as otherwise noted.      Objective    /72 (BP Location: Right arm, Patient Position: Sitting, Cuff Size: Adult Large)   Pulse 66   Temp 98.1  F (36.7  C) (Temporal)   Wt 111.1 kg (245 lb)   LMP 05/08/2001   SpO2 96%   BMI 47.85 kg/m    Body mass index is 47.85 kg/m .  Physical Exam

## 2024-05-14 ENCOUNTER — OFFICE VISIT (OUTPATIENT)
Dept: CARDIOLOGY | Facility: CLINIC | Age: 74
End: 2024-05-14
Attending: FAMILY MEDICINE
Payer: MEDICARE

## 2024-05-14 ENCOUNTER — OFFICE VISIT (OUTPATIENT)
Dept: FAMILY MEDICINE | Facility: CLINIC | Age: 74
End: 2024-05-14

## 2024-05-14 VITALS
HEART RATE: 60 BPM | BODY MASS INDEX: 48.22 KG/M2 | HEIGHT: 60 IN | DIASTOLIC BLOOD PRESSURE: 71 MMHG | OXYGEN SATURATION: 99 % | WEIGHT: 245.6 LBS | SYSTOLIC BLOOD PRESSURE: 112 MMHG

## 2024-05-14 VITALS
DIASTOLIC BLOOD PRESSURE: 72 MMHG | SYSTOLIC BLOOD PRESSURE: 118 MMHG | OXYGEN SATURATION: 96 % | BODY MASS INDEX: 47.85 KG/M2 | WEIGHT: 245 LBS | HEART RATE: 66 BPM | TEMPERATURE: 98.1 F

## 2024-05-14 DIAGNOSIS — I50.30 HEART FAILURE WITH PRESERVED EJECTION FRACTION, NYHA CLASS I (H): Primary | ICD-10-CM

## 2024-05-14 DIAGNOSIS — D64.9 ANEMIA, UNSPECIFIED TYPE: ICD-10-CM

## 2024-05-14 DIAGNOSIS — E87.5 HYPERKALEMIA: ICD-10-CM

## 2024-05-14 DIAGNOSIS — N28.9 RENAL INSUFFICIENCY: ICD-10-CM

## 2024-05-14 DIAGNOSIS — N28.9 RENAL INSUFFICIENCY: Primary | ICD-10-CM

## 2024-05-14 DIAGNOSIS — M79.605 BILATERAL LEG PAIN: ICD-10-CM

## 2024-05-14 DIAGNOSIS — M79.604 BILATERAL LEG PAIN: ICD-10-CM

## 2024-05-14 LAB
BUN SERPL-MCNC: 45 MG/DL (ref 7–25)
BUN/CREATININE RATIO: 24 (ref 6–32)
CALCIUM SERPL-MCNC: 9.6 MG/DL (ref 8.6–10.3)
CHLORIDE SERPLBLD-SCNC: 97.9 MMOL/L (ref 98–110)
CO2 SERPL-SCNC: 24.9 MMOL/L (ref 20–32)
CREAT SERPL-MCNC: 1.91 MG/DL (ref 0.6–1.3)
GFR SERPL CREATININE-BSD FRML MDRD: 27 ML/MIN/1.73M2
GLUCOSE SERPL-MCNC: 380 MG/DL (ref 60–99)
POTASSIUM SERPL-SCNC: 6.03 MMOL/L (ref 3.5–5.3)
SODIUM SERPL-SCNC: 131.9 MMOL/L (ref 135–146)

## 2024-05-14 PROCEDURE — 36415 COLL VENOUS BLD VENIPUNCTURE: CPT | Performed by: FAMILY MEDICINE

## 2024-05-14 PROCEDURE — G2211 COMPLEX E/M VISIT ADD ON: HCPCS | Performed by: INTERNAL MEDICINE

## 2024-05-14 PROCEDURE — 80048 BASIC METABOLIC PNL TOTAL CA: CPT | Performed by: FAMILY MEDICINE

## 2024-05-14 PROCEDURE — 99214 OFFICE O/P EST MOD 30 MIN: CPT | Performed by: FAMILY MEDICINE

## 2024-05-14 PROCEDURE — 99215 OFFICE O/P EST HI 40 MIN: CPT | Performed by: INTERNAL MEDICINE

## 2024-05-14 PROCEDURE — G2211 COMPLEX E/M VISIT ADD ON: HCPCS | Performed by: FAMILY MEDICINE

## 2024-05-14 NOTE — NURSING NOTE
Chief Complaint   Patient presents with    RECHECK     Recheck kidney function, she has stopped lasix and spironolactone     Pre-visit Screening:  Immunizations:  up to date  Colonoscopy:  is due and to be scheduled by patient for later completion  Mammogram: is due and to be scheduled by patient for later completion  Asthma Action Test/Plan:  NA  PHQ9:  NA  GAD7:  NA  Questioned patient about current smoking habits Pt. Has never smoked.  Ok to leave detailed message on voice mail for today's visit only Yes, phone # [phone]

## 2024-05-14 NOTE — PROGRESS NOTES
HISTORY:    Alma Kraft is a pleasant 73-year-old female accompanied by her  today.  She has a history of difficult to control hypertension and type 2 diabetes along with morbid obesity, obstructive sleep apnea using CPAP, and liver cirrhosis.  In addition she has a moderate degree of aortic stenosis and HFpEF versus obesity hypoventilation syndrome.  She is seen today on an urgent basis for follow-up of laboratory abnormalities.    When I last saw Jeny in January, she was not yet listed as being on spironolactone.  Review of outside notes showed that she was seen in early December and the body of the gastroenterologist note commented on considering adding spironolactone but the order is otherwise not listed on her note and was never transmitted to us.  The patient failed to tell us that she started spironolactone.  As far as I am aware, no follow-up labs were arranged by gastroenterology.  Her potassium on October 31 was 3.8.  A comprehensive metabolic profile done on February 5 showed that her potassium had risen to 5.5 and creatinine had increased from 0.64 up to 1.16 with estimated GFR dropping from greater than 90 to 49.  Labs were then rechecked 4 days later showing improvement but then month later (March 12) showing creatinine now elevated at 1.66 with a GFR now just 32 and potassium 5.2.  She has had extensive labs recently including a metabolic profile today showing a creatinine of 1.91, potassium 6.03, GFR 27.    Jeny stopped her spironolactone late last week upon orders from?.  She is not sure what day she stopped it but thinks her last dose was either Wednesday or Thursday.  She had stopped her potassium several days earlier.  Her furosemide was also recently stopped, she thinks Thursday of last week.  Jeny reports that in the recent past she has had a very dry mouth and has been very thirsty and has been taking a lot of fluids and putting out a lot of urine.  Her glucose on recent readings  "has been markedly elevated, 380 today.    Jeny reports that she has a burning sensation in her anterior thighs with exercise.  This is a symmetrical burning and makes her want to bend over which causes back pain.  He also complains that she has had low energy and \"cannot do anything\".  She also acknowledges that she does not think her energy level has changed much in the past year, but her leg pain seems to be new over the last 3 to 4 months by her estimate.    Examination today shows excellent pulses in all extremities, excluding vascular disease as a cause of her leg pain.  She was dizzy when she stood up consistent with dehydration.  Her weight is virtually unchanged from our last visit in January suggesting that she has gained fat weight and lost fluid weight.  She acknowledges that she is extraordinarily sedentary.      ASSESSMENT/PLAN:    1.  Acute kidney injury.  This is almost certainly due to dehydration due to overdiuresis and likely contributed by her very high serum glucose levels.  I encouraged her to drink plenty of fluids and we will plan on rechecking a BMP tomorrow.  2.  Hyperkalemia.  This is due to the LAI discussed above along with the potassium sparing properties of spironolactone, the pulmonal potassium she was taking until about a week ago, and the ARB that she is on.  We will need to watch this carefully.  If there is not a decrease in potassium on tomorrow's labs she will be admitted for more careful monitoring.  3.  Aortic stenosis, moderate in severity.  This is unlikely to be playing any role in her current problems or symptoms.  Will continue to monitor.  4.  Peripheral edema.  He is wearing compression stockings today but I do not detect any peripheral edema, not surprising since she is undoubtedly significantly dehydrated.  5.  Very poorly controlled diabetes.  This is contributing to her diuresis and I will leave it to her primary care physician to manage this problem.    Thank you " for inviting me to participate in the care of your patient.  Please don't hesitate to call if I can be of further assistance.  50 minutes were spent today reviewing the chart and other records, interviewing and examining the patient, and documenting our visit.    The longitudinal plan of care for the diagnosis(es)/condition(s) as documented were addressed during this visit. Due to the added complexity in care, I will continue to support Jeny in the subsequent management and with ongoing continuity of care.     Chart documentation was completed, in part, with M87 voice-recognition software. Even though reviewed, some grammatical, spelling, and word errors may remain.       Orders Placed This Encounter   Procedures    Basic metabolic panel    N terminal pro BNP outpatient     No orders of the defined types were placed in this encounter.    There are no discontinued medications.    10 year ASCVD risk: The ASCVD Risk score (Joselo RICHARDSON, et al., 2019) failed to calculate for the following reasons:    The valid total cholesterol range is 130 to 320 mg/dL    Encounter Diagnoses   Name Primary?    Anemia, unspecified type     Renal insufficiency     Hyperkalemia     Heart failure with preserved ejection fraction, NYHA class I (H) Yes       CURRENT MEDICATIONS:  Current Outpatient Medications   Medication Sig Dispense Refill    amLODIPine (NORVASC) 10 MG tablet Take 1 tablet (10 mg) by mouth daily 90 tablet 1    ASPIRIN 81 MG OR TABS 1 tab po QD (Once per day) 100 3    atorvastatin (LIPITOR) 10 MG tablet Take 1 tablet (10 mg) by mouth daily 90 tablet 1    blood glucose (ONE TOUCH DELICA) lancing device Device to be used with lancets. One touch Delica 100 each 3    blood glucose (ONETOUCH VERIO IQ) test strip Use to test blood sugar one time daily or as directed. 100 strip 1    blood glucose monitoring (ONE TOUCH DELICA) lancets Use to test blood sugars 1 times daily or as directed. 100 each 3    Blood Glucose Monitoring  Suppl (ONE TOUCH ULTRA 2) W/DEVICE KIT Use to test blood sugars 2 times daily or as directed. 1 kit 0    budesonide (RINOCORT AQUA) 32 MCG/ACT nasal spray Spray 2 sprays into both nostrils daily 25.29 mL 11    carvedilol (COREG) 25 MG tablet Take 1 tablet (25 mg) by mouth 2 times daily (with meals) 180 tablet 3    celecoxib (CELEBREX) 200 MG capsule Take 1 capsule (200 mg) by mouth daily 90 capsule 1    cyclobenzaprine (FLEXERIL) 5 MG tablet Take 1 tablet (5 mg) by mouth 3 times daily as needed for muscle spasms 30 tablet 0    glipiZIDE (GLUCOTROL XL) 10 MG 24 hr tablet Take 1 tablet (10 mg) by mouth 2 times daily 180 tablet 1    insulin glargine 100 UNIT/ML pen Inject 46 Units Subcutaneous daily 75 mL 1    insulin pen needle (Daric SAFEPACK PEN NEEDLE) 32G X 4 MM miscellaneous Use 1 pen needle daily or as directed. 100 each 1    losartan-hydrochlorothiazide (HYZAAR) 100-25 MG tablet Take 1 tablet by mouth daily 90 tablet 1    metroNIDAZOLE (METROGEL) 0.75 % external gel apply twice a day topically to the central face.*      Multiple Vitamins-Minerals (CENTRUM SILVER) per tablet 1 tablet 4 times weekly 100 tablet     nortriptyline (PAMELOR) 10 MG capsule Take 1 capsule (10 mg) by mouth at bedtime 90 capsule 1    OneTouch Delica Lancets 33G MISC 1 Lancet daily 100 each 3    ORDER FOR DME Equipment being ordered: Mediven plus compression stockings    47539  20-30 compression 3 Device 0    venlafaxine (EFFEXOR XR) 150 MG 24 hr capsule Take 1 capsule (150 mg) by mouth daily 90 capsule 1       ALLERGIES     Allergies   Allergen Reactions    Demerol Nausea and Vomiting    Erythromycin      GI upset       PAST MEDICAL HISTORY:  Past Medical History:   Diagnosis Date    Arthritis     hands, Right shoulder    Diabetes (H)     Hypertension     Sleep apnea     Uses a CPAP       PAST SURGICAL HISTORY:  Past Surgical History:   Procedure Laterality Date    ------------OTHER-------------  09/05/2013    L hand, cyst excision     ------------OTHER------------- Right 03/14/2014    shoulder manipulation    ESOPHAGOSCOPY, GASTROSCOPY, DUODENOSCOPY (EGD), COMBINED N/A 01/24/2022    Procedure: ESOPHAGOGASTRODUODENOSCOPY;  Surgeon: Xavi Heller MD;  Location: [facility] OR    HC INCISION TENDON SHEATH FINGER Left 09/05/2013    left thumb trigger finger    HC KNEE SCOPE, DIAGNOSTIC  04/2005    Arthroscopy, Knee Left    HC REMOVE TONSILS/ADENOIDS,<12 Y/O  1953    T & A <11y.o.    TEST NOT FOUND  06/27/2007    R heel/ inocencio's deformity    ZZC APPENDECTOMY  1956    ZZC TOTAL KNEE ARTHROPLASTY  02/2006    Knee Replacement, Total Right    ZZC TOTAL KNEE ARTHROPLASTY  03/05/2007    Knee Replacement, Total  Left    ZZC VAGINAL HYSTERECTOMY  08/2001    Hysterectomy, Vaginal & BSO       FAMILY HISTORY:  Family History   Problem Relation Age of Onset    Cerebrovascular Disease Mother     Deep Vein Thrombosis Mother     Cancer Father         prostate    Gastrointestinal Disease Father     Breast Cancer Sister     Breast Cancer Sister     Prostate Cancer Brother     Prostate Cancer Brother     Heart Disease Maternal Grandmother     Cerebrovascular Disease Maternal Grandfather     Heart Disease Paternal Grandfather     Gastrointestinal Disease Other         Niece with GERD/hiatal hernia       SOCIAL HISTORY:  Social History     Socioeconomic History    Marital status:      Spouse name: Blas    Number of children: 3    Years of education: 16    Highest education level: None   Occupational History    Occupation: [employer]     Employer: "[email or web address]"   Tobacco Use    Smoking status: Never     Passive exposure: Never    Smokeless tobacco: Never   Substance and Sexual Activity    Alcohol use: Not Currently    Drug use: No    Sexual activity: Never     Partners: Male     Comment: Hysterectomy   Other Topics Concern    Exercise No    Seat Belt Yes    Self-Exams Yes   Social History Narrative        Functional abiltity:      Hearing imparment:No      Acitvities  of daily living:Normal      Risk of falls:No      Home safety of concern:No        Do you exercise?     NO   Times/week: 0    History of abusive relationships in past:   No    History of abusive relationships currently:    No    Do you feel emotionally and physically safe in your environment?     Yes    Do you own a gun?  No      Is the gun kept in a safe place:   NOT APPLICABLE    Do you wear a seatbelt regularly?     Yes    Do you use sun screen?     Yes           Review of Systems:  Skin:        Eyes:    glasses  ENT:  not assessed    Respiratory:  Positive for dyspnea on exertion;shortness of breath  Cardiovascular:    fatigue;Positive for;edema  Gastroenterology: not assessed    Genitourinary:  not assessed    Musculoskeletal:  Positive for back pain;joint stiffness;muscular weakness  Neurologic:  not assessed    Psychiatric:  not assessed    Heme/Lymph/Imm:  not assessed    Endocrine:  not assessed      Physical Exam:  Vitals: /71 (BP Location: Right arm, Patient Position: Sitting, Cuff Size: Adult Large)   Pulse 60   Ht 1.524 m (5')   Wt 111.4 kg (245 lb 9.6 oz)   LMP 05/08/2001   SpO2 99%   BMI 47.97 kg/m      Constitutional:  cooperative, alert and oriented, well developed, well nourished, in no acute distress morbidly obese      Skin:  warm and dry to the touch, no apparent skin lesions or masses noted        Head:  normocephalic, no masses or lesions        Eyes:  pupils equal and round, conjunctivae and lids unremarkable, sclera white, no xanthalasma, EOMS intact, no nystagmus        ENT:  no pallor or cyanosis        Neck:  carotid pulses are full and equal bilaterally, JVP normal, no carotid bruit   Transmitted murmur right greater than left    Chest:  normal breath sounds, clear to auscultation, normal A-P diameter, normal symmetry, normal respiratory excursion, no use of accessory muscles        Cardiac: regular rhythm;normal S1 and S2;no S3 or S4   distant heart sounds   systolic  ejection murmur;grade 2;RUSB;radiation to the carotid          Abdomen:  abdomen soft;BS normoactive        Vascular: pulses full and equal                                      Extremities and Muscular Skeletal:  no edema           Neurological:  no gross motor deficits        Psych:  affect appropriate, oriented to time, person and place     Recent Lab Results:  LIPID RESULTS:  Lab Results   Component Value Date    CHOL 121 07/14/2023    HDL 51 07/14/2023    LDL 55 07/14/2023    LDL 87 02/21/2019    TRIG 74 07/14/2023    CHOLHDLRATIO 2 07/14/2023       LIVER ENZYME RESULTS:  Lab Results   Component Value Date    AST 33 10/05/2013    ALT 32 (H) 02/21/2019       CBC RESULTS:  Lab Results   Component Value Date    WBC 7.2 05/06/2024    RBC 3.52 (A) 05/06/2024    HGB 11.1 (A) 05/06/2024    HCT 34.2 (A) 05/06/2024    MCV 97.3 05/06/2024    MCH 31.5 05/06/2024    MCHC 32.5 05/06/2024    RDW 12.9 08/22/2018     05/06/2024     02/11/2018       BMP RESULTS:  Lab Results   Component Value Date    .9 (A) 05/14/2024    POTASSIUM 6.03 (A) 05/14/2024    CHLORIDE 97.9 (A) 05/14/2024    CO2 24.9 05/14/2024    ANIONGAP 13 10/31/2023    ANIONGAP 5 02/11/2018     (A) 05/14/2024    BUN 45 (A) 05/14/2024    BUN 24 05/14/2024    CR 1.91 (A) 05/14/2024    GFRESTIMATED 27 (A) 05/14/2024    GFRESTBLACK >90 02/11/2018    TONI 9.6 05/14/2024        A1C RESULTS:  Lab Results   Component Value Date    A1C 9.7 (A) 05/06/2024    A1C 9.5 (A) 02/05/2024       INR RESULTS:  Lab Results   Component Value Date    INR 1.84 (H) 03/08/2007    INR 2.17 (H) 03/07/2007         Lan Espinoza MD, FACC    CC  Yuridia Simmons MD  [address] [place] [address]

## 2024-05-14 NOTE — LETTER
5/14/2024    Yuridia Simmons MD  [address] [facility] [address]    RE: Alma Kraft       Dear Colleague,     I had the pleasure of seeing Alma Kraft in the [facility].  HISTORY:    Alma Kraft is a pleasant 73-year-old female accompanied by her  today.  She has a history of difficult to control hypertension and type 2 diabetes along with morbid obesity, obstructive sleep apnea using CPAP, and liver cirrhosis.  In addition she has a moderate degree of aortic stenosis and HFpEF versus obesity hypoventilation syndrome.  She is seen today on an urgent basis for follow-up of laboratory abnormalities.    When I last saw Jeny in January, she was not yet listed as being on spironolactone.  Review of outside notes showed that she was seen in early December and the body of the gastroenterologist note commented on considering adding spironolactone but the order is otherwise not listed on her note and was never transmitted to us.  The patient failed to tell us that she started spironolactone.  As far as I am aware, no follow-up labs were arranged by gastroenterology.  Her potassium on October 31 was 3.8.  A comprehensive metabolic profile done on February 5 showed that her potassium had risen to 5.5 and creatinine had increased from 0.64 up to 1.16 with estimated GFR dropping from greater than 90 to 49.  Labs were then rechecked 4 days later showing improvement but then month later (March 12) showing creatinine now elevated at 1.66 with a GFR now just 32 and potassium 5.2.  She has had extensive labs recently including a metabolic profile today showing a creatinine of 1.91, potassium 6.03, GFR 27.    Jeny stopped her spironolactone late last week upon orders from?.  She is not sure what day she stopped it but thinks her last dose was either Wednesday or Thursday.  She had stopped her potassium several days earlier.  Her furosemide was also recently stopped, she thinks  "Thursday of last week.  Jeny reports that in the recent past she has had a very dry mouth and has been very thirsty and has been taking a lot of fluids and putting out a lot of urine.  Her glucose on recent readings has been markedly elevated, 380 today.    Jeny reports that she has a burning sensation in her anterior thighs with exercise.  This is a symmetrical burning and makes her want to bend over which causes back pain.  He also complains that she has had low energy and \"cannot do anything\".  She also acknowledges that she does not think her energy level has changed much in the past year, but her leg pain seems to be new over the last 3 to 4 months by her estimate.    Examination today shows excellent pulses in all extremities, excluding vascular disease as a cause of her leg pain.  She was dizzy when she stood up consistent with dehydration.  Her weight is virtually unchanged from our last visit in January suggesting that she has gained fat weight and lost fluid weight.  She acknowledges that she is extraordinarily sedentary.      ASSESSMENT/PLAN:    1.  Acute kidney injury.  This is almost certainly due to dehydration due to overdiuresis and likely contributed by her very high serum glucose levels.  I encouraged her to drink plenty of fluids and we will plan on rechecking a BMP tomorrow.  2.  Hyperkalemia.  This is due to the LAI discussed above along with the potassium sparing properties of spironolactone, the pulmonal potassium she was taking until about a week ago, and the ARB that she is on.  We will need to watch this carefully.  If there is not a decrease in potassium on tomorrow's labs she will be admitted for more careful monitoring.  3.  Aortic stenosis, moderate in severity.  This is unlikely to be playing any role in her current problems or symptoms.  Will continue to monitor.  4.  Peripheral edema.  He is wearing compression stockings today but I do not detect any peripheral edema, not surprising " since she is undoubtedly significantly dehydrated.  5.  Very poorly controlled diabetes.  This is contributing to her diuresis and I will leave it to her primary care physician to manage this problem.    Thank you for inviting me to participate in the care of your patient.  Please don't hesitate to call if I can be of further assistance.  50 minutes were spent today reviewing the chart and other records, interviewing and examining the patient, and documenting our visit.    The longitudinal plan of care for the diagnosis(es)/condition(s) as documented were addressed during this visit. Due to the added complexity in care, I will continue to support Jeny in the subsequent management and with ongoing continuity of care.     Chart documentation was completed, in part, with [employer] voice-recognition software. Even though reviewed, some grammatical, spelling, and word errors may remain.       Orders Placed This Encounter   Procedures    Basic metabolic panel    N terminal pro BNP outpatient     No orders of the defined types were placed in this encounter.    There are no discontinued medications.    10 year ASCVD risk: The ASCVD Risk score (Joselo DK, et al., 2019) failed to calculate for the following reasons:    The valid total cholesterol range is 130 to 320 mg/dL    Encounter Diagnoses   Name Primary?    Anemia, unspecified type     Renal insufficiency     Hyperkalemia     Heart failure with preserved ejection fraction, NYHA class I (H) Yes       CURRENT MEDICATIONS:  Current Outpatient Medications   Medication Sig Dispense Refill    amLODIPine (NORVASC) 10 MG tablet Take 1 tablet (10 mg) by mouth daily 90 tablet 1    ASPIRIN 81 MG OR TABS 1 tab po QD (Once per day) 100 3    atorvastatin (LIPITOR) 10 MG tablet Take 1 tablet (10 mg) by mouth daily 90 tablet 1    blood glucose (ONE TOUCH DELICA) lancing device Device to be used with lancets. One touch Delica 100 each 3    blood glucose (ONETOUCH VERIO IQ) test strip Use  to test blood sugar one time daily or as directed. 100 strip 1    blood glucose monitoring (ONE TOUCH DELICA) lancets Use to test blood sugars 1 times daily or as directed. 100 each 3    Blood Glucose Monitoring Suppl (ONE TOUCH ULTRA 2) W/DEVICE KIT Use to test blood sugars 2 times daily or as directed. 1 kit 0    budesonide (RINOCORT AQUA) 32 MCG/ACT nasal spray Spray 2 sprays into both nostrils daily 25.29 mL 11    carvedilol (COREG) 25 MG tablet Take 1 tablet (25 mg) by mouth 2 times daily (with meals) 180 tablet 3    celecoxib (CELEBREX) 200 MG capsule Take 1 capsule (200 mg) by mouth daily 90 capsule 1    cyclobenzaprine (FLEXERIL) 5 MG tablet Take 1 tablet (5 mg) by mouth 3 times daily as needed for muscle spasms 30 tablet 0    glipiZIDE (GLUCOTROL XL) 10 MG 24 hr tablet Take 1 tablet (10 mg) by mouth 2 times daily 180 tablet 1    insulin glargine 100 UNIT/ML pen Inject 46 Units Subcutaneous daily 75 mL 1    insulin pen needle (Stereobot SAFEPACK PEN NEEDLE) 32G X 4 MM miscellaneous Use 1 pen needle daily or as directed. 100 each 1    losartan-hydrochlorothiazide (HYZAAR) 100-25 MG tablet Take 1 tablet by mouth daily 90 tablet 1    metroNIDAZOLE (METROGEL) 0.75 % external gel apply twice a day topically to the central face.*      Multiple Vitamins-Minerals (CENTRUM SILVER) per tablet 1 tablet 4 times weekly 100 tablet     nortriptyline (PAMELOR) 10 MG capsule Take 1 capsule (10 mg) by mouth at bedtime 90 capsule 1    OneTouch Delica Lancets 33G MISC 1 Lancet daily 100 each 3    ORDER FOR DME Equipment being ordered: Mediven plus compression stockings    77396  20-30 compression 3 Device 0    venlafaxine (EFFEXOR XR) 150 MG 24 hr capsule Take 1 capsule (150 mg) by mouth daily 90 capsule 1       ALLERGIES     Allergies   Allergen Reactions    Demerol Nausea and Vomiting    Erythromycin      GI upset       PAST MEDICAL HISTORY:  Past Medical History:   Diagnosis Date    Arthritis     hands, Right shoulder     Diabetes (H)     Hypertension     Sleep apnea     Uses a CPAP       PAST SURGICAL HISTORY:  Past Surgical History:   Procedure Laterality Date    ------------OTHER-------------  09/05/2013    L hand, cyst excision    ------------OTHER------------- Right 03/14/2014    shoulder manipulation    ESOPHAGOSCOPY, GASTROSCOPY, DUODENOSCOPY (EGD), COMBINED N/A 01/24/2022    Procedure: ESOPHAGOGASTRODUODENOSCOPY;  Surgeon: Xavi Heller MD;  Location: [facility] OR    HC INCISION TENDON SHEATH FINGER Left 09/05/2013    left thumb trigger finger    HC KNEE SCOPE, DIAGNOSTIC  04/2005    Arthroscopy, Knee Left    HC REMOVE TONSILS/ADENOIDS,<12 Y/O  1953    T & A <11y.o.    TEST NOT FOUND  06/27/2007    R heel/ inocencio's deformity    ZZC APPENDECTOMY  1956    ZZC TOTAL KNEE ARTHROPLASTY  02/2006    Knee Replacement, Total Right    ZZC TOTAL KNEE ARTHROPLASTY  03/05/2007    Knee Replacement, Total  Left    ZZC VAGINAL HYSTERECTOMY  08/2001    Hysterectomy, Vaginal & BSO       FAMILY HISTORY:  Family History   Problem Relation Age of Onset    Cerebrovascular Disease Mother     Deep Vein Thrombosis Mother     Cancer Father         prostate    Gastrointestinal Disease Father     Breast Cancer Sister     Breast Cancer Sister     Prostate Cancer Brother     Prostate Cancer Brother     Heart Disease Maternal Grandmother     Cerebrovascular Disease Maternal Grandfather     Heart Disease Paternal Grandfather     Gastrointestinal Disease Other         Niece with GERD/hiatal hernia       SOCIAL HISTORY:  Social History     Socioeconomic History    Marital status:      Spouse name: Blas    Number of children: 3    Years of education: 16    Highest education level: None   Occupational History    Occupation: Processor     Employer: [employer]   Tobacco Use    Smoking status: Never     Passive exposure: Never    Smokeless tobacco: Never   Substance and Sexual Activity    Alcohol use: Not Currently    Drug use: No    Sexual activity: Never      Partners: Male     Comment: Hysterectomy   Other Topics Concern    Exercise No    Seat Belt Yes    Self-Exams Yes   Social History Narrative        Functional abiltity:      Hearing imparment:No      Acitvities of daily living:Normal      Risk of falls:No      Home safety of concern:No        Do you exercise?     NO   Times/week: 0    History of abusive relationships in past:   No    History of abusive relationships currently:    No    Do you feel emotionally and physically safe in your environment?     Yes    Do you own a gun?  No      Is the gun kept in a safe place:   NOT APPLICABLE    Do you wear a seatbelt regularly?     Yes    Do you use sun screen?     Yes           Review of Systems:  Skin:        Eyes:    glasses  ENT:  not assessed    Respiratory:  Positive for dyspnea on exertion;shortness of breath  Cardiovascular:    fatigue;Positive for;edema  Gastroenterology: not assessed    Genitourinary:  not assessed    Musculoskeletal:  Positive for back pain;joint stiffness;muscular weakness  Neurologic:  not assessed    Psychiatric:  not assessed    Heme/Lymph/Imm:  not assessed    Endocrine:  not assessed      Physical Exam:  Vitals: /71 (BP Location: Right arm, Patient Position: Sitting, Cuff Size: Adult Large)   Pulse 60   Ht 1.524 m (5')   Wt 111.4 kg (245 lb 9.6 oz)   LMP 05/08/2001   SpO2 99%   BMI 47.97 kg/m      Constitutional:  cooperative, alert and oriented, well developed, well nourished, in no acute distress morbidly obese      Skin:  warm and dry to the touch, no apparent skin lesions or masses noted        Head:  normocephalic, no masses or lesions        Eyes:  pupils equal and round, conjunctivae and lids unremarkable, sclera white, no xanthalasma, EOMS intact, no nystagmus        ENT:  no pallor or cyanosis        Neck:  carotid pulses are full and equal bilaterally, JVP normal, no carotid bruit   Transmitted murmur right greater than left    Chest:  normal breath sounds, clear  to auscultation, normal A-P diameter, normal symmetry, normal respiratory excursion, no use of accessory muscles        Cardiac: regular rhythm;normal S1 and S2;no S3 or S4   distant heart sounds   systolic ejection murmur;grade 2;RUSB;radiation to the carotid          Abdomen:  abdomen soft;BS normoactive        Vascular: pulses full and equal                                      Extremities and Muscular Skeletal:  no edema           Neurological:  no gross motor deficits        Psych:  affect appropriate, oriented to time, person and place     Recent Lab Results:  LIPID RESULTS:  Lab Results   Component Value Date    CHOL 121 07/14/2023    HDL 51 07/14/2023    LDL 55 07/14/2023    LDL 87 02/21/2019    TRIG 74 07/14/2023    CHOLHDLRATIO 2 07/14/2023       LIVER ENZYME RESULTS:  Lab Results   Component Value Date    AST 33 10/05/2013    ALT 32 (H) 02/21/2019       CBC RESULTS:  Lab Results   Component Value Date    WBC 7.2 05/06/2024    RBC 3.52 (A) 05/06/2024    HGB 11.1 (A) 05/06/2024    HCT 34.2 (A) 05/06/2024    MCV 97.3 05/06/2024    MCH 31.5 05/06/2024    MCHC 32.5 05/06/2024    RDW 12.9 08/22/2018     05/06/2024     02/11/2018       BMP RESULTS:  Lab Results   Component Value Date    .9 (A) 05/14/2024    POTASSIUM 6.03 (A) 05/14/2024    CHLORIDE 97.9 (A) 05/14/2024    CO2 24.9 05/14/2024    ANIONGAP 13 10/31/2023    ANIONGAP 5 02/11/2018     (A) 05/14/2024    BUN 45 (A) 05/14/2024    BUN 24 05/14/2024    CR 1.91 (A) 05/14/2024    GFRESTIMATED 27 (A) 05/14/2024    GFRESTBLACK >90 02/11/2018    TONI 9.6 05/14/2024        A1C RESULTS:  Lab Results   Component Value Date    A1C 9.7 (A) 05/06/2024    A1C 9.5 (A) 02/05/2024       INR RESULTS:  Lab Results   Component Value Date    INR 1.84 (H) 03/08/2007    INR 2.17 (H) 03/07/2007         Lan Espinoza MD, [facility]    CC  Yuridia Simmons MD  [address] [place] [address]    Thank you for allowing me to participate in the  care of your patient.      Sincerely,     Lan Espinoza MD     [facility]

## 2024-05-15 ENCOUNTER — LAB (OUTPATIENT)
Dept: LAB | Facility: CLINIC | Age: 74
End: 2024-05-15
Payer: MEDICARE

## 2024-05-15 DIAGNOSIS — N28.9 RENAL INSUFFICIENCY: ICD-10-CM

## 2024-05-15 DIAGNOSIS — I50.30 HEART FAILURE WITH PRESERVED EJECTION FRACTION, NYHA CLASS I (H): ICD-10-CM

## 2024-05-15 DIAGNOSIS — E87.5 HYPERKALEMIA: ICD-10-CM

## 2024-05-15 LAB
ANION GAP SERPL CALCULATED.3IONS-SCNC: 12 MMOL/L (ref 7–15)
BUN SERPL-MCNC: 38.6 MG/DL (ref 8–23)
CALCIUM SERPL-MCNC: 9 MG/DL (ref 8.8–10.2)
CHLORIDE SERPL-SCNC: 98 MMOL/L (ref 98–107)
CREAT SERPL-MCNC: 1.45 MG/DL (ref 0.51–0.95)
DEPRECATED HCO3 PLAS-SCNC: 23 MMOL/L (ref 22–29)
EGFRCR SERPLBLD CKD-EPI 2021: 38 ML/MIN/1.73M2
GLUCOSE SERPL-MCNC: 383 MG/DL (ref 70–99)
NT-PROBNP SERPL-MCNC: 204 PG/ML (ref 0–900)
POTASSIUM SERPL-SCNC: 5 MMOL/L (ref 3.4–5.3)
SODIUM SERPL-SCNC: 133 MMOL/L (ref 135–145)

## 2024-05-15 PROCEDURE — 83880 ASSAY OF NATRIURETIC PEPTIDE: CPT

## 2024-05-15 PROCEDURE — 80048 BASIC METABOLIC PNL TOTAL CA: CPT

## 2024-05-15 PROCEDURE — 36415 COLL VENOUS BLD VENIPUNCTURE: CPT

## 2024-05-16 ENCOUNTER — TELEPHONE (OUTPATIENT)
Dept: CARDIOLOGY | Facility: CLINIC | Age: 74
End: 2024-05-16
Payer: MEDICARE

## 2024-05-16 DIAGNOSIS — I50.30 HEART FAILURE WITH PRESERVED EJECTION FRACTION, NYHA CLASS I (H): Primary | ICD-10-CM

## 2024-05-16 NOTE — TELEPHONE ENCOUNTER
Labs are considerably better.  She should have BMP repeated next Monday       Contacted patient's spouse with plan orders placed for BMP future.   Tiffanie Lockhart, RN on 5/16/2024 at 8:46 AM      Component      Latest Ref Rng 5/15/2024  3:49 PM   Sodium      135 - 145 mmol/L 133 (L)    Potassium      3.4 - 5.3 mmol/L 5.0    Chloride      98 - 107 mmol/L 98    Carbon Dioxide (CO2)      22 - 29 mmol/L 23    Anion Gap      7 - 15 mmol/L 12    Urea Nitrogen      8.0 - 23.0 mg/dL 38.6 (H)    Creatinine      0.51 - 0.95 mg/dL 1.45 (H)    GFR Estimate      >60 mL/min/1.73m2 38 (L)    Calcium      8.8 - 10.2 mg/dL 9.0    Glucose      70 - 99 mg/dL 383 (H)    N-Terminal Pro Bnp      0 - 900 pg/mL 204       Legend:  (L) Low  (H) High

## 2024-05-20 ENCOUNTER — LAB (OUTPATIENT)
Dept: LAB | Facility: CLINIC | Age: 74
End: 2024-05-20
Attending: INTERNAL MEDICINE
Payer: MEDICARE

## 2024-05-20 DIAGNOSIS — I50.30 HEART FAILURE WITH PRESERVED EJECTION FRACTION, NYHA CLASS I (H): ICD-10-CM

## 2024-05-20 LAB
ANION GAP SERPL CALCULATED.3IONS-SCNC: 8 MMOL/L (ref 7–15)
BUN SERPL-MCNC: 29.3 MG/DL (ref 8–23)
CALCIUM SERPL-MCNC: 9.1 MG/DL (ref 8.8–10.2)
CHLORIDE SERPL-SCNC: 96 MMOL/L (ref 98–107)
CREAT SERPL-MCNC: 1.22 MG/DL (ref 0.51–0.95)
DEPRECATED HCO3 PLAS-SCNC: 27 MMOL/L (ref 22–29)
EGFRCR SERPLBLD CKD-EPI 2021: 46 ML/MIN/1.73M2
GLUCOSE SERPL-MCNC: 299 MG/DL (ref 70–99)
POTASSIUM SERPL-SCNC: 4.9 MMOL/L (ref 3.4–5.3)
SODIUM SERPL-SCNC: 131 MMOL/L (ref 135–145)

## 2024-05-20 PROCEDURE — 80048 BASIC METABOLIC PNL TOTAL CA: CPT | Performed by: INTERNAL MEDICINE

## 2024-05-20 PROCEDURE — 36415 COLL VENOUS BLD VENIPUNCTURE: CPT | Performed by: INTERNAL MEDICINE

## 2024-05-21 ENCOUNTER — TELEPHONE (OUTPATIENT)
Dept: FAMILY MEDICINE | Facility: CLINIC | Age: 74
End: 2024-05-21

## 2024-05-21 NOTE — TELEPHONE ENCOUNTER
Patient had labs redrawn yesterday. GFR has increased back to 46 with elimination of spironolactone. Would like to restart metformin at decreased dose of 1000mg ER once daily.     Keep insulin at 34 units. Fasting levels have been in the 150s-160s.     Will follow up with patient later this week to further determine appropriateness of diabetes regimen.    Romina Caro, PharmD  Clinical Pharmacist  [facility]  General Clinic Phone: [phone]  Direct Office Phone: [phone]

## 2024-05-23 DIAGNOSIS — N28.9 RENAL INSUFFICIENCY: Primary | ICD-10-CM

## 2024-05-23 RX ORDER — FUROSEMIDE 20 MG
20 TABLET ORAL DAILY
COMMUNITY
Start: 2024-05-23 | End: 2024-07-15

## 2024-05-23 NOTE — RESULT ENCOUNTER NOTE
I know she stopped lasix, but she needed it in the past and her metabolic/renal problems began when she was started on high dose spironolactone in addition to the lasix, without proper monitoring.  Have her restart the lasix, and per my note we will continue to monitor BMP.    Contacted patient to review recommendations from Dr. Espinoza. Patient verbalized understanding and agreed with plan of care. Order placed for BMP in 1 month, medication list updated.

## 2024-05-29 ENCOUNTER — TELEPHONE (OUTPATIENT)
Dept: FAMILY MEDICINE | Facility: CLINIC | Age: 74
End: 2024-05-29

## 2024-05-29 NOTE — TELEPHONE ENCOUNTER
Follow up with patient since adding metformin 1000mg ER daily back into regimen.    Fasting levels have been 103, 104, 124, 134. Will increase insulin to 35 units daily and follow up on Friday to assure fasting goal is being met.    Romina Caro, PharmD  Clinical Pharmacist  [facility]  General [facility] Phone: [phone]  Direct Office Phone: [phone]

## 2024-05-31 ENCOUNTER — TELEPHONE (OUTPATIENT)
Dept: FAMILY MEDICINE | Facility: CLINIC | Age: 74
End: 2024-05-31

## 2024-05-31 NOTE — TELEPHONE ENCOUNTER
Follow up with Jeny on fasting BG readings with change in diabetes regimen. Patient is doing well. Notes fasting levels have been 102 and 113 the last two days with slight increase in insulin to 35 units daily. Will continue other diabetes medications as previously taking.    Romina Caro, PharmD  Clinical Pharmacist  [facility]  General Clinic Phone: [phone]  Direct Office Phone: [phone]

## 2024-06-10 ENCOUNTER — LAB (OUTPATIENT)
Dept: LAB | Facility: CLINIC | Age: 74
End: 2024-06-10
Payer: MEDICARE

## 2024-06-10 DIAGNOSIS — N28.9 RENAL INSUFFICIENCY: ICD-10-CM

## 2024-06-10 LAB
ANION GAP SERPL CALCULATED.3IONS-SCNC: 13 MMOL/L (ref 7–15)
BUN SERPL-MCNC: 29.7 MG/DL (ref 8–23)
CALCIUM SERPL-MCNC: 8.9 MG/DL (ref 8.8–10.2)
CHLORIDE SERPL-SCNC: 97 MMOL/L (ref 98–107)
CREAT SERPL-MCNC: 1.22 MG/DL (ref 0.51–0.95)
DEPRECATED HCO3 PLAS-SCNC: 27 MMOL/L (ref 22–29)
EGFRCR SERPLBLD CKD-EPI 2021: 46 ML/MIN/1.73M2
GLUCOSE SERPL-MCNC: 330 MG/DL (ref 70–99)
POTASSIUM SERPL-SCNC: 4.1 MMOL/L (ref 3.4–5.3)
SODIUM SERPL-SCNC: 137 MMOL/L (ref 135–145)

## 2024-06-10 PROCEDURE — 36415 COLL VENOUS BLD VENIPUNCTURE: CPT

## 2024-06-10 PROCEDURE — 80048 BASIC METABOLIC PNL TOTAL CA: CPT

## 2024-06-12 ENCOUNTER — DOCUMENTATION ONLY (OUTPATIENT)
Dept: CARDIOLOGY | Facility: CLINIC | Age: 74
End: 2024-06-12
Payer: MEDICARE

## 2024-06-12 NOTE — RESULT ENCOUNTER NOTE
"Per Dr. Espinoza, \"Lytes and renal function are stable.  Her poor diabetes control is being addressed by primary care.\"    Updated patient with results letter mailed to home address. "

## 2024-06-26 ENCOUNTER — TRANSFERRED RECORDS (OUTPATIENT)
Dept: FAMILY MEDICINE | Facility: CLINIC | Age: 74
End: 2024-06-26

## 2024-07-01 ENCOUNTER — TRANSFERRED RECORDS (OUTPATIENT)
Dept: FAMILY MEDICINE | Facility: CLINIC | Age: 74
End: 2024-07-01

## 2024-07-02 ENCOUNTER — HOSPITAL ENCOUNTER (OUTPATIENT)
Facility: CLINIC | Age: 74
End: 2024-07-02
Attending: INTERNAL MEDICINE | Admitting: INTERNAL MEDICINE
Payer: MEDICARE

## 2024-07-08 ENCOUNTER — TRANSFERRED RECORDS (OUTPATIENT)
Dept: FAMILY MEDICINE | Facility: CLINIC | Age: 74
End: 2024-07-08

## 2024-07-11 ENCOUNTER — TRANSFERRED RECORDS (OUTPATIENT)
Dept: FAMILY MEDICINE | Facility: CLINIC | Age: 74
End: 2024-07-11

## 2024-07-14 DIAGNOSIS — Z79.4 TYPE 2 DIABETES MELLITUS WITHOUT COMPLICATION, WITH LONG-TERM CURRENT USE OF INSULIN (H): ICD-10-CM

## 2024-07-14 DIAGNOSIS — E11.9 TYPE 2 DIABETES MELLITUS WITHOUT COMPLICATION, WITH LONG-TERM CURRENT USE OF INSULIN (H): ICD-10-CM

## 2024-07-15 DIAGNOSIS — N28.9 RENAL INSUFFICIENCY: ICD-10-CM

## 2024-07-15 RX ORDER — FUROSEMIDE 20 MG
20 TABLET ORAL DAILY
Qty: 90 TABLET | Refills: 3 | Status: SHIPPED | OUTPATIENT
Start: 2024-07-15

## 2024-07-16 RX ORDER — METFORMIN HCL 500 MG
1000 TABLET, EXTENDED RELEASE 24 HR ORAL
COMMUNITY
Start: 2024-07-16

## 2024-07-16 NOTE — TELEPHONE ENCOUNTER
Alma Kraft is requesting a refill of:    Refused Prescriptions:                       Disp   Refills    metFORMIN (GLUCOPHAGE XR) 500 MG 24 hr tab*                Sig: Take 2 tablets (1,000 mg) by mouth daily (with           dinner)  Refused By: RAVEN JENKINS  Reason for Refusal: Patient needs appointment    Spoke with patients  he will have her call back, needing to verify how much metformin she is taking and if she is needing extension

## 2024-07-18 DIAGNOSIS — N18.30 CHRONIC KIDNEY DISEASE, STAGE III (MODERATE) (H): Primary | ICD-10-CM

## 2024-07-18 LAB
ALBUMIN SERPL-MCNC: 3.3 G/DL (ref 3.6–5.1)
BUN SERPL-MCNC: 30 MG/DL (ref 7–25)
BUN/CREATININE RATIO: 23 (ref 6–32)
CALCIUM SERPL-MCNC: 9.4 MG/DL (ref 8.6–10.3)
CHLORIDE SERPLBLD-SCNC: 96.3 MMOL/L (ref 98–110)
CO2 SERPL-SCNC: 29.4 MMOL/L (ref 20–32)
CREAT SERPL-MCNC: 1.29 MG/DL (ref 0.6–1.3)
GLUCOSE SERPL-MCNC: 279 MG/DL (ref 60–99)
PHOSPHATE SERPL-MCNC: 4.2 MG/DL (ref 2.1–4.3)
POTASSIUM SERPL-SCNC: 3.59 MMOL/L (ref 3.5–5.3)
SODIUM SERPL-SCNC: 135.7 MMOL/L (ref 135–146)

## 2024-07-18 PROCEDURE — 80048 BASIC METABOLIC PNL TOTAL CA: CPT | Performed by: FAMILY MEDICINE

## 2024-07-18 PROCEDURE — 36415 COLL VENOUS BLD VENIPUNCTURE: CPT | Performed by: FAMILY MEDICINE

## 2024-07-18 PROCEDURE — 82040 ASSAY OF SERUM ALBUMIN: CPT | Performed by: FAMILY MEDICINE

## 2024-07-18 PROCEDURE — 84100 ASSAY OF PHOSPHORUS: CPT | Performed by: FAMILY MEDICINE

## 2024-07-19 ENCOUNTER — HOSPITAL ENCOUNTER (OUTPATIENT)
Dept: CARDIOLOGY | Facility: CLINIC | Age: 74
Discharge: HOME | End: 2024-07-19
Attending: INTERNAL MEDICINE | Admitting: INTERNAL MEDICINE
Payer: MEDICARE

## 2024-07-19 DIAGNOSIS — I35.0 NONRHEUMATIC AORTIC VALVE STENOSIS: ICD-10-CM

## 2024-07-19 LAB — LVEF ECHO: NORMAL

## 2024-07-19 PROCEDURE — 93306 TTE W/DOPPLER COMPLETE: CPT

## 2024-07-19 PROCEDURE — 93306 TTE W/DOPPLER COMPLETE: CPT | Mod: 26 | Performed by: INTERNAL MEDICINE

## 2024-07-22 ENCOUNTER — HOSPITAL ENCOUNTER (OUTPATIENT)
Dept: MAMMOGRAPHY | Facility: CLINIC | Age: 74
Discharge: HOME | End: 2024-07-22
Attending: FAMILY MEDICINE | Admitting: FAMILY MEDICINE
Payer: MEDICARE

## 2024-07-22 DIAGNOSIS — Z12.31 VISIT FOR SCREENING MAMMOGRAM: ICD-10-CM

## 2024-07-22 PROCEDURE — 77063 BREAST TOMOSYNTHESIS BI: CPT

## 2024-07-23 DIAGNOSIS — E11.9 TYPE 2 DIABETES MELLITUS WITHOUT COMPLICATION, WITH LONG-TERM CURRENT USE OF INSULIN (H): Primary | ICD-10-CM

## 2024-07-23 DIAGNOSIS — Z79.4 TYPE 2 DIABETES MELLITUS WITHOUT COMPLICATION, WITH LONG-TERM CURRENT USE OF INSULIN (H): Primary | ICD-10-CM

## 2024-07-23 NOTE — TELEPHONE ENCOUNTER
Patient is requesting an extension of  Pending Prescriptions:                       Disp   Refills    metFORMIN (GLUCOPHAGE XR) 500 MG 24 hr ta*28 tab*0            Sig: Take 1 tablet (500 mg) by mouth 2 times daily           (with meals)    To get her until her scheduled appointment with Dr. Simmons. Please see telephone encounters from Romina in May-patient restarted taking Metformin and has been taking the 500 mg XR tablets twice a day. Routing to Kyung to send in due to Dr. Simmons being out of office.

## 2024-07-25 RX ORDER — METFORMIN HCL 500 MG
500 TABLET, EXTENDED RELEASE 24 HR ORAL 2 TIMES DAILY WITH MEALS
Qty: 28 TABLET | Refills: 0 | Status: SHIPPED | OUTPATIENT
Start: 2024-07-25 | End: 2024-08-05

## 2024-07-30 NOTE — PROGRESS NOTES
Assessment & Plan   Problem List Items Addressed This Visit          Nervous and Auditory    Chronic pain of both shoulders       Endocrine    Mixed hyperlipidemia    Relevant Medications    atorvastatin (LIPITOR) 10 MG tablet    Other Relevant Orders    Lipid Panel (BFP) (Completed)    Comprehensive Metobolic Panel (BFP) (Completed)    Type 2 diabetes mellitus without complication, with long-term current use of insulin (H)    Relevant Medications    metFORMIN (GLUCOPHAGE XR) 500 MG 24 hr tablet    glipiZIDE (GLUCOTROL XL) 10 MG 24 hr tablet    Other Relevant Orders    HEMOGLOBIN A1C (BFP) (Completed)    VENOUS COLLECTION (Completed)       Circulatory    Essential hypertension, benign--followed by cardiology    Relevant Medications    amLODIPine (NORVASC) 10 MG tablet    losartan-hydrochlorothiazide (HYZAAR) 100-25 MG tablet    Heart murmur       Musculoskeletal and Integumentary    Osteopenia of multiple sites    Relevant Medications    celecoxib (CELEBREX) 200 MG capsule       Urinary    Chronic kidney disease, unspecified CKD stage     Other Visit Diagnoses       Special screening for malignant neoplasms, colon    -  Primary    Relevant Orders    Colonoscopy Screening  Referral    Pain        Relevant Medications    celecoxib (CELEBREX) 200 MG capsule    Essential hypertension        Relevant Medications    losartan-hydrochlorothiazide (HYZAAR) 100-25 MG tablet    Chronic paroxysmal hemicrania, not intractable        Relevant Medications    amLODIPine (NORVASC) 10 MG tablet    celecoxib (CELEBREX) 200 MG capsule    nortriptyline (PAMELOR) 10 MG capsule    venlafaxine (EFFEXOR XR) 150 MG 24 hr capsule    Major depression in remission (H24)        Relevant Medications    nortriptyline (PAMELOR) 10 MG capsule    venlafaxine (EFFEXOR XR) 150 MG 24 hr capsule    Vertigo        Relevant Orders    Radiology Referral (Affiliate Use Only)    CT Head w/o Contrast    Irritation of external female genitalia         Relevant Orders    Ob/Gyn  Referral - To a [facility] Location (Use POS/Location)    Other urinary incontinence        Relevant Orders    Ob/Gyn  Referral - To a Non [facility] Location (Use POS/Location)           1. Type 2 diabetes mellitus without complication, with long-term current use of insulin (H)  Improved but blood sugars are still running high. Schedule with Romina, refilled current medications.  - HEMOGLOBIN A1C (BFP)  - VENOUS COLLECTION  - metFORMIN (GLUCOPHAGE XR) 500 MG 24 hr tablet; Take 1 tablet (500 mg) by mouth 2 times daily (with meals)  Dispense: 180 tablet; Refill: 1  - glipiZIDE (GLUCOTROL XL) 10 MG 24 hr tablet; Take 1 tablet (10 mg) by mouth 2 times daily  Dispense: 180 tablet; Refill: 1    2. Mixed hyperlipidemia  Check labs, refilled.  - atorvastatin (LIPITOR) 10 MG tablet; Take 1 tablet (10 mg) by mouth daily  Dispense: 90 tablet; Refill: 1  - Lipid Panel (BFP)  - Comprehensive Metobolic Panel (BFP)    3. Essential hypertension, benign  Controlled on medications, refilled.  - amLODIPine (NORVASC) 10 MG tablet; Take 1 tablet (10 mg) by mouth daily  Dispense: 90 tablet; Refill: 1    4. Pain  I would like her to get off the medications, schedule with her ortho MD to discuss alternative options.  - celecoxib (CELEBREX) 200 MG capsule; Take 1 capsule (200 mg) by mouth daily  Dispense: 90 capsule; Refill: 1    5. Essential hypertension    - losartan-hydrochlorothiazide (HYZAAR) 100-25 MG tablet; Take 1 tablet by mouth daily  Dispense: 90 tablet; Refill: 1    6. Chronic paroxysmal hemicrania, not intractable  Refilled.  - nortriptyline (PAMELOR) 10 MG capsule; Take 1 capsule (10 mg) by mouth at bedtime  Dispense: 90 capsule; Refill: 1    7. Major depression in remission (H24)  Refilled, sx are controlled.  - venlafaxine (EFFEXOR XR) 150 MG 24 hr capsule; Take 1 capsule (150 mg) by mouth daily  Dispense: 90 capsule; Refill: 1    8. Special screening for  malignant neoplasms, colon    - Colonoscopy Screening  Referral    9. Osteopenia of multiple sites      10. Chronic kidney disease, unspecified CKD stage  Followup with her kidney specialist.    11. Chronic pain of both shoulders  She will schedule with her ortho doctor to discuss options.    12. Vertigo  Head ct.  - Radiology Referral (Affiliate Use Only)  - CT Head w/o Contrast    13. Irritation of external female genitalia  She is having external genital sx, referral to gynecology for additional evaluation.  - Ob/Gyn  Referral - To a [facility] Location (Use POS/Location)    14. Other urinary incontinence  Referral to gynecology.  - Ob/Gyn  Referral - To a Non [facility] Location (Use POS/Location)    15. Heart murmur              BMI  Estimated body mass index is 47.46 kg/m  as calculated from the following:    Height as of 5/14/24: 1.524 m (5').    Weight as of this encounter: 110.2 kg (243 lb).         FUTURE APPOINTMENTS:       - Follow-up visit in 6 months. We manage her chronic medical care.    No follow-ups on file.    Yuridia Simmons MD  [facility]    Subjective     Nursing Notes:   Latha Watts, [facility]  8/5/2024 11:40 AM  Signed  Chief Complaint   Patient presents with    Recheck Medication     Fasting today, recheck A1C, refill medications     Pre-visit Screening:  Immunizations:  up to date  Colonoscopy:  is due and ordered today  Mammogram: is up to date  Asthma Action Test/Plan:  NA  PHQ9:  NA  GAD7:  NA  Questioned patient about current smoking habits Pt. has never smoked.  Ok to leave detailed message on voice mail for today's visit only Yes, phone # [phone]       Alma Kraft is a 73 year old female who presents to clinic today for the following health issues   HPI     Here for a recheck on medications, fasting labs. Is due for refills.  Today home blood sugars: 137, mostly 140 or lower. Average 110. She was working with  Romina. She is on insulin, needs to schedule again with her.   She sees kidney doctor. Was previously on 4 metformin per day but is now down to 2 per day.  Depression--thinks controlled, had a lot of stress and anxiety with the diagnostic mammogram but this turned out ok, so she is feeling better.  Celebrex daily for pain. Can't take aspirin and tylenol. Needs a shoulder replacement.     Had an episode of dizziness when in bed and turns her head. Got sweats and sick to her stomach with this. She told [facility] about this and they told her to tell her doctor about this. This happened in the morning when ready to get up. It has happened twice. The first time was 6 months ago, then again 2 months ago.  This is the only time.    Every once in awhile gets some numbness in the tips of her toes.     Has problems with urinary incontinence. When she wipes the skin is dry and rubs off. Wears a pad and not sure if this is irritating her. Sometimes itchy and bleeding.        Review of Systems   Constitutional, HEENT, cardiovascular, pulmonary, gi and gu systems are negative, except as otherwise noted.      Objective    /78 (BP Location: Right arm, Patient Position: Sitting, Cuff Size: Adult Large)   Pulse 73   Temp 97.8  F (36.6  C) (Temporal)   Wt 110.2 kg (243 lb)   LMP 05/08/2001   SpO2 97%   BMI 47.46 kg/m    Body mass index is 47.46 kg/m .  Physical Exam   GENERAL: alert and no distress  RESP: lungs clear to auscultation - no rales, rhonchi or wheezes  CV: regular rate and rhythm, harsh systolic murmur  MS: no gross musculoskeletal defects noted, no edema  NEURO: Normal strength and tone, mentation intact and speech normal  PSYCH: mentation appears normal, affect normal/bright    Results for orders placed or performed in visit on 08/05/24   HEMOGLOBIN A1C (BFP)     Status: Abnormal   Result Value Ref Range    Hemoglobin A1C 8.6 (A) 4 - 5.6 %   Lipid Panel (BFP)     Status: Abnormal   Result Value Ref Range     Cholesterol 111 0 - 199 mg/dL    Triglycerides 111 0 - 149 mg/dL    HDL Cholesterol 39 (A) 40 - 150 mg/dL    LDL-C 50 mg/dL    Cholesterol/HDL Ratio 3 0 - 5   Comprehensive Metobolic Panel (BFP)     Status: Abnormal   Result Value Ref Range    Carbon Dioxide 29.2 20 - 32 mmol/L    Creatinine 1.18 0.60 - 1.30 mg/dL    Glucose 176 (A) 60 - 99 mg/dL    Sodium 136.3 135 - 146 mmol/L    Potassium 3.89 3.5 - 5.3 mmol/L    Chloride 97.8 (A) 98 - 110 mmol/L    Protein Total 7.0 6.1 - 8.1 g/dL    Albumin 3.0 (A) 3.6 - 5.1 g/dL    Alkaline Phosphatase 148 (A) 33 - 130 U/L    ALT 18 0 - 32 U/L    AST 27 0 - 35 U/L    Bilirubin Total 1.0 0.2 - 1.2 mg/dL    Urea Nitrogen 27 (A) 7 - 25 mg/dL    Calcium 9.1 8.6 - 10.3 mg/dL    BUN/Creatinine Ratio 23 6 - 32    GFR Estimate 48 (A) >60 ml/min/1.73m2

## 2024-08-01 ENCOUNTER — HOSPITAL ENCOUNTER (OUTPATIENT)
Dept: MAMMOGRAPHY | Facility: CLINIC | Age: 74
Discharge: HOME | End: 2024-08-01
Attending: FAMILY MEDICINE
Payer: MEDICARE

## 2024-08-01 DIAGNOSIS — R92.8 ABNORMAL MAMMOGRAM: ICD-10-CM

## 2024-08-01 PROCEDURE — 77065 DX MAMMO INCL CAD UNI: CPT | Mod: RT

## 2024-08-05 ENCOUNTER — OFFICE VISIT (OUTPATIENT)
Dept: FAMILY MEDICINE | Facility: CLINIC | Age: 74
End: 2024-08-05

## 2024-08-05 VITALS
SYSTOLIC BLOOD PRESSURE: 122 MMHG | DIASTOLIC BLOOD PRESSURE: 78 MMHG | BODY MASS INDEX: 47.46 KG/M2 | OXYGEN SATURATION: 97 % | HEART RATE: 73 BPM | TEMPERATURE: 97.8 F | WEIGHT: 243 LBS

## 2024-08-05 DIAGNOSIS — F32.5 MAJOR DEPRESSION IN REMISSION (H): ICD-10-CM

## 2024-08-05 DIAGNOSIS — G44.049 CHRONIC PAROXYSMAL HEMICRANIA, NOT INTRACTABLE: ICD-10-CM

## 2024-08-05 DIAGNOSIS — E11.9 TYPE 2 DIABETES MELLITUS WITHOUT COMPLICATION, WITH LONG-TERM CURRENT USE OF INSULIN (H): ICD-10-CM

## 2024-08-05 DIAGNOSIS — M85.89 OSTEOPENIA OF MULTIPLE SITES: ICD-10-CM

## 2024-08-05 DIAGNOSIS — I10 ESSENTIAL HYPERTENSION: ICD-10-CM

## 2024-08-05 DIAGNOSIS — N39.498 OTHER URINARY INCONTINENCE: ICD-10-CM

## 2024-08-05 DIAGNOSIS — G89.29 CHRONIC PAIN OF BOTH SHOULDERS: ICD-10-CM

## 2024-08-05 DIAGNOSIS — N18.9 CHRONIC KIDNEY DISEASE, UNSPECIFIED CKD STAGE: ICD-10-CM

## 2024-08-05 DIAGNOSIS — I10 ESSENTIAL HYPERTENSION, BENIGN: ICD-10-CM

## 2024-08-05 DIAGNOSIS — N90.9 IRRITATION OF EXTERNAL FEMALE GENITALIA: ICD-10-CM

## 2024-08-05 DIAGNOSIS — R01.1 HEART MURMUR: ICD-10-CM

## 2024-08-05 DIAGNOSIS — Z12.11 SPECIAL SCREENING FOR MALIGNANT NEOPLASMS, COLON: Primary | ICD-10-CM

## 2024-08-05 DIAGNOSIS — R42 VERTIGO: ICD-10-CM

## 2024-08-05 DIAGNOSIS — E78.2 MIXED HYPERLIPIDEMIA: ICD-10-CM

## 2024-08-05 DIAGNOSIS — R93.0 ABNORMAL HEAD CT: ICD-10-CM

## 2024-08-05 DIAGNOSIS — M25.512 CHRONIC PAIN OF BOTH SHOULDERS: ICD-10-CM

## 2024-08-05 DIAGNOSIS — M25.511 CHRONIC PAIN OF BOTH SHOULDERS: ICD-10-CM

## 2024-08-05 DIAGNOSIS — R52 PAIN: ICD-10-CM

## 2024-08-05 DIAGNOSIS — Z79.4 TYPE 2 DIABETES MELLITUS WITHOUT COMPLICATION, WITH LONG-TERM CURRENT USE OF INSULIN (H): ICD-10-CM

## 2024-08-05 LAB
ALBUMIN SERPL-MCNC: 3 G/DL (ref 3.6–5.1)
ALP SERPL-CCNC: 148 U/L (ref 33–130)
ALT 1742-6: 18 U/L (ref 0–32)
AST 1920-8: 27 U/L (ref 0–35)
BILIRUB SERPL-MCNC: 1 MG/DL (ref 0.2–1.2)
BUN SERPL-MCNC: 27 MG/DL (ref 7–25)
BUN/CREATININE RATIO: 23 (ref 6–32)
CALCIUM SERPL-MCNC: 9.1 MG/DL (ref 8.6–10.3)
CHLORIDE SERPLBLD-SCNC: 97.8 MMOL/L (ref 98–110)
CHOLEST SERPL-MCNC: 111 MG/DL (ref 0–199)
CHOLEST/HDLC SERPL: 3 {RATIO} (ref 0–5)
CO2 SERPL-SCNC: 29.2 MMOL/L (ref 20–32)
CREAT SERPL-MCNC: 1.18 MG/DL (ref 0.6–1.3)
GFR SERPL CREATININE-BSD FRML MDRD: 48 ML/MIN/1.73M2
GLUCOSE SERPL-MCNC: 176 MG/DL (ref 60–99)
HDLC SERPL-MCNC: 39 MG/DL (ref 40–150)
HEMOGLOBIN A1C: 8.6 % (ref 4–5.6)
LDLC SERPL CALC-MCNC: 50 MG/DL
POTASSIUM SERPL-SCNC: 3.89 MMOL/L (ref 3.5–5.3)
PROT SERPL-MCNC: 7 G/DL (ref 6.1–8.1)
SODIUM SERPL-SCNC: 136.3 MMOL/L (ref 135–146)
TRIGL SERPL-MCNC: 111 MG/DL (ref 0–149)

## 2024-08-05 PROCEDURE — 80061 LIPID PANEL: CPT | Performed by: FAMILY MEDICINE

## 2024-08-05 PROCEDURE — 80053 COMPREHEN METABOLIC PANEL: CPT | Performed by: FAMILY MEDICINE

## 2024-08-05 PROCEDURE — 36415 COLL VENOUS BLD VENIPUNCTURE: CPT | Performed by: FAMILY MEDICINE

## 2024-08-05 PROCEDURE — G2211 COMPLEX E/M VISIT ADD ON: HCPCS | Performed by: FAMILY MEDICINE

## 2024-08-05 PROCEDURE — 83036 HEMOGLOBIN GLYCOSYLATED A1C: CPT | Performed by: FAMILY MEDICINE

## 2024-08-05 PROCEDURE — 99214 OFFICE O/P EST MOD 30 MIN: CPT | Performed by: FAMILY MEDICINE

## 2024-08-05 RX ORDER — METFORMIN HCL 500 MG
500 TABLET, EXTENDED RELEASE 24 HR ORAL 2 TIMES DAILY WITH MEALS
Qty: 180 TABLET | Refills: 1 | Status: SHIPPED | OUTPATIENT
Start: 2024-08-05

## 2024-08-05 RX ORDER — LOSARTAN POTASSIUM AND HYDROCHLOROTHIAZIDE 25; 100 MG/1; MG/1
1 TABLET ORAL DAILY
Qty: 90 TABLET | Refills: 1 | Status: SHIPPED | OUTPATIENT
Start: 2024-08-05

## 2024-08-05 RX ORDER — VENLAFAXINE HYDROCHLORIDE 150 MG/1
150 CAPSULE, EXTENDED RELEASE ORAL DAILY
Qty: 90 CAPSULE | Refills: 1 | Status: SHIPPED | OUTPATIENT
Start: 2024-08-05

## 2024-08-05 RX ORDER — CELECOXIB 200 MG/1
200 CAPSULE ORAL DAILY
Qty: 90 CAPSULE | Refills: 1 | Status: SHIPPED | OUTPATIENT
Start: 2024-08-05

## 2024-08-05 RX ORDER — NORTRIPTYLINE HCL 10 MG
10 CAPSULE ORAL AT BEDTIME
Qty: 90 CAPSULE | Refills: 1 | Status: SHIPPED | OUTPATIENT
Start: 2024-08-05

## 2024-08-05 RX ORDER — AMLODIPINE BESYLATE 10 MG/1
10 TABLET ORAL DAILY
Qty: 90 TABLET | Refills: 1 | Status: SHIPPED | OUTPATIENT
Start: 2024-08-05 | End: 2024-08-29

## 2024-08-05 RX ORDER — ATORVASTATIN CALCIUM 10 MG/1
10 TABLET, FILM COATED ORAL DAILY
Qty: 90 TABLET | Refills: 1 | Status: SHIPPED | OUTPATIENT
Start: 2024-08-05

## 2024-08-05 RX ORDER — GLIPIZIDE 10 MG/1
10 TABLET, FILM COATED, EXTENDED RELEASE ORAL 2 TIMES DAILY
Qty: 180 TABLET | Refills: 1 | Status: SHIPPED | OUTPATIENT
Start: 2024-08-05

## 2024-08-05 NOTE — LETTER
August 7, 2024      Jeny CANA[place] Abena  [address]  [place] [address]        Dear ,    We are writing to inform you of your test results.    Here are your labs/ be sure to followup with your kidney specialist.    Resulted Orders   HEMOGLOBIN A1C (BFP)   Result Value Ref Range    Hemoglobin A1C 8.6 (A) 4 - 5.6 %   Lipid Panel (BFP)   Result Value Ref Range    Cholesterol 111 0 - 199 mg/dL    Triglycerides 111 0 - 149 mg/dL    HDL Cholesterol 39 (A) 40 - 150 mg/dL    LDL-C 50 mg/dL    Cholesterol/HDL Ratio 3 0 - 5   Comprehensive Metobolic Panel (BFP)   Result Value Ref Range    Carbon Dioxide 29.2 20 - 32 mmol/L    Creatinine 1.18 0.60 - 1.30 mg/dL    Glucose 176 (A) 60 - 99 mg/dL    Sodium 136.3 135 - 146 mmol/L    Potassium 3.89 3.5 - 5.3 mmol/L    Chloride 97.8 (A) 98 - 110 mmol/L    Protein Total 7.0 6.1 - 8.1 g/dL    Albumin 3.0 (A) 3.6 - 5.1 g/dL    Alkaline Phosphatase 148 (A) 33 - 130 U/L    ALT 18 0 - 32 U/L    AST 27 0 - 35 U/L    Bilirubin Total 1.0 0.2 - 1.2 mg/dL    Urea Nitrogen 27 (A) 7 - 25 mg/dL    Calcium 9.1 8.6 - 10.3 mg/dL    BUN/Creatinine Ratio 23 6 - 32    GFR Estimate 48 (A) >60 ml/min/1.73m2       If you have any questions or concerns, please call the clinic at the number listed above.       Sincerely,      Yuridia Simmons MD

## 2024-08-05 NOTE — NURSING NOTE
Chief Complaint   Patient presents with    Recheck Medication     Fasting today, recheck A1C, refill medications     Pre-visit Screening:  Immunizations:  up to date  Colonoscopy:  is due and ordered today  Mammogram: is up to date  Asthma Action Test/Plan:  NA  PHQ9:  NA  GAD7:  NA  Questioned patient about current smoking habits Pt. has never smoked.  Ok to leave detailed message on voice mail for today's visit only Yes, phone # [phone]

## 2024-08-26 NOTE — PROGRESS NOTES
Assessment & Plan   Problem List Items Addressed This Visit       Essential hypertension, benign--followed by cardiology    Type 2 diabetes mellitus without complication, with long-term current use of insulin (H)    Relevant Orders    FOOT EXAM  NO CHARGE [34359.114] (Completed)     Other Visit Diagnoses       Vertigo    -  Primary    Relevant Orders    Adult Neurology  Referral - To a [facility] Location (Use POS/Location)    Abnormal head CT        Relevant Orders    Adult Neurology  Referral - To a [facility] Location (Use POS/Location)    Renal insufficiency               1. Vertigo  Abnormal head ct, results discussed with her, referral to neurology.  - Adult Neurology  Referral - To a [facility] Location (Use POS/Location)    2. Abnormal head CT  Referral to neurology to go over the results. She doesn't recall having been told in the past that she has had old infarct. I can't find a comparison in the chart.  - Adult Neurology  Referral - To a [facility] Location (Use POS/Location)    3. Type 2 diabetes mellitus without complication, with long-term current use of insulin (H)    - FOOT EXAM  NO CHARGE [19159.114]    4. Renal insufficiency      5. Essential hypertension, benign--followed by cardiology              BMI  Estimated body mass index is 47.07 kg/m  as calculated from the following:    Height as of 5/14/24: 1.524 m (5').    Weight as of this encounter: 109.3 kg (241 lb).         FUTURE APPOINTMENTS:       - Follow-up visit in 3-6 months when due for medication check. We manage her chronic medical care.    No follow-ups on file.    Yuridia Simmons MD  [facility]    Subjective     Nursing Notes:   Latha Watts CMA  8/30/2024  1:15 PM  Signed  Chief Complaint   Patient presents with    foot exam     Foot exam, was unable to complete previously     CT Results     Review CT scan      Pre-visit  Screening:  Immunizations:  up to date  Colonoscopy:  is up to date  Mammogram: is up to date  Asthma Action Test/Plan:  NA  PHQ9:  NA  GAD7:  NA  Questioned patient about current smoking habits Pt. has never smoked.  Ok to leave detailed message on voice mail for today's visit only Yes, phone # [phone]       Alma Kraft is a 73 year old female who presents to clinic today for the following health issues   HPI     Here to followup . She needs her diabetic foot exam.  Labs and medications are up to date. She has been working with [facility] on her diabetes, blood sugars at home are good.    She has renal insufficiency, she followed up with them and they dropped her amlodipine from 10 to 5.    Also followup on vertigo. No longer having the vertigo. 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 I can't see an MRI in the chart and she doesn't recall this. She hasn't seen neurology.        Review of Systems   Constitutional, HEENT, cardiovascular, pulmonary, gi and gu systems are negative, except as otherwise noted.      Objective    /78 (BP Location: Right arm, Patient Position: Sitting, Cuff Size: Adult Large)   Pulse 82   Temp 97.7  F (36.5  C) (Temporal)   Wt 109.3 kg (241 lb)   LMP 05/08/2001   SpO2 97%   BMI 47.07 kg/m    Body mass index is 47.07 kg/m .  Physical Exam   GENERAL: alert and no distress  MS: no gross musculoskeletal defects noted, no edema  PSYCH: mentation appears normal, affect normal/bright  Diabetic foot exam: normal DP and PT pulses, no trophic changes or ulcerative lesions, and normal sensory exam    No results found for any visits on 08/30/24.

## 2024-08-29 ENCOUNTER — OFFICE VISIT (OUTPATIENT)
Dept: FAMILY MEDICINE | Facility: CLINIC | Age: 74
End: 2024-08-29

## 2024-08-29 DIAGNOSIS — Z79.4 TYPE 2 DIABETES MELLITUS WITHOUT COMPLICATION, WITH LONG-TERM CURRENT USE OF INSULIN (H): ICD-10-CM

## 2024-08-29 DIAGNOSIS — E11.9 TYPE 2 DIABETES MELLITUS WITHOUT COMPLICATION, WITH LONG-TERM CURRENT USE OF INSULIN (H): ICD-10-CM

## 2024-08-29 DIAGNOSIS — I10 ESSENTIAL HYPERTENSION, BENIGN: Primary | ICD-10-CM

## 2024-08-29 RX ORDER — AMLODIPINE BESYLATE 5 MG/1
5 TABLET ORAL DAILY
Qty: 90 TABLET | Refills: 1 | Status: SHIPPED | OUTPATIENT
Start: 2024-08-29

## 2024-08-29 NOTE — PROGRESS NOTES
SUBJECTIVE/OBJECTIVE:                Alma Kraft is a 73 year old female seen  for a follow-up visit for Medication Management Services.  She was referred to me from Dr. Yuridia Simmons.     Chief Complaint: Follow up from [facility] visit on 02/01/2024.      Diabetes:  Current Medications:  Current Outpatient Medications   Medication Sig Dispense Refill    amLODIPine (NORVASC) 10 MG tablet Take 1 tablet (10 mg) by mouth daily 90 tablet 1    ASPIRIN 81 MG OR TABS 1 tab po QD (Once per day) 100 3    atorvastatin (LIPITOR) 10 MG tablet Take 1 tablet (10 mg) by mouth daily 90 tablet 1    blood glucose (ONE TOUCH DELICA) lancing device Device to be used with lancets. One touch Delica 100 each 3    blood glucose (ONETOUCH VERIO IQ) test strip Use to test blood sugar one time daily or as directed. 100 strip 1    blood glucose monitoring (ONE TOUCH DELICA) lancets Use to test blood sugars 1 times daily or as directed. 100 each 3    Blood Glucose Monitoring Suppl (ONE TOUCH ULTRA 2) W/DEVICE KIT Use to test blood sugars 2 times daily or as directed. 1 kit 0    budesonide (RINOCORT AQUA) 32 MCG/ACT nasal spray Spray 2 sprays into both nostrils daily 25.29 mL 11    carvedilol (COREG) 25 MG tablet Take 1 tablet (25 mg) by mouth 2 times daily (with meals) 180 tablet 3    celecoxib (CELEBREX) 200 MG capsule Take 1 capsule (200 mg) by mouth daily 90 capsule 1    cyclobenzaprine (FLEXERIL) 5 MG tablet Take 1 tablet (5 mg) by mouth 3 times daily as needed for muscle spasms 30 tablet 0    furosemide (LASIX) 20 MG tablet Take 1 tablet (20 mg) by mouth daily 90 tablet 3    glipiZIDE (GLUCOTROL XL) 10 MG 24 hr tablet Take 1 tablet (10 mg) by mouth 2 times daily 180 tablet 1    insulin glargine 100 UNIT/ML pen Inject 46 Units Subcutaneous daily 75 mL 1    insulin pen needle (Fundrise SAFEPACK PEN NEEDLE) 32G X 4 MM miscellaneous Use 1 pen needle daily or as directed. 100 each 1    losartan-hydrochlorothiazide (HYZAAR) 100-25 MG tablet  Take 1 tablet by mouth daily 90 tablet 1    metFORMIN (GLUCOPHAGE XR) 500 MG 24 hr tablet Take 1 tablet (500 mg) by mouth 2 times daily (with meals) 180 tablet 1    metroNIDAZOLE (METROGEL) 0.75 % external gel apply twice a day topically to the central face.*      Multiple Vitamins-Minerals (CENTRUM SILVER) per tablet 1 tablet 4 times weekly 100 tablet     nortriptyline (PAMELOR) 10 MG capsule Take 1 capsule (10 mg) by mouth at bedtime 90 capsule 1    OneTouch Delica Lancets 33G MISC 1 Lancet daily 100 each 3    ORDER FOR DME Equipment being ordered: Mediven plus compression stockings    [id number]  20-30 compression 3 Device 0    venlafaxine (EFFEXOR XR) 150 MG 24 hr capsule Take 1 capsule (150 mg) by mouth daily 90 capsule 1     No current facility-administered medications for this visit.       We discussed the benefits and risks of each medication.    Labs:  Lab Results   Component Value Date    A1C 8.6 08/05/2024    A1C 9.7 05/06/2024    A1C 9.5 02/05/2024    A1C 8.0 10/16/2023    A1C 7.1 07/14/2023    A1C 7.5 03/27/2023    A1C 7.8 12/14/2022       ASSESSMENT/PLAN:                Diabetes:  Assessment: Jeny returned to clinic and A1c had decreased from 9.7 to 8.6.    Patient brought in fasting levels and they fluctuate from low 100s to high 100s with average being between 120-140. She has been on 35 units long acting insulin once daily. Will increase to 38 units daily. Discussed wanting to keep fasting levels below 130.    I will follow up with patient in two weeks to collect fasting numbers and determine if we need to taper further up on the Lantus.    Dr. Carcamo prescribed amlodipine 5mg. Was refilled at 10mg however tablets are difficult to cut. Will refill at 5mg today.    Status: Diabetes control improved yet not at goal  Drug Therapy Problems:  1) Insulin regimen suboptimal  Plan:  1) Increase insulin to 38 units daily   2) Follow up in two weeks over telephone to determine further taper.      I spent 45 minutes  with this patient today.  All changes were made via collaborative practice agreement with Yuridia Simmons A copy of the visit note was provided to the patient's primary care pro    Romina Caro PharmD  Clinical Pharmacist  [facility] Phone: [phone]  Direct Office Phone: [phone]

## 2024-08-30 ENCOUNTER — OFFICE VISIT (OUTPATIENT)
Dept: FAMILY MEDICINE | Facility: CLINIC | Age: 74
End: 2024-08-30

## 2024-08-30 VITALS
HEART RATE: 82 BPM | SYSTOLIC BLOOD PRESSURE: 124 MMHG | TEMPERATURE: 97.7 F | WEIGHT: 241 LBS | DIASTOLIC BLOOD PRESSURE: 78 MMHG | BODY MASS INDEX: 47.07 KG/M2 | OXYGEN SATURATION: 97 %

## 2024-08-30 DIAGNOSIS — I10 ESSENTIAL HYPERTENSION, BENIGN: ICD-10-CM

## 2024-08-30 DIAGNOSIS — E11.9 TYPE 2 DIABETES MELLITUS WITHOUT COMPLICATION, WITH LONG-TERM CURRENT USE OF INSULIN (H): ICD-10-CM

## 2024-08-30 DIAGNOSIS — N28.9 RENAL INSUFFICIENCY: ICD-10-CM

## 2024-08-30 DIAGNOSIS — R42 VERTIGO: Primary | ICD-10-CM

## 2024-08-30 DIAGNOSIS — Z79.4 TYPE 2 DIABETES MELLITUS WITHOUT COMPLICATION, WITH LONG-TERM CURRENT USE OF INSULIN (H): ICD-10-CM

## 2024-08-30 DIAGNOSIS — R93.0 ABNORMAL HEAD CT: ICD-10-CM

## 2024-08-30 PROCEDURE — G2211 COMPLEX E/M VISIT ADD ON: HCPCS | Performed by: FAMILY MEDICINE

## 2024-08-30 PROCEDURE — 99213 OFFICE O/P EST LOW 20 MIN: CPT | Performed by: FAMILY MEDICINE

## 2024-08-30 NOTE — NURSING NOTE
Chief Complaint   Patient presents with    foot exam     Foot exam, was unable to complete previously     CT Results     Review CT scan      Pre-visit Screening:  Immunizations:  up to date  Colonoscopy:  is up to date  Mammogram: is up to date  Asthma Action Test/Plan:  NA  PHQ9:  NA  GAD7:  NA  Questioned patient about current smoking habits Pt. has never smoked.  Ok to leave detailed message on voice mail for today's visit only Yes, phone # [phone]

## 2024-09-23 ENCOUNTER — TRANSFERRED RECORDS (OUTPATIENT)
Dept: FAMILY MEDICINE | Facility: CLINIC | Age: 74
End: 2024-09-23

## 2024-09-25 ENCOUNTER — TRANSFERRED RECORDS (OUTPATIENT)
Dept: FAMILY MEDICINE | Facility: CLINIC | Age: 74
End: 2024-09-25

## 2024-10-27 NOTE — PROGRESS NOTES
HISTORY OF PRESENT ILLNESS:     This is a 74 year old female who follows with Dr Espinoza at [facility]  Her past medical history includes:  Hypertension, sleep apnea, hyperlipidemia, type II diabetes, morbid obesity, aortic stenosis, cirrhosis of the liver, and peripheral edema.     Ms Kraft has longstanding hypertension which has been difficult to control. A Cindy NUC (2018) showed no ischemia/infarction  LVEF 81%    ECHO (2022) showed LVEF 60-65% without regional wall motion abnormalities, normal RV function, moderate aortic stenosis, could not exclude bicuspid aortic valve  (mean gradient 31 mmHg, Louie 1.3 cm2, V max 3.7 m/sec), mild ascending aorta dilatation, RVSP 32 mmHg    Earlier this year, she was placed on spironolactone by her gastroenterologist  She then developed acute kidney injury and her spironolactone was stopped  Fortunately, her potassium and creatinine levels significantly improved  She has since seen Nephrology and she was asked to lower her Amlodipine and consider stopping the dose due to her lower extremity edema    ECHO (7/2024) showed LVEF 60%, moderate aortic stenosis (mean gradient 38 mmHg), aortic root 4.1 cm     Our visit today is for further review     Ms Kraft admits to not being active  She gets tired easily  and uses a cane  She denies any chest pain or significant shortness of breath  Her leg swelling has been minimal She weaned off the Amlodipine as directed by Dr Carcamo  Her main complaint is ongoing dry mouth which makes her want to have sugary fluid intake  She has upcoming visit about this issue  She denies any palpitations or orthopnea         VITAL SIGNS:  BP: 134/76  Pulse:  67  Weight: 245 lbs  (BMI: 47)     IMPRESSION AND PLAN:     Hypertension:  -on Coreg 25 mg BID,  losartan-hydrochlorothiazide 100-25 mg, Lasix 40 mg  -BP controlled  Mainly managed by Neprhology     Moderate Aortic Stenosis  -mean gradient 38 mmHg, LOUIE 1.2 cm2  -repeat ECHO next  year    Bilateral Carotid Bruits  -may be transmission from her murmur  -will check carotid ultrasound     Hyperlipidemia:  -on Atorvastatin 10 mg  -LDL  50     Insulin Dependent Diabetes  -Hgb A1C  8.6     Morbid Obesity with associated liver cirrhosis  -encouraged calorie restriction, increasing physical activity    -On diuretics     Treated Sleep Apnea:  -follows closely with sleep clinic     Mild Pulmonary Hypertension    The total time for the visit today was 30 minutes which includes patient visit, reviewing of records, discussion, and placing of orders of the outpatient coordination of cardiovascular care as described.  The level of medical decision making during this visit was of moderate complexity.  Thank you for allowing me to participate in their care.    The longitudinal plan of care for the diagnosis(es)/condition(s) as documented were addressed during this visit. Due to the added complexity in care, I will continue to support Jeny in the subsequent management and with ongoing continuity of care.      Orders Placed This Encounter   Procedures    US Carotid Bilateral    Follow-Up with Cardiology    Echocardiogram Complete       Orders Placed This Encounter   Medications    carvedilol (COREG) 25 MG tablet     Sig: Take 1 tablet (25 mg) by mouth 2 times daily (with meals).     Dispense:  180 tablet     Refill:  3       Medications Discontinued During This Encounter   Medication Reason    amLODIPine (NORVASC) 5 MG tablet     budesonide (RINOCORT AQUA) 32 MCG/ACT nasal spray     carvedilol (COREG) 25 MG tablet          Encounter Diagnoses   Name Primary?    Heart failure with preserved ejection fraction, NYHA class I (H)     Essential hypertension     Localized edema     Bilateral carotid bruits Yes    Nonrheumatic aortic valve stenosis--followed by cardiology        CURRENT MEDICATIONS:  Current Outpatient Medications   Medication Sig Dispense Refill    ASPIRIN 81 MG OR TABS 1 tab po QD (Once per day) 100  3    atorvastatin (LIPITOR) 10 MG tablet Take 1 tablet (10 mg) by mouth daily 90 tablet 1    BD PEN NEEDLE ROSE 2ND GEN 32G X 4 MM miscellaneous USE 1 PEN NEEDLE DAILY OR AS DIRECTED 100 each 2    blood glucose (ONE TOUCH DELICA) lancing device Device to be used with lancets. One touch Delica 100 each 3    blood glucose (ONETOUCH VERIO IQ) test strip Use to test blood sugar one time daily or as directed. 100 strip 1    blood glucose monitoring (ONE TOUCH DELICA) lancets Use to test blood sugars 1 times daily or as directed. 100 each 3    Blood Glucose Monitoring Suppl (ONE TOUCH ULTRA 2) W/DEVICE KIT Use to test blood sugars 2 times daily or as directed. 1 kit 0    carvedilol (COREG) 25 MG tablet Take 1 tablet (25 mg) by mouth 2 times daily (with meals). 180 tablet 3    celecoxib (CELEBREX) 200 MG capsule Take 1 capsule (200 mg) by mouth daily 90 capsule 1    cyclobenzaprine (FLEXERIL) 5 MG tablet Take 1 tablet (5 mg) by mouth 3 times daily as needed for muscle spasms 30 tablet 0    furosemide (LASIX) 20 MG tablet Take 1 tablet (20 mg) by mouth daily (Patient taking differently: Take 40 mg by mouth daily.) 90 tablet 3    glipiZIDE (GLUCOTROL XL) 10 MG 24 hr tablet Take 1 tablet (10 mg) by mouth 2 times daily 180 tablet 1    insulin glargine (LANTUS PEN) 100 UNIT/ML pen Inject 38 Units subcutaneously at bedtime. 15 mL 1    losartan-hydrochlorothiazide (HYZAAR) 100-25 MG tablet Take 1 tablet by mouth daily 90 tablet 1    metFORMIN (GLUCOPHAGE XR) 500 MG 24 hr tablet Take 1 tablet (500 mg) by mouth 2 times daily (with meals) 180 tablet 1    metroNIDAZOLE (METROGEL) 0.75 % external gel apply twice a day topically to the central face.*      Multiple Vitamins-Minerals (CENTRUM SILVER) per tablet 1 tablet 4 times weekly 100 tablet     nortriptyline (PAMELOR) 10 MG capsule Take 1 capsule (10 mg) by mouth at bedtime 90 capsule 1    OneTouch Delica Lancets 33G MISC 1 Lancet daily 100 each 3    ORDER FOR DME Equipment being  ordered: Mediven plus compression stockings    69699  20-30 compression 3 Device 0    venlafaxine (EFFEXOR XR) 150 MG 24 hr capsule Take 1 capsule (150 mg) by mouth daily 90 capsule 1       ALLERGIES     Allergies   Allergen Reactions    Demerol Nausea and Vomiting    Erythromycin      GI upset       PAST MEDICAL HISTORY:  Past Medical History:   Diagnosis Date    Arthritis     hands, Right shoulder    Diabetes (H)     Hypertension     Sleep apnea     Uses a CPAP       PAST SURGICAL HISTORY:  Past Surgical History:   Procedure Laterality Date    ------------OTHER-------------  09/05/2013    L hand, cyst excision    ------------OTHER------------- Right 03/14/2014    shoulder manipulation    ESOPHAGOSCOPY, GASTROSCOPY, DUODENOSCOPY (EGD), COMBINED N/A 01/24/2022    Procedure: ESOPHAGOGASTRODUODENOSCOPY;  Surgeon: Xavi Heller MD;  Location: [facility] OR    HC INCISION TENDON SHEATH FINGER Left 09/05/2013    left thumb trigger finger    HC KNEE SCOPE, DIAGNOSTIC  04/2005    Arthroscopy, Knee Left    HC REMOVE TONSILS/ADENOIDS,<12 Y/O  1953    T & A <11y.o.    TEST NOT FOUND  06/27/2007    R heel/ inocencio's deformity    ZZC APPENDECTOMY  1956    ZZC TOTAL KNEE ARTHROPLASTY  02/2006    Knee Replacement, Total Right    ZZC TOTAL KNEE ARTHROPLASTY  03/05/2007    Knee Replacement, Total  Left    ZZC VAGINAL HYSTERECTOMY  08/2001    Hysterectomy, Vaginal & BSO       FAMILY HISTORY:  Family History   Problem Relation Age of Onset    Cerebrovascular Disease Mother     Deep Vein Thrombosis Mother     Cancer Father         prostate    Gastrointestinal Disease Father     Breast Cancer Sister         in 60s    Breast Cancer Sister         in her 50s    Heart Disease Maternal Grandmother     Cerebrovascular Disease Maternal Grandfather     Heart Disease Paternal Grandfather     Prostate Cancer Brother     Prostate Cancer Brother     Gastrointestinal Disease Other         Niece with GERD/hiatal hernia       SOCIAL HISTORY:  Social  History     Socioeconomic History    Marital status:      Spouse name: Blas    Number of children: 3    Years of education: 16    Highest education level: None   Occupational History    Occupation: Processor     Employer: [employer]   Tobacco Use    Smoking status: Never     Passive exposure: Never    Smokeless tobacco: Never   Substance and Sexual Activity    Alcohol use: Not Currently    Drug use: No    Sexual activity: Never     Partners: Male     Comment: Hysterectomy   Other Topics Concern    Exercise No    Seat Belt Yes    Self-Exams Yes   Social History Narrative        Functional abiltity:      Hearing imparment:No      Acitvities of daily living:Normal      Risk of falls:No      Home safety of concern:No        Do you exercise?     NO   Times/week: 0    History of abusive relationships in past:   No    History of abusive relationships currently:    No    Do you feel emotionally and physically safe in your environment?     Yes    Do you own a gun?  No      Is the gun kept in a safe place:   NOT APPLICABLE    Do you wear a seatbelt regularly?     Yes    Do you use sun screen?     Yes         Social Drivers of Health      Received from [employer]    Social Connections       Review of Systems:  Skin:  not assessed       Eyes:  not assessed      ENT:  not assessed      Respiratory:  Positive for sleep apnea;CPAP     Cardiovascular:    Positive for;edema;fatigue;lightheadedness pt shares that a recent scan revealed she had a mini stroke back in 2011  Gastroenterology: not assessed      Genitourinary:  not assessed      Musculoskeletal:  not assessed      Neurologic:  not assessed      Psychiatric:  not assessed      Heme/Lymph/Imm:  not assessed      Endocrine:  not assessed        Physical Exam:  Vitals: /76 (BP Location: Right arm, Patient Position: Sitting)   Pulse 67   Ht 1.524 m (5')   Wt 111.4 kg (245 lb 8 oz)   LMP 05/08/2001   BMI 47.95 kg/m       Constitutional:  cooperative morbidly obese      Skin:  warm and dry to the touch          Head:  normocephalic        Eyes:  pupils equal and round        Lymph:      ENT:  no pallor or cyanosis        Neck:    bilateral carotid bruit Transmitted murmur right greater than left    Respiratory:  normal respiratory excursion;clear to auscultation         Cardiac: regular rhythm;normal S1 and S2;no S3 or S4       systolic ejection murmur;grade 2;RUSB;radiation to the carotid        pulses full and equal                                        GI:    obese      Extremities and Muscular Skeletal:  no edema              Neurological:  no gross motor deficits        Psych:  affect appropriate, oriented to time, person and place          CC  Lan Espinoza MD  [address] [place] [id number]

## 2024-10-28 ENCOUNTER — OFFICE VISIT (OUTPATIENT)
Dept: CARDIOLOGY | Facility: CLINIC | Age: 74
End: 2024-10-28
Attending: INTERNAL MEDICINE
Payer: MEDICARE

## 2024-10-28 VITALS
SYSTOLIC BLOOD PRESSURE: 134 MMHG | HEART RATE: 67 BPM | HEIGHT: 60 IN | WEIGHT: 245.5 LBS | DIASTOLIC BLOOD PRESSURE: 76 MMHG | BODY MASS INDEX: 48.2 KG/M2

## 2024-10-28 DIAGNOSIS — I50.30 HEART FAILURE WITH PRESERVED EJECTION FRACTION, NYHA CLASS I (H): ICD-10-CM

## 2024-10-28 DIAGNOSIS — I35.0 NONRHEUMATIC AORTIC VALVE STENOSIS: ICD-10-CM

## 2024-10-28 DIAGNOSIS — I10 ESSENTIAL HYPERTENSION: ICD-10-CM

## 2024-10-28 DIAGNOSIS — R60.0 LOCALIZED EDEMA: ICD-10-CM

## 2024-10-28 DIAGNOSIS — R09.89 BILATERAL CAROTID BRUITS: Primary | ICD-10-CM

## 2024-10-28 PROCEDURE — 99214 OFFICE O/P EST MOD 30 MIN: CPT | Performed by: NURSE PRACTITIONER

## 2024-10-28 RX ORDER — CARVEDILOL 25 MG/1
25 TABLET ORAL 2 TIMES DAILY WITH MEALS
Qty: 180 TABLET | Refills: 3 | Status: SHIPPED | OUTPATIENT
Start: 2024-10-28

## 2024-10-28 NOTE — LETTER
10/28/2024    Yuridia Simmons MD  [address] [facility] [address]    RE: Alma Kraft       Dear Colleague,     I had the pleasure of seeing Alma Kraft in the [facility].  HISTORY OF PRESENT ILLNESS:     This is a 74 year old female who follows with Dr Espinoza at [facility]  Her past medical history includes:  Hypertension, sleep apnea, hyperlipidemia, type II diabetes, morbid obesity, aortic stenosis, cirrhosis of the liver, and peripheral edema.     Ms Kraft has longstanding hypertension which has been difficult to control. A Cindy NUC (2018) showed no ischemia/infarction  LVEF 81%    ECHO (2022) showed LVEF 60-65% without regional wall motion abnormalities, normal RV function, moderate aortic stenosis, could not exclude bicuspid aortic valve  (mean gradient 31 mmHg, Mann 1.3 cm2, V max 3.7 m/sec), mild ascending aorta dilatation, RVSP 32 mmHg    Earlier this year, she was placed on spironolactone by her gastroenterologist  She then developed acute kidney injury and her spironolactone was stopped  Fortunately, her potassium and creatinine levels significantly improved  She has since seen Nephrology and she was asked to lower her Amlodipine and consider stopping the dose due to her lower extremity edema    ECHO (7/2024) showed LVEF 60%, moderate aortic stenosis (mean gradient 38 mmHg), aortic root 4.1 cm     Our visit today is for further review     Ms Kraft admits to not being active  She gets tired easily  and uses a cane  She denies any chest pain or significant shortness of breath  Her leg swelling has been minimal She weaned off the Amlodipine as directed by Dr Carcamo  Her main complaint is ongoing dry mouth which makes her want to have sugary fluid intake  She has upcoming visit about this issue  She denies any palpitations or orthopnea         VITAL SIGNS:  BP: 134/76  Pulse:  67  Weight: 245 lbs  (BMI: 47)     IMPRESSION AND PLAN:     Hypertension:  -on  Coreg 25 mg BID,  losartan-hydrochlorothiazide 100-25 mg, Lasix 40 mg  -BP controlled  Mainly managed by Neprhology     Moderate Aortic Stenosis  -mean gradient 38 mmHg, LOUIE 1.2 cm2  -repeat ECHO next year    Bilateral Carotid Bruits  -may be transmission from her murmur  -will check carotid ultrasound     Hyperlipidemia:  -on Atorvastatin 10 mg  -LDL  50     Insulin Dependent Diabetes  -Hgb A1C  8.6     Morbid Obesity with associated liver cirrhosis  -encouraged calorie restriction, increasing physical activity    -On diuretics     Treated Sleep Apnea:  -follows closely with sleep clinic     Mild Pulmonary Hypertension    The total time for the visit today was 30 minutes which includes patient visit, reviewing of records, discussion, and placing of orders of the outpatient coordination of cardiovascular care as described.  The level of medical decision making during this visit was of moderate complexity.  Thank you for allowing me to participate in their care.    The longitudinal plan of care for the diagnosis(es)/condition(s) as documented were addressed during this visit. Due to the added complexity in care, I will continue to support Jeny in the subsequent management and with ongoing continuity of care.      Orders Placed This Encounter   Procedures     US Carotid Bilateral     Follow-Up with Cardiology     Echocardiogram Complete       Orders Placed This Encounter   Medications     carvedilol (COREG) 25 MG tablet     Sig: Take 1 tablet (25 mg) by mouth 2 times daily (with meals).     Dispense:  180 tablet     Refill:  3       Medications Discontinued During This Encounter   Medication Reason     amLODIPine (NORVASC) 5 MG tablet      budesonide (RINOCORT AQUA) 32 MCG/ACT nasal spray      carvedilol (COREG) 25 MG tablet          Encounter Diagnoses   Name Primary?     Heart failure with preserved ejection fraction, NYHA class I (H)      Essential hypertension      Localized edema      Bilateral carotid bruits Yes      Nonrheumatic aortic valve stenosis--followed by cardiology        CURRENT MEDICATIONS:  Current Outpatient Medications   Medication Sig Dispense Refill     ASPIRIN 81 MG OR TABS 1 tab po QD (Once per day) 100 3     atorvastatin (LIPITOR) 10 MG tablet Take 1 tablet (10 mg) by mouth daily 90 tablet 1     BD PEN NEEDLE ROSE 2ND GEN 32G X 4 MM miscellaneous USE 1 PEN NEEDLE DAILY OR AS DIRECTED 100 each 2     blood glucose (ONE TOUCH DELICA) lancing device Device to be used with lancets. One touch Delica 100 each 3     blood glucose (ONETOUCH VERIO IQ) test strip Use to test blood sugar one time daily or as directed. 100 strip 1     blood glucose monitoring (ONE TOUCH DELICA) lancets Use to test blood sugars 1 times daily or as directed. 100 each 3     Blood Glucose Monitoring Suppl (ONE TOUCH ULTRA 2) W/DEVICE KIT Use to test blood sugars 2 times daily or as directed. 1 kit 0     carvedilol (COREG) 25 MG tablet Take 1 tablet (25 mg) by mouth 2 times daily (with meals). 180 tablet 3     celecoxib (CELEBREX) 200 MG capsule Take 1 capsule (200 mg) by mouth daily 90 capsule 1     cyclobenzaprine (FLEXERIL) 5 MG tablet Take 1 tablet (5 mg) by mouth 3 times daily as needed for muscle spasms 30 tablet 0     furosemide (LASIX) 20 MG tablet Take 1 tablet (20 mg) by mouth daily (Patient taking differently: Take 40 mg by mouth daily.) 90 tablet 3     glipiZIDE (GLUCOTROL XL) 10 MG 24 hr tablet Take 1 tablet (10 mg) by mouth 2 times daily 180 tablet 1     insulin glargine (LANTUS PEN) 100 UNIT/ML pen Inject 38 Units subcutaneously at bedtime. 15 mL 1     losartan-hydrochlorothiazide (HYZAAR) 100-25 MG tablet Take 1 tablet by mouth daily 90 tablet 1     metFORMIN (GLUCOPHAGE XR) 500 MG 24 hr tablet Take 1 tablet (500 mg) by mouth 2 times daily (with meals) 180 tablet 1     metroNIDAZOLE (METROGEL) 0.75 % external gel apply twice a day topically to the central face.*       Multiple Vitamins-Minerals (CENTRUM SILVER) per tablet 1  tablet 4 times weekly 100 tablet      nortriptyline (PAMELOR) 10 MG capsule Take 1 capsule (10 mg) by mouth at bedtime 90 capsule 1     OneTouch Delica Lancets 33G MISC 1 Lancet daily 100 each 3     ORDER FOR DME Equipment being ordered: Mediven plus compression stockings    39740  20-30 compression 3 Device 0     venlafaxine (EFFEXOR XR) 150 MG 24 hr capsule Take 1 capsule (150 mg) by mouth daily 90 capsule 1       ALLERGIES     Allergies   Allergen Reactions     Demerol Nausea and Vomiting     Erythromycin      GI upset       PAST MEDICAL HISTORY:  Past Medical History:   Diagnosis Date     Arthritis     hands, Right shoulder     Diabetes (H)      Hypertension      Sleep apnea     Uses a CPAP       PAST SURGICAL HISTORY:  Past Surgical History:   Procedure Laterality Date     ------------OTHER-------------  09/05/2013    L hand, cyst excision     ------------OTHER------------- Right 03/14/2014    shoulder manipulation     ESOPHAGOSCOPY, GASTROSCOPY, DUODENOSCOPY (EGD), COMBINED N/A 01/24/2022    Procedure: ESOPHAGOGASTRODUODENOSCOPY;  Surgeon: Xavi Heller MD;  Location: [facility] OR     HC INCISION TENDON SHEATH FINGER Left 09/05/2013    left thumb trigger finger     HC KNEE SCOPE, DIAGNOSTIC  04/2005    Arthroscopy, Knee Left     HC REMOVE TONSILS/ADENOIDS,<10 Y/O  1953    T & A <11y.o.     TEST NOT FOUND  06/27/2007    R heel/ inocencio's deformity     ZZC APPENDECTOMY  1956     ZZC TOTAL KNEE ARTHROPLASTY  02/2006    Knee Replacement, Total Right     ZZC TOTAL KNEE ARTHROPLASTY  03/05/2007    Knee Replacement, Total  Left     ZZC VAGINAL HYSTERECTOMY  08/2001    Hysterectomy, Vaginal & BSO       FAMILY HISTORY:  Family History   Problem Relation Age of Onset     Cerebrovascular Disease Mother      Deep Vein Thrombosis Mother      Cancer Father         prostate     Gastrointestinal Disease Father      Breast Cancer Sister         in 60s     Breast Cancer Sister         in her 50s     Heart Disease Maternal Grandmother       Cerebrovascular Disease Maternal Grandfather      Heart Disease Paternal Grandfather      Prostate Cancer Brother      Prostate Cancer Brother      Gastrointestinal Disease Other         Niece with GERD/hiatal hernia       SOCIAL HISTORY:  Social History     Socioeconomic History     Marital status:      Spouse name: Blas     Number of children: 3     Years of education: 16     Highest education level: None   Occupational History     Occupation: Processor     Employer: [employer]   Tobacco Use     Smoking status: Never     Passive exposure: Never     Smokeless tobacco: Never   Substance and Sexual Activity     Alcohol use: Not Currently     Drug use: No     Sexual activity: Never     Partners: Male     Comment: Hysterectomy   Other Topics Concern     Exercise No     Seat Belt Yes     Self-Exams Yes   Social History Narrative        Functional abiltity:      Hearing imparment:No      Acitvities of daily living:Normal      Risk of falls:No      Home safety of concern:No        Do you exercise?     NO   Times/week: 0    History of abusive relationships in past:   No    History of abusive relationships currently:    No    Do you feel emotionally and physically safe in your environment?     Yes    Do you own a gun?  No      Is the gun kept in a safe place:   NOT APPLICABLE    Do you wear a seatbelt regularly?     Yes    Do you use sun screen?     Yes         Social Drivers of Health      Received from [facility] [employer]    Social Connections       Review of Systems:  Skin:  not assessed       Eyes:  not assessed      ENT:  not assessed      Respiratory:  Positive for sleep apnea;CPAP     Cardiovascular:    Positive for;edema;fatigue;lightheadedness pt shares that a recent scan revealed she had a mini stroke back in 2011  Gastroenterology: not assessed      Genitourinary:  not assessed      Musculoskeletal:  not assessed      Neurologic:  not assessed      Psychiatric:  not  assessed      Heme/Lymph/Imm:  not assessed      Endocrine:  not assessed        Physical Exam:  Vitals: /76 (BP Location: Right arm, Patient Position: Sitting)   Pulse 67   Ht 1.524 m (5')   Wt 111.4 kg (245 lb 8 oz)   LMP 05/08/2001   BMI 47.95 kg/m      Constitutional:  cooperative morbidly obese      Skin:  warm and dry to the touch          Head:  normocephalic        Eyes:  pupils equal and round        Lymph:      ENT:  no pallor or cyanosis        Neck:    bilateral carotid bruit Transmitted murmur right greater than left    Respiratory:  normal respiratory excursion;clear to auscultation         Cardiac: regular rhythm;normal S1 and S2;no S3 or S4       systolic ejection murmur;grade 2;RUSB;radiation to the carotid        pulses full and equal                                        GI:    obese      Extremities and Muscular Skeletal:  no edema              Neurological:  no gross motor deficits        Psych:  affect appropriate, oriented to time, person and place          CC  Lan Espinoza MD  [address] [place], [place] [id number]                      Thank you for allowing me to participate in the care of your patient.      Sincerely,     [facility]  cc:   Lan Espinoza MD  [address] [place] [id number]

## 2024-11-01 NOTE — PROGRESS NOTES
Assessment & Plan   Problem List Items Addressed This Visit       Type 2 diabetes mellitus without complication, with long-term current use of insulin (H) - Primary    Relevant Orders    HEMOGLOBIN A1C ([facility]) (Completed)    VENOUS COLLECTION (Completed)     Other Visit Diagnoses       Dry mouth        Relevant Orders    MISAEL Scrn Rflx to [facility] ([facility])    ESR WESTERGREN ([facility]) (Completed)           1. Type 2 diabetes mellitus without complication, with long-term current use of insulin (H) (Primary)  Hgba1c is very high. She will be meeting tomorrow with Romina and can go over her medications, discuss insulin adjustments. Also recommend to stop sweet and drinks high in carbohydrates.  - HEMOGLOBIN A1C ([facility])  - VENOUS COLLECTION    2. Dry mouth  Followup with the specialist that she plans to see , also check auto immune labs.  - MISAEL Scrn Rflx to [facility] ([facility])  - ESR WESTERGREN ([facility])            BMI  Estimated body mass index is 46.87 kg/m  as calculated from the following:    Height as of 10/28/24: 1.524 m (5').    Weight as of this encounter: 108.9 kg (240 lb).         FUTURE APPOINTMENTS:       - Follow-up visit when due for medication check. We manage her chronic medical care.    No follow-ups on file.    Yuridia Simmons MD  [facility]    Subjective     Nursing Notes:   Latha Watts CMA  11/6/2024 10:39 AM  Signed  Chief Complaint   Patient presents with    Recheck Medication     Fasting today, does not need refills at this time, nephrologist advised she stop amlodipine     Pre-visit Screening:  Immunizations:  up to date  Colonoscopy:  is up to date  Mammogram: is up to date  Asthma Action Test/Plan:  NA  PHQ9:  NA  GAD7:  NA  Questioned patient about current smoking habits Pt. has never smoked.  Ok to leave detailed message on voice mail for today's visit only Yes, phone # [phone]       Alma Kraft is a 74 year old female who presents to clinic today for the  following health issues   HPI     Here for hemoglobin A1c for her diabetes. Has been drinking sugary drinks: juice and pop due to worsening of her dry mouth. Has had this for years is very uncomfortable, worse over the past 2 years. Talked to her oral surgeon, now is referred to see another specialist.    Home blood sugars. This morning it was 208. Usually around 150-180. Will see Romina tomorrow. She is on insulin.          Review of Systems   Constitutional, HEENT, cardiovascular, pulmonary, gi and gu systems are negative, except as otherwise noted.      Objective    /76 (BP Location: Right arm, Patient Position: Sitting, Cuff Size: Adult Large)   Pulse 67   Temp 98  F (36.7  C) (Temporal)   Wt 108.9 kg (240 lb)   LMP 05/08/2001   SpO2 99%   BMI 46.87 kg/m    Body mass index is 46.87 kg/m .  Physical Exam   GENERAL: alert and no distress  MS: no gross musculoskeletal defects noted, no edema  PSYCH: mentation appears normal, affect normal/bright    Results for orders placed or performed in visit on 11/06/24   HEMOGLOBIN A1C ([facility])     Status: Abnormal   Result Value Ref Range    Hemoglobin A1C 10.7 (A) 4 - 5.6 %   ESR WESTERGREN ([facility])     Status: Abnormal   Result Value Ref Range    Sed Rate 50 (A) 0 - 20 mm/hr

## 2024-11-05 ENCOUNTER — HOSPITAL ENCOUNTER (OUTPATIENT)
Dept: CARDIOLOGY | Facility: CLINIC | Age: 74
Discharge: HOME | End: 2024-11-05
Attending: NURSE PRACTITIONER | Admitting: NURSE PRACTITIONER
Payer: MEDICARE

## 2024-11-05 DIAGNOSIS — R09.89 BILATERAL CAROTID BRUITS: ICD-10-CM

## 2024-11-05 PROCEDURE — 93880 EXTRACRANIAL BILAT STUDY: CPT

## 2024-11-05 PROCEDURE — 93880 EXTRACRANIAL BILAT STUDY: CPT | Mod: 26 | Performed by: INTERNAL MEDICINE

## 2024-11-06 ENCOUNTER — OFFICE VISIT (OUTPATIENT)
Dept: FAMILY MEDICINE | Facility: CLINIC | Age: 74
End: 2024-11-06

## 2024-11-06 VITALS
TEMPERATURE: 98 F | SYSTOLIC BLOOD PRESSURE: 126 MMHG | BODY MASS INDEX: 46.87 KG/M2 | DIASTOLIC BLOOD PRESSURE: 76 MMHG | OXYGEN SATURATION: 99 % | WEIGHT: 240 LBS | HEART RATE: 67 BPM

## 2024-11-06 DIAGNOSIS — R68.2 DRY MOUTH: ICD-10-CM

## 2024-11-06 DIAGNOSIS — Z79.4 TYPE 2 DIABETES MELLITUS WITHOUT COMPLICATION, WITH LONG-TERM CURRENT USE OF INSULIN (H): Primary | ICD-10-CM

## 2024-11-06 DIAGNOSIS — E11.9 TYPE 2 DIABETES MELLITUS WITHOUT COMPLICATION, WITH LONG-TERM CURRENT USE OF INSULIN (H): Primary | ICD-10-CM

## 2024-11-06 LAB
ERYTHROCYTE [SEDIMENTATION RATE] IN BLOOD: 50 MM/HR (ref 0–20)
HEMOGLOBIN A1C: 10.7 % (ref 4–5.6)

## 2024-11-06 PROCEDURE — 83036 HEMOGLOBIN GLYCOSYLATED A1C: CPT | Performed by: FAMILY MEDICINE

## 2024-11-06 PROCEDURE — 85651 RBC SED RATE NONAUTOMATED: CPT | Performed by: FAMILY MEDICINE

## 2024-11-06 PROCEDURE — G2211 COMPLEX E/M VISIT ADD ON: HCPCS | Performed by: FAMILY MEDICINE

## 2024-11-06 PROCEDURE — 99214 OFFICE O/P EST MOD 30 MIN: CPT | Performed by: FAMILY MEDICINE

## 2024-11-06 PROCEDURE — 36415 COLL VENOUS BLD VENIPUNCTURE: CPT | Performed by: FAMILY MEDICINE

## 2024-11-06 NOTE — NURSING NOTE
Chief Complaint   Patient presents with    Recheck Medication     Fasting today, does not need refills at this time, nephrologist advised she stop amlodipine     Pre-visit Screening:  Immunizations:  up to date  Colonoscopy:  is up to date  Mammogram: is up to date  Asthma Action Test/Plan:  NA  PHQ9:  NA  GAD7:  NA  Questioned patient about current smoking habits Pt. has never smoked.  Ok to leave detailed message on voice mail for today's visit only Yes, phone # [phone]     no

## 2024-11-06 NOTE — LETTER
November 13, 2024      Jeny Kraft  [address]  [place] [address]        Dear ,    We are writing to inform you of your test results.  Sabi for autoimmune disease was normal. Esr for inflammation was elevated. This is non specific      Resulted Orders   HEMOGLOBIN A1C ([facility])   Result Value Ref Range    Hemoglobin A1C 10.7 (A) 4 - 5.6 %   SABI Scrn Rflx to Titer and [facility]tr[facility] ([facility])   Result Value Ref Range    SABI Screen NEGATIVE NEGATIVE      Comment:      SABI IFA is a first line screen for detecting the  presence of up to approximately 150 autoantibodies in  various autoimmune diseases. A negative SABI IFA result  suggests an SABI-associated autoimmune disease is not  present at this time, but is not definitive. If there  is high clinical suspicion for Sjogren's syndrome,  testing for anti-SS-A/Ro antibody should be considered.  Anti-Shirley-1 antibody should be considered for clinically  suspected inflammatory myopathies.     AC-0: Negative     International Consensus on SABI Patterns  (https://doi.org/10.1515/hypf-3399-0372)     For additional information, please refer to  http://education.[employer].Create/faq/OWB273  (This link is being provided for informational/  educational purposes only.)         ESR WESTERGREN ([facility])   Result Value Ref Range    Sed Rate 50 (A) 0 - 20 mm/hr       If you have any questions or concerns, please call the clinic at the number listed above.       Sincerely,      Yuridia Simmons MD

## 2024-11-07 ENCOUNTER — OFFICE VISIT (OUTPATIENT)
Dept: FAMILY MEDICINE | Facility: CLINIC | Age: 74
End: 2024-11-07

## 2024-11-07 DIAGNOSIS — Z79.4 TYPE 2 DIABETES MELLITUS WITHOUT COMPLICATION, WITH LONG-TERM CURRENT USE OF INSULIN (H): Primary | ICD-10-CM

## 2024-11-07 DIAGNOSIS — E11.9 TYPE 2 DIABETES MELLITUS WITHOUT COMPLICATION, WITH LONG-TERM CURRENT USE OF INSULIN (H): Primary | ICD-10-CM

## 2024-11-07 LAB — ANA SCREEN - QUEST: NEGATIVE

## 2024-11-07 RX ORDER — LINAGLIPTIN 5 MG/1
5 TABLET, FILM COATED ORAL DAILY
COMMUNITY
Start: 2024-11-07

## 2024-11-07 NOTE — PROGRESS NOTES
SUBJECTIVE/OBJECTIVE:                Alma Kraft is a 74 year old female seen for a follow-up visit for Medication Management Services.  She was referred to me from Dr. Yuridia Simmons.     Chief Complaint: Follow up from [facility] visit on 08/29/2024.      Diabetes:  Current Medications:  Current Outpatient Medications   Medication Sig Dispense Refill    empagliflozin (JARDIANCE) 10 MG TABS tablet Take 1 tablet (10 mg) by mouth daily.      linagliptin (TRADJENTA) 5 MG TABS tablet Take 1 tablet (5 mg) by mouth daily.      ASPIRIN 81 MG OR TABS 1 tab po QD (Once per day) 100 3    atorvastatin (LIPITOR) 10 MG tablet Take 1 tablet (10 mg) by mouth daily 90 tablet 1    BD PEN NEEDLE ROSE 2ND GEN 32G X 4 MM miscellaneous USE 1 PEN NEEDLE DAILY OR AS DIRECTED 100 each 2    blood glucose (ONE TOUCH DELICA) lancing device Device to be used with lancets. One touch Delica 100 each 3    blood glucose (ONETOUCH VERIO IQ) test strip Use to test blood sugar one time daily or as directed. 100 strip 1    blood glucose monitoring (ONE TOUCH DELICA) lancets Use to test blood sugars 1 times daily or as directed. 100 each 3    Blood Glucose Monitoring Suppl (ONE TOUCH ULTRA 2) W/DEVICE KIT Use to test blood sugars 2 times daily or as directed. 1 kit 0    carvedilol (COREG) 25 MG tablet Take 1 tablet (25 mg) by mouth 2 times daily (with meals). 180 tablet 3    celecoxib (CELEBREX) 200 MG capsule Take 1 capsule (200 mg) by mouth daily 90 capsule 1    cyclobenzaprine (FLEXERIL) 5 MG tablet Take 1 tablet (5 mg) by mouth 3 times daily as needed for muscle spasms 30 tablet 0    Elemental iron 65 mg Vitamin C 125 mg (VITRON C)  MG TABS tablet Take 1 tablet by mouth daily.      furosemide (LASIX) 20 MG tablet Take 1 tablet (20 mg) by mouth daily (Patient taking differently: Take 40 mg by mouth daily.) 90 tablet 3    glipiZIDE (GLUCOTROL XL) 10 MG 24 hr tablet Take 1 tablet (10 mg) by mouth 2 times daily 180 tablet 1    insulin glargine  (LANTUS PEN) 100 UNIT/ML pen Inject 38 Units subcutaneously at bedtime. 15 mL 1    losartan-hydrochlorothiazide (HYZAAR) 100-25 MG tablet Take 1 tablet by mouth daily 90 tablet 1    metFORMIN (GLUCOPHAGE XR) 500 MG 24 hr tablet Take 1 tablet (500 mg) by mouth 2 times daily (with meals) 180 tablet 1    metroNIDAZOLE (METROGEL) 0.75 % external gel apply twice a day topically to the central face.*      Multiple Vitamins-Minerals (CENTRUM SILVER) per tablet 1 tablet 4 times weekly 100 tablet     nortriptyline (PAMELOR) 10 MG capsule Take 1 capsule (10 mg) by mouth at bedtime 90 capsule 1    OneTouch Delica Lancets 33G MISC 1 Lancet daily 100 each 3    ORDER FOR DME Equipment being ordered: Mediven plus compression stockings    [id number]  20-30 compression 3 Device 0    venlafaxine (EFFEXOR XR) 150 MG 24 hr capsule Take 1 capsule (150 mg) by mouth daily 90 capsule 1     No current facility-administered medications for this visit.       We discussed the benefits and risks of each medication.  Patient Questions/Concerns:  A1c increase  Labs:  Lab Results   Component Value Date    A1C 10.7 11/06/2024    A1C 8.6 08/05/2024    A1C 9.7 05/06/2024    A1C 9.5 02/05/2024    A1C 8.0 10/16/2023    A1C 7.1 07/14/2023    A1C 7.5 03/27/2023    A1C 7.8 12/14/2022       ASSESSMENT/PLAN:                Diabetes:  Assessment: Patient has had increase in dry mouth/thirst lately, causing her to increase sugary beverages, juice, soda, etc.     Discussed that drinking the sugary drinks is increasing blood sugars which causes increase in thirst.     Fasting levels in the last couple months have typically been in the 150-200 range.    Recommend increasing insulin to 42 units daily and will touch base with patient early next week to determine further taper.    Status: Diabetes control is worsening.  Drug Therapy Problems:  1) Insulin dosing needs adjustment  Plan:  1) Increase insulin dose to 42 units daily.   2) Call patient Monday afternoon to  determine further taper.      I spent 45 minutes with this patient today.  All changes were made via collaborative practice agreement with Yuridia Simmons. A copy of the visit note was provided to the patient's primary care provider.    Romina Caro, PharmD  Clinical Pharmacist  [facility] Phone: [phone]  Direct Office Phone: [phone]

## 2024-11-08 ENCOUNTER — TELEPHONE (OUTPATIENT)
Dept: CARDIOLOGY | Facility: CLINIC | Age: 74
End: 2024-11-08
Payer: MEDICARE

## 2024-11-08 NOTE — TELEPHONE ENCOUNTER
Left detailed message on voicemail of negative Carotid US.  Call back number given if any questions or concerns. Tiffanie Lockhart RN on 11/8/2024 at 11:26 AM

## 2024-11-08 NOTE — TELEPHONE ENCOUNTER
----- Message from Berenice Correa sent at 11/5/2024  3:49 PM CST -----  No significant carotid disease bilaterally which is reassuring

## 2024-11-12 ENCOUNTER — TELEPHONE (OUTPATIENT)
Dept: FAMILY MEDICINE | Facility: CLINIC | Age: 74
End: 2024-11-12

## 2024-11-12 NOTE — TELEPHONE ENCOUNTER
Patient increased insulin to 42 units daily from 38 units daily Friday. Fasting levels have been 179, 164, 152, 189 since increase.    Will have patient increase an additional 10% (4 units) to 46 units daily and follow up end of this week.    Romina Caro, PharmD  Clinical Pharmacist  [facility] Phone: [phone]  Direct Office Phone: [phone]

## 2024-11-26 ENCOUNTER — TRANSFERRED RECORDS (OUTPATIENT)
Dept: FAMILY MEDICINE | Facility: CLINIC | Age: 74
End: 2024-11-26

## 2024-12-04 ENCOUNTER — TELEPHONE (OUTPATIENT)
Dept: CARDIOLOGY | Facility: CLINIC | Age: 74
End: 2024-12-04
Payer: MEDICARE

## 2024-12-04 NOTE — TELEPHONE ENCOUNTER
Dr. Espinoza prescribed lasix 20mg once a day and she recently saw her Nephrologist who increased 20mg bid.  She will need a refill and recommend patient contact the Nephrologist who made the increase to prescribe the current medication.  Patient verbalized understanding.  Tiffanie Lockhart RN on 12/4/2024 at 3:45 PM

## 2025-01-29 ENCOUNTER — TRANSFERRED RECORDS (OUTPATIENT)
Dept: FAMILY MEDICINE | Facility: CLINIC | Age: 75
End: 2025-01-29

## 2025-01-30 ENCOUNTER — OFFICE VISIT (OUTPATIENT)
Dept: FAMILY MEDICINE | Facility: CLINIC | Age: 75
End: 2025-01-30

## 2025-01-30 DIAGNOSIS — E11.9 TYPE 2 DIABETES MELLITUS WITHOUT COMPLICATION, WITH LONG-TERM CURRENT USE OF INSULIN (H): Primary | ICD-10-CM

## 2025-01-30 DIAGNOSIS — Z79.4 TYPE 2 DIABETES MELLITUS WITHOUT COMPLICATION, WITH LONG-TERM CURRENT USE OF INSULIN (H): Primary | ICD-10-CM

## 2025-01-30 NOTE — PROGRESS NOTES
SUBJECTIVE/OBJECTIVE:                Alma Kraft is a 74 year old female seen for a follow-up visit for Medication Management Services.  She was referred to me from Dr. Yuridia Simmons.     Chief Complaint: Follow up from [facility] visit on 11/07/2024.      Diabetes:  Current Medications:  Current Outpatient Medications   Medication Sig Dispense Refill    ASPIRIN 81 MG OR TABS 1 tab po QD (Once per day) 100 3    atorvastatin (LIPITOR) 10 MG tablet Take 1 tablet (10 mg) by mouth daily 90 tablet 1    BD PEN NEEDLE ROSE 2ND GEN 32G X 4 MM miscellaneous USE 1 PEN NEEDLE DAILY OR AS DIRECTED 100 each 2    blood glucose (ONE TOUCH DELICA) lancing device Device to be used with lancets. One touch Delica 100 each 3    blood glucose (ONETOUCH VERIO IQ) test strip Use to test blood sugar one time daily or as directed. 100 strip 1    blood glucose monitoring (ONE TOUCH DELICA) lancets Use to test blood sugars 1 times daily or as directed. 100 each 3    Blood Glucose Monitoring Suppl (ONE TOUCH ULTRA 2) W/DEVICE KIT Use to test blood sugars 2 times daily or as directed. 1 kit 0    carvedilol (COREG) 25 MG tablet Take 1 tablet (25 mg) by mouth 2 times daily (with meals). 180 tablet 3    celecoxib (CELEBREX) 200 MG capsule Take 1 capsule (200 mg) by mouth daily 90 capsule 1    cyclobenzaprine (FLEXERIL) 5 MG tablet Take 1 tablet (5 mg) by mouth 3 times daily as needed for muscle spasms 30 tablet 0    Elemental iron 65 mg Vitamin C 125 mg (VITRON C)  MG TABS tablet Take 1 tablet by mouth daily.      empagliflozin (JARDIANCE) 10 MG TABS tablet Take 1 tablet (10 mg) by mouth daily.      furosemide (LASIX) 20 MG tablet Take 1 tablet (20 mg) by mouth daily (Patient taking differently: Take 40 mg by mouth daily.) 90 tablet 3    glipiZIDE (GLUCOTROL XL) 10 MG 24 hr tablet Take 1 tablet (10 mg) by mouth 2 times daily 180 tablet 1    INSULIN GLARGINE 100 UNIT/ML pen INJECT 38 UNITS SUBCUTANEOUSLY AT BEDTIME. 15 mL 0    linagliptin  (TRADJENTA) 5 MG TABS tablet Take 1 tablet (5 mg) by mouth daily.      losartan-hydrochlorothiazide (HYZAAR) 100-25 MG tablet Take 1 tablet by mouth daily 90 tablet 1    metFORMIN (GLUCOPHAGE XR) 500 MG 24 hr tablet Take 1 tablet (500 mg) by mouth 2 times daily (with meals) 28 tablet 0    metroNIDAZOLE (METROGEL) 0.75 % external gel apply twice a day topically to the central face.*      Multiple Vitamins-Minerals (CENTRUM SILVER) per tablet 1 tablet 4 times weekly 100 tablet     nortriptyline (PAMELOR) 10 MG capsule Take 1 capsule (10 mg) by mouth at bedtime 90 capsule 1    OneTouch Delica Lancets 33G MISC 1 Lancet daily 100 each 3    ORDER FOR DME Equipment being ordered: Mediven plus compression stockings    [id number]  20-30 compression 3 Device 0    venlafaxine (EFFEXOR XR) 150 MG 24 hr capsule Take 1 capsule (150 mg) by mouth daily 90 capsule 1     No current facility-administered medications for this visit.       We discussed the benefits and risks of each medication.  Patient Questions/Concerns:  Yeast infections      ASSESSMENT/PLAN:                Diabetes:  Assessment: Patient experiencing on and off yeast infections for one plus years. Fasting levels in last couple of weeks increased to low 200s. Previously was low 100s and high 90s. Recommend discontinuation of Jardiance as this has been continuous since initiation of Jardiance.    Discussed a CGM with patient. I think this would help both with medication decisions as well as awareness of sugars with patient. Sent home with literature and she will discuss and if interested touch base with PCP at appointment next week.    Recommend increase in insulin glargine to 50 units daily.    A1c too soon today, but will have drawn at appt next week with PCP.      Status: Diabetes control uncertain  Drug Therapy Problems:  1) Jardiance likely cause of frequent yeast infections  Plan:  1) Discontinue Jardiance  2) Increase long acting insulin to 50 units daily  3) Will  make further recommendations after appointment with PCP.        I spent 45 minutes with this patient today.  All changes were made via collaborative practice agreement with Yuridia Simmons. A copy of the visit note was provided to the patient's primary care provider.    Romina Caro, PharmD  Clinical Pharmacist  [facility] Phone: [phone]  Direct Office Phone: [phone]            nish

## 2025-02-06 ENCOUNTER — OFFICE VISIT (OUTPATIENT)
Dept: FAMILY MEDICINE | Facility: CLINIC | Age: 75
End: 2025-02-06

## 2025-02-06 VITALS
DIASTOLIC BLOOD PRESSURE: 68 MMHG | RESPIRATION RATE: 20 BRPM | WEIGHT: 239 LBS | BODY MASS INDEX: 46.92 KG/M2 | HEIGHT: 60 IN | TEMPERATURE: 97.5 F | HEART RATE: 72 BPM | SYSTOLIC BLOOD PRESSURE: 128 MMHG

## 2025-02-06 DIAGNOSIS — Z00.00 MEDICARE ANNUAL WELLNESS VISIT, SUBSEQUENT: Primary | ICD-10-CM

## 2025-02-06 DIAGNOSIS — Z79.4 TYPE 2 DIABETES MELLITUS WITHOUT COMPLICATION, WITH LONG-TERM CURRENT USE OF INSULIN (H): ICD-10-CM

## 2025-02-06 DIAGNOSIS — E78.2 MIXED HYPERLIPIDEMIA: ICD-10-CM

## 2025-02-06 DIAGNOSIS — M25.512 CHRONIC PAIN OF BOTH SHOULDERS: ICD-10-CM

## 2025-02-06 DIAGNOSIS — G89.29 CHRONIC PAIN OF BOTH SHOULDERS: ICD-10-CM

## 2025-02-06 DIAGNOSIS — E11.9 TYPE 2 DIABETES MELLITUS WITHOUT COMPLICATION, WITH LONG-TERM CURRENT USE OF INSULIN (H): ICD-10-CM

## 2025-02-06 DIAGNOSIS — I10 ESSENTIAL HYPERTENSION: ICD-10-CM

## 2025-02-06 DIAGNOSIS — M25.511 CHRONIC PAIN OF BOTH SHOULDERS: ICD-10-CM

## 2025-02-06 DIAGNOSIS — R68.2 DRY MOUTH: ICD-10-CM

## 2025-02-06 LAB
% GRANULOCYTES: 68.7 %
ALBUMIN (URINE) MG/L: 150
ALBUMIN SERPL-MCNC: 2.9 G/DL (ref 3.6–5.1)
ALBUMIN URINE MG/G CR: ABNORMAL MG/G CREATININE
ALP SERPL-CCNC: 173 U/L (ref 33–130)
ALT 1742-6: 19 U/L (ref 0–32)
AST 1920-8: 35 U/L (ref 0–35)
BILIRUB SERPL-MCNC: 1.4 MG/DL (ref 0.2–1.2)
BUN SERPL-MCNC: 42 MG/DL (ref 7–25)
BUN/CREATININE RATIO: 28 (ref 6–32)
CALCIUM SERPL-MCNC: 8.9 MG/DL (ref 8.6–10.3)
CHLORIDE SERPLBLD-SCNC: 94.8 MMOL/L (ref 98–110)
CHOLEST SERPL-MCNC: 128 MG/DL (ref 0–199)
CHOLEST/HDLC SERPL: 3 {RATIO} (ref 0–5)
CO2 SERPL-SCNC: 32.8 MMOL/L (ref 20–32)
CREAT SERPL-MCNC: 1.52 MG/DL (ref 0.6–1.3)
CREATININE URINE MG/DL: 100 MG/DL
GFR SERPL CREATININE-BSD FRML MDRD: 36 ML/MIN/1.73M2
GLUCOSE SERPL-MCNC: 227 MG/DL (ref 60–99)
HCT VFR BLD AUTO: 37.3 % (ref 35–47)
HDLC SERPL-MCNC: 42 MG/DL (ref 40–150)
HEMOGLOBIN A1C: 11.3 % (ref 4–5.6)
HEMOGLOBIN: 12.3 G/DL (ref 11.7–15.7)
LDLC SERPL CALC-MCNC: 63 MG/DL (ref 0–129)
LYMPHOCYTES NFR BLD AUTO: 21.7 %
MCH RBC QN AUTO: 33.9 PG (ref 26–33)
MCHC RBC AUTO-ENTMCNC: 33 G/DL (ref 31–36)
MCV RBC AUTO: 102.8 FL (ref 78–100)
MONOCYTES NFR BLD AUTO: 9.6 %
PLATELET COUNT - QUEST: 117 10^9/L (ref 150–375)
POTASSIUM SERPL-SCNC: 3.48 MMOL/L (ref 3.5–5.3)
PROT SERPL-MCNC: 6.7 G/DL (ref 6.1–8.1)
RBC # BLD AUTO: 3.63 10*12/L (ref 3.8–5.2)
SODIUM SERPL-SCNC: 135.3 MMOL/L (ref 135–146)
TRIGL SERPL-MCNC: 114 MG/DL (ref 0–149)
WBC # BLD AUTO: 5.5 10*9/L (ref 4–11)

## 2025-02-06 RX ORDER — INSULIN GLARGINE 100 [IU]/ML
50 INJECTION, SOLUTION SUBCUTANEOUS EVERY MORNING
Qty: 60 ML | Refills: 1 | Status: SHIPPED | OUTPATIENT
Start: 2025-02-06

## 2025-02-06 RX ORDER — GLIPIZIDE 10 MG/1
10 TABLET, FILM COATED, EXTENDED RELEASE ORAL 2 TIMES DAILY
Qty: 180 TABLET | Refills: 1 | Status: SHIPPED | OUTPATIENT
Start: 2025-02-06

## 2025-02-06 RX ORDER — METFORMIN HYDROCHLORIDE 500 MG/1
500 TABLET, EXTENDED RELEASE ORAL 2 TIMES DAILY WITH MEALS
Qty: 180 TABLET | Refills: 1 | Status: SHIPPED | OUTPATIENT
Start: 2025-02-06

## 2025-02-06 RX ORDER — ATORVASTATIN CALCIUM 10 MG/1
10 TABLET, FILM COATED ORAL DAILY
Qty: 90 TABLET | Refills: 1 | Status: SHIPPED | OUTPATIENT
Start: 2025-02-06

## 2025-02-06 RX ORDER — LOSARTAN POTASSIUM AND HYDROCHLOROTHIAZIDE 25; 100 MG/1; MG/1
1 TABLET ORAL DAILY
Qty: 90 TABLET | Refills: 1 | Status: SHIPPED | OUTPATIENT
Start: 2025-02-06

## 2025-02-06 ASSESSMENT — PATIENT HEALTH QUESTIONNAIRE - PHQ9
5. POOR APPETITE OR OVEREATING: NOT AT ALL
SUM OF ALL RESPONSES TO PHQ QUESTIONS 1-9: 8

## 2025-02-06 ASSESSMENT — ANXIETY QUESTIONNAIRES
5. BEING SO RESTLESS THAT IT IS HARD TO SIT STILL: NOT AT ALL
2. NOT BEING ABLE TO STOP OR CONTROL WORRYING: NOT AT ALL
3. WORRYING TOO MUCH ABOUT DIFFERENT THINGS: SEVERAL DAYS
1. FEELING NERVOUS, ANXIOUS, OR ON EDGE: NOT AT ALL
6. BECOMING EASILY ANNOYED OR IRRITABLE: NOT AT ALL
GAD7 TOTAL SCORE: 1
7. FEELING AFRAID AS IF SOMETHING AWFUL MIGHT HAPPEN: NOT AT ALL
GAD7 TOTAL SCORE: 1
IF YOU CHECKED OFF ANY PROBLEMS ON THIS QUESTIONNAIRE, HOW DIFFICULT HAVE THESE PROBLEMS MADE IT FOR YOU TO DO YOUR WORK, TAKE CARE OF THINGS AT HOME, OR GET ALONG WITH OTHER PEOPLE: NOT DIFFICULT AT ALL

## 2025-02-06 NOTE — PATIENT INSTRUCTIONS
Health Maintenance   Topic Date Due    COLORECTAL CANCER SCREENING  11/06/2023    RSV VACCINE (1 - 1-dose 75+ series) Never done    EYE EXAM  12/07/2024    BMP  02/05/2025    MICROALBUMIN  02/05/2025    MEDICARE ANNUAL WELLNESS VISIT  02/05/2025    PHQ-9  02/05/2025    HF ACTION PLAN  02/01/2026 (Originally 1950)    HEPATITIS A IMMUNIZATION (2 of 2 - Risk 2-dose series) 02/06/2026 (Originally 12/29/2022)    COVID-19 Vaccine (4 - 2024-25 season) 02/06/2026 (Originally 9/1/2024)    A1C  05/06/2025    MAMMO SCREENING  08/01/2025    LIPID  08/05/2025    DIABETIC FOOT EXAM  08/30/2025    ALT  02/06/2026    FALL RISK ASSESSMENT  02/06/2026    CBC  02/06/2026    DEXA  07/19/2026    ADVANCE CARE PLANNING  01/10/2027    DTAP/TDAP/TD IMMUNIZATION (3 - Td or Tdap) 05/23/2028    TSH W/FREE T4 REFLEX  Completed    HEPATITIS C SCREENING  Completed    DEPRESSION ACTION PLAN  Completed    INFLUENZA VACCINE  Completed    Pneumococcal Vaccine: 50+ Years  Completed    URINALYSIS  Completed    ZOSTER IMMUNIZATION  Completed    HEPATITIS B IMMUNIZATION  Completed    HPV IMMUNIZATION  Aged Out    MENINGITIS IMMUNIZATION  Aged Out

## 2025-02-06 NOTE — NURSING NOTE
Alma Kraft is here for a diabetic check, medication refill and Wellness    Questioned patient about current smoking habits.  Pt. has never smoked.  Body mass index is 46.68 kg/m .  PULSE regular  My Chart:   CLASSIFICATION OF OVERWEIGHT AND OBESITY BY BMI                        Obesity Class           BMI(kg/m2)  Underweight                                    < 18.5  Normal                                         18.5-24.9  Overweight                                     25.0-29.9  OBESITY                     I                  30.0-34.9                             II                 35.0-39.9  EXTREME OBESITY             III                >40                            Patient's  BMI Body mass index is 46.68 kg/m .  http://hin.nhlbi.nih.gov/menuplanner/menu.cgi    Pre-visit planning  Immunizations - up to date  Colonoscopy - is up to date  Mammogram - is up to date  Asthma -   PHQ9 -    KATHERINE-7 -      The patient has verbalized that it is ok to leave a detailed voice message on the patient's cell phone with results/recommendations from this visit.

## 2025-02-06 NOTE — PROGRESS NOTES
Alma Kraft is a 74 year old female who presents for Medicare Annual Wellness Visit.    Current providers caring for this patient include:  Patient Care Team:  Yuridia Simmons MD as PCP - General (Family Medicine)  Yuridia Simmons MD as Assigned PCP  Janeth Caro [facility] as Assigned MTM Pharmacist  Lan Espinoza MD as MD (Cardiovascular Disease)  Lan Espinoza MD as Assigned Heart and Vascular Provider  Rossy Caruso RD as Diabetes Educator (Dietitian, Registered)  Rossy Caruso RD as Diabetes Educator (Dietitian, Registered)  Berenice Correa E, APRN CNP as Nurse Practitioner (Cardiovascular Disease)    Complete Medical and Social history reviewed with patient, outlined below.    Patient Active Problem List   Diagnosis    Essential hypertension, benign--followed by cardiology    Mixed hyperlipidemia    Obesity, morbid, BMI 40.0-49.9 (H)    Gastroesophageal reflux disease without esophagitis    Allergic rhinitis    Obstructive sleep apnea    Family history of malignant neoplasm of breast    ACP (advance care planning)    Type 2 diabetes mellitus without complication, with long-term current use of insulin (H)    Adjustment disorder with mixed anxiety and depressed mood    Acne    Other cirrhosis of liver (H)    Elevated AFP    History of colonic polyps    Elevated anti-tissue transglutaminase (tTG) IgA level--followed by rheumatology    Nonrheumatic aortic valve stenosis--followed by cardiology    Heart failure with preserved ejection fraction, NYHA class III (H)    Heart murmur    Osteopenia of multiple sites    Chronic kidney disease, unspecified CKD stage    Chronic pain of both shoulders       Past Medical History:   Diagnosis Date    Arthritis     hands, Right shoulder    Diabetes (H)     Hypertension     Sleep apnea     Uses a CPAP       Past Surgical History:   Procedure Laterality Date    ------------OTHER-------------  09/05/2013    L hand, cyst excision     ------------OTHER------------- Right 03/14/2014    shoulder manipulation    ESOPHAGOSCOPY, GASTROSCOPY, DUODENOSCOPY (EGD), COMBINED N/A 01/24/2022    Procedure: ESOPHAGOGASTRODUODENOSCOPY;  Surgeon: Xavi Heller MD;  Location: [facility] OR    HC INCISION TENDON SHEATH FINGER Left 09/05/2013    left thumb trigger finger    HC KNEE SCOPE, DIAGNOSTIC  04/2005    Arthroscopy, Knee Left    HC REMOVE TONSILS/ADENOIDS,<13 Y/O  1953    T & A <11y.o.    TEST NOT FOUND  06/27/2007    R heel/ inocencio's deformity    ZZC APPENDECTOMY  1956    ZZC TOTAL KNEE ARTHROPLASTY  02/2006    Knee Replacement, Total Right    ZZC TOTAL KNEE ARTHROPLASTY  03/05/2007    Knee Replacement, Total  Left    ZZC VAGINAL HYSTERECTOMY  08/2001    Hysterectomy, Vaginal & BSO       Family History   Problem Relation Age of Onset    Cerebrovascular Disease Mother     Deep Vein Thrombosis Mother     Cancer Father         prostate    Gastrointestinal Disease Father     Breast Cancer Sister         in 60s    Breast Cancer Sister         in her 50s    Heart Disease Maternal Grandmother     Cerebrovascular Disease Maternal Grandfather     Heart Disease Paternal Grandfather     Prostate Cancer Brother     Prostate Cancer Brother     Gastrointestinal Disease Other         Niece with GERD/hiatal hernia       Social History     Tobacco Use    Smoking status: Never     Passive exposure: Never    Smokeless tobacco: Never   Substance Use Topics    Alcohol use: Not Currently       Diet: regular, low salt/low fat  Physical Activity: active without specific exercise program  Depression Screen:    Over the past 2 weeks, patient has felt down, depressed, or hopeless:  No    Over the past 2 weeks, patient has felt little interest or pleasure in doing things: No    Functional ability/Safety screen:  Up and go test (able to get up and walk longer than 30 seconds): Passed  Patient needs assistance with: nothing  Patient's home has the following possible safety concerns: none  identified  Patient has concerns about her hearing:  No  Cognitive Screen  Patient repeats three objects (ball, flag, tree)      Clock drawing test:   NORMAL  Recalls three objects after 3 minutes (ball,flag,tree):                                                                                               recalls 1 object, 1 point    Physical Exam:  Ht 1.524 m (5')   Wt 108.4 kg (239 lb)   LMP 05/08/2001   BMI 46.68 kg/m     Body mass index is 46.68 kg/m .

## 2025-02-06 NOTE — PROGRESS NOTES
Assessment & Plan   Problem List Items Addressed This Visit       Mixed hyperlipidemia    Relevant Medications    atorvastatin (LIPITOR) 10 MG tablet    Type 2 diabetes mellitus without complication, with long-term current use of insulin (H)    Relevant Medications    glipiZIDE (GLUCOTROL XL) 10 MG 24 hr tablet    insulin glargine 100 UNIT/ML pen    metFORMIN (GLUCOPHAGE XR) 500 MG 24 hr tablet    Other Relevant Orders    HEMOGLOBIN A1C (BFP) (Completed)    VENOUS COLLECTION (Completed)    Comprehensive Metobolic Panel (BFP)    Lipid Panel (BFP)    ALBUMIN RANDOM URINE QUANTITATIVE (BFP) (Completed)    Chronic pain of both shoulders    Dry mouth     Other Visit Diagnoses       Medicare annual wellness visit, subsequent    -  Primary    Relevant Orders    HEMOGRAM PLATELET DIFF (BFP) (Completed)    Essential hypertension        Relevant Medications    losartan-hydrochlorothiazide (HYZAAR) 100-25 MG tablet             1. Medicare annual wellness visit, subsequent (Primary)  Completed.  - HEMOGRAM PLATELET DIFF (BFP)    2. Mixed hyperlipidemia  Refilled.   - atorvastatin (LIPITOR) 10 MG tablet; Take 1 tablet (10 mg) by mouth daily.  Dispense: 90 tablet; Refill: 1    3. Type 2 diabetes mellitus without complication, with long-term current use of insulin (H)  Very high. She is working with Romina on insulin adjustments. Followup with Romina. Also recommended [facility] diabetes education, she would like to wait on this. She has seen them in the past. Continue to work on healthy diet, regular exercise, weight loss efforts.  - HEMOGLOBIN A1C (BFP)  - VENOUS COLLECTION  - glipiZIDE (GLUCOTROL XL) 10 MG 24 hr tablet; Take 1 tablet (10 mg) by mouth 2 times daily.  Dispense: 180 tablet; Refill: 1  - insulin glargine 100 UNIT/ML pen; Inject 50 Units subcutaneously every morning.  Dispense: 60 mL; Refill: 1  - metFORMIN (GLUCOPHAGE XR) 500 MG 24 hr tablet; Take 1 tablet (500 mg) by mouth 2 times daily (with meals).  Dispense: 180 tablet;  "Refill: 1  - Comprehensive Metobolic Panel (BFP)  - Lipid Panel (BFP)  - ALBUMIN RANDOM URINE QUANTITATIVE (BFP)    4. Essential hypertension  Controlled, refilled.  - losartan-hydrochlorothiazide (HYZAAR) 100-25 MG tablet; Take 1 tablet by mouth daily.  Dispense: 90 tablet; Refill: 1    5. Chronic pain of both shoulders  She isn't sleeping due to the pain, advised to see ortho and have a plan to help with the chronic pain. I told her that If she is intending surgery, she will have to first get her blood sugars down.    6. Dry mouth  She has been referred to see a specialist, although unclear what specialty this  is. She said she is no longer seeing rheumatology. Also the dry mouth could be due to the high blood sugars. Avoid sugary drinks, see the specialist then see me back for a recheck.          BMI  Estimated body mass index is 46.68 kg/m  as calculated from the following:    Height as of this encounter: 1.524 m (5').    Weight as of this encounter: 108.4 kg (239 lb).         FUTURE APPOINTMENTS:       - Follow-up visit in 3 months. We manage her chronic medical care.    No follow-ups on file.    Yuridia Simmons MD  [facility]    Subjective     There are no exam notes on file for this visit.     Alma Kraft is a 74 year old female who presents to clinic today for the following health issues   HPI     Here for diabetes followup, blood sugars are \"terrible\". Has dry mouth, saw Romina last week on Thursday. Was taken off a medication because she's had a yeast infection for a year--saw Dr. Barber for that.  Thinks that jardiance is contributing to the yeast infections.  Is drinking too many sugary liquids, dentist recommended to see a doctor for this but doesn't know what specialty she is.  Yesterday blood sugar was 243, today it was 193. Has been talking to Romina about this. She increased the insulin.     She isn't sleeping well due to the bilateral shoulder pain, has an apt with ortho for " this and will have shoulder replacements. Is taking celebrex for the pain but not sure if this is working.    Is supposed to have a colonoscopy, has cancelled this twice due to her yeast infections.          Review of Systems   Constitutional, HEENT, cardiovascular, pulmonary, gi and gu systems are negative, except as otherwise noted.      Objective    /68 (BP Location: Left arm, Patient Position: Chair, Cuff Size: Adult Large)   Pulse 72   Temp 97.5  F (36.4  C) (Temporal)   Resp 20   Ht 1.524 m (5')   Wt 108.4 kg (239 lb)   LMP 05/08/2001   BMI 46.68 kg/m    Body mass index is 46.68 kg/m .  Physical Exam   GENERAL: alert and no distress  RESP: lungs clear to auscultation - no rales, rhonchi or wheezes  CV: regular rate and rhythm, normal S1 S2, no S3 or S4, no murmur, click or rub, no peripheral edema  MS: no gross musculoskeletal defects noted, no edema  PSYCH: mentation appears normal, affect normal/bright    Results for orders placed or performed in visit on 02/06/25   HEMOGLOBIN A1C (BFP)     Status: Abnormal   Result Value Ref Range    Hemoglobin A1C 11.3 (A) 4 - 5.6 %   ALBUMIN RANDOM URINE QUANTITATIVE (BFP)     Status: Abnormal   Result Value Ref Range    Albumin mg/L 150 (A) 30    Creatinine Urine mg/dL 100 300 mg/dL    Albumin Urine mg/g Cr  (A) 30 MG/G Creatinine   HEMOGRAM PLATELET DIFF (BFP)     Status: Abnormal   Result Value Ref Range    WBC 5.5 4.0 - 11 10*9/L    RBC Count 3.63 (A) 3.8 - 5.2 10*12/L    Hemoglobin 12.3 11.7 - 15.7 g/dL    Hematocrit 37.3 35.0 - 47.0 %    .8 (A) 78 - 100 fL    MCH 33.9 (A) 26 - 33 pg    MCHC 33.0 31 - 36 g/dL    Platelet Count 117 (A) 150 - 375 10^9/L    % Granulocytes 68.7 %    % Lymphocytes 21.7 %    % Monocytes 9.6 %    Narrative    Ran twice

## 2025-03-31 ENCOUNTER — TRANSFERRED RECORDS (OUTPATIENT)
Dept: HEALTH INFORMATION MANAGEMENT | Facility: CLINIC | Age: 75
End: 2025-03-31
Payer: MEDICARE

## 2025-04-02 ENCOUNTER — TRANSFERRED RECORDS (OUTPATIENT)
Dept: FAMILY MEDICINE | Facility: CLINIC | Age: 75
End: 2025-04-02

## 2025-04-06 DIAGNOSIS — R52 PAIN: ICD-10-CM

## 2025-04-06 DIAGNOSIS — F32.5 MAJOR DEPRESSION IN REMISSION: ICD-10-CM

## 2025-04-07 RX ORDER — VENLAFAXINE HYDROCHLORIDE 150 MG/1
150 CAPSULE, EXTENDED RELEASE ORAL DAILY
COMMUNITY
Start: 2025-04-07

## 2025-04-07 RX ORDER — CELECOXIB 200 MG/1
200 CAPSULE ORAL DAILY
COMMUNITY
Start: 2025-04-07

## 2025-04-07 NOTE — TELEPHONE ENCOUNTER
Received incoming refill request for  Pending Prescriptions:                       Disp   Refills    celecoxib (CELEBREX) 200 MG capsule [Phar*90 cap*0            Sig: Take 1 capsule (200 mg) by mouth daily    venlafaxine (EFFEXOR XR) 150 MG 24 hr cap*90 cap*0            Sig: Take 1 capsule (150 mg) by mouth daily    Patient was seen on 2/6/25 for medicare wellness visit and medication check visit and was told to follow up in 3 months.   
79.2

## 2025-04-08 ENCOUNTER — TRANSFERRED RECORDS (OUTPATIENT)
Dept: FAMILY MEDICINE | Facility: CLINIC | Age: 75
End: 2025-04-08

## 2025-04-17 DIAGNOSIS — R52 PAIN: ICD-10-CM

## 2025-04-17 RX ORDER — CELECOXIB 200 MG/1
200 CAPSULE ORAL DAILY
COMMUNITY
Start: 2025-04-17

## 2025-04-17 NOTE — TELEPHONE ENCOUNTER
Pt is requesting refills of:  Refused Prescriptions:                       Disp   Refills    celecoxib (CELEBREX) 200 MG capsule [Pharm*90 cap*0        Sig: Take 1 capsule (200 mg) by mouth daily    Pt sees ortho for pain.

## 2025-05-11 ENCOUNTER — HOSPITAL ENCOUNTER (EMERGENCY)
Facility: CLINIC | Age: 75
Discharge: HOME | End: 2025-05-11
Attending: EMERGENCY MEDICINE | Admitting: EMERGENCY MEDICINE
Payer: MEDICARE

## 2025-05-11 VITALS
SYSTOLIC BLOOD PRESSURE: 149 MMHG | DIASTOLIC BLOOD PRESSURE: 74 MMHG | BODY MASS INDEX: 46.92 KG/M2 | TEMPERATURE: 98.2 F | RESPIRATION RATE: 18 BRPM | OXYGEN SATURATION: 97 % | HEIGHT: 60 IN | WEIGHT: 239 LBS | HEART RATE: 65 BPM

## 2025-05-11 DIAGNOSIS — E11.65 HYPERGLYCEMIA DUE TO DIABETES MELLITUS (H): ICD-10-CM

## 2025-05-11 DIAGNOSIS — E86.0 DEHYDRATION: ICD-10-CM

## 2025-05-11 LAB
ALBUMIN SERPL BCG-MCNC: 2.6 G/DL (ref 3.5–5.2)
ALBUMIN UR-MCNC: NEGATIVE MG/DL
ALP SERPL-CCNC: 200 U/L (ref 40–150)
ALT SERPL W P-5'-P-CCNC: 39 U/L (ref 0–50)
ANION GAP SERPL CALCULATED.3IONS-SCNC: 10 MMOL/L (ref 7–15)
ANION GAP SERPL CALCULATED.3IONS-SCNC: 13 MMOL/L (ref 7–15)
APPEARANCE UR: CLEAR
AST SERPL W P-5'-P-CCNC: 32 U/L (ref 0–45)
B-OH-BUTYR SERPL-SCNC: <0.18 MMOL/L
BACTERIA #/AREA URNS HPF: ABNORMAL /HPF
BASE EXCESS BLDV CALC-SCNC: 11.9 MMOL/L (ref -3–3)
BASOPHILS # BLD AUTO: 0 10E3/UL (ref 0–0.2)
BASOPHILS NFR BLD AUTO: 0 %
BILIRUB DIRECT SERPL-MCNC: 0.57 MG/DL (ref 0–0.3)
BILIRUB SERPL-MCNC: 1.3 MG/DL
BILIRUB UR QL STRIP: NEGATIVE
BUN SERPL-MCNC: 47 MG/DL (ref 8–23)
BUN SERPL-MCNC: 51.6 MG/DL (ref 8–23)
CALCIUM SERPL-MCNC: 8.8 MG/DL (ref 8.8–10.4)
CALCIUM SERPL-MCNC: 8.8 MG/DL (ref 8.8–10.4)
CHLORIDE SERPL-SCNC: 85 MMOL/L (ref 98–107)
CHLORIDE SERPL-SCNC: 87 MMOL/L (ref 98–107)
COLOR UR AUTO: ABNORMAL
CREAT SERPL-MCNC: 1.71 MG/DL (ref 0.51–0.95)
CREAT SERPL-MCNC: 1.76 MG/DL (ref 0.51–0.95)
EGFRCR SERPLBLD CKD-EPI 2021: 30 ML/MIN/1.73M2
EGFRCR SERPLBLD CKD-EPI 2021: 31 ML/MIN/1.73M2
EOSINOPHIL # BLD AUTO: 0 10E3/UL (ref 0–0.7)
EOSINOPHIL NFR BLD AUTO: 0 %
ERYTHROCYTE [DISTWIDTH] IN BLOOD BY AUTOMATED COUNT: 12.6 % (ref 10–15)
GLUCOSE BLDC GLUCOMTR-MCNC: 317 MG/DL (ref 70–99)
GLUCOSE BLDC GLUCOMTR-MCNC: 403 MG/DL (ref 70–99)
GLUCOSE SERPL-MCNC: 359 MG/DL (ref 70–99)
GLUCOSE SERPL-MCNC: 429 MG/DL (ref 70–99)
GLUCOSE UR STRIP-MCNC: 500 MG/DL
HCO3 BLDV-SCNC: 38 MMOL/L (ref 21–28)
HCO3 SERPL-SCNC: 30 MMOL/L (ref 22–29)
HCO3 SERPL-SCNC: 30 MMOL/L (ref 22–29)
HCT VFR BLD AUTO: 33.8 % (ref 35–47)
HGB BLD-MCNC: 12 G/DL (ref 11.7–15.7)
HGB UR QL STRIP: NEGATIVE
HOLD SPECIMEN: NORMAL
HOLD SPECIMEN: NORMAL
HYALINE CASTS: 12 /LPF
IMM GRANULOCYTES # BLD: 0.1 10E3/UL
IMM GRANULOCYTES NFR BLD: 1 %
KETONES UR STRIP-MCNC: NEGATIVE MG/DL
LACTATE SERPL-SCNC: 1.7 MMOL/L (ref 0.7–2)
LACTATE SERPL-SCNC: 2.3 MMOL/L (ref 0.7–2)
LEUKOCYTE ESTERASE UR QL STRIP: ABNORMAL
LIPASE SERPL-CCNC: 44 U/L (ref 13–60)
LYMPHOCYTES # BLD AUTO: 0.9 10E3/UL (ref 0.8–5.3)
LYMPHOCYTES NFR BLD AUTO: 10 %
MAGNESIUM SERPL-MCNC: 1.9 MG/DL (ref 1.7–2.3)
MCH RBC QN AUTO: 33.1 PG (ref 26.5–33)
MCHC RBC AUTO-ENTMCNC: 35.5 G/DL (ref 31.5–36.5)
MCV RBC AUTO: 93 FL (ref 78–100)
MONOCYTES # BLD AUTO: 1 10E3/UL (ref 0–1.3)
MONOCYTES NFR BLD AUTO: 11 %
MUCOUS THREADS #/AREA URNS LPF: PRESENT /LPF
NEUTROPHILS # BLD AUTO: 7.1 10E3/UL (ref 1.6–8.3)
NEUTROPHILS NFR BLD AUTO: 79 %
NITRATE UR QL: NEGATIVE
NRBC # BLD AUTO: 0 10E3/UL
NRBC BLD AUTO-RTO: 0 /100
O2/TOTAL GAS SETTING VFR VENT: 0 %
OXYHGB MFR BLDV: 22 % (ref 70–75)
PCO2 BLDV: 55 MM HG (ref 40–50)
PH BLDV: 7.45 [PH] (ref 7.32–7.43)
PH UR STRIP: 6.5 [PH] (ref 5–7)
PLATELET # BLD AUTO: 121 10E3/UL (ref 150–450)
PO2 BLDV: 19 MM HG (ref 25–47)
POTASSIUM SERPL-SCNC: 3.7 MMOL/L (ref 3.4–5.3)
POTASSIUM SERPL-SCNC: 4.3 MMOL/L (ref 3.4–5.3)
PROT SERPL-MCNC: 6.4 G/DL (ref 6.4–8.3)
RBC # BLD AUTO: 3.63 10E6/UL (ref 3.8–5.2)
RBC URINE: 6 /HPF
SAO2 % BLDV: 22.5 % (ref 70–75)
SODIUM SERPL-SCNC: 125 MMOL/L (ref 135–145)
SODIUM SERPL-SCNC: 130 MMOL/L (ref 135–145)
SP GR UR STRIP: 1.01 (ref 1–1.03)
SQUAMOUS EPITHELIAL: 10 /HPF
UROBILINOGEN UR STRIP-MCNC: NORMAL MG/DL
WBC # BLD AUTO: 9 10E3/UL (ref 4–11)
WBC URINE: 10 /HPF

## 2025-05-11 PROCEDURE — 84132 ASSAY OF SERUM POTASSIUM: CPT | Performed by: EMERGENCY MEDICINE

## 2025-05-11 PROCEDURE — 83605 ASSAY OF LACTIC ACID: CPT | Performed by: EMERGENCY MEDICINE

## 2025-05-11 PROCEDURE — 82962 GLUCOSE BLOOD TEST: CPT

## 2025-05-11 PROCEDURE — 96360 HYDRATION IV INFUSION INIT: CPT

## 2025-05-11 PROCEDURE — 82247 BILIRUBIN TOTAL: CPT | Performed by: EMERGENCY MEDICINE

## 2025-05-11 PROCEDURE — 96361 HYDRATE IV INFUSION ADD-ON: CPT

## 2025-05-11 PROCEDURE — 250N000012 HC RX MED GY IP 250 OP 636 PS 637: Performed by: EMERGENCY MEDICINE

## 2025-05-11 PROCEDURE — 81001 URINALYSIS AUTO W/SCOPE: CPT | Performed by: EMERGENCY MEDICINE

## 2025-05-11 PROCEDURE — 82805 BLOOD GASES W/O2 SATURATION: CPT | Performed by: EMERGENCY MEDICINE

## 2025-05-11 PROCEDURE — 83690 ASSAY OF LIPASE: CPT | Performed by: EMERGENCY MEDICINE

## 2025-05-11 PROCEDURE — 85025 COMPLETE CBC W/AUTO DIFF WBC: CPT | Performed by: EMERGENCY MEDICINE

## 2025-05-11 PROCEDURE — 99284 EMERGENCY DEPT VISIT MOD MDM: CPT

## 2025-05-11 PROCEDURE — 258N000003 HC RX IP 258 OP 636: Performed by: EMERGENCY MEDICINE

## 2025-05-11 PROCEDURE — 36415 COLL VENOUS BLD VENIPUNCTURE: CPT | Performed by: EMERGENCY MEDICINE

## 2025-05-11 PROCEDURE — 83735 ASSAY OF MAGNESIUM: CPT | Performed by: EMERGENCY MEDICINE

## 2025-05-11 PROCEDURE — 82010 KETONE BODYS QUAN: CPT | Performed by: EMERGENCY MEDICINE

## 2025-05-11 PROCEDURE — 93005 ELECTROCARDIOGRAM TRACING: CPT

## 2025-05-11 PROCEDURE — 87086 URINE CULTURE/COLONY COUNT: CPT | Performed by: EMERGENCY MEDICINE

## 2025-05-11 RX ADMIN — SODIUM CHLORIDE 1000 ML: 0.9 INJECTION, SOLUTION INTRAVENOUS at 15:39

## 2025-05-11 RX ADMIN — INSULIN ASPART 5 UNITS: 100 INJECTION, SOLUTION INTRAVENOUS; SUBCUTANEOUS at 20:02

## 2025-05-11 ASSESSMENT — ACTIVITIES OF DAILY LIVING (ADL)
ADLS_ACUITY_SCORE: 42

## 2025-05-11 NOTE — ED PROVIDER NOTES
"  Emergency Department Note      History of Present Illness     Chief Complaint   Dehydration      HPI   Alma Kraft is a 74 year old female with history of stage 3 CKD, type 2 diabetes, hypertension who presents to the ED for evaluation of dehydration. Patient reports intermittent weakness, lightheadedness, dry eyes, dry mouth, cold hands and feet, warm face, muscle twitching in her hands and arms, and lack of appetite over the past 3-4 weeks. Today, she reports feeling weak, lightheaded, and just wanting to go to sleep, prompting her to come in for evaluation. She is currently hypotensive, systolic in 90s. Her blood pressure is 120-130s systolic at baseline. She has not been checking her blood pressure at home. She reports her blood sugar levels in the 400s yesterday, and approximately 306 today, and that her blood sugar has been running high for the last four weeks. She states her normal levels are in the 120-140s. She has been drinking Coke, orange juice, and milk, and states that she gets \"tired of water\". She notes having a steroid injection in both of her shoulders approximately 4 weeks ago. She is not taking steroids. She saw Nephrology, Dr. Carcamo, on 5/8 for a lab visit for her stage 3 CKD. Her PCP at [facility] manages her diabetes. She denies leg swelling. She is currently drinking water, and reports eating breakfast today.      Independent Historian   None    Review of External Notes   I reviewed nephrology note from 4/2. Patient has CKD stage 3, and has stopped Jardiance.     Past Medical History     Medical History and Problem List   Arthritis  Hypertension  Type 2 diabetes   Obstructive sleep apnea  Obesity  GERD  Allergic rhinitis  Adjustment disorder with mixed anxiety and depressed mood  Cirrhosis of lover  Colon polyps  Nonrheumatic aortic valve  Heart failure  CKD stage 3    Medications   Atorvastatin  Coreg  Celebrex  Lasix  Glipizide  Insulin " glargine  Hyzaar  Metformin  Pamelor  Linagliptin    Surgical History   Hysterectomy  Left knee total replacement  Right knee total replacement  Radha's deformity  T&D  Cyst excision, left hand  Left thumb trigger finger  Adenoidectomy  Appendectomy     Physical Exam     Patient Vitals for the past 24 hrs:   BP Temp Temp src Pulse Resp SpO2 Height Weight   05/11/25 1952 -- -- -- -- -- 97 % -- --   05/11/25 1951 (!) 149/74 -- -- 65 -- -- -- --   05/11/25 1502 -- -- -- -- -- 92 % -- --   05/11/25 1501 92/50 98.2  F (36.8  C) Oral 71 18 -- 1.524 m (5') 108.4 kg (239 lb)     Physical Exam  Constitutional:       Appearance: She is well-developed.   HENT:      Right Ear: External ear normal.      Left Ear: External ear normal.      Mouth/Throat:      Mouth: Mucous membranes are moist.      Pharynx: Oropharynx is clear. No oropharyngeal exudate or posterior oropharyngeal erythema.   Eyes:      General: No scleral icterus.     Extraocular Movements: Extraocular movements intact.      Conjunctiva/sclera: Conjunctivae normal.      Pupils: Pupils are equal, round, and reactive to light.   Cardiovascular:      Rate and Rhythm: Normal rate and regular rhythm.      Heart sounds: Normal heart sounds. No murmur heard.     No friction rub. No gallop.   Pulmonary:      Effort: Pulmonary effort is normal. No respiratory distress.      Breath sounds: Normal breath sounds. No stridor. No wheezing, rhonchi or rales.   Abdominal:      General: Bowel sounds are normal. There is no distension.      Palpations: Abdomen is soft. There is no mass.      Tenderness: There is no abdominal tenderness.   Musculoskeletal:         General: Normal range of motion.      Cervical back: Normal range of motion and neck supple.      Right lower leg: No edema.      Left lower leg: No edema.   Skin:     General: Skin is warm and dry.      Capillary Refill: Capillary refill takes less than 2 seconds.      Findings: No rash.   Neurological:      General: No  focal deficit present.      Mental Status: She is alert.      Cranial Nerves: No cranial nerve deficit.      Motor: No weakness.      Gait: Gait normal.           Diagnostics     Lab Results   Labs Ordered and Resulted from Time of ED Arrival to Time of ED Departure   BASIC METABOLIC PANEL - Abnormal       Result Value    Sodium 125 (*)     Potassium 4.3      Chloride 85 (*)     Carbon Dioxide (CO2) 30 (*)     Anion Gap 10      Urea Nitrogen 51.6 (*)     Creatinine 1.76 (*)     GFR Estimate 30 (*)     Calcium 8.8      Glucose 429 (*)    HEPATIC FUNCTION PANEL - Abnormal    Protein Total 6.4      Albumin 2.6 (*)     Bilirubin Total 1.3 (*)     Alkaline Phosphatase 200 (*)     AST 32      ALT 39      Bilirubin Direct 0.57 (*)    LACTIC ACID WHOLE BLOOD WITH 1X REPEAT IN 2 HR WHEN >2 - Abnormal    Lactic Acid, Initial 2.3 (*)    ROUTINE UA WITH MICROSCOPIC REFLEX TO CULTURE - Abnormal    Color Urine Light Yellow      Appearance Urine Clear      Glucose Urine 500 (*)     Bilirubin Urine Negative      Ketones Urine Negative      Specific Gravity Urine 1.010      Blood Urine Negative      pH Urine 6.5      Protein Albumin Urine Negative      Urobilinogen Urine Normal      Nitrite Urine Negative      Leukocyte Esterase Urine Large (*)     Bacteria Urine Few (*)     Mucus Urine Present (*)     RBC Urine 6 (*)     WBC Urine 10 (*)     Squamous Epithelials Urine 10 (*)     Hyaline Casts Urine 12 (*)    CBC WITH PLATELETS AND DIFFERENTIAL - Abnormal    WBC Count 9.0      RBC Count 3.63 (*)     Hemoglobin 12.0      Hematocrit 33.8 (*)     MCV 93      MCH 33.1 (*)     MCHC 35.5      RDW 12.6      Platelet Count 121 (*)     % Neutrophils 79      % Lymphocytes 10      % Monocytes 11      % Eosinophils 0      % Basophils 0      % Immature Granulocytes 1      NRBCs per 100 WBC 0      Absolute Neutrophils 7.1      Absolute Lymphocytes 0.9      Absolute Monocytes 1.0      Absolute Eosinophils 0.0      Absolute Basophils 0.0       Absolute Immature Granulocytes 0.1      Absolute NRBCs 0.0     BLOOD GAS VENOUS - Abnormal    pH Venous 7.45 (*)     pCO2 Venous 55 (*)     pO2 Venous 19 (*)     Bicarbonate Venous 38 (*)     Base Excess/Deficit Venous 11.9 (*)     FIO2 0      Oxyhemoglobin Venous 22 (*)     O2 Sat, Venous 22.5 (*)    GLUCOSE BY METER - Abnormal    GLUCOSE BY METER POCT 403 (*)    BASIC METABOLIC PANEL - Abnormal    Sodium 130 (*)     Potassium 3.7      Chloride 87 (*)     Carbon Dioxide (CO2) 30 (*)     Anion Gap 13      Urea Nitrogen 47.0 (*)     Creatinine 1.71 (*)     GFR Estimate 31 (*)     Calcium 8.8      Glucose 359 (*)    GLUCOSE BY METER - Abnormal    GLUCOSE BY METER POCT 317 (*)    LIPASE - Normal    Lipase 44     MAGNESIUM - Normal    Magnesium 1.9     KETONE BETA-HYDROXYBUTYRATE QUANTITATIVE, RAPID - Normal    Ketone (Beta-Hydroxybutyrate) Quantitative <0.18     LACTIC ACID WHOLE BLOOD - Normal    Lactic Acid 1.7     URINE CULTURE       Imaging   No orders to display       EKG   ECG results from 05/11/25   EKG 12-lead, tracing only     Value    Systolic Blood Pressure     Diastolic Blood Pressure     Ventricular Rate 69    Atrial Rate 69    OH Interval 170    QRS Duration 88        QTc 460    P Axis 6    R AXIS 7    T Axis 38    Interpretation ECG       Suspect electrode reversal: V2 V3 ; interpretation assumes no reversal  Sinus rhythm  Anterior infarct , age undetermined  Abnormal ECG  When compared with ECG of 15-Feb-2018 11:51, (unconfirmed)  Vent. rate has decreased by  34 bpm  Anterior infarct is now Present  Read by me at 1542       Independent Interpretation   None    ED Course      Medications Administered   Medications   sodium chloride 0.9% BOLUS 1,000 mL (0 mLs Intravenous Stopped 5/11/25 2001)   insulin aspart (NovoLOG) injection (RAPID ACTING) (5 Units Subcutaneous $Given 5/11/25 2002)       Procedures   Procedures     Discussion of Management   None    ED Course   ED Course as of 05/11/25 2045    Sun May 11, 2025   1527 I obtained history and examined the patient as noted above.        Additional Documentation  None    Medical Decision Making / Diagnosis     CMS Diagnoses: IV Antibiotics given and/or elevated Lactate of 2.3 and no sepsis note found - Delete this reminder and enter the sepsis note or '.edcms' before signing chart.>>>Lactic acid elevated due to dehydration. At this time there are no signs of sepsis or septic shock    MIPS   None    [facility]   Alma Kraft is a 74 year old female with poorly controlled diabetes who presents with above symptoms.  States that she has not been compliant with her diabetic diet.  Blood sugar was in the 400 here.  Ketones were negative.  She is definitely dehydrated based on the hyperglycemia worsening.  After hydration, her electrolytes improved as well as her blood sugar.  We gave her 5 units of insulin which brought her blood sugar down more.  I had a discussion with her regarding dietary changes and she states that she will try to do as much as she can.  She is encouraged to follow-up with her doctor this week.  She should continue off her medication.  Return precaution provided.  Patient voiced understanding.    Disposition   The patient was discharged.     Diagnosis     ICD-10-CM    1. Hyperglycemia due to diabetes mellitus (H)  E11.65       2. Dehydration  E86.0                Scribe Disclosure:  I, Gia Chavez, am serving as a scribe at 3:44 PM on 5/11/2025 to document services personally performed by Makayla Gupta MD based on my observations and the provider's statements to me.        Makayla Gupta MD  05/11/25 6436

## 2025-05-11 NOTE — ED TRIAGE NOTES
Pt comes in from home. Pt has been feeling unsteady and weak for the past 3 weeks. Pt states that they are dehydrated. Also reports some nausea. Systolic in 90's.

## 2025-05-12 LAB
ATRIAL RATE - MUSE: 69 BPM
DIASTOLIC BLOOD PRESSURE - MUSE: NORMAL MMHG
INTERPRETATION ECG - MUSE: NORMAL
P AXIS - MUSE: 6 DEGREES
PR INTERVAL - MUSE: 170 MS
QRS DURATION - MUSE: 88 MS
QT - MUSE: 430 MS
QTC - MUSE: 460 MS
R AXIS - MUSE: 7 DEGREES
SYSTOLIC BLOOD PRESSURE - MUSE: NORMAL MMHG
T AXIS - MUSE: 38 DEGREES
VENTRICULAR RATE- MUSE: 69 BPM

## 2025-05-12 NOTE — DISCHARGE INSTRUCTIONS
Drink plenty of water and non sugary beverages  Monitor your sugar closely  Follow up with your doctor in next 2-3 days

## 2025-05-13 ENCOUNTER — RESULTS FOLLOW-UP (OUTPATIENT)
Dept: NURSING | Facility: CLINIC | Age: 75
End: 2025-05-13

## 2025-05-13 LAB — BACTERIA UR CULT: NORMAL

## 2025-05-19 ENCOUNTER — TRANSFERRED RECORDS (OUTPATIENT)
Dept: FAMILY MEDICINE | Facility: CLINIC | Age: 75
End: 2025-05-19

## 2025-05-25 DIAGNOSIS — F32.5 MAJOR DEPRESSION IN REMISSION: ICD-10-CM

## 2025-05-25 DIAGNOSIS — G44.049 CHRONIC PAROXYSMAL HEMICRANIA, NOT INTRACTABLE: ICD-10-CM

## 2025-05-27 RX ORDER — NORTRIPTYLINE HYDROCHLORIDE 10 MG/1
10 CAPSULE ORAL AT BEDTIME
COMMUNITY
Start: 2025-05-27

## 2025-05-27 RX ORDER — VENLAFAXINE HYDROCHLORIDE 150 MG/1
150 CAPSULE, EXTENDED RELEASE ORAL DAILY
COMMUNITY
Start: 2025-05-27

## 2025-05-27 NOTE — TELEPHONE ENCOUNTER
Alma Kraft is requesting a refill of:    Refused Prescriptions:                       Disp   Refills    nortriptyline (PAMELOR) 10 MG capsule [Pha*                Sig: Take 1 capsule (10 mg) by mouth at bedtime  Refused By: RAVEN JENKINS  Reason for Refusal: Patient needs appointment    venlafaxine (EFFEXOR XR) 150 MG 24 hr caps*                Sig: Take 1 capsule (150 mg) by mouth daily  Refused By: RAVEN JENKINS  Reason for Refusal: Patient needs appointment    Will refill at upcoming appt on 05/30

## 2025-05-29 ENCOUNTER — OFFICE VISIT (OUTPATIENT)
Dept: FAMILY MEDICINE | Facility: CLINIC | Age: 75
End: 2025-05-29

## 2025-05-29 DIAGNOSIS — E11.9 TYPE 2 DIABETES MELLITUS WITHOUT COMPLICATION, WITH LONG-TERM CURRENT USE OF INSULIN (H): Primary | ICD-10-CM

## 2025-05-29 DIAGNOSIS — Z79.4 TYPE 2 DIABETES MELLITUS WITHOUT COMPLICATION, WITH LONG-TERM CURRENT USE OF INSULIN (H): Primary | ICD-10-CM

## 2025-05-29 RX ORDER — VENLAFAXINE HYDROCHLORIDE 150 MG/1
150 CAPSULE, EXTENDED RELEASE ORAL DAILY
Qty: 30 CAPSULE | Refills: 0 | Status: SHIPPED | OUTPATIENT
Start: 2025-05-29 | End: 2025-06-11

## 2025-05-29 NOTE — PROGRESS NOTES
SUBJECTIVE/OBJECTIVE:                Alma Kraft is a 74 year old female seen for a follow-up visit for Medication Management Services.  She was referred to me from Dr. Yuridia Simmons.     Chief Complaint: Follow up from [facility] visit on 01/30/2025.      Diabetes:  Current Medications:  Current Outpatient Medications   Medication Sig Dispense Refill    empagliflozin (JARDIANCE) 10 MG TABS tablet Take 1 tablet (10 mg) by mouth daily. 90 tablet 3    venlafaxine (EFFEXOR XR) 150 MG 24 hr capsule Take 1 capsule (150 mg) by mouth daily. 30 capsule 0    ASPIRIN 81 MG OR TABS 1 tab po QD (Once per day) 100 3    atorvastatin (LIPITOR) 10 MG tablet Take 1 tablet (10 mg) by mouth daily. 90 tablet 0    BD PEN NEEDLE ROSE 2ND GEN 32G X 4 MM miscellaneous USE 1 PEN NEEDLE DAILY OR AS DIRECTED 100 each 2    blood glucose (ONETOUCH VERIO IQ) test strip Use to test blood sugar one time daily or as directed. 100 strip 1    carvedilol (COREG) 25 MG tablet Take 1 tablet (25 mg) by mouth 2 times daily (with meals). 180 tablet 3    Elemental iron 65 mg Vitamin C 125 mg (VITRON C)  MG TABS tablet Take 1 tablet by mouth daily.      furosemide (LASIX) 20 MG tablet Take 1 tablet (20 mg) by mouth daily (Patient taking differently: Take 40 mg by mouth daily.) 90 tablet 3    glipiZIDE (GLUCOTROL XL) 10 MG 24 hr tablet Take 1 tablet (10 mg) by mouth 2 times daily. 180 tablet 0    insulin glargine 100 UNIT/ML pen Inject 50 Units subcutaneously every morning. 60 mL 0    Lancets (ONETOUCH DELICA PLUS [id number]) MISC USE 1 Lancet daily 100 each 1    linagliptin (TRADJENTA) 5 MG TABS tablet Take 1 tablet (5 mg) by mouth daily.      losartan-hydrochlorothiazide (HYZAAR) 100-25 MG tablet Take 1 tablet by mouth daily. 90 tablet 0    metroNIDAZOLE (METROGEL) 0.75 % external gel apply twice a day topically to the central face.*      Multiple Vitamins-Minerals (CENTRUM SILVER) per tablet 1 tablet 4 times weekly 100 tablet     MYRBETRIQ 50 MG 24 hr  tablet Take 1 tablet by mouth daily at 2 pm.      nortriptyline (PAMELOR) 10 MG capsule Take 1 capsule (10 mg) by mouth at bedtime. 90 capsule 0    ORDER FOR DME Equipment being ordered: Mediven plus compression stockings    93769  20-30 compression 3 Device 0    potassium chloride ER (K-TAB/KLOR-CON) 10 MEQ CR tablet Take 1 tablet (10 mEq) by mouth 2 times daily. 30 tablet 0     No current facility-administered medications for this visit.       We discussed the benefits and risks of each medication.  Patient Questions/Concerns:   Concern of compliance with medication due to fogginess. Discussed with patient and has been off of Effexor for a period and thinks this is causing fogginess.    Labs:  Lab Results   Component Value Date    A1C 12.4 05/30/2025    A1C 11.3 02/06/2025    A1C 10.7 11/06/2024    A1C 8.6 08/05/2024    A1C 9.7 05/06/2024    A1C 9.5 02/05/2024    A1C 8.0 10/16/2023    A1C 7.1 07/14/2023    A1C 7.5 03/27/2023    A1C 7.8 12/14/2022           ASSESSMENT/PLAN:                Diabetes:  Assessment: Patient comes to clinic today stating that she cannot remember if she has taken her medications many days, and has missed the last couple of appointments at the clinic.    Appointment tomorrow with Dr. Simmons. Having withdrawal side effects without Effexor so sending in month supply to get her started prior to her appointment with Dr. Simmons.    She had two shoulder injections. BG increased to the 300s-400s after each injection, early April and late April. She was not aware that this was correlating to injections.    Starting 05/12/2025 readings have been in the 130s-150s, occasionally increasing to the high 190s fasting. Due to the fact that patient documents only fasting levels, concerned that post prandial levels are quite elevated. She has been offered a CGM and is not interested in this option at this time. This would give us a better idea of insulin need with meals.    Nephrologist would like  Jardiance to be added back in. Reordering through  [facility]. Patient signed paperwork today and faxed in.    Will redraw labs tomorrow with Dr. Simmons. Consider discontinuing metformin if GFR not improved.    Status: Diabetes control suboptimal    Drug Therapy Problems:  1) Metformin likely will need to be discontinued due to kidney function.   2) Additional medication warranted due to A1c, however fasting reading have been close to goal.  3) Jardiance needs to be reinitiated. Sending paperwork to  [facility]  Plan:  1) Labs to be drawn tomorrow with Dr. Simmons.  2) Jardiance ordered from  [facility].  3) Would like to continue conversations with patient about a CGM and potentially initiate short acting insulin to help with post prandial readings as I suspect they are high.      I spent 30 minutes with this patient today.  All changes were made via collaborative practice agreement with Yuridia Simmons. A copy of the visit note was provided to the patient's primary care provider.    Romina Caro, PharmD  Clinical Pharmacist  [facility]  General Clinic Phone: [phone]  Direct Office Phone: [phone]

## 2025-05-30 ENCOUNTER — RESULTS FOLLOW-UP (OUTPATIENT)
Dept: FAMILY MEDICINE | Facility: CLINIC | Age: 75
End: 2025-05-30

## 2025-05-30 PROBLEM — N18.9 CHRONIC KIDNEY DISEASE, UNSPECIFIED CKD STAGE: Status: ACTIVE | Noted: 2024-08-05

## 2025-06-02 ENCOUNTER — PATIENT OUTREACH (OUTPATIENT)
Dept: CARE COORDINATION | Facility: CLINIC | Age: 75
End: 2025-06-02
Payer: MEDICARE

## 2025-06-02 ENCOUNTER — OFFICE VISIT (OUTPATIENT)
Dept: FAMILY MEDICINE | Facility: CLINIC | Age: 75
End: 2025-06-02

## 2025-06-02 VITALS
DIASTOLIC BLOOD PRESSURE: 80 MMHG | HEART RATE: 69 BPM | BODY MASS INDEX: 46.29 KG/M2 | TEMPERATURE: 97.8 F | OXYGEN SATURATION: 97 % | SYSTOLIC BLOOD PRESSURE: 124 MMHG | WEIGHT: 237 LBS

## 2025-06-02 DIAGNOSIS — N18.9 CHRONIC KIDNEY DISEASE, UNSPECIFIED CKD STAGE: ICD-10-CM

## 2025-06-02 DIAGNOSIS — E11.9 TYPE 2 DIABETES MELLITUS WITHOUT COMPLICATION, WITH LONG-TERM CURRENT USE OF INSULIN (H): ICD-10-CM

## 2025-06-02 DIAGNOSIS — Z79.4 TYPE 2 DIABETES MELLITUS WITHOUT COMPLICATION, WITH LONG-TERM CURRENT USE OF INSULIN (H): ICD-10-CM

## 2025-06-02 DIAGNOSIS — R79.9 ABNORMAL BLOOD CELL COUNT: Primary | ICD-10-CM

## 2025-06-02 DIAGNOSIS — K74.69 OTHER CIRRHOSIS OF LIVER (H): ICD-10-CM

## 2025-06-02 LAB
% GRANULOCYTES: 56.1 %
BUN SERPL-MCNC: 50 MG/DL (ref 7–25)
BUN/CREATININE RATIO: 22 (ref 6–32)
CALCIUM SERPL-MCNC: 9.4 MG/DL (ref 8.6–10.3)
CHLORIDE SERPLBLD-SCNC: 93.6 MMOL/L (ref 98–110)
CO2 SERPL-SCNC: 31.5 MMOL/L (ref 20–32)
CREAT SERPL-MCNC: 2.24 MG/DL (ref 0.6–1.3)
GFR SERPL CREATININE-BSD FRML MDRD: 22 ML/MIN/1.73M2
GLUCOSE SERPL-MCNC: 203 MG/DL (ref 60–99)
HCT VFR BLD AUTO: 33.1 % (ref 35–47)
HEMOGLOBIN: 10.5 G/DL (ref 11.7–15.7)
LYMPHOCYTES NFR BLD AUTO: 28.9 %
MCH RBC QN AUTO: 32.1 PG (ref 26–33)
MCHC RBC AUTO-ENTMCNC: 31.7 G/DL (ref 31–36)
MCV RBC AUTO: 101.3 FL (ref 78–100)
MONOCYTES NFR BLD AUTO: 15 %
PLATELET COUNT - QUEST: 132 10^9/L (ref 150–375)
POTASSIUM SERPL-SCNC: 3.21 MMOL/L (ref 3.5–5.3)
RBC # BLD AUTO: 3.27 10*12/L (ref 3.8–5.2)
SODIUM SERPL-SCNC: 134.3 MMOL/L (ref 135–146)
WBC # BLD AUTO: 4.3 10*9/L (ref 4–11)

## 2025-06-02 PROCEDURE — 80048 BASIC METABOLIC PNL TOTAL CA: CPT | Performed by: FAMILY MEDICINE

## 2025-06-02 PROCEDURE — 36415 COLL VENOUS BLD VENIPUNCTURE: CPT | Performed by: FAMILY MEDICINE

## 2025-06-02 PROCEDURE — 85025 COMPLETE CBC W/AUTO DIFF WBC: CPT | Performed by: FAMILY MEDICINE

## 2025-06-02 PROCEDURE — 99213 OFFICE O/P EST LOW 20 MIN: CPT | Performed by: FAMILY MEDICINE

## 2025-06-02 PROCEDURE — G2211 COMPLEX E/M VISIT ADD ON: HCPCS | Performed by: FAMILY MEDICINE

## 2025-06-02 NOTE — NURSING NOTE
Chief Complaint   Patient presents with    RECHECK     Recheck BMP, discontinued metformin     Pre-visit Screening:  Immunizations:  up to date  Colonoscopy:  is up to date  Mammogram: is up to date  Asthma Action Test/Plan:  NA  PHQ9:  NA  GAD7:  NA  Questioned patient about current smoking habits Pt. has never smoked.  Ok to leave detailed message on voice mail for today's visit only Yes, phone # [phone]

## 2025-06-02 NOTE — PROGRESS NOTES
Assessment & Plan   Problem List Items Addressed This Visit       Type 2 diabetes mellitus without complication, with long-term current use of insulin (H)    Other cirrhosis of liver (H)    Chronic kidney disease, unspecified CKD stage--followed by kidney specialist    Relevant Orders    Basic Metabolic Panel (BFP)     Other Visit Diagnoses         Abnormal blood cell count    -  Primary    Relevant Orders    VENOUS COLLECTION (Completed)    HEMOGRAM PLATELET DIFF (BFP) (Completed)           1. Abnormal blood cell count (Primary)  Recheck today.  - VENOUS COLLECTION  - HEMOGRAM PLATELET DIFF (BFP)    2. Chronic kidney disease, unspecified CKD stage--followed by kidney specialist  Followed by kidney specialist. I will recheck today, encouraged to drink more fluids, followup with kidney specialist.  - Basic Metabolic Panel (BFP)    3. Type 2 diabetes mellitus without complication, with long-term current use of insulin (H)  Not controlled. She was referred to see diabetes educator. She has stopped her metformin due to abnormal kidney function.    4. Other cirrhosis of liver (H)              BMI  Estimated body mass index is 46.29 kg/m  as calculated from the following:    Height as of 5/11/25: 1.524 m (5').    Weight as of this encounter: 107.5 kg (237 lb).         FUTURE APPOINTMENTS:       - Follow-up visit as needed or when due for medication check. We manage her chronic medical care.    No follow-ups on file.    Yuridia Simmons MD  [facility]    Subjective     Nursing Notes:   Latha Watts CMA  6/2/2025 10:54 AM  Signed  Chief Complaint   Patient presents with    RECHECK     Recheck BMP, discontinued metformin     Pre-visit Screening:  Immunizations:  up to date  Colonoscopy:  is up to date  Mammogram: is up to date  Asthma Action Test/Plan:  NA  PHQ9:  NA  GAD7:  NA  Questioned patient about current smoking habits Pt. has never smoked.  Ok to leave detailed message on voice mail for  today's visit only Yes, phone # [phone]       Alma Kraft is a 74 year old female who presents to clinic today for the following health issues  HPI     Here for a followup apt to apt from last week.  She had low blood counts: wbc, hgb and platelets. Needs this rechecked.  Also had very abnormal kidney function, followed by kidney specialist but this was worse. She doesn't drink much for fluids. Doesn't know what to drink also had low K, was started on potassium supplement and is now taking this.   Needs to recheck this today. Stopped her metformin        Review of Systems   Constitutional, HEENT, cardiovascular, pulmonary, gi and gu systems are negative, except as otherwise noted.      Objective    /80 (BP Location: Right arm, Patient Position: Sitting, Cuff Size: Adult Large)   Pulse 69   Temp 97.8  F (36.6  C) (Temporal)   Wt 107.5 kg (237 lb)   LMP 05/08/2001   SpO2 97%   BMI 46.29 kg/m    Body mass index is 46.29 kg/m .  Physical Exam   GENERAL: alert and no distress  MS: no gross musculoskeletal defects noted, no edema  PSYCH: mentation appears normal, affect normal/bright    Results for orders placed or performed in visit on 06/02/25   HEMOGRAM PLATELET DIFF (BFP)     Status: Abnormal   Result Value Ref Range    WBC 4.3 4.0 - 11 10*9/L    RBC Count 3.27 (A) 3.8 - 5.2 10*12/L    Hemoglobin 10.5 (A) 11.7 - 15.7 g/dL    Hematocrit 33.1 (A) 35.0 - 47.0 %    .3 (A) 78 - 100 fL    MCH 32.1 26 - 33 pg    MCHC 31.7 31 - 36 g/dL    Platelet Count 132 (A) 150 - 375 10^9/L    % Granulocytes 56.1 %    % Lymphocytes 28.9 %    % Monocytes 15.0 %

## 2025-06-03 ENCOUNTER — RESULTS FOLLOW-UP (OUTPATIENT)
Dept: FAMILY MEDICINE | Facility: CLINIC | Age: 75
End: 2025-06-03

## 2025-06-17 DIAGNOSIS — E87.6 HYPOKALEMIA: ICD-10-CM

## 2025-06-17 LAB
BUN SERPL-MCNC: 14 MG/DL (ref 7–25)
BUN/CREATININE RATIO: 13 (ref 6–32)
CALCIUM SERPL-MCNC: 8.5 MG/DL (ref 8.6–10.3)
CHLORIDE SERPLBLD-SCNC: 99.8 MMOL/L (ref 98–110)
CO2 SERPL-SCNC: 28.8 MMOL/L (ref 20–32)
CREAT SERPL-MCNC: 1.05 MG/DL (ref 0.6–1.3)
GLUCOSE SERPL-MCNC: 354 MG/DL (ref 60–99)
POTASSIUM SERPL-SCNC: 4.48 MMOL/L (ref 3.5–5.3)
SODIUM SERPL-SCNC: 134.1 MMOL/L (ref 135–146)

## 2025-06-17 PROCEDURE — 36415 COLL VENOUS BLD VENIPUNCTURE: CPT | Performed by: FAMILY MEDICINE

## 2025-06-17 PROCEDURE — 80048 BASIC METABOLIC PNL TOTAL CA: CPT | Performed by: FAMILY MEDICINE

## 2025-06-18 ENCOUNTER — RESULTS FOLLOW-UP (OUTPATIENT)
Dept: FAMILY MEDICINE | Facility: CLINIC | Age: 75
End: 2025-06-18

## 2025-06-19 ENCOUNTER — TRANSFERRED RECORDS (OUTPATIENT)
Dept: FAMILY MEDICINE | Facility: CLINIC | Age: 75
End: 2025-06-19

## 2025-07-02 ENCOUNTER — ALLIED HEALTH/NURSE VISIT (OUTPATIENT)
Dept: EDUCATION SERVICES | Facility: CLINIC | Age: 75
End: 2025-07-02
Attending: FAMILY MEDICINE
Payer: MEDICARE

## 2025-07-02 DIAGNOSIS — Z79.4 TYPE 2 DIABETES MELLITUS WITHOUT COMPLICATION, WITH LONG-TERM CURRENT USE OF INSULIN (H): ICD-10-CM

## 2025-07-02 DIAGNOSIS — E11.9 TYPE 2 DIABETES MELLITUS WITHOUT COMPLICATION, WITH LONG-TERM CURRENT USE OF INSULIN (H): ICD-10-CM

## 2025-07-02 PROCEDURE — G0108 DIAB MANAGE TRN  PER INDIV: HCPCS | Performed by: DIETITIAN, REGISTERED

## 2025-07-02 NOTE — PATIENT INSTRUCTIONS
MY DIABETES TODAY:    1)  Goal A1C is under <7.0  Mine is:      Lab Results   Component Value Date    A1C 12.4 05/30/2025    A1C 9.5 02/05/2024       2)  Goal LDL (bad cholesterol) under 100  (measured at least yearly)- I am currently at:   Lab Results   Component Value Date    LDL 55 07/14/2023    LDL 87 02/21/2019       3)  Goal blood pressure under 130/80- mine was Data Unavailable today    Care Plan:  Follow carb controlled diet using My Plate method.  Avoid having both cereal and pastry at breakfast, and chooose between fruit and fruit juice.  Test blood sugar 3x/day for the next week (fasting, 2 hours post-breakfast and 2 hours post-dinner).  Bring results to PCP or Romina.  Follow-up in 6 months.    Follow up:  Follow-up for annual diabetes education review in 1 year or sooner, if needed.     Bring blood glucose meter and logbook with you to all doctor and follow-up appointments.     [place] [facility] for the [facility]:  For Your Diabetes or Nutrition Education Appointments Call:  [phone]   For Diabetes or Nutrition Related Questions:   [phone]  Send [employer] Message   If you need a medication refill please contact your pharmacy. Please allow 3 business days for your refills to be completed.

## 2025-07-02 NOTE — PROGRESS NOTES
Diabetes Self-Management Education & Support    Presents for: Individual review    Type of Service: In Person Visit      Assessment  Patient presents for diabetes education review visit.  Last seen April 2024.    Patient is working with [facility] at  [facility] for diabetes medication adjustments.  Testing glucose once daily, fasting.  Did not bring meter.  Patient would like to discuss healthy eating.  She states she has difficulty eating low carb and low sodium.  Patient notes that her  and son bring sweets/pastries in the home and she has difficulty avoiding this foods.  Also prefers regular pop - does not like the taste of aspartame.  Patient is followed by nephrology.      Patient's most recent   Lab Results   Component Value Date    A1C 12.4 05/30/2025    A1C 9.5 02/05/2024     is not meeting goal of <7.0    Diabetes knowledge and skills assessment:   Patient is knowledgeable in diabetes management concepts related to: Healthy Eating, Monitoring, Taking Medication, and Reducing Risks    Based on learning assessment above, most appropriate setting for further diabetes education would be: Individual setting.    Care Plan and Education Provided:  Healthy Eating: Balanced meals, Carbohydrate Counting, Consistency in amount and timing of carbohydrate intake, Plate planning method, and Portion control  Monitoring: Frequency of monitoring, Individual glucose targets, and Log and interpret results  Problem Solving: High glucose - causes, signs/symptoms, treatment and prevention  Healthy Coping: Benefits of making appropriate lifestyle changes    Patient verbalized understanding of diabetes self-management education concepts discussed, opportunities for ongoing education and support, and recommendations provided today.    Plan    Follow carb controlled diet using My Plate method.  Avoid having both cereal and pastry at breakfast, and chooose between fruit and fruit juice.  Test blood sugar 3x/day for the  next week (fasting, 2 hours post-breakfast and 2 hours post-dinner).  Bring results to PCP or Romina.  Follow-up in 6 months.    Topics to cover at upcoming visits: Problem Solving    See Care Plan for co-developed, patient-state behavior change goals.    Education Materials Provided:  - My Plate Planner   - Blood Glucose Log Sheet       Subjective/Objective  Jeny is an 74 year old, presenting for the following diabetes education related to: Individual review  Accompanied by: Self  Diabetes education in the past 24mo: Yes  Focus of Visit: Healthy Eating  Diabetes type: Type 2  Disease course: Getting harder to manage  How confident are you filling out medical forms by yourself:: Not Assessed  Transportation concerns: No  Other concerns: None  Cultural Influences/Ethnic Background:  Not  or       Diabetes Symptoms & Complications:  Disease course: Getting harder to manage  Complications assessed today?: No    Patient Problem List and Family Medical History reviewed for relevant medical history, current medical status, and diabetes risk factors.    Vitals:  LMP 05/08/2001   Estimated body mass index is 46.87 kg/m  as calculated from the following:    Height as of 5/11/25: 1.524 m (5').    Weight as of 6/11/25: 108.9 kg (240 lb).   Last 3 BP:   BP Readings from Last 3 Encounters:   06/11/25 132/80   06/02/25 124/80   05/30/25 118/74       History   Smoking Status    Never   Smokeless Tobacco    Never       Labs:  Lab Results   Component Value Date    A1C 12.4 05/30/2025    A1C 9.5 02/05/2024     Lab Results   Component Value Date     06/17/2025     Lab Results   Component Value Date    LDL 55 07/14/2023    LDL 87 02/21/2019     HDL Cholesterol   Date Value Ref Range Status   02/06/2025 42 40 - 150 mg/dL Final     GFR Estimate   Date Value Ref Range Status   06/02/2025 22 (A) ml/min/1.73m2 Final     GFR Estimate If Black   Date Value Ref Range Status   02/11/2018 >90 >60 mL/min/1.7m2 Final      Comment:      GFR Calc     Lab Results   Component Value Date    CR 1.05 2025     Lab Results   Component Value Date    UMALCR  (A) 2025    UCRR 50 2021   Healthy Eating   Healthy Eating Assessed Today Yes   Cultural/Quaker diet restrictions? No   Do you have any food allergies or intolerances? No   Who cooks/prepares meals for you? Self   Who purchases food in  your home? Spouse   How many times a week on average do you eat food made away from home (restaurant/take-out)? 1       fish/chicken, salad OR rolon salad   Meals include Breakfast;Dinner;Afternoon Snack   Breakfast 10am-2pm: coffeecake/pastries, cereal, fruit, OJ OR fruit (melon), OJ, cereal (cheerios, honey bunches of oats), milk   Lunch sips on Coke or Root Beer   Dinner 7-9pm:  canned soup OR frozen entree OR PBJ sandwich OR tacos   Snacks 4pm: melon OR cheese + crackers OR string cheese   Beverages Water;Juice;Soda;Milk       12-16 oz Coke or Root Beer   Has patient met with a dietitian in the past? Yes         2025   Monitoring   Monitoring Assessed Today Yes   Did patient bring glucose meter to appointment?  No   Times checking blood sugar at home (number) 1   Times checking blood sugar at home (per) Day       Fastin-190 during the last week           2025   Problem Solving   Problem Solving Assessed Today No           2025   Healthy Coping: Diabetes Distress Assessment   Healthy Coping Assessed Today Yes   Informal Support system: Spouse       NAILA Ramirez [facility]    Time Spent: 60 minutes  Encounter Type: Individual    Any diabetes medication dose changes were made via the [facility] Standing Orders under the patient's referring provider.

## 2025-07-02 NOTE — LETTER
7/2/2025         RE: Alma Kraft  [address]  [place] [address]        Dear Colleague,    Thank you for referring your patient, Alma Kraft, to the [facility]. Please see a copy of my visit note below.    Diabetes Self-Management Education & Support    Presents for: Individual review    Type of Service: In Person Visit      Assessment  Patient presents for diabetes education review visit.  Last seen April 2024.    Patient is working with Romina at  [facility] for diabetes medication adjustments.  Testing glucose once daily, fasting.  Did not bring meter.  Patient would like to discuss healthy eating.  She states she has difficulty eating low carb and low sodium.  Patient notes that her  and son bring sweets/pastries in the home and she has difficulty avoiding this foods.  Also prefers regular pop - does not like the taste of aspartame.  Patient is followed by nephrology.      Patient's most recent   Lab Results   Component Value Date    A1C 12.4 05/30/2025    A1C 9.5 02/05/2024     is not meeting goal of <7.0    Diabetes knowledge and skills assessment:   Patient is knowledgeable in diabetes management concepts related to: Healthy Eating, Monitoring, Taking Medication, and Reducing Risks    Based on learning assessment above, most appropriate setting for further diabetes education would be: Individual setting.    Care Plan and Education Provided:  Healthy Eating: Balanced meals, Carbohydrate Counting, Consistency in amount and timing of carbohydrate intake, Plate planning method, and Portion control  Monitoring: Frequency of monitoring, Individual glucose targets, and Log and interpret results  Problem Solving: High glucose - causes, signs/symptoms, treatment and prevention  Healthy Coping: Benefits of making appropriate lifestyle changes    Patient verbalized understanding of diabetes self-management education concepts discussed, opportunities for  ongoing education and support, and recommendations provided today.    Plan    Follow carb controlled diet using My Plate method.  Avoid having both cereal and pastry at breakfast, and chooose between fruit and fruit juice.  Test blood sugar 3x/day for the next week (fasting, 2 hours post-breakfast and 2 hours post-dinner).  Bring results to PCP or Romina.  Follow-up in 6 months.    Topics to cover at upcoming visits: Problem Solving    See Care Plan for co-developed, patient-state behavior change goals.    Education Materials Provided:  - My Plate Planner   - Blood Glucose Log Sheet       Subjective/Objective  Jeny is an 74 year old, presenting for the following diabetes education related to: Individual review  Accompanied by: Self  Diabetes education in the past 24mo: Yes  Focus of Visit: Healthy Eating  Diabetes type: Type 2  Disease course: Getting harder to manage  How confident are you filling out medical forms by yourself:: Not Assessed  Transportation concerns: No  Other concerns: None  Cultural Influences/Ethnic Background:  Not  or       Diabetes Symptoms & Complications:  Disease course: Getting harder to manage  Complications assessed today?: No    Patient Problem List and Family Medical History reviewed for relevant medical history, current medical status, and diabetes risk factors.    Vitals:  LMP 05/08/2001   Estimated body mass index is 46.87 kg/m  as calculated from the following:    Height as of 5/11/25: 1.524 m (5').    Weight as of 6/11/25: 108.9 kg (240 lb).   Last 3 BP:   BP Readings from Last 3 Encounters:   06/11/25 132/80   06/02/25 124/80   05/30/25 118/74       History   Smoking Status     Never   Smokeless Tobacco     Never       Labs:  Lab Results   Component Value Date    A1C 12.4 05/30/2025    A1C 9.5 02/05/2024     Lab Results   Component Value Date     06/17/2025     Lab Results   Component Value Date    LDL 55 07/14/2023    LDL 87 02/21/2019     HDL Cholesterol    Date Value Ref Range Status   2025 42 40 - 150 mg/dL Final     GFR Estimate   Date Value Ref Range Status   2025 22 (A) ml/min/1.73m2 Final     GFR Estimate If Black   Date Value Ref Range Status   2018 >90 >60 mL/min/1.7m2 Final     Comment:      GFR Calc     Lab Results   Component Value Date    CR 1.05 2025     Lab Results   Component Value Date    UMALCR  (A) 2025    UCRR 50 2021   Healthy Eating   Healthy Eating Assessed Today Yes   Cultural/Islam diet restrictions? No   Do you have any food allergies or intolerances? No   Who cooks/prepares meals for you? Self   Who purchases food in  your home? Spouse   How many times a week on average do you eat food made away from home (restaurant/take-out)? 1       fish/chicken, salad OR rolon salad   Meals include Breakfast;Dinner;Afternoon Snack   Breakfast 10am-2pm: coffeecake/pastries, cereal, fruit, OJ OR fruit (melon), OJ, cereal (cheerios, honey bunches of oats), milk   Lunch sips on Coke or Root Beer   Dinner 7-9pm:  canned soup OR frozen entree OR PBJ sandwich OR tacos   Snacks 4pm: melon OR cheese + crackers OR string cheese   Beverages Water;Juice;Soda;Milk       12-16 oz Coke or Root Beer   Has patient met with a dietitian in the past? Yes         2025   Monitoring   Monitoring Assessed Today Yes   Did patient bring glucose meter to appointment?  No   Times checking blood sugar at home (number) 1   Times checking blood sugar at home (per) Day       Fastin-190 during the last week           2025   Problem Solving   Problem Solving Assessed Today No           2025   Healthy Coping: Diabetes Distress Assessment   Healthy Coping Assessed Today Yes   Informal Support system: Spouse       NAILA Ramirez [facility]    Time Spent: 60 minutes  Encounter Type: Individual    Any diabetes medication dose changes were made via the [facility] Standing Orders under the patient's  referring provider.

## 2025-07-08 NOTE — PROGRESS NOTES
Assessment & Plan   Problem List Items Addressed This Visit       Type 2 diabetes mellitus without complication, with long-term current use of insulin (H)    Chronic kidney disease, unspecified CKD stage--followed by kidney specialist - Primary     Other Visit Diagnoses         Neck mass          Anxiety          Fluid retention               1. Chronic kidney disease, unspecified CKD stage--followed by kidney specialist (Primary)  She is concerned about more fluid retention, her kidney function appears to have improved. She should contact her kidney specialist for next steps.    2. Type 2 diabetes mellitus without complication, with long-term current use of insulin (H)  Her blood sugars sound improved, continue working with her diabetes educator.    3. Neck mass  She had a neck ct and was referred to see ent, she hasn't heard from them, phone number given to her.    4. Anxiety  She is still having some anxiety that is affecting her eating that raised her blood sugars. Discuss further with her diabetes educator on ways to mitigate this. Also advised to start seeing her therapist again to discuss ways to manage this she will call to schedule. Offered to increase dose of anxiety medication--hold off on changes for now.    5. Fluid retention  She has kidney and liver disease. She will call and get in touch with her kidney doctor and her liver specialist. Recently had labs done.            BMI  Estimated body mass index is 47.26 kg/m  as calculated from the following:    Height as of 5/11/25: 1.524 m (5').    Weight as of this encounter: 109.8 kg (242 lb).         FUTURE APPOINTMENTS:       - Follow-up visit in August. We manage her chronic medical care.    No follow-ups on file.    Yuridia Simmons MD  [facility]    Subjective     Nursing Notes:   Latha Watts, CARLENE  7/9/2025  1:15 PM  Signed  Chief Complaint   Patient presents with    RECHECK     Recheck sodium, she has been checking glucose 1x a  day, glucose has been doing ok, under stress/ anxiety causing her to eat      Pre-visit Screening:  Immunizations:  up to date  Colonoscopy:  is up to date  Mammogram: is up to date  Asthma Action Test/Plan:  NA  PHQ9:  NA  GAD7:  NA  Questioned patient about current smoking habits Pt. has never smoked.  Ok to leave detailed message on voice mail for today's visit only Yes, phone # [phone]       Alma Kraft is a 74 year old female who presents to clinic today for the following health issues   HPI     Here to followup on her diabetes. Had a very high hgba1c. Saw diabetes ed last week. Now her blood sugars in 80sand 100s. Had a coke yesterday, then today it was up to 145.   When things get crazy at home, gets anxious and then starts to eat more. When she has more family stress.  She has restarted her effexor, had run out of it for a few weeks. She sees a therapist already. Thnks the dose of effexor is ok and doesn't want to increase this. This helps to see her and she will call to schedule her next apt.    Had a lump in the right neck. She did a ct scan.  Was then referred to see ent.  She didn't hear from them.    She feels that she is retaining fluid. Not sure what to do next. Sees kidney specialist. Also sees GI for liver disease. Last time she saw [facility]--had lfts done 05.2025.   Last kidney functions 06.17.2025      Review of Systems   Constitutional, HEENT, cardiovascular, pulmonary, gi and gu systems are negative, except as otherwise noted.      Objective    /82 (BP Location: Right arm, Patient Position: Sitting, Cuff Size: Adult Large)   Pulse 80   Temp 98.3  F (36.8  C) (Temporal)   Wt 109.8 kg (242 lb)   LMP 05/08/2001   SpO2 97%   BMI 47.26 kg/m    Body mass index is 47.26 kg/m .  Physical Exam

## 2025-07-09 ENCOUNTER — OFFICE VISIT (OUTPATIENT)
Dept: FAMILY MEDICINE | Facility: CLINIC | Age: 75
End: 2025-07-09

## 2025-07-09 VITALS
TEMPERATURE: 98.3 F | DIASTOLIC BLOOD PRESSURE: 82 MMHG | WEIGHT: 242 LBS | BODY MASS INDEX: 47.26 KG/M2 | SYSTOLIC BLOOD PRESSURE: 132 MMHG | HEART RATE: 80 BPM | OXYGEN SATURATION: 97 %

## 2025-07-09 DIAGNOSIS — R22.1 NECK MASS: ICD-10-CM

## 2025-07-09 DIAGNOSIS — N18.9 CHRONIC KIDNEY DISEASE, UNSPECIFIED CKD STAGE: Primary | ICD-10-CM

## 2025-07-09 DIAGNOSIS — F41.9 ANXIETY: ICD-10-CM

## 2025-07-09 DIAGNOSIS — E11.9 TYPE 2 DIABETES MELLITUS WITHOUT COMPLICATION, WITH LONG-TERM CURRENT USE OF INSULIN (H): ICD-10-CM

## 2025-07-09 DIAGNOSIS — Z79.4 TYPE 2 DIABETES MELLITUS WITHOUT COMPLICATION, WITH LONG-TERM CURRENT USE OF INSULIN (H): ICD-10-CM

## 2025-07-09 DIAGNOSIS — R60.9 FLUID RETENTION: ICD-10-CM

## 2025-07-09 PROCEDURE — 99214 OFFICE O/P EST MOD 30 MIN: CPT | Performed by: FAMILY MEDICINE

## 2025-07-09 PROCEDURE — G2211 COMPLEX E/M VISIT ADD ON: HCPCS | Performed by: FAMILY MEDICINE

## 2025-07-09 NOTE — PATIENT INSTRUCTIONS
[facility]  [address] [address]  [address] [place] [id number]  [phone] - appt line  [phone] - fax

## 2025-07-09 NOTE — NURSING NOTE
Chief Complaint   Patient presents with    RECHECK     Recheck sodium, she has been checking glucose 1x a day, glucose has been doing ok, under stress/ anxiety causing her to eat      Pre-visit Screening:  Immunizations:  up to date  Colonoscopy:  is up to date  Mammogram: is up to date  Asthma Action Test/Plan:  NA  PHQ9:  NA  GAD7:  NA  Questioned patient about current smoking habits Pt. has never smoked.  Ok to leave detailed message on voice mail for today's visit only Yes, phone # [phone]

## 2025-08-05 ENCOUNTER — HOSPITAL ENCOUNTER (OUTPATIENT)
Dept: MAMMOGRAPHY | Facility: CLINIC | Age: 75
Discharge: HOME | End: 2025-08-05
Attending: FAMILY MEDICINE
Payer: MEDICARE

## 2025-08-05 DIAGNOSIS — Z12.31 SCREENING MAMMOGRAM FOR BREAST CANCER: ICD-10-CM

## 2025-08-05 PROCEDURE — 77063 BREAST TOMOSYNTHESIS BI: CPT

## 2025-08-11 ENCOUNTER — TRANSFERRED RECORDS (OUTPATIENT)
Dept: FAMILY MEDICINE | Facility: CLINIC | Age: 75
End: 2025-08-11

## 2025-08-14 ENCOUNTER — TRANSFERRED RECORDS (OUTPATIENT)
Dept: FAMILY MEDICINE | Facility: CLINIC | Age: 75
End: 2025-08-14

## 2025-09-04 ENCOUNTER — OFFICE VISIT (OUTPATIENT)
Dept: FAMILY MEDICINE | Facility: CLINIC | Age: 75
End: 2025-09-04

## 2025-09-04 DIAGNOSIS — E11.9 TYPE 2 DIABETES MELLITUS WITHOUT COMPLICATION, WITH LONG-TERM CURRENT USE OF INSULIN (H): Primary | ICD-10-CM

## 2025-09-04 DIAGNOSIS — Z79.4 TYPE 2 DIABETES MELLITUS WITHOUT COMPLICATION, WITH LONG-TERM CURRENT USE OF INSULIN (H): Primary | ICD-10-CM

## 2025-09-04 LAB — HEMOGLOBIN A1C: 6.7 % (ref 4–5.6)

## 2025-09-04 RX ORDER — LINAGLIPTIN 5 MG/1
5 TABLET, FILM COATED ORAL DAILY
COMMUNITY
Start: 2025-09-04

## (undated) DEVICE — KIT PROCEDURE W/CLEAN-A-SCOPE LINERS V2 200800

## (undated) DEVICE — BAG RED BIOHAZARD 37X50" 40GAL A7450PR

## (undated) DEVICE — PAD CHUX UNDERPAD 23X24" 7136

## (undated) DEVICE — SUCTION CANISTER MEDIVAC LINER 3000ML W/LID 65651-530

## (undated) DEVICE — TUBING SUCTION MEDI-VAC SOFT 3/16"X20' N520A

## (undated) DEVICE — SOL WATER IRRIG 1000ML BOTTLE 2F7114

## (undated) DEVICE — BAG CLEAR TRASH 1.3M 39X33" P4040C

## (undated) RX ORDER — DEXAMETHASONE SODIUM PHOSPHATE 4 MG/ML
INJECTION, SOLUTION INTRA-ARTICULAR; INTRALESIONAL; INTRAMUSCULAR; INTRAVENOUS; SOFT TISSUE
Status: DISPENSED
Start: 2022-01-24

## (undated) RX ORDER — LIDOCAINE HYDROCHLORIDE 10 MG/ML
INJECTION, SOLUTION EPIDURAL; INFILTRATION; INTRACAUDAL; PERINEURAL
Status: DISPENSED
Start: 2022-01-24

## (undated) RX ORDER — FENTANYL CITRATE 50 UG/ML
INJECTION, SOLUTION INTRAMUSCULAR; INTRAVENOUS
Status: DISPENSED
Start: 2022-01-24

## (undated) RX ORDER — GLYCOPYRROLATE 0.2 MG/ML
INJECTION INTRAMUSCULAR; INTRAVENOUS
Status: DISPENSED
Start: 2022-01-24

## (undated) RX ORDER — PROPOFOL 10 MG/ML
INJECTION, EMULSION INTRAVENOUS
Status: DISPENSED
Start: 2022-01-24

## (undated) RX ORDER — ONDANSETRON 2 MG/ML
INJECTION INTRAMUSCULAR; INTRAVENOUS
Status: DISPENSED
Start: 2022-01-24